# Patient Record
Sex: FEMALE | Race: WHITE | NOT HISPANIC OR LATINO | Employment: OTHER | URBAN - METROPOLITAN AREA
[De-identification: names, ages, dates, MRNs, and addresses within clinical notes are randomized per-mention and may not be internally consistent; named-entity substitution may affect disease eponyms.]

---

## 2017-01-01 ENCOUNTER — APPOINTMENT (OUTPATIENT)
Dept: GENERAL RADIOLOGY | Facility: HOSPITAL | Age: 82
End: 2017-01-01

## 2017-01-01 LAB
ALBUMIN SERPL-MCNC: 3.8 G/DL (ref 3.5–5.2)
ALBUMIN/GLOB SERPL: 1.5 G/DL
ALP SERPL-CCNC: 40 U/L (ref 39–117)
ALT SERPL W P-5'-P-CCNC: 17 U/L (ref 1–33)
ANION GAP SERPL CALCULATED.3IONS-SCNC: 14.2 MMOL/L
AST SERPL-CCNC: 18 U/L (ref 1–32)
BASOPHILS # BLD AUTO: 0.02 10*3/MM3 (ref 0–0.2)
BASOPHILS NFR BLD AUTO: 0.4 % (ref 0–1.5)
BILIRUB SERPL-MCNC: 0.3 MG/DL (ref 0.1–1.2)
BUN BLD-MCNC: 50 MG/DL (ref 8–23)
BUN/CREAT SERPL: 31.4 (ref 7–25)
CALCIUM SPEC-SCNC: 9.8 MG/DL (ref 8.6–10.5)
CHLORIDE SERPL-SCNC: 98 MMOL/L (ref 98–107)
CO2 SERPL-SCNC: 28.8 MMOL/L (ref 22–29)
CREAT BLD-MCNC: 1.59 MG/DL (ref 0.57–1)
DEPRECATED RDW RBC AUTO: 55 FL (ref 37–54)
EOSINOPHIL # BLD AUTO: 0.02 10*3/MM3 (ref 0–0.7)
EOSINOPHIL NFR BLD AUTO: 0.4 % (ref 0.3–6.2)
ERYTHROCYTE [DISTWIDTH] IN BLOOD BY AUTOMATED COUNT: 15.7 % (ref 11.7–13)
GFR SERPL CREATININE-BSD FRML MDRD: 31 ML/MIN/1.73
GLOBULIN UR ELPH-MCNC: 2.6 GM/DL
GLUCOSE BLD-MCNC: 95 MG/DL (ref 65–99)
HCT VFR BLD AUTO: 36.8 % (ref 35.6–45.5)
HGB BLD-MCNC: 11.5 G/DL (ref 11.9–15.5)
IMM GRANULOCYTES # BLD: 0 10*3/MM3 (ref 0–0.03)
IMM GRANULOCYTES NFR BLD: 0 % (ref 0–0.5)
LYMPHOCYTES # BLD AUTO: 0.69 10*3/MM3 (ref 0.9–4.8)
LYMPHOCYTES NFR BLD AUTO: 13.8 % (ref 19.6–45.3)
MAGNESIUM SERPL-MCNC: 2.6 MG/DL (ref 1.6–2.4)
MCH RBC QN AUTO: 30.3 PG (ref 26.9–32)
MCHC RBC AUTO-ENTMCNC: 31.3 G/DL (ref 32.4–36.3)
MCV RBC AUTO: 97.1 FL (ref 80.5–98.2)
MONOCYTES # BLD AUTO: 0.49 10*3/MM3 (ref 0.2–1.2)
MONOCYTES NFR BLD AUTO: 9.8 % (ref 5–12)
NEUTROPHILS # BLD AUTO: 3.79 10*3/MM3 (ref 1.9–8.1)
NEUTROPHILS NFR BLD AUTO: 75.6 % (ref 42.7–76)
PLATELET # BLD AUTO: 228 10*3/MM3 (ref 140–500)
PMV BLD AUTO: 10.4 FL (ref 6–12)
POTASSIUM BLD-SCNC: 4.6 MMOL/L (ref 3.5–5.2)
PROT SERPL-MCNC: 6.4 G/DL (ref 6–8.5)
RBC # BLD AUTO: 3.79 10*6/MM3 (ref 3.9–5.2)
SODIUM BLD-SCNC: 141 MMOL/L (ref 136–145)
TROPONIN T SERPL-MCNC: 0.02 NG/ML (ref 0–0.03)
WBC NRBC COR # BLD: 5.01 10*3/MM3 (ref 4.5–10.7)

## 2017-01-01 PROCEDURE — 84484 ASSAY OF TROPONIN QUANT: CPT | Performed by: EMERGENCY MEDICINE

## 2017-01-01 PROCEDURE — 71010 HC CHEST PA OR AP: CPT

## 2017-01-01 PROCEDURE — 82550 ASSAY OF CK (CPK): CPT | Performed by: HOSPITALIST

## 2017-01-01 PROCEDURE — 99284 EMERGENCY DEPT VISIT MOD MDM: CPT

## 2017-01-01 PROCEDURE — 83735 ASSAY OF MAGNESIUM: CPT | Performed by: EMERGENCY MEDICINE

## 2017-01-01 PROCEDURE — 93010 ELECTROCARDIOGRAM REPORT: CPT | Performed by: INTERNAL MEDICINE

## 2017-01-01 PROCEDURE — 85025 COMPLETE CBC W/AUTO DIFF WBC: CPT | Performed by: EMERGENCY MEDICINE

## 2017-01-01 PROCEDURE — 93005 ELECTROCARDIOGRAM TRACING: CPT

## 2017-01-01 PROCEDURE — 80053 COMPREHEN METABOLIC PANEL: CPT | Performed by: EMERGENCY MEDICINE

## 2017-01-01 RX ORDER — SODIUM CHLORIDE 0.9 % (FLUSH) 0.9 %
10 SYRINGE (ML) INJECTION AS NEEDED
Status: DISCONTINUED | OUTPATIENT
Start: 2017-01-01 | End: 2017-01-09 | Stop reason: HOSPADM

## 2017-01-02 ENCOUNTER — HOSPITAL ENCOUNTER (INPATIENT)
Facility: HOSPITAL | Age: 82
LOS: 7 days | Discharge: NURSING, REHAB OR LONG-TERM CARE | End: 2017-01-09
Attending: EMERGENCY MEDICINE | Admitting: HOSPITALIST

## 2017-01-02 ENCOUNTER — APPOINTMENT (OUTPATIENT)
Dept: CARDIOLOGY | Facility: HOSPITAL | Age: 82
End: 2017-01-02
Attending: HOSPITALIST

## 2017-01-02 ENCOUNTER — APPOINTMENT (OUTPATIENT)
Dept: CT IMAGING | Facility: HOSPITAL | Age: 82
End: 2017-01-02

## 2017-01-02 DIAGNOSIS — R53.1 GENERALIZED WEAKNESS: ICD-10-CM

## 2017-01-02 DIAGNOSIS — N28.9 ACUTE RENAL INSUFFICIENCY: Primary | ICD-10-CM

## 2017-01-02 DIAGNOSIS — E86.0 DEHYDRATION: ICD-10-CM

## 2017-01-02 PROBLEM — E46 PROTEIN CALORIE MALNUTRITION (HCC): Status: ACTIVE | Noted: 2017-01-02

## 2017-01-02 PROBLEM — N18.30 CKD (CHRONIC KIDNEY DISEASE) STAGE 3, GFR 30-59 ML/MIN (HCC): Status: ACTIVE | Noted: 2017-01-02

## 2017-01-02 PROBLEM — F32.A DEPRESSION: Status: ACTIVE | Noted: 2017-01-02

## 2017-01-02 LAB
BH CV LOW VAS LEFT GASTRONEMIUS VESSEL: 1
BH CV LOW VAS LEFT PERONEAL VESSEL: 1
BH CV LOW VAS LEFT POPLITEAL SPONT: 1
BH CV LOW VAS RIGHT GASTRONEMIUS VESSEL: 1
BH CV LOW VAS RIGHT PERONEAL VESSEL: 1
BH CV LOW VAS RIGHT POPLITEAL SPONT: 1
BH CV LOW VAS RIGHT POSTERIOR TIBIAL VESSEL: 1
BH CV LOWER VASCULAR LEFT COMMON FEMORAL AUGMENT: NORMAL
BH CV LOWER VASCULAR LEFT COMMON FEMORAL COMPETENT: NORMAL
BH CV LOWER VASCULAR LEFT COMMON FEMORAL COMPRESS: NORMAL
BH CV LOWER VASCULAR LEFT COMMON FEMORAL PHASIC: NORMAL
BH CV LOWER VASCULAR LEFT COMMON FEMORAL SPONT: NORMAL
BH CV LOWER VASCULAR LEFT DISTAL FEMORAL COMPRESS: NORMAL
BH CV LOWER VASCULAR LEFT GASTRONEMIUS COMPRESS: NORMAL
BH CV LOWER VASCULAR LEFT GASTRONEMIUS THROMBUS: NORMAL
BH CV LOWER VASCULAR LEFT GREATER SAPH AK COMPRESS: NORMAL
BH CV LOWER VASCULAR LEFT GREATER SAPH BK COMPRESS: NORMAL
BH CV LOWER VASCULAR LEFT MID FEMORAL AUGMENT: NORMAL
BH CV LOWER VASCULAR LEFT MID FEMORAL COMPETENT: NORMAL
BH CV LOWER VASCULAR LEFT MID FEMORAL COMPRESS: NORMAL
BH CV LOWER VASCULAR LEFT MID FEMORAL PHASIC: NORMAL
BH CV LOWER VASCULAR LEFT MID FEMORAL SPONT: NORMAL
BH CV LOWER VASCULAR LEFT PERONEAL COMPRESS: NORMAL
BH CV LOWER VASCULAR LEFT PERONEAL THROMBUS: NORMAL
BH CV LOWER VASCULAR LEFT POPLITEAL AUGMENT: NORMAL
BH CV LOWER VASCULAR LEFT POPLITEAL COMPETENT: NORMAL
BH CV LOWER VASCULAR LEFT POPLITEAL COMPRESS: NORMAL
BH CV LOWER VASCULAR LEFT POPLITEAL PHASIC: NORMAL
BH CV LOWER VASCULAR LEFT POPLITEAL SPONT: NORMAL
BH CV LOWER VASCULAR LEFT POPLITEAL THROMBUS: NORMAL
BH CV LOWER VASCULAR LEFT PROXIMAL FEMORAL COMPRESS: NORMAL
BH CV LOWER VASCULAR LEFT SAPHENOFEMORAL JUNCTION AUGMENT: NORMAL
BH CV LOWER VASCULAR LEFT SAPHENOFEMORAL JUNCTION COMPETENT: NORMAL
BH CV LOWER VASCULAR LEFT SAPHENOFEMORAL JUNCTION COMPRESS: NORMAL
BH CV LOWER VASCULAR LEFT SAPHENOFEMORAL JUNCTION PHASIC: NORMAL
BH CV LOWER VASCULAR LEFT SAPHENOFEMORAL JUNCTION SPONT: NORMAL
BH CV LOWER VASCULAR RIGHT COMMON FEMORAL AUGMENT: NORMAL
BH CV LOWER VASCULAR RIGHT COMMON FEMORAL COMPETENT: NORMAL
BH CV LOWER VASCULAR RIGHT COMMON FEMORAL COMPRESS: NORMAL
BH CV LOWER VASCULAR RIGHT COMMON FEMORAL PHASIC: NORMAL
BH CV LOWER VASCULAR RIGHT COMMON FEMORAL SPONT: NORMAL
BH CV LOWER VASCULAR RIGHT DISTAL FEMORAL COMPRESS: NORMAL
BH CV LOWER VASCULAR RIGHT GASTRONEMIUS COMPRESS: NORMAL
BH CV LOWER VASCULAR RIGHT GASTRONEMIUS THROMBUS: NORMAL
BH CV LOWER VASCULAR RIGHT GREATER SAPH AK COMPRESS: NORMAL
BH CV LOWER VASCULAR RIGHT GREATER SAPH BK COMPRESS: NORMAL
BH CV LOWER VASCULAR RIGHT MID FEMORAL AUGMENT: NORMAL
BH CV LOWER VASCULAR RIGHT MID FEMORAL COMPETENT: NORMAL
BH CV LOWER VASCULAR RIGHT MID FEMORAL COMPRESS: NORMAL
BH CV LOWER VASCULAR RIGHT MID FEMORAL PHASIC: NORMAL
BH CV LOWER VASCULAR RIGHT MID FEMORAL SPONT: NORMAL
BH CV LOWER VASCULAR RIGHT PERONEAL COMPRESS: NORMAL
BH CV LOWER VASCULAR RIGHT PERONEAL THROMBUS: NORMAL
BH CV LOWER VASCULAR RIGHT POPLITEAL AUGMENT: NORMAL
BH CV LOWER VASCULAR RIGHT POPLITEAL COMPETENT: NORMAL
BH CV LOWER VASCULAR RIGHT POPLITEAL COMPRESS: NORMAL
BH CV LOWER VASCULAR RIGHT POPLITEAL PHASIC: NORMAL
BH CV LOWER VASCULAR RIGHT POPLITEAL SPONT: NORMAL
BH CV LOWER VASCULAR RIGHT POPLITEAL THROMBUS: NORMAL
BH CV LOWER VASCULAR RIGHT POSTERIOR TIBIAL COMPRESS: NORMAL
BH CV LOWER VASCULAR RIGHT POSTERIOR TIBIAL THROMBUS: NORMAL
BH CV LOWER VASCULAR RIGHT PROXIMAL FEMORAL COMPRESS: NORMAL
BH CV LOWER VASCULAR RIGHT SAPHENOFEMORAL JUNCTION AUGMENT: NORMAL
BH CV LOWER VASCULAR RIGHT SAPHENOFEMORAL JUNCTION COMPETENT: NORMAL
BH CV LOWER VASCULAR RIGHT SAPHENOFEMORAL JUNCTION COMPRESS: NORMAL
BH CV LOWER VASCULAR RIGHT SAPHENOFEMORAL JUNCTION PHASIC: NORMAL
BH CV LOWER VASCULAR RIGHT SAPHENOFEMORAL JUNCTION SPONT: NORMAL
BILIRUB UR QL STRIP: NEGATIVE
CK SERPL-CCNC: 63 U/L (ref 20–180)
CLARITY UR: CLEAR
COLOR UR: YELLOW
GLUCOSE UR STRIP-MCNC: NEGATIVE MG/DL
HGB UR QL STRIP.AUTO: NEGATIVE
HOLD SPECIMEN: NORMAL
HOLD SPECIMEN: NORMAL
KETONES UR QL STRIP: ABNORMAL
LEUKOCYTE ESTERASE UR QL STRIP.AUTO: NEGATIVE
NITRITE UR QL STRIP: NEGATIVE
PH UR STRIP.AUTO: <=5 [PH] (ref 5–8)
PROT UR QL STRIP: NEGATIVE
SP GR UR STRIP: 1.01 (ref 1–1.03)
UROBILINOGEN UR QL STRIP: ABNORMAL
WHOLE BLOOD HOLD SPECIMEN: NORMAL
WHOLE BLOOD HOLD SPECIMEN: NORMAL

## 2017-01-02 PROCEDURE — 81003 URINALYSIS AUTO W/O SCOPE: CPT | Performed by: EMERGENCY MEDICINE

## 2017-01-02 PROCEDURE — 93970 EXTREMITY STUDY: CPT

## 2017-01-02 PROCEDURE — 74176 CT ABD & PELVIS W/O CONTRAST: CPT

## 2017-01-02 PROCEDURE — 25010000002 ENOXAPARIN PER 10 MG: Performed by: HOSPITALIST

## 2017-01-02 RX ORDER — SACCHAROMYCES BOULARDII 250 MG
250 CAPSULE ORAL 2 TIMES DAILY
Status: DISCONTINUED | OUTPATIENT
Start: 2017-01-02 | End: 2017-01-03

## 2017-01-02 RX ORDER — POLYETHYLENE GLYCOL 3350 17 G/17G
17 POWDER, FOR SOLUTION ORAL AS NEEDED
COMMUNITY
End: 2017-04-21

## 2017-01-02 RX ORDER — SODIUM CHLORIDE 9 MG/ML
75 INJECTION, SOLUTION INTRAVENOUS CONTINUOUS
Status: DISCONTINUED | OUTPATIENT
Start: 2017-01-02 | End: 2017-01-03

## 2017-01-02 RX ORDER — ESCITALOPRAM OXALATE 10 MG/1
10 TABLET ORAL DAILY
Status: DISCONTINUED | OUTPATIENT
Start: 2017-01-02 | End: 2017-01-04

## 2017-01-02 RX ORDER — ONDANSETRON 2 MG/ML
4 INJECTION INTRAMUSCULAR; INTRAVENOUS EVERY 6 HOURS PRN
Status: DISCONTINUED | OUTPATIENT
Start: 2017-01-02 | End: 2017-01-09 | Stop reason: HOSPADM

## 2017-01-02 RX ORDER — MIRTAZAPINE 15 MG/1
15 TABLET, FILM COATED ORAL NIGHTLY
Status: DISCONTINUED | OUTPATIENT
Start: 2017-01-02 | End: 2017-01-09 | Stop reason: HOSPADM

## 2017-01-02 RX ORDER — ACETAMINOPHEN 325 MG/1
650 TABLET ORAL EVERY 6 HOURS PRN
Status: DISCONTINUED | OUTPATIENT
Start: 2017-01-02 | End: 2017-01-09 | Stop reason: HOSPADM

## 2017-01-02 RX ORDER — BENAZEPRIL HYDROCHLORIDE 20 MG/1
20 TABLET ORAL
Status: DISCONTINUED | OUTPATIENT
Start: 2017-01-02 | End: 2017-01-03

## 2017-01-02 RX ORDER — SODIUM CHLORIDE 0.9 % (FLUSH) 0.9 %
10 SYRINGE (ML) INJECTION AS NEEDED
Status: DISCONTINUED | OUTPATIENT
Start: 2017-01-02 | End: 2017-01-09 | Stop reason: HOSPADM

## 2017-01-02 RX ADMIN — MIRTAZAPINE 15 MG: 15 TABLET, FILM COATED ORAL at 20:14

## 2017-01-02 RX ADMIN — SODIUM CHLORIDE 100 ML/HR: 9 INJECTION, SOLUTION INTRAVENOUS at 04:39

## 2017-01-02 RX ADMIN — BENAZEPRIL HYDROCHLORIDE 20 MG: 20 TABLET ORAL at 13:10

## 2017-01-02 RX ADMIN — AMPICILLIN AND SULBACTAM 1.5 G: 1; .5 INJECTION, POWDER, FOR SOLUTION INTRAMUSCULAR; INTRAVENOUS at 21:41

## 2017-01-02 RX ADMIN — Medication 250 MG: at 13:09

## 2017-01-02 RX ADMIN — Medication 250 MG: at 17:07

## 2017-01-02 RX ADMIN — ENOXAPARIN SODIUM 50 MG: 60 INJECTION SUBCUTANEOUS at 16:07

## 2017-01-02 RX ADMIN — SODIUM CHLORIDE 75 ML/HR: 9 INJECTION, SOLUTION INTRAVENOUS at 17:07

## 2017-01-02 RX ADMIN — ESCITALOPRAM 10 MG: 10 TABLET, FILM COATED ORAL at 13:10

## 2017-01-02 RX ADMIN — AMPICILLIN AND SULBACTAM 1.5 G: 1; .5 INJECTION, POWDER, FOR SOLUTION INTRAMUSCULAR; INTRAVENOUS at 13:09

## 2017-01-02 RX ADMIN — SODIUM CHLORIDE 500 ML: 9 INJECTION, SOLUTION INTRAVENOUS at 01:55

## 2017-01-02 NOTE — ED PROVIDER NOTES
I supervised care provided by the midlevel provider.    We have discussed this patient's history, physical exam, and treatment plan.   I have reviewed the note and personally saw and examined the patient and agree with the plan of care.    Pt with increased weakness and fatigue over the past few weeks. She denies any CP or SOA, nor does she have any other complaints. Pt was sent from NH today for evaluation of her hypotension.     On exam, pt is anxious, but A&Ox3. Heart is RRR and lungs are CTAB. Abd is soft and nontender. Extremities are unremarkable.     Labs show evidence of renal failure. She will be admitted for further workup evaluation.       Documentation assistance provided by beckie Alicea for Dr. Grajeda.  Information recorded by the scribe was done at my direction and has been verified and validated by me.       Shane Alicea  01/02/17 0120       Levi Grajeda MD  01/02/17 0703

## 2017-01-02 NOTE — PLAN OF CARE
Problem: Patient Care Overview (Adult)  Goal: Plan of Care Review  Outcome: Outcome(s) achieved Date Met:  01/02/17 01/02/17 0427   Coping/Psychosocial Response Interventions   Plan Of Care Reviewed With patient   Patient Care Overview   Progress no change   Outcome Evaluation   Outcome Summary/Follow up Plan admitted for weakness, DHN, Acute renal insufficiency, plan of care initiated, falls precaution, on bed alarm, for further care       Goal: Adult Individualization and Mutuality  Outcome: Ongoing (interventions implemented as appropriate)  Goal: Discharge Needs Assessment  Outcome: Ongoing (interventions implemented as appropriate)    Problem: Renal Failure/Kidney Injury, Acute (Adult)  Goal: Signs and Symptoms of Listed Potential Problems Will be Absent or Manageable (Renal Failure/Kidney Injury, Acute)  Outcome: Ongoing (interventions implemented as appropriate)    Problem: Fall Risk (Adult)  Goal: Identify Related Risk Factors and Signs and Symptoms  Outcome: Outcome(s) achieved Date Met:  01/02/17  Goal: Absence of Falls  Outcome: Ongoing (interventions implemented as appropriate)

## 2017-01-02 NOTE — PROGRESS NOTES
Chart was reviewed.  Attempted to see patient. She had 4-5 guest in the room.  She was eating a little but not much. Access Center will return later.

## 2017-01-02 NOTE — ED PROVIDER NOTES
EMERGENCY DEPARTMENT ENCOUNTER    CHIEF COMPLAINT  Chief Complaint: Weakness  History given by: Patient  History limited by: Age  Room Number: P690/1  PMD: Rafi Gtz MD      HPI:  Pt is a 86 y.o. female who presents via EMS complaining of worsening generalized weakness over the past few weeks with associated decreased appetite. The patient reports that she was living at [facility] and the nurse noted that she was hypotensive so they sent her to the ED for further evaluation. The patient denies chest pain, cough or SOA and she has no other complaints. Patient is a poor historian.     Duration:  Weeks  Onset: Gradual   Timing: Constant  Location: Generalized  Radiation: None  Quality: Weakness  Intensity/Severity: Moderate  Progression: Worsening  Associated Symptoms: Weakness  Aggravating Factors: Exertion  Alleviating Factors: Rest  Previous Episodes: Yes  Treatment before arrival: None    PAST MEDICAL HISTORY  Active Ambulatory Problems     Diagnosis Date Noted   • Hypercholesterolemia 02/03/2016   • Hypertension 02/03/2016   • Valvular heart disease 02/03/2016   • Swelling of limb, left 03/29/2016   • Cellulitis and abscess of leg 04/04/2016   • MVP (mitral valve prolapse) 05/16/2016   • Mitral regurgitation 05/16/2016   • Bilateral carotid bruits 05/16/2016   • Encounter for immunization 09/15/2016   • Routine health maintenance 09/15/2016     Resolved Ambulatory Problems     Diagnosis Date Noted   • No Resolved Ambulatory Problems     Past Medical History   Diagnosis Date   • Hyperlipidemia    • Macular degeneration    • Mitral valve prolapse    • Zoster        PAST SURGICAL HISTORY  Past Surgical History   Procedure Laterality Date   • Skin cancer excision         FAMILY HISTORY  Family History   Problem Relation Age of Onset   • Heart attack Mother    • Hypertension Mother    • Heart attack Father    • Hypertension Father    • Heart attack Brother 58   • Hypertension Brother        SOCIAL  HISTORY  Social History     Social History   • Marital status:      Spouse name: N/A   • Number of children: N/A   • Years of education: N/A     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   • Smoking status: Former Smoker     Quit date: 1977   • Smokeless tobacco: Not on file   • Alcohol use No   • Drug use: No   • Sexual activity: Defer     Other Topics Concern   • Not on file     Social History Narrative       ALLERGIES  Zoloft [sertraline hcl] and Erythromycin    REVIEW OF SYSTEMS  Review of Systems   Constitutional: Positive for appetite change (Dec). Negative for chills and fatigue.   HENT: Negative for congestion, rhinorrhea and sore throat.    Eyes: Negative for pain.   Respiratory: Negative for cough, chest tightness, shortness of breath and wheezing.    Cardiovascular: Negative for chest pain, palpitations and leg swelling.   Gastrointestinal: Negative for abdominal pain, diarrhea, nausea and vomiting.   Genitourinary: Negative for difficulty urinating, dysuria, flank pain and frequency.   Musculoskeletal: Negative for arthralgias, myalgias, neck pain and neck stiffness.   Skin: Negative for rash.   Neurological: Positive for weakness. Negative for dizziness, speech difficulty, light-headedness, numbness and headaches.   Psychiatric/Behavioral: Negative.    All other systems reviewed and are negative.      PHYSICAL EXAM  ED Triage Vitals   Temp Heart Rate Resp BP SpO2   01/01/17 1949 01/01/17 1947 01/01/17 1947 01/01/17 1947 01/01/17 1947   97.5 °F (36.4 °C) 77 18 106/56 100 %      Temp src Heart Rate Source Patient Position BP Location FiO2 (%)   01/01/17 1949 01/01/17 1947 -- -- --   Tympanic Monitor          Physical Exam   Constitutional: She is oriented to person, place, and time and well-developed, well-nourished, and in no distress. No distress.   HENT:   Head: Normocephalic.   Eyes: EOM are normal. Pupils are equal, round, and reactive to light.   Neck: Normal range of  motion.   Cardiovascular: Normal rate and regular rhythm.    Murmur heard.   Systolic (Soft. Kimball at the apex. ) murmur is present with a grade of 3/6   Pulmonary/Chest: Effort normal and breath sounds normal. No respiratory distress.   Abdominal: Soft. There is no tenderness. There is no rebound and no guarding.   Musculoskeletal: Normal range of motion. She exhibits no edema.   Neurological: She is alert and oriented to person, place, and time.   Skin: Skin is warm and dry. No rash noted.   Psychiatric: Mood and affect normal.   Nursing note and vitals reviewed.      LAB RESULTS  Lab Results (last 24 hours)     Procedure Component Value Units Date/Time    CBC & Differential [[id number]] Collected:  01/01/17 2110    Specimen:  Blood Updated:  01/01/17 2123    Narrative:       The following orders were created for panel order CBC & Differential.  Procedure                               Abnormality         Status                     ---------                               -----------         ------                     CBC Auto Differential[[id number]]         Abnormal            Final result                 Please view results for these tests on the individual orders.    Comprehensive Metabolic Panel [[id number]]  (Abnormal) Collected:  01/01/17 2110    Specimen:  Blood Updated:  01/01/17 2150     Glucose 95 mg/dL      BUN 50 (H) mg/dL      Creatinine 1.59 (H) mg/dL      Sodium 141 mmol/L      Potassium 4.6 mmol/L      Chloride 98 mmol/L      CO2 28.8 mmol/L      Calcium 9.8 mg/dL      Total Protein 6.4 g/dL      Albumin 3.80 g/dL      ALT (SGPT) 17 U/L      AST (SGOT) 18 U/L      Alkaline Phosphatase 40 U/L      Total Bilirubin 0.3 mg/dL      eGFR Non African Amer 31 (L) mL/min/1.73      Globulin 2.6 gm/dL      A/G Ratio 1.5 g/dL      BUN/Creatinine Ratio 31.4 (H)      Anion Gap 14.2 mmol/L     Narrative:       The MDRD GFR formula is only valid for adults with stable renal function between ages 18 and 70.    Troponin  [[id number]]  (Normal) Collected:  01/01/17 2110    Specimen:  Blood Updated:  01/01/17 2201     Troponin T 0.016 ng/mL     Narrative:       Troponin T Reference Ranges:  Less than 0.03 ng/mL:    Negative for AMI  0.03 to 0.09 ng/mL:      Indeterminant for AMI  Greater than 0.09 ng/mL: Positive for AMI    Magnesium [[id number]]  (Abnormal) Collected:  01/01/17 2110    Specimen:  Blood Updated:  01/01/17 2147     Magnesium 2.6 (H) mg/dL     CBC Auto Differential [[id number]]  (Abnormal) Collected:  01/01/17 2110    Specimen:  Blood Updated:  01/01/17 2123     WBC 5.01 10*3/mm3      RBC 3.79 (L) 10*6/mm3      Hemoglobin 11.5 (L) g/dL      Hematocrit 36.8 %      MCV 97.1 fL      MCH 30.3 pg      MCHC 31.3 (L) g/dL      RDW 15.7 (H) %      RDW-SD 55.0 (H) fl      MPV 10.4 fL      Platelets 228 10*3/mm3      Neutrophil % 75.6 %      Lymphocyte % 13.8 (L) %      Monocyte % 9.8 %      Eosinophil % 0.4 %      Basophil % 0.4 %      Immature Grans % 0.0 %      Neutrophils, Absolute 3.79 10*3/mm3      Lymphocytes, Absolute 0.69 (L) 10*3/mm3      Monocytes, Absolute 0.49 10*3/mm3      Eosinophils, Absolute 0.02 10*3/mm3      Basophils, Absolute 0.02 10*3/mm3      Immature Grans, Absolute 0.00 10*3/mm3     CK [[id number]]  (Normal) Collected:  01/01/17 2110    Specimen:  Blood Updated:  01/02/17 0333     Creatine Kinase 63 U/L     Urinalysis With / Culture If Indicated [[id number]]  (Abnormal) Collected:  01/02/17 0217    Specimen:  Urine from Urine, Catheter Updated:  01/02/17 0229     Color, UA Yellow      Appearance, UA Clear      pH, UA <=5.0      Specific Gravity, UA 1.015      Glucose, UA Negative      Ketones, UA Trace (A)      Bilirubin, UA Negative      Blood, UA Negative      Protein, UA Negative      Leuk Esterase, UA Negative      Nitrite, UA Negative      Urobilinogen, UA 0.2 E.U./dL     Narrative:       Urine microscopic not indicated.          I ordered the above labs and reviewed the results    RADIOLOGY  XR Chest 1 View    Final Result         0101  Reviewed CXR - The lungs are well-expanded and clear and the heart size is normal. There is no acute disease or change from 12/20/2016.. Independently viewed by me. Interpreted by radiologist.     I ordered the above noted radiological studies. Interpreted by radiologist.  Reviewed by me in PACS.       PROCEDURES  Procedures  See 's note for the EKG reading.     PROGRESS AND CONSULTS  ED Course     1952  Ordered EKG, Labs and CXR for further evaluation. Also ordered IVF for hydration.     0104  Discussed case with  and he agrees with the treatment plan.     0119  Discussed case with Dr. Diallo. Reviewed history, exam, results and treatments.  Discussed concerns and plan of care.  accepts pt to be admitted.      MEDICAL DECISION MAKING  Results were reviewed/discussed with the patient and they were also made aware of online access. Pt also made aware that some labs, such as cultures, will not be resulted during ER visit and follow up with PMD is necessary.     MDM  Number of Diagnoses or Management Options     Amount and/or Complexity of Data Reviewed  Clinical lab tests: ordered and reviewed  Tests in the radiology section of CPT®: ordered and reviewed  Tests in the medicine section of CPT®: ordered and reviewed  Decide to obtain previous medical records or to obtain history from someone other than the patient: yes  Review and summarize past medical records: yes (Patient was seen in ED 12/10/16 for weakness. Five previous visits in December 2016 )    Patient Progress  Patient progress: stable         DIAGNOSIS  Final diagnoses:   Acute renal insufficiency   Dehydration   Generalized weakness       DISPOSITION  ADMISSION    Discussed treatment plan and reason for admission with pt/family and admitting physician.  Pt/family voiced understanding of the plan for admission for further testing/treatment as needed.         Latest Documented Vital Signs:  As of 1:39  AM  BP- 127/73 HR- 80 Temp- 96.3 °F (35.7 °C) O2 sat- 94%    --  Documentation assistance provided by beckie Haile for Nick Grace PA-C.  Information recorded by the scribe was done at my direction and has been verified and validated by me.         Bruce Haile  01/02/17 0140       BLAKE Roque III  01/02/17 0359

## 2017-01-02 NOTE — PROGRESS NOTES
Discharge Planning Assessment  [facility]     Patient Name: Carito Gonzalez  MRN: [id number]  Today's Date: 1/2/2017    Admit Date: 1/2/2017          Discharge Needs Assessment       01/02/17 1437    Living Environment    Lives With alone    Living Arrangements assisted living    Provides Primary Care For no one, unable/limited ability to care for self    Quality Of Family Relationships supportive    Able to Return to Prior Living Arrangements yes    Discharge Needs Assessment    Concerns To Be Addressed adjustment to diagnosis/illness concerns    Readmission Within The Last 30 Days no previous admission in last 30 days    Anticipated Changes Related to Illness inability to care for self    Equipment Currently Used at Home walker, rolling    Equipment Needed After Discharge none    Discharge Facility/Level Of Care Needs assisted living facility v/s nursing facility, skilled    Transportation Available car;family or friend will provide            Discharge Plan       01/02/17 1640    Case Management/Social Work Plan    Plan Return back to [facility] at discharge    Patient/Family In Agreement With Plan other (see comments)    Additional Comments Pt confirmed the PCP is correct, she said she uses [facility] for mail order pharmacy but locally uses [facility] on [address], pt said she is IADL, ambulates with a walker, no longer drives due to macular degeneration has never had HH and hopes to return to [facility] (Monica Appiah notified) at discharge.  Pt said she did not want me to make her a (LACE) follow-up with her PCP due to her son or dtr will have to take her and she will make her appointment when they can take her. I left HIPPA compliant voice mail for pt's dtr Violet ([phone]) with request for return call to review any discharge concerns        Discharge Placement     No information found                Demographic Summary       01/02/17 1436    Referral Information    Admission Type inpatient     Arrived From admitted as an inpatient;other (see comments)    Referral Source admission list    Reason For Consult discharge planning    Contact Information    Permission Granted to Share Information With family/designee;facility             Functional Status       01/02/17 1437    Functional Status Current    Ambulation 2-->assistive person    Transferring 2-->assistive person    Toileting 2-->assistive person    Bathing 2-->assistive person    Dressing 2-->assistive person    Eating 0-->independent    Communication 0-->understands/communicates without difficulty    Swallowing (if score 2 or more for any item, consult Rehab Services) 0-->swallows foods/liquids without difficulty    Change in Functional Status Since Onset of Current Illness/Injury yes    Functional Status Prior    Ambulation 1-->assistive equipment    Transferring 1-->assistive equipment    Toileting 1-->assistive equipment    Bathing 1-->assistive equipment    Dressing 0-->independent    Eating 0-->independent    Communication 0-->understands/communicates without difficulty    Swallowing 0-->swallows foods/liquids without difficulty         Patient Forms       01/02/17 1439    Patient Forms    Provider Choice List Delivered    Delivered to Patient    Method of delivery In person          Claribel Vegas RN

## 2017-01-02 NOTE — PROGRESS NOTES
Bilateral leg venous doppler study preliminary report: positive for dvt and calf vein thrombosis in both legs.  Called report to Dannielle.

## 2017-01-02 NOTE — ED NOTES
Patient and nursing home states that patient has been more weak today and states that she can normally walk well by herself with a walker and today is very weak.      Mana Thomas RN  01/01/17 1953

## 2017-01-02 NOTE — PROGRESS NOTES
Pharmacy consult for Lovenox dosing    Carito Gonzalez is a 86 y.o. female 99 lb 8 oz (45.1 kg).    Pharmacy consulted to dose per Dr. Davila  Indication: Bilateral DVT treatment    Estimated Creatinine Clearance: 18.1 mL/min (by C-G formula based on Cr of 1.59).  Body mass index is 17.97 kg/(m^2).    CREATININE   Date Value Ref Range Status   01/01/2017 1.59 (H) 0.57 - 1.00 mg/dL Final     PLATELETS   Date Value Ref Range Status   01/01/2017 228 140 - 500 10*3/mm3 Final     No results found for: INR  PLAN:  Will start Lovenox 50 mg (~1mg/kg) sq Q 24 hr.  Will monitor and follow daily. Seeing patient is 86 & crcl is well below 30ml/min this is the dose patient will be on throughout therapy.      Thank you for the consult.    Rafi Sage RPH  01/02/17 2:23 PM

## 2017-01-02 NOTE — PLAN OF CARE
Problem: Patient Care Overview (Adult)  Goal: Plan of Care Review    01/02/17 1649   Outcome Evaluation   Outcome Summary/Follow up Plan Vitals stable. Pt medicated with Lovenox per order for DVT treatment. Family at bedside most of day. Decreased appetite. Went for CT abdomen, results pending. Falls precautions maintained, frequent weight shift, heels elevated off bed.        Goal: Adult Individualization and Mutuality  Outcome: Ongoing (interventions implemented as appropriate)  Goal: Discharge Needs Assessment  Outcome: Ongoing (interventions implemented as appropriate)    Problem: Renal Failure/Kidney Injury, Acute (Adult)  Goal: Signs and Symptoms of Listed Potential Problems Will be Absent or Manageable (Renal Failure/Kidney Injury, Acute)  Outcome: Ongoing (interventions implemented as appropriate)    Problem: Fall Risk (Adult)  Goal: Absence of Falls  Outcome: Ongoing (interventions implemented as appropriate)    Problem: Skin Integrity Impairment, Risk/Actual (Adult)  Goal: Identify Related Risk Factors and Signs and Symptoms  Outcome: Ongoing (interventions implemented as appropriate)  Goal: Skin Integrity/Wound Healing  Outcome: Ongoing (interventions implemented as appropriate)

## 2017-01-02 NOTE — IP AVS SNAPSHOT
INTER-FACILITY TRANSFER   AFTER VISIT SUMMARY             Carito Gonzalez            Summary of Your Hospitalization        About Your Hospitalization     You were admitted on:  January 2, 2017 You last received care in the:  [address]      Reason for Hospitalization     Your primary diagnosis was:  Acute Renal Insufficiency    Your diagnoses also included:  Ckd (Chronic Kidney Disease) Stage 3, Gfr 30-59 Ml/Min, Mitral Valve Prolapse, High Blood Pressure, Blood Clot In Vein, Depression, Protein Calorie Malnutrition      Care Providers     Provider Service Role Specialty    Israel Ariza MD Medicine Attending Provider Internal Medicine    Eb Andino III, MD -- Consulting Physician  Psychiatry      Your Allergies  Date Reviewed: 1/2/2017    Allergen Reactions    Zoloft (Sertraline Hcl) Shortness Of Breath         Erythromycin Swelling       Medications    Based on the information you provided to us as well as any changes during this visit, the following is your updated medication list.  This is subject to change based on the care your receive at the receiving facility.  You should receive a new list once you are discharged.      If you have any questions or concerns, contact your primary care physician's office.             Your Medications      START taking these medications     acetaminophen 325 MG tablet   Take 2 tablets by mouth Every 6 (Six) Hours As Needed for mild pain (1-3).   Commonly known as:  TYLENOL           apixaban 2.5 MG tablet tablet   Take 1 tablet by mouth Every 12 (Twelve) Hours.   Last time this was given:  1/9/2017  3:20 PM   Commonly known as:  ELIQUIS           mirtazapine 15 MG tablet   Take 1 tablet by mouth Every Night.   Last time this was given:  1/8/2017  9:13 PM   Commonly known as:  REMERON             CONTINUE taking these medications     benazepril 40 MG tablet   Take 1 tablet by mouth daily.   Last time this was given:  1/9/2017   3:19 PM   Commonly known as:  LOTENSIN           COLACE PO   Take  by mouth.           escitalopram 10 MG tablet   Take 1 tablet by mouth Daily.   Last time this was given:  1/9/2017  3:20 PM   Commonly known as:  LEXAPRO           OCUTABS tablet   Take 1 tablet by mouth daily.           polyethylene glycol packet   Take 17 g by mouth Daily.   Last time this was given:  1/8/2017  9:13 AM   Commonly known as:  MIRALAX             STOP taking these medications     furosemide 40 MG tablet   Commonly known as:  LASIX           potassium chloride 20 MEQ packet   Commonly known as:  KLOR-CON                Where to Get Your Medications      Information about where to get these medications is not yet available     ! Ask your nurse or doctor about these medications     acetaminophen 325 MG tablet    apixaban 2.5 MG tablet tablet    mirtazapine 15 MG tablet                Additional Instructions    Information is subject to change based on the care your receive at the receiving facility.  If you have any questions or concerns, contact your primary care physician's office.           Follow-ups for After Discharge        Follow-up Information     Follow up with [facility] .    Specialty:  Skilled Nursing Facility    Contact information:    [address] [place] [id number] [phone]      Scheduled Appointments     Jan 24, 2017  3:40 PM EST   Office Visit with Rafi Gtz MD   [facility] PRIMARY CARE (--)    [address]  [facility] [id number] [phone]           Arrive 15 minutes prior to appointment.            Mar 13, 2017  8:20 AM EDT   Follow Up with Quin Magana MD   [facility] CARDIOLOGY (--)    [address]  [facility] [address]   [phone]           Arrive 15 minutes prior to appointment.            Mar 15, 2017  9:00 AM EDT   Office Visit with Rafi Gtz MD   [facility]  PRIMARY CARE (--)    [address]  [facility] [address]   [phone]           Arrive 15 minutes prior to appointment.              [employer] Signup Information     [employer] allows you to send messages to your doctor, view your test results, renew your prescriptions, schedule appointments, and more. To sign up, go to [employer] and click on the Sign Up Now link in the New User? box. Enter your [employer] Activation Code exactly as it appears below along with the last four digits of your Social Security Number and your Date of Birth () to complete the sign-up process. If you do not sign up before the expiration date, you must request a new code.    [employer] Activation Code: [email or web address]  Expires: 2017  2:54 PM    If you have questions, you can email [email or web address] or call [phone] to talk to our [employer] staff. Remember, [employer] is NOT to be used for urgent needs. For medical emergencies, dial 911.                     Patient Signature:  ____________________________________________________________    Date:  ____________________________________________________________

## 2017-01-02 NOTE — H&P
HISTORY AND PHYSICAL   [facility]        Patient Identification:  Name: Carito Gonzalez  Age: 86 y.o.  Sex: female  :  3/7/1930  MRN: [id number]                     Primary Care Physician: Rafi Gtz MD    Chief Complaint:  Hypotension     History of Present Illness:   Mrs Gonzalez is a pleasant 86-year-old female sent over from [facility].  Apparently the patient has had a rough turn of events over the last year.  She has been moved into a separate facility from her  and even spent time in the [place] for concerns over uncontrolled depression.  There were plans for her to see Dr. Veloz in consultation from a [facility]-psych standpoint though that has not happened just yet.  She has been started on Lexapro but is not had any major improvement.  The patient still has issues with decreased oral intake and is becoming more and more withdrawn.  She was actually sent over to the hospital for complaints of hypotension though her blood pressures seem well controlled here.  She was found to have some mild acute on chronic renal insufficiency and IV fluids were initiated.  I have discussed the case with the daughter over the phone and spent at least 20 minutes plus inquiring into past medical history.  The patient's not really voicing any complaints at this time though she is very soft-spoken and is not the most interactive at this time though she does not appear toxic either.  She denies any fever chills night sweats dysuria abdominal pain but admits to fatigue and decreased oral intake but denies any suicidal ideation.  She also complains about tenderness in her lower extremities more so on her left side.    Past Medical History:  Past Medical History   Diagnosis Date   • Hyperlipidemia    • Hypertension    • Macular degeneration      Noted 2014   • Mitral regurgitation    • Mitral valve prolapse    • Zoster      Past Surgical History:  Past Surgical History   Procedure Laterality  Date   • Skin cancer excision        Home Meds:  Prescriptions Prior to Admission   Medication Sig Dispense Refill Last Dose   • benazepril (LOTENSIN) 40 MG tablet Take 1 tablet by mouth daily. 90 tablet 1 12/2/2016 at Unknown time   • Docusate Sodium (COLACE PO) Take  by mouth.   12/23/2016 at Unknown time   • escitalopram (LEXAPRO) 10 MG tablet Take 1 tablet by mouth Daily. 30 tablet 0    • furosemide (LASIX) 40 MG tablet Take 0.5 tablets by mouth Daily. 30 tablet 0    • polyethylene glycol (MIRALAX) packet Take 17 g by mouth Daily.      • potassium chloride (KLOR-CON) 20 MEQ packet Take 10 mEq by mouth Daily. 30 packet 0    • Multiple Vitamins-Minerals (OCUTABS) tablet Take 1 tablet by mouth daily.   12/2/2016 at Unknown time       Allergies:  Allergies   Allergen Reactions   • Zoloft [Sertraline Hcl] Shortness Of Breath   • Erythromycin Swelling     Immunizations:  Immunization History   Administered Date(s) Administered   • Pneumococcal Conjugate 13-Valent 09/15/2016     Social History:   Social History     Social History Narrative     Social History     Social History   • Marital status:      Spouse name: N/A   • Number of children: N/A   • Years of education: N/A     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   • Smoking status: Former Smoker     Quit date: 1977   • Smokeless tobacco: Not on file   • Alcohol use No   • Drug use: No   • Sexual activity: Defer     Other Topics Concern   • Not on file     Social History Narrative       Family History:  Family History   Problem Relation Age of Onset   • Heart attack Mother    • Hypertension Mother    • Heart attack Father    • Hypertension Father    • Heart attack Brother 58   • Hypertension Brother         Review of Systems  See history of present illness and past medical history.  Patient denies any acute changes to vision smell taste or sound.  She denies any headache dizziness loss of consciousness or loss of function.  She admits to  "being more fatigued and having less interest in normal activities.  She denies any chest pain palpitations cough shortness of breath or abdominal or flank pain.  She denies any dysuria and admits to constipation.  She denies any new bruising bleeding or complete loss of function.  She missed a discomfort in her left lower extremity and heels.  Remainder of ROS is negative.    Objective:  tMax 24 hrs: Temp (24hrs), Av °F (36.1 °C), Min:96.3 °F (35.7 °C), Max:97.5 °F (36.4 °C)    Vitals Ranges:   Temp:  [96.3 °F (35.7 °C)-97.5 °F (36.4 °C)] 97.3 °F (36.3 °C)  Heart Rate:  [] 76  Resp:  [15-18] 16  BP: (106-163)/(56-84) 144/77      Exam:  Visit Vitals   • /77 (BP Location: Right arm, Patient Position: Lying)   • Pulse 76   • Temp 97.3 °F (36.3 °C) (Oral)   • Resp 16   • Ht 62.4\" (158.5 cm)   • Wt 99 lb 8 oz (45.1 kg)   • SpO2 95%   • BMI 17.97 kg/m2       General Appearance:    Alert, cooperative, no distress, very weak/withdrawn, cachexic, very soft spoken   Head:    Normocephalic, without obvious abnormality, atraumatic       Ears:    Normal external ear canals, both ears   Nose:   Nares normal, septum midline, mucosa normal, no drainage    or sinus tenderness   Throat:   Lips, mucosa, and tongue normal though mucus membranes are dry   Neck:   Supple, no meningismus or JVD       Lungs:     Clear to auscultation bilaterally, respirations unlabored   Chest Wall:    No tenderness or deformity    Heart:    Regular rate and rhythm, S1 and S2 normal, +mitral murmur   Abdomen:     Soft, non-tender, bowel sounds active all four quadrants,     no masses   Extremities:   Mild left leg cellulitis - warm/tender, no cyanosis or edema   Pulses:   2+ and symmetric all extremities           Neurologic:   CNII-XII intact, normal strength, no focal deficits      .    Data Review:  Labs in chart were reviewed.             Imaging Results (all)     Procedure Component Value Units Date/Time    XR Chest 1 View [[id number]] " Collected:  01/01/17 2129     Updated:  01/01/17 9610    Narrative:       ONE VIEW PORTABLE CHEST     HISTORY: Hypotension. Mitral valve prolapse.     The lungs are well-expanded and clear and the heart size is normal.  There is no acute disease or change from 12/20/2016.     This report was finalized on 1/1/2017 11:12 PM by Dr. Tai Pisano MD.               Assessment:  Principal Problem:    Acute renal insufficiency  Active Problems:    Hypertension    Cellulitis and abscess of leg    MVP (mitral valve prolapse)    CKD (chronic kidney disease) stage 3, GFR 30-59 ml/min    Depression    Protein calorie malnutrition      Plan:  A/CKD3 - hold lasix, reduce benazepril. Likely influenced from decreased oral intake compounded by depression   -gentle IVF   -check CT A/P    HTN w/ hypotension    Mild left leg cellulitis - trial Unasyn and Florastor for GI aversion. Check ultrasound to r/out DVT    Depression - resume Lexapro and add Remeron - concern is uncontrolled - denies SI - get Access to evaluate    -TSH 1.6    PCM - check prealbumin, nutrition consult, check anemia labs    Lovenox for DVT prophylaxis    PT/CCP - DC planning as may need to escalate care to assisted care    D/w daughter 20-25min - Code status DNR - I have concerns for overall longterm prognosis and discuss that w/ the daughter    Addendum - preliminary + for DVT - increase Lovenox to therapeutic dosing w/pharm to dose secondary to kidney function    Wallace Davila MD  1/2/2017  9:13 AM

## 2017-01-03 LAB
ALBUMIN SERPL-MCNC: 2.9 G/DL (ref 3.5–5.2)
ANION GAP SERPL CALCULATED.3IONS-SCNC: 11.9 MMOL/L
BUN BLD-MCNC: 26 MG/DL (ref 8–23)
BUN/CREAT SERPL: 31.3 (ref 7–25)
CALCIUM SPEC-SCNC: 8.4 MG/DL (ref 8.6–10.5)
CHLORIDE SERPL-SCNC: 107 MMOL/L (ref 98–107)
CO2 SERPL-SCNC: 24.1 MMOL/L (ref 22–29)
CREAT BLD-MCNC: 0.83 MG/DL (ref 0.57–1)
DEPRECATED RDW RBC AUTO: 55.8 FL (ref 37–54)
ERYTHROCYTE [DISTWIDTH] IN BLOOD BY AUTOMATED COUNT: 15.9 % (ref 11.7–13)
FERRITIN SERPL-MCNC: 71.73 NG/ML (ref 13–150)
GFR SERPL CREATININE-BSD FRML MDRD: 65 ML/MIN/1.73
GLUCOSE BLD-MCNC: 79 MG/DL (ref 65–99)
HCT VFR BLD AUTO: 33.2 % (ref 35.6–45.5)
HGB BLD-MCNC: 10.4 G/DL (ref 11.9–15.5)
IRON 24H UR-MRATE: 73 MCG/DL (ref 37–145)
IRON SATN MFR SERPL: 31 % (ref 20–50)
MAGNESIUM SERPL-MCNC: 2 MG/DL (ref 1.6–2.4)
MCH RBC QN AUTO: 30.5 PG (ref 26.9–32)
MCHC RBC AUTO-ENTMCNC: 31.3 G/DL (ref 32.4–36.3)
MCV RBC AUTO: 97.4 FL (ref 80.5–98.2)
PHOSPHATE SERPL-MCNC: 2.6 MG/DL (ref 2.5–4.5)
PLATELET # BLD AUTO: 190 10*3/MM3 (ref 140–500)
PMV BLD AUTO: 10.5 FL (ref 6–12)
POTASSIUM BLD-SCNC: 3.8 MMOL/L (ref 3.5–5.2)
PREALB SERPL-MCNC: 16.3 MG/DL (ref 20–40)
RBC # BLD AUTO: 3.41 10*6/MM3 (ref 3.9–5.2)
SODIUM BLD-SCNC: 143 MMOL/L (ref 136–145)
TIBC SERPL-MCNC: 237 MCG/DL (ref 298–536)
TRANSFERRIN SERPL-MCNC: 159 MG/DL (ref 200–360)
WBC NRBC COR # BLD: 3.62 10*3/MM3 (ref 4.5–10.7)

## 2017-01-03 PROCEDURE — 84134 ASSAY OF PREALBUMIN: CPT | Performed by: HOSPITALIST

## 2017-01-03 PROCEDURE — 83735 ASSAY OF MAGNESIUM: CPT | Performed by: HOSPITALIST

## 2017-01-03 PROCEDURE — 25010000002 ENOXAPARIN PER 10 MG: Performed by: HOSPITALIST

## 2017-01-03 PROCEDURE — 97110 THERAPEUTIC EXERCISES: CPT

## 2017-01-03 PROCEDURE — 83540 ASSAY OF IRON: CPT | Performed by: HOSPITALIST

## 2017-01-03 PROCEDURE — 85027 COMPLETE CBC AUTOMATED: CPT | Performed by: HOSPITALIST

## 2017-01-03 PROCEDURE — 84466 ASSAY OF TRANSFERRIN: CPT | Performed by: HOSPITALIST

## 2017-01-03 PROCEDURE — 97162 PT EVAL MOD COMPLEX 30 MIN: CPT

## 2017-01-03 PROCEDURE — 80069 RENAL FUNCTION PANEL: CPT | Performed by: HOSPITALIST

## 2017-01-03 PROCEDURE — 82728 ASSAY OF FERRITIN: CPT | Performed by: HOSPITALIST

## 2017-01-03 RX ORDER — BENAZEPRIL HYDROCHLORIDE 20 MG/1
40 TABLET ORAL
Status: DISCONTINUED | OUTPATIENT
Start: 2017-01-04 | End: 2017-01-09 | Stop reason: HOSPADM

## 2017-01-03 RX ADMIN — BENAZEPRIL HYDROCHLORIDE 20 MG: 20 TABLET ORAL at 18:34

## 2017-01-03 RX ADMIN — MIRTAZAPINE 15 MG: 15 TABLET, FILM COATED ORAL at 20:04

## 2017-01-03 RX ADMIN — AMPICILLIN AND SULBACTAM 1.5 G: 1; .5 INJECTION, POWDER, FOR SOLUTION INTRAMUSCULAR; INTRAVENOUS at 10:13

## 2017-01-03 RX ADMIN — ENOXAPARIN SODIUM 50 MG: 60 INJECTION SUBCUTANEOUS at 17:41

## 2017-01-03 RX ADMIN — SODIUM CHLORIDE 75 ML/HR: 9 INJECTION, SOLUTION INTRAVENOUS at 07:19

## 2017-01-03 NOTE — PROGRESS NOTES
Malnutrition Severity Assessment    Patient Name:  Carito Gonzalez  YOB: 1930  MRN: [id number]  Admit Date:  1/2/2017    Patient meets criteria for : Moderate malnutrition    Comments:        Malnutrition Type: Acute Illness/Injury Malnutrition    Physical Signs of Malnutrition         Most Recent Value    Muscle Wasting  Moderate    Fat Loss  Moderate      Weight Status         Most Recent Value    BMI  Mild (<19)    %IBW  Mild (<90%)      Energy Intake Status         Most Recent Value    Energy Intake  Severe (< or equal to 50% / > or equal to 5d)      Labs         Most Recent Value    Serum Albumin  Mild (<3.7)    Serum Prealbumin  Mild (<20)    C-Reactive Protein Elevated  No [NO CRP level available. ]              Electronically signed by:  Sarah Beth Cummings RD  01/03/17 2:33 PM

## 2017-01-03 NOTE — PLAN OF CARE
Problem: Patient Care Overview (Adult)  Goal: Plan of Care Review  Outcome: Ongoing (interventions implemented as appropriate)    01/02/17 2224   Coping/Psychosocial Response Interventions   Plan Of Care Reviewed With patient   Patient Care Overview   Progress no change   Outcome Evaluation   Outcome Summary/Follow up Plan vss, to continue on IV anbiotics, resting comfortablt, labs this am, needs stool specimen for hemoccult       Goal: Adult Individualization and Mutuality  Outcome: Ongoing (interventions implemented as appropriate)  Goal: Discharge Needs Assessment  Outcome: Ongoing (interventions implemented as appropriate)    Problem: Renal Failure/Kidney Injury, Acute (Adult)  Goal: Signs and Symptoms of Listed Potential Problems Will be Absent or Manageable (Renal Failure/Kidney Injury, Acute)  Outcome: Ongoing (interventions implemented as appropriate)    Problem: Fall Risk (Adult)  Goal: Absence of Falls  Outcome: Ongoing (interventions implemented as appropriate)    Problem: Skin Integrity Impairment, Risk/Actual (Adult)  Goal: Identify Related Risk Factors and Signs and Symptoms  Outcome: Outcome(s) achieved Date Met:  01/02/17  Goal: Skin Integrity/Wound Healing  Outcome: Ongoing (interventions implemented as appropriate)

## 2017-01-03 NOTE — PLAN OF CARE
Problem: Nutrition, Imbalanced: Inadequate Oral Intake (Adult)  Goal: Identify Related Risk Factors and Signs and Symptoms  Outcome: Ongoing (interventions implemented as appropriate)

## 2017-01-03 NOTE — PLAN OF CARE
Problem: Patient Care Overview (Adult)  Goal: Plan of Care Review  Outcome: Ongoing (interventions implemented as appropriate)    01/03/17 1304   Coping/Psychosocial Response Interventions   Plan Of Care Reviewed With patient   Outcome Evaluation   Outcome Summary/Follow up Plan pt able to walk 40 ft with rolling walker and min assist, flat affect and needed a lot of encouragement, pt will benefit from PT to address functional mobility, rec snf at d/c         Problem: Inpatient Physical Therapy  Goal: Bed Mobility Goal LTG- PT  Outcome: Ongoing (interventions implemented as appropriate)    01/03/17 1304   Bed Mobility PT LTG   Bed Mobility PT LTG, Date Established 01/03/17   Bed Mobility PT LTG, Time to Achieve 1 wk   Bed Mobility PT LTG, Activity Type all bed mobility   Bed Mobility PT LTG, [place] Level contact guard assist       Goal: Transfer Training Goal 1 LTG- PT  Outcome: Ongoing (interventions implemented as appropriate)    01/03/17 1304   Transfer Training PT LTG   Transfer Training PT LTG, Date Established 01/03/17   Transfer Training PT LTG, Time to Achieve 1 wk   Transfer Training PT LTG, Activity Type all transfers   Transfer Training PT LTG, [place] Level contact guard assist   Transfer Training PT LTG, Assist Device walker, rolling       Goal: Gait Training Goal LTG- PT  Outcome: Ongoing (interventions implemented as appropriate)    01/03/17 1304   Gait Training PT LTG   Gait Training Goal PT LTG, Date Established 01/03/17   Gait Training Goal PT LTG, Time to Achieve 1 wk   Gait Training Goal PT LTG, [place] Level contact guard assist   Gait Training Goal PT LTG, Assist Device walker, rolling   Gait Training Goal PT LTG, Distance to Achieve 75 ft

## 2017-01-03 NOTE — NURSING NOTE
Patient's daughter upset at nurse's station, requesting Dr. Davila call her. I told the daughter that he attempted to call her this afternoon with no answer. I paged Dr. Davila for her and he stated that he already called her today and he wasn't going to call her again. Patient's daughter left soon after arriving and is no longer at the bedside.

## 2017-01-03 NOTE — NURSING NOTE
Attempted to talk with pt, pt points to mouth as if unable to speak. Will return later to again attempt psychiatric evaluation.

## 2017-01-03 NOTE — PROGRESS NOTES
Acute Care - Physical Therapy Initial Evaluation  [facility]     Patient Name: Carito Gonzalez  : 3/7/1930  MRN: [id number]  Today's Date: 1/3/2017   Onset of Illness/Injury or Date of Surgery Date: 17  Date of Referral to PT: 17  Referring Physician: Giovanni      Admit Date: 2017     Visit Dx:    ICD-10-CM ICD-9-CM   1. Acute renal insufficiency N28.9 593.9   2. Dehydration E86.0 276.51   3. Generalized weakness R53.1 780.79     Patient Active Problem List   Diagnosis   • Hypercholesterolemia   • Hypertension   • Valvular heart disease   • Swelling of limb, left   • Cellulitis and abscess of leg   • MVP (mitral valve prolapse)   • Mitral regurgitation   • Bilateral carotid bruits   • Encounter for immunization   • Routine health maintenance   • Acute renal insufficiency   • CKD (chronic kidney disease) stage 3, GFR 30-59 ml/min   • Depression   • Protein calorie malnutrition     Past Medical History   Diagnosis Date   • Hyperlipidemia    • Hypertension    • Macular degeneration      Noted 2014   • Mitral regurgitation    • Mitral valve prolapse    • Zoster      Past Surgical History   Procedure Laterality Date   • Skin cancer excision            PT ASSESSMENT (last 72 hours)      PT Evaluation       17 1253 17 1437    Rehab Evaluation    Document Type evaluation  -PC     Subjective Information complains of;weakness;fatigue  -PC     Patient Effort, Rehab Treatment fair  -PC     Symptoms Noted During/After Treatment fatigue  -PC     General Information    Patient Profile Review yes  -PC     Onset of Illness/Injury or Date of Surgery Date 17  -PC     Referring Physician Giovanni  -PC     General Observations pt is a thin elderly frail appearing female in bed, in no acute distress, flat affect  -PC     Pertinent History Of Current Problem dehydration, acute renal insufficiency  -PC     Prior Level of Function --   difficult to determine, pt unable to provide adequate hx  -PC      Equipment Currently Used at Home walker, rolling  -PC walker, rolling  -AW    Plans/Goals Discussed With patient  -PC     Barriers to Rehab cognitive status;previous functional deficit  -     Living Environment    Lives With --   per chart pt is from assisted living  - alone  -AW    Living Arrangements assisted living  - assisted living  -AW    Transportation Available  car;family or friend will provide  -AW    Living Environment Comment unclear as to whether pt has/needs assistance in her current living situation  -     Clinical Impression    Date of Referral to PT 01/03/17  -PC     Patient/Family Goals Statement did not state  -     Criteria for Skilled Therapeutic Interventions Met yes;treatment indicated  -     Impairments Found (describe specific impairments) gait, locomotion, and balance;arousal, attention, and cognition;muscle performance  -     Rehab Potential fair, will monitor progress closely  -PC     Pain Assessment    Pain Assessment Estrada-Sosa FACES  -PC     Estrada-Sosa FACES Pain Rating 2  -PC     Pain Location Generalized  -     Pain Intervention(s) Repositioned  -     Cognitive Assessment/Intervention    Current Cognitive/Communication Assessment impaired  -PC     Orientation Status unable/difficult to assess  -PC     Follows Commands/Answers Questions able to follow single-step instructions;needs cueing;needs increased time;needs repetition;50% of the time  -     Personal Safety at risk behaviors demonstrated;decreased awareness, need for assist;decreased insight to deficits  -     Personal Safety Interventions fall prevention program maintained;gait belt  -     Short/Long Term Memory requires constant cues  -PC     ROM (Range of Motion)    General ROM Detail WFL  -PC     MMT (Manual Muscle Testing)    General MMT Assessment Detail 3/5, evidence of muscle atrophy/decrease muscle bulk  -PC     Bed Mobility, Assessment/Treatment    Bed Mob, Supine to Sit, [place]  minimum assist (75% patient effort)  -PC     Bed Mob, Sit to Supine, [place] minimum assist (75% patient effort)  -PC     Transfer Assessment/Treatment    Transfers, Sit-Stand [place] moderate assist (50% patient effort)  -PC     Transfers, Stand-Sit [place] minimum assist (75% patient effort)  -PC     Transfers, Sit-Stand-Sit, Assist Device rolling walker  -PC     Gait Assessment/Treatment    Gait, [place] Level minimum assist (75% patient effort)  -PC     Gait, Assistive Device rolling walker  -PC     Gait, Distance (Feet) 40  -PC     Gait, Gait Deviations forward flexed posture;step length decreased  -PC     Gait, Safety Issues step length decreased;sequencing ability decreased  -PC     Gait, Impairments strength decreased;impaired balance;coordination impaired;motor control impaired  -PC     Positioning and Restraints    Pre-Treatment Position in bed  -PC     Post Treatment Position bed  -PC     In Bed supine;call light within reach;encouraged to call for assist;exit alarm on  -PC       01/02/17 0354 01/02/17 0344    General Information    Equipment Currently Used at Home  walker, standard  -SG    Living Environment    Lives With alone  -SG     Living Arrangements assisted living  -SG     Home Accessibility no concerns  -SG     Stair Railings at Home none  -SG     Type of Financial/Environmental Concern none  -SG     Transportation Available ambulance  -SG       User Key  (r) = Recorded By, (t) = Taken By, (c) = Cosigned By    Initials Name Provider Type    PC Maria Luisa Mccarthy, SYDNEY Physical Therapist    EMMA Armstrong, KERA Registered Nurse    KRISTI Vegas RN Case Manager          Physical Therapy Education     Title: PT OT SLP Therapies (Active)     Topic: Physical Therapy (Active)     Point: Mobility training (Active)    Learning Progress Summary    Learner Readiness Method Response Comment Documented by Status   Patient Nonacceptance E,D NR  PC 01/03/17 3714 Active                Point: Home exercise program (Active)    Learning Progress Summary    Learner Readiness Method Response Comment Documented by Status   Patient Nonacceptance E,D NR  PC 01/03/17 1304 Active               Point: Body mechanics (Active)    Learning Progress Summary    Learner Readiness Method Response Comment Documented by Status   Patient Nonacceptance E,D NR  PC 01/03/17 1304 Active               Point: Precautions (Active)    Learning Progress Summary    Learner Readiness Method Response Comment Documented by Status   Patient Nonacceptance E,D NR  PC 01/03/17 1304 Active                      User Key     Initials Effective Dates Name Provider Type Discipline     12/01/15 -  Maria Luisa Mccarthy, PT Physical Therapist PT                PT Recommendation and Plan  Anticipated Discharge Disposition: skilled nursing facility  Planned Therapy Interventions: balance training, bed mobility training, gait training, home exercise program, strengthening, transfer training  PT Frequency: daily  Plan of Care Review  Plan Of Care Reviewed With: patient  Progress: improving  Outcome Summary/Follow up Plan: pt able to walk 40 ft with rolling walker and min assist, flat affect and needed alot of encouragement, pt will benefit from PT to address functional mobility, rec  snf at d/c,          IP PT Goals       01/03/17 1304          Bed Mobility PT LTG    Bed Mobility PT LTG, Date Established 01/03/17  -PC      Bed Mobility PT LTG, Time to Achieve 1 wk  -PC      Bed Mobility PT LTG, Activity Type all bed mobility  -PC      Bed Mobility PT LTG, [place] Level contact guard assist  -PC      Transfer Training PT LTG    Transfer Training PT LTG, Date Established 01/03/17  -PC      Transfer Training PT LTG, Time to Achieve 1 wk  -PC      Transfer Training PT LTG, Activity Type all transfers  -PC      Transfer Training PT LTG, [place] Level contact guard assist  -PC      Transfer Training PT LTG, Assist Device walker, rolling   -PC      Gait Training PT LTG    Gait Training Goal PT LTG, Date Established 01/03/17  -PC      Gait Training Goal PT LTG, Time to Achieve 1 wk  -PC      Gait Training Goal PT LTG, [place] Level contact guard assist  -PC      Gait Training Goal PT LTG, Assist Device walker, rolling  -PC      Gait Training Goal PT LTG, Distance to Achieve 75 ft  -PC        User Key  (r) = Recorded By, (t) = Taken By, (c) = Cosigned By    Initials Name Provider Type    PC Maria Luisa Mccarthy PT Physical Therapist                Outcome Measures       01/03/17 1300          How much help from another person do you currently need...    Turning from your back to your side while in flat bed without using bedrails? 3  -PC      Moving from lying on back to sitting on the side of a flat bed without bedrails? 3  -PC      Moving to and from a bed to a chair (including a wheelchair)? 3  -PC      Standing up from a chair using your arms (e.g., wheelchair, bedside chair)? 3  -PC      Climbing 3-5 steps with a railing? 2  -PC      To walk in hospital room? 3  -PC      AM-PAC 6 Clicks Score 17  -PC      Functional Assessment    Outcome Measure Options AM-PAC 6 Clicks Basic Mobility (PT)  -PC        User Key  (r) = Recorded By, (t) = Taken By, (c) = Cosigned By    Initials Name Provider Type    PC Maria Luisa Mccarthy PT Physical Therapist           Time Calculation:         PT Charges       01/03/17 1307          Time Calculation    Start Time 1130  -PC      Stop Time 1155  -PC      Time Calculation (min) 25 min  -PC      PT Received On 01/03/17  -PC      PT - Next Appointment 01/04/17  -PC      PT Goal Re-Cert Due Date 01/10/17  -PC        User Key  (r) = Recorded By, (t) = Taken By, (c) = Cosigned By    Initials Name Provider Type     Maria Luisa Mccarthy PT Physical Therapist          Therapy Charges for Today     Code Description Service Date Service Provider Modifiers Qty    13024885607 HC PT EVAL MOD COMPLEXITY 2 1/3/2017 Maria Luisa Mccarthy PT GP 1     [id number]  PT THER PROC EA 15 MIN 1/3/2017 Maria Luisa Mccarthy, PT GP 1          PT G-Codes  Outcome Measure Options: AM-PAC 6 Clicks Basic Mobility (PT)      Maria Luisa Mccarthy, PT  1/3/2017

## 2017-01-03 NOTE — CONSULTS
"Adult Nutrition  Assessment/PES    Patient Name:  Carito Gonzalez  YOB: 1930  MRN: [id number]  Admit Date:  1/2/2017    Assessment Date:  1/3/2017     Consult received and nutritional assessment completed. No family here at present so could not get accurate wt loss amounts. Pt does however meet criteria for acute, moderate malnutrition. See MSA form for details.    Suggest change diet to Regular given poor po intakes as pt currently on on a Renal/HHD and energy intakes have been < 50% in about 5 days (20% avg x last 5 meals.). Arranged Ensure Enlive nutritional supplement TID.    RD to follow/monitor. Thanks.         Reason for Assessment       01/03/17 1414    Reason for Assessment    Reason For Assessment/Visit physician consult    Identified At Risk By Screening Criteria MST SCORE 2+;reduced oral intake over the last month;unintentional loss of 10 lbs or more in the past 2 mos    Diagnosis Diagnosis    Nutrition related Increased nutrition needs   \"Protein Calorie Malnutrition\" per MD    Cardiac HTN;Other (comment)   + DVT    Psychosocial Depression    Renal MARGARET;CKD              Nutrition/Diet History       01/03/17 1416    Nutrition/Diet History    Factors Affecting Nutritional Intake Factors    Other Pt from NH. DNR.            Anthropometrics       01/03/17 1416    Anthropometrics    RD Documented Current Weight  44.9 kg (99 lb)    RD Documented Weight on Admission 44.9 kg (99 lb)    Anthropometrics (Special Considerations)    RD Calculated     RD Calculated % IBW 90    RD Calculated BMI (kg/m2) 17.9    Ideal Body Weight (IBW)    % Ideal Body Weight Malnutrition 80-90% - mild deficit    Usual Body Weight (UBW)    Weight Loss 4.536 kg (10 lb)   \"2-13#\" per database. No family present, pt unable to answer, and nothing else documented in chart re: wt loss.     Body Mass Index (BMI)    BMI Grade 17 - 19 low grade I            Labs/Tests/Procedures/Meds       01/03/17 1418    " "Labs/Tests/Procedures/Meds    Diagnostic Test/Procedure Review reviewed   + DVT and calf vein thrombosis in bilat legs.     Labs/Tests Review BUN;Creat;Mg++;Pre Albumin;Alb;Reviewed    Medication Review Reviewed, pertinent;Antibiotic;Anticoag;Other (comment)   Florastor.     Significant Vitals reviewed   Hypotension.             Physical Findings       01/03/17 1419    Physical Findings/Assessment    Additional Documentation Physical Appearance (Group)    Physical Appearance    Overall Physical Appearance cachetic;underweight;listlessness;other (see comments)   [facility] following.     Skin cellulitis;other (see comments)   Cellulitis and abscesss of leg. Erythema noted. B = 18.            Estimated/Assessed Needs       01/03/17 1421    Calculation Measurements    Weight Used For Calculations 44.9 kg (99 lb)    Height Used for Calculations 0.624 m (2' 0.57\")    Estimated/Assessed Energy Needs    Energy Need Method Kcal/kg    kcal/kg 30    30 Kcal/Kg (kcal) 1347.18    Estimated/Assessed Protein Needs    Weight Used for Protein Calculation 44.9 kg (99 lb)    Protein (gm/kg) 1.2    1.2 Gm Protein (gm) 53.89    Estimated/Assessed Fluid Needs    Fluid Need Method RDA method    RDA Method (mL)  1350            Nutrition Prescription Ordered       01/03/17 1421    Nutrition Prescription PO    Common Modifiers Renal;Cardiac            Evaluation of Received Nutrient/Fluid Intake       01/03/17 1421    Evaluation of Received Nutrient/Fluid Intake    Nutrition Delivered Fluid Evaluation    Fluid Intake Evaluation    IV Fluid (mL) 1800   NS @ 75ml/hr.     Total Fluid Intake (mL) 1800    Total Fluid Intake (mL/kg) 40.08 mL/kg    PO Evaluation    Number of Meals 5    % PO Intake 20% avg              Malnutrition Severity Assessment       01/03/17 1422    Malnutrition Severity Assessment    Malnutrition Type Acute Illness/Injury Malnutrition    Physical Signs of Malnutrition (Acute)    Muscle Wasting Moderate    Fat Loss " Moderate    Weight Status (Acute)    BMI Mild (<19)    %IBW Mild (<90%)    Energy Intake Status (Acute)    Energy Intake Severe (< or equal to 50% / > or equal to 5d)    Lab Values (Acute)    Serum Albumin mild    Serum Prealbumin mild    C-Reactive Protein Elevated No   NO CRP level available.           Problem/Interventions:        Problem 1       01/03/17 1424    Nutrition Diagnoses Problem 1    Problem 1 Malnutrition    Etiology (related to) Factors Affecting Nutrition    Appetite Poor at this Time;Poor Whenever Ill    Mental State/Condition Too Drowsy to Eat;Depression;Weakness    Signs/Symptoms (evidenced by) Report of Mnimal PO Intake;Report/Observation    Reported/Observed By RN;MD                    Intervention Goal       01/03/17 1426    Intervention Goal    General Maintain nutrition;Improved nutrition related lab(s);Reduce/improve symptoms    PO Tolerate PO;Increase intake;PO intake (%)    PO Intake % 75 %    Weight Appropriate weight gain            Nutrition Intervention       01/03/17 1426    Nutrition Intervention    RD/Tech Action Follow Tx progress;Care plan reviewd;Other (comment);Encourage intake;Recommend/ordered   No family present, and pt asleep in chair.     Recommended/Ordered Supplement            Nutrition Prescription       01/03/17 1426    Nutrition Prescription PO    PO Prescription Begin/change supplement;Begin/change diet    Begin/Change Diet to Regular    Supplement Ensure Complete    Supplement Frequency 3 times a day    New PO Prescription Ordered? No, recommended    Other Orders    Obtain Weight Weekly    Labs CRP    Labs Ordered? No, recommended            Education/Evaluation       01/03/17 1428    Education    Education Will Instruct as appropriate    Monitor/Evaluation    Monitor Per protocol    Education Follow-up Reinforce PRN        Comments:      Electronically signed by:  Sarah Beth Cummings RD  01/03/17 2:29 PM

## 2017-01-03 NOTE — PROGRESS NOTES
Continued Stay Note  [facility]     Patient Name: Carito Gonzalez  MRN: [id number]  Today's Date: 1/3/2017    Admit Date: 1/2/2017          Discharge Plan       01/03/17 1547    Case Management/Social Work Plan    Plan From [facility], pt wants to return there. PT recommends SNF at discharge    Additional Comments I left a voice mail for pt's dtr Violet yesterday and have not had a return call. I tried again to reach her by phone and no answer, I did leave pt's on [place] HIPPA compliant voice mail with request for a return call to review disscharge planning concerns. I spoke with Monica with [facility] to see if they can accept pt with current mobility due to pt said she wants to return there at discharge, she said she will see how pt is doing at the time of discharge.  If no return call from pt's family I will attempt to reach them again tomorrow.               Discharge Codes     None            Claribel Vegas RN

## 2017-01-03 NOTE — PROGRESS NOTES
Pharmacy consult for Lovenox dosing    Carito Gonzalez is a 86 y.o. female 99 lb 8 oz (45.1 kg).    Pharmacy consulted to dose per Dr Davila  Indication: Bilateral DVT Treatment    Estimated Creatinine Clearance: 34.6 mL/min (by C-G formula based on Cr of 0.83).  Body mass index is 17.97 kg/(m^2).    CREATININE   Date Value Ref Range Status   01/03/2017 0.83 0.57 - 1.00 mg/dL Final   01/01/2017 1.59 (H) 0.57 - 1.00 mg/dL Final     PLATELETS   Date Value Ref Range Status   01/03/2017 190 140 - 500 10*3/mm3 Final   01/01/2017 228 140 - 500 10*3/mm3 Final     No results found for: INR  PLAN:  Serum creatinine has improved to 0.83mg/dl with resulting crcl of ~35ml/min. Therefore will increase the dose of 50mg's frequency to q12h. (at crcl >30ml/min.frequency is q12h)     Thank you for the consult.    Rafi Sage KANIKA  01/03/17 11:34 AM
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
## 2017-01-03 NOTE — PROGRESS NOTES
"    DAILY PROGRESS NOTE  [facility]    Patient Identification:  Name: Carito Gonzalez  Age: 86 y.o.  Sex: female  :  3/7/1930  MRN: [id number]         Primary Care Physician: Rafi Gtz MD    Subjective:  Interval History: d/w rn and she is refusing meds and tx at times. She interacts and seems to have some insight but not sure I can trust some of responses - denies SI    Objective:no fm. D/w rn    Scheduled Meds:    [START ON 2017] benazepril 40 mg Oral Q24H   enoxaparin 50 mg Subcutaneous Q12H   escitalopram 10 mg Oral Daily   mirtazapine 15 mg Oral Nightly     Continuous Infusions:    Pharmacy to Dose enoxaparin (LOVENOX)        Vital signs in last 24 hours:  Temp:  [97.3 °F (36.3 °C)-99 °F (37.2 °C)] 99 °F (37.2 °C)  Heart Rate:  [68-93] 93  Resp:  [16-20] 20  BP: (135-183)/(72-97) 183/97    Intake/Output:    Intake/Output Summary (Last 24 hours) at 17 1542  Last data filed at 17 1300   Gross per 24 hour   Intake   2750 ml   Output   1600 ml   Net   1150 ml       Exam:  Visit Vitals   • BP (!) 183/97 (BP Location: Right arm, Patient Position: Lying)   • Pulse 93   • Temp 99 °F (37.2 °C) (Oral)   • Resp 20   • Ht 62.4\" (158.5 cm)   • Wt 99 lb 8 oz (45.1 kg)   • SpO2 96%   • BMI 17.97 kg/m2       General Appearance:    Alert, no distress, withdrawn though speaks                           Head:    Normocephalic, without obvious abnormality, atraumatic                         Throat:   Lips, tongue, gums normal; oral mucosa pink and moist                         Lungs:    Clear to auscultation bilaterally, respirations unlabored                          Heart:    Regular rate and rhythm, S1 and S2 normal                  Abdomen:     Soft, non-tender, bowel sounds active                 Extremities:   Erythema/warmth resolved, no cyanosis or edema                        Pulses:   Pulses palpable in lower extremities                                 Data Review:  Labs in chart were " reviewed.    Assessment:  Principal Problem:    Acute renal insufficiency  Active Problems:    Hypertension    DVT (deep venous thrombosis)    MVP (mitral valve prolapse)    CKD (chronic kidney disease) stage 3, GFR 30-59 ml/min    Depression    Protein calorie malnutrition      Plan:  ARF resolved w/ CKD3 - hold lasix, resume benazepril at full dose. Likely influenced from decreased oral intake compounded by depression  -SLIVF  -negative CT A/P     HTN w/ hypotension resolving      Doubtful left leg cellulitis - DC Unasyn - changes likely secondary to DVT - on Lovenox for now     Depression - resume Lexapro and add Remeron - concern is uncontrolled - denies SI - get Access to evaluate    -TSH 1.6  -consult psych as could benefit from CMU - she is dodging Access and medical mgnt     PCM - prealbumin 16.3, nutrition consulted, anemia labs ok/stool pending     Lovenox for DVT prophylaxis     PT/CCP      Called daughter and no answer    Wallace Davila MD  1/3/2017  3:42 PM

## 2017-01-03 NOTE — NURSING NOTE
"Have attempted x2 today to see pt. Pt appears very depressed, keeps eyes closed and the only thing she has said is \"I don't understand.\"  I will call family to get input from them.  "

## 2017-01-03 NOTE — PLAN OF CARE
Problem: Patient Care Overview (Adult)  Goal: Plan of Care Review  Outcome: Ongoing (interventions implemented as appropriate)    01/03/17 0335   Coping/Psychosocial Response Interventions   Plan Of Care Reviewed With patient   Patient Care Overview   Progress improving   Outcome Evaluation   Outcome Summary/Follow up Plan VSS, voiding without difficulty, urine clear adequate in amount, no complaints made, slept well, still needs stool for hemoccult       Goal: Adult Individualization and Mutuality  Outcome: Ongoing (interventions implemented as appropriate)  Goal: Discharge Needs Assessment  Outcome: Ongoing (interventions implemented as appropriate)    Problem: Renal Failure/Kidney Injury, Acute (Adult)  Goal: Signs and Symptoms of Listed Potential Problems Will be Absent or Manageable (Renal Failure/Kidney Injury, Acute)  Outcome: Ongoing (interventions implemented as appropriate)    Problem: Fall Risk (Adult)  Goal: Absence of Falls  Outcome: Ongoing (interventions implemented as appropriate)    Problem: Skin Integrity Impairment, Risk/Actual (Adult)  Goal: Skin Integrity/Wound Healing  Outcome: Ongoing (interventions implemented as appropriate)

## 2017-01-03 NOTE — PLAN OF CARE
Problem: Patient Care Overview (Adult)  Goal: Plan of Care Review  Outcome: Ongoing (interventions implemented as appropriate)    01/03/17 0239   Coping/Psychosocial Response Interventions   Plan Of Care Reviewed With patient   Patient Care Overview   Progress no change   Outcome Evaluation   Outcome Summary/Follow up Plan Patient withdrawn, not answering questions and refusing medications. Continue IVF and IV antibiotics. Up to bathroom with assist.       Goal: Adult Individualization and Mutuality  Outcome: Ongoing (interventions implemented as appropriate)  Goal: Discharge Needs Assessment  Outcome: Ongoing (interventions implemented as appropriate)    Problem: Renal Failure/Kidney Injury, Acute (Adult)  Goal: Signs and Symptoms of Listed Potential Problems Will be Absent or Manageable (Renal Failure/Kidney Injury, Acute)  Outcome: Ongoing (interventions implemented as appropriate)    Problem: Fall Risk (Adult)  Goal: Absence of Falls  Outcome: Ongoing (interventions implemented as appropriate)    Problem: Skin Integrity Impairment, Risk/Actual (Adult)  Goal: Skin Integrity/Wound Healing  Outcome: Ongoing (interventions implemented as appropriate)    Problem: Nutrition, Imbalanced: Inadequate Oral Intake (Adult)  Goal: Identify Related Risk Factors and Signs and Symptoms  Outcome: Outcome(s) achieved Date Met:  01/03/17  Goal: Improved Oral Intake  Outcome: Ongoing (interventions implemented as appropriate)  Goal: Prevent Further Weight Loss  Outcome: Ongoing (interventions implemented as appropriate)

## 2017-01-04 PROCEDURE — 25010000002 ENOXAPARIN PER 10 MG: Performed by: HOSPITALIST

## 2017-01-04 PROCEDURE — 97110 THERAPEUTIC EXERCISES: CPT

## 2017-01-04 RX ADMIN — BENAZEPRIL HYDROCHLORIDE 40 MG: 20 TABLET ORAL at 10:21

## 2017-01-04 RX ADMIN — ENOXAPARIN SODIUM 50 MG: 60 INJECTION SUBCUTANEOUS at 10:25

## 2017-01-04 RX ADMIN — MIRTAZAPINE 15 MG: 15 TABLET, FILM COATED ORAL at 20:27

## 2017-01-04 RX ADMIN — ENOXAPARIN SODIUM 50 MG: 60 INJECTION SUBCUTANEOUS at 21:39

## 2017-01-04 RX ADMIN — ESCITALOPRAM 10 MG: 10 TABLET, FILM COATED ORAL at 10:20

## 2017-01-04 NOTE — PLAN OF CARE
Problem: Patient Care Overview (Adult)  Goal: Plan of Care Review    01/04/17 1438   Coping/Psychosocial Response Interventions   Plan Of Care Reviewed With patient   Patient Care Overview   Progress improving   Outcome Evaluation   Outcome Summary/Follow up Plan Pt progressing w bed mobility this pm.

## 2017-01-04 NOTE — PROGRESS NOTES
Acute Care - Physical Therapy Treatment Note  [facility]     Patient Name: Carito Gonzalez  : 3/7/1930  MRN: [id number]  Today's Date: 2017  Onset of Illness/Injury or Date of Surgery Date: 17  Date of Referral to PT: 17  Referring Physician: Giovanni    Admit Date: 2017    Visit Dx:    ICD-10-CM ICD-9-CM   1. Acute renal insufficiency N28.9 593.9   2. Dehydration E86.0 276.51   3. Generalized weakness R53.1 780.79     Patient Active Problem List   Diagnosis   • Hypercholesterolemia   • Hypertension   • Valvular heart disease   • Swelling of limb, left   • DVT (deep venous thrombosis)   • MVP (mitral valve prolapse)   • Mitral regurgitation   • Bilateral carotid bruits   • Encounter for immunization   • Routine health maintenance   • Acute renal insufficiency   • CKD (chronic kidney disease) stage 3, GFR 30-59 ml/min   • Depression   • Protein calorie malnutrition               Adult Rehabilitation Note       17 1435          Rehab Assessment/Intervention    Discipline physical therapist  -MD      Document Type therapy note (daily note)  -MD      Subjective Information agree to therapy;complains of;weakness  -MD      Patient Effort, Rehab Treatment good  -MD      Symptoms Noted During/After Treatment none  -MD      Precautions/Limitations fall precautions  -MD      Recorded by [MD] Ofelia Estrada PT      Pain Assessment    Pain Assessment No/denies pain  -MD      Recorded by [MD] Ofelia Estrada PT      Cognitive Assessment/Intervention    Current Cognitive/Communication Assessment impaired  -MD      Orientation Status oriented to;person  -MD      Follows Commands/Answers Questions 75% of the time;needs cueing;needs repetition  -MD      Personal Safety decreased insight to deficits  -MD      Personal Safety Interventions gait belt;muscle strengthening facilitated;nonskid shoes/slippers when out of bed;supervised activity  -MD      Recorded by [MD] Ofeila Estrada, PT      Bed Mobility,  Assessment/Treatment    Bed Mob, Supine to Sit, [place] verbal cues required;contact guard assist  -MD      Bed Mob, Sit to Supine, [place] verbal cues required;supervision required  -MD      Bed Mobility, Impairments strength decreased  -MD      Recorded by [DORIAN Estrada PT      Transfer Assessment/Treatment    Transfers, Sit-Stand [place] verbal cues required;minimum assist (75% patient effort);2 person assist required  -MD      Transfers, Stand-Sit [place] verbal cues required;minimum assist (75% patient effort)  -MD      Transfers, Sit-Stand-Sit, Assist Device rolling walker  -MD      Transfer, Impairments strength decreased  -MD      Recorded by [MD] Ofelia Estrada PT      Gait Assessment/Treatment    Gait, [place] Level minimum assist (75% patient effort)  -MD      Gait, Assistive Device rolling walker  -MD      Gait, Distance (Feet) 40  -MD      Gait, Gait Deviations bilateral:;shavon decreased;forward flexed posture;step length decreased;decreased heel strike  -MD      Gait, Impairments impaired balance  -MD      Recorded by [DORIAN Estrada PT      Positioning and Restraints    Pre-Treatment Position in bed  -MD      Post Treatment Position bed  -MD      In Bed supine;call light within reach;exit alarm on  -MD      Recorded by [MD] Ofelia Estrada PT        User Key  (r) = Recorded By, (t) = Taken By, (c) = Cosigned By    Initials Name Effective Dates    MD Ofelia Estrada, PT 12/01/15 -                 IP PT Goals       01/03/17 1304          Bed Mobility PT LTG    Bed Mobility PT LTG, Date Established 01/03/17  -PC      Bed Mobility PT LTG, Time to Achieve 1 wk  -PC      Bed Mobility PT LTG, Activity Type all bed mobility  -PC      Bed Mobility PT LTG, [place] Level contact guard assist  -PC      Transfer Training PT LTG    Transfer Training PT LTG, Date Established 01/03/17  -PC      Transfer Training PT LTG, Time to Achieve 1 wk  -PC      Transfer Training PT LTG, Activity Type all  transfers  -PC      Transfer Training PT LTG, [place] Level contact guard assist  -PC      Transfer Training PT LTG, Assist Device walker, rolling  -PC      Gait Training PT LTG    Gait Training Goal PT LTG, Date Established 01/03/17  -PC      Gait Training Goal PT LTG, Time to Achieve 1 wk  -PC      Gait Training Goal PT LTG, [place] Level contact guard assist  -PC      Gait Training Goal PT LTG, Assist Device walker, rolling  -PC      Gait Training Goal PT LTG, Distance to Achieve 75 ft  -PC        User Key  (r) = Recorded By, (t) = Taken By, (c) = Cosigned By    Initials Name Provider Type    PC Maria Luisa Mccarthy, PT Physical Therapist          Physical Therapy Education     Title: PT OT SLP Therapies (Active)     Topic: Physical Therapy (Active)     Point: Mobility training (Active)    Learning Progress Summary    Learner Readiness Method Response Comment Documented by Status   Patient Nonacceptance JACKELIN CORREIA  PC 01/03/17 1304 Active               Point: Home exercise program (Active)    Learning Progress Summary    Learner Readiness Method Response Comment Documented by Status   Patient Nonacceptance JACKELIN CORREIA  PC 01/03/17 1304 Active               Point: Body mechanics (Active)    Learning Progress Summary    Learner Readiness Method Response Comment Documented by Status   Patient Nonacceptance E,D NR  PC 01/03/17 1304 Active               Point: Precautions (Active)    Learning Progress Summary    Learner Readiness Method Response Comment Documented by Status   Patient Acceptance JACKELIN CORREIA MD 01/04/17 1438 Active    Nonacceptance EJACKELIN 01/03/17 1304 Active                      User Key     Initials Effective Dates Name Provider Type Discipline     12/01/15 -  Maria Luisa Mccarthy, PT Physical Therapist PT    MD 12/01/15 -  Ofelia Estrada PT Physical Therapist PT                    PT Recommendation and Plan  Anticipated Discharge Disposition: skilled nursing facility  Planned Therapy Interventions: balance  training, bed mobility training, gait training, home exercise program, strengthening, transfer training  PT Frequency: daily  Plan of Care Review  Plan Of Care Reviewed With: patient  Progress: improving  Outcome Summary/Follow up Plan: Pt progressing w bed mobility this pm.          Outcome Measures       01/04/17 1400 01/03/17 1300       How much help from another person do you currently need...    Turning from your back to your side while in flat bed without using bedrails? 3  -MD 3  -PC     Moving from lying on back to sitting on the side of a flat bed without bedrails? 3  -MD 3  -PC     Moving to and from a bed to a chair (including a wheelchair)? 2  -MD 3  -PC     Standing up from a chair using your arms (e.g., wheelchair, bedside chair)? 2  -MD 3  -PC     Climbing 3-5 steps with a railing? 2  -MD 2  -PC     To walk in hospital room? 3  -MD 3  -PC     AM-PAC 6 Clicks Score 15  -MD 17  -PC     Functional Assessment    Outcome Measure Options AM-PAC 6 Clicks Basic Mobility (PT)  -MD AM-PAC 6 Clicks Basic Mobility (PT)  -PC       User Key  (r) = Recorded By, (t) = Taken By, (c) = Cosigned By    Initials Name Provider Type    PC Maria Luisa Mccarthy, PT Physical Therapist    MD Ofelia Estrada, PT Physical Therapist           Time Calculation:         PT Charges       01/04/17 1435          Time Calculation    Start Time 1408  -MD      Stop Time 1423  -MD      Time Calculation (min) 15 min  -MD      PT Received On 01/04/17  -MD      PT - Next Appointment 01/05/17  -MD        User Key  (r) = Recorded By, (t) = Taken By, (c) = Cosigned By    Initials Name Provider Type    MD Ofelia Estrada, PT Physical Therapist          Therapy Charges for Today     Code Description Service Date Service Provider Modifiers Qty    54020657991 HC PT THER PROC EA 15 MIN 1/4/2017 Ofelia Estrada, PT GP 1    79074782920 HC PT THER SUPP EA 15 MIN 1/4/2017 Ofelia Estrada, PT GP 1          PT G-Codes  Outcome Measure Options: AM-PAC 6 Clicks Basic Mobility  (PT)    Ofelia Estrada, PT  1/4/2017

## 2017-01-04 NOTE — PLAN OF CARE
Problem: Patient Care Overview (Adult)  Goal: Plan of Care Review  Outcome: Ongoing (interventions implemented as appropriate)    01/04/17 0029   Coping/Psychosocial Response Interventions   Plan Of Care Reviewed With patient   Patient Care Overview   Progress no change   Outcome Evaluation   Outcome Summary/Follow up Plan BP elevated. Pt has been uncooperative. Not answering questions, refusing medications, and not allowing staff to take vital signs. Up to bathroom with assist of 2. Pt is very weak. No c/o pain or nausea. Pt is refusing to eat but family came in earlier and were able to get her to eat some dinner. Lovenox administered as ordered for adonis. DVTs. Fall precautions maintained. Accumax on bed. Frequent weight shifts. Psych consult placed .        Goal: Adult Individualization and Mutuality  Outcome: Ongoing (interventions implemented as appropriate)  Goal: Discharge Needs Assessment  Outcome: Ongoing (interventions implemented as appropriate)    Problem: Renal Failure/Kidney Injury, Acute (Adult)  Goal: Signs and Symptoms of Listed Potential Problems Will be Absent or Manageable (Renal Failure/Kidney Injury, Acute)  Outcome: Ongoing (interventions implemented as appropriate)    Problem: Fall Risk (Adult)  Goal: Absence of Falls  Outcome: Ongoing (interventions implemented as appropriate)    Problem: Skin Integrity Impairment, Risk/Actual (Adult)  Goal: Skin Integrity/Wound Healing  Outcome: Ongoing (interventions implemented as appropriate)    Problem: Nutrition, Imbalanced: Inadequate Oral Intake (Adult)  Goal: Improved Oral Intake  Outcome: Ongoing (interventions implemented as appropriate)  Goal: Prevent Further Weight Loss  Outcome: Ongoing (interventions implemented as appropriate)

## 2017-01-04 NOTE — PROGRESS NOTES
Continued Stay Note  [facility]     Patient Name: Carito Gonzalez  MRN: [id number]  Today's Date: 1/4/2017    Admit Date: 1/2/2017          Discharge Plan       01/04/17 1547    Case Management/Social Work Plan    Plan From [facility], pt wants to return there. PT recommends SNF at discharge    Additional Comments I have not had a return call from pt's son and daughter to review discharge plans.  At pts request I met with her this afternoon, she said she thinks she was to be moving out of [facility] and she wondered if [facility] had a place she could stay, I advised [facility] does offer a place when a person is actively dying but not to go stay for months.  I advised I have talked with Monica at [place]  and she wants to see how she is doing at [facility]              Discharge Codes     None            Claribel Vegas RN

## 2017-01-04 NOTE — PROGRESS NOTES
"    DAILY PROGRESS NOTE  [facility]    Patient Identification:  Name: Carito Gonzalez  Age: 86 y.o.  Sex: female  :  3/7/1930  MRN: [id number]         Primary Care Physician: Rafi Gtz MD    Subjective:  Interval History: refusing meds. No cp/palpitation/sob/n/v/bleeding    Objective:no fm    Scheduled Meds:  benazepril 40 mg Oral Q24H   enoxaparin 50 mg Subcutaneous Q12H   escitalopram 10 mg Oral Daily   mirtazapine 15 mg Oral Nightly     Continuous Infusions:  Pharmacy to Dose enoxaparin (LOVENOX)        Vital signs in last 24 hours:  Temp:  [97.6 °F (36.4 °C)-99 °F (37.2 °C)] 97.6 °F (36.4 °C)  Heart Rate:  [75-93] 75  Resp:  [18-20] 18  BP: (159-183)/(78-97) 176/81    Intake/Output:    Intake/Output Summary (Last 24 hours) at 17 0959  Last data filed at 17 1700   Gross per 24 hour   Intake    503 ml   Output    900 ml   Net   -397 ml       Exam:  Visit Vitals   • /81 (BP Location: Right arm, Patient Position: Lying)   • Pulse 75   • Temp 97.6 °F (36.4 °C) (Oral)   • Resp 18   • Ht 62.4\" (158.5 cm)   • Wt 99 lb 8 oz (45.1 kg)   • SpO2 96%   • BMI 17.97 kg/m2       General Appearance:    Alert, cooperative, flat though a little more interactive today                          Head:    Normocephalic, without obvious abnormality, atraumatic                         Throat:   Lips, tongue, gums normal; oral mucosa pink and moist                         Lungs:    Clear to auscultation bilaterally, respirations unlabored                         Heart:    Regular rate and rhythm, S1 and S2 normal                  Abdomen:     Soft, non-tender, bowel sounds active                 Extremities:   No cyanosis or edema or erythema                        Pulses:   Pulses palpable in lower extremities                                 Data Review:  Labs in chart were reviewed.    Assessment:  Principal Problem:    Acute renal insufficiency  Active Problems:    Hypertension    DVT (deep " venous thrombosis)    MVP (mitral valve prolapse)    CKD (chronic kidney disease) stage 3, GFR 30-59 ml/min    Depression    Protein calorie malnutrition      Plan:  ARF resolved w/ CKD3 - hold lasix (needs for valvular disease/diastolic dysfunction), resume benazepril at full dose. Likely influenced from decreased oral intake compounded by depression  -SLIVF  -negative CT A/P      HTN w/ hypotension resolved       No left leg cellulitis - DC Unasyn - changes likely secondary to DVT - on Lovenox for now - declined this am but after further discussing, she is amenable - switch to Eliquis soon      Depression - resume Lexapro and add Remeron - concern is uncontrolled - denies SI - get Access to evaluate    -TSH 1.6  -consult psych as could benefit from [facility] - difficult to manage when she refuses meds      PCM - prealbumin 16.3, nutrition consulted, anemia labs ok/stool pending - repeat cbc in am      Lovenox for DVT prophylaxis      PT/CCP - needs skilled vs assisted living for now      Called daughter and discussed otp - answered all ?s - will call daily    Wallace Davila MD  1/4/2017  9:59 AM

## 2017-01-04 NOTE — PROGRESS NOTES
Continued Stay Note  [facility]     Patient Name: Carito Gonzalez  MRN: [id number]  Today's Date: 1/4/2017    Admit Date: 1/2/2017          Discharge Plan       01/04/17 1731    Case Management/Social Work Plan    Plan Short term rehab (Pending Precert)list of facilities in order of choice:.[facility], [facility], [facility] and [facility]    Patient/Family In Agreement With Plan yes    Additional Comments I spoke again with pt's daughter, [facility] has no beds, referral sent to Nina with [facility] and [facility], referral sent to Shaye with [facility].      01/04/17 1654    Case Management/Social Work Plan    Plan Short term rehab... facility to be selected by dtr today    Additional Comments I spoke with pt's daughter Violet ([phone]), she said she thinks her mother will need rehab when discharged from her, she said she is working with an agency who recommended [facility], after a few back and forth calls with Shaye with [facility] and Violet I advised       01/04/17 1547    Case Management/Social Work Plan    Plan From [facility], pt wants to return there. PT recommends SNF at discharge    Additional Comments I have not had a return call from pt's son and daughter to review discharge plans.  At pts request I met with her this afternoon, she said she thinks she was to be moving out of [facility] and she wondered if [facility] had a place she could stay, I advised \[facility]\"" does offer a place when a person is actively dying but not to go stay for months.  I advised I have talked with Monica at [place]  and she wants to see how she is doing at [facility]              Discharge Codes     None            Claribel Vegas, KERA
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
## 2017-01-04 NOTE — PROGRESS NOTES
"Pt found laying in bed with eyes closed. This clinician had seen pt last admission. She has extremely flat affect with sad, depressed mood. She acknowledged that she is depressed and feels terrible that she did not plan for her future. She was encouraged to think about her overall life and the things she did with the money she had. She continues to regret spending her money and not planning for her future. She says that \" we made bad decisions.\" Pt ultimately believes she needs to have a nest egg to leave to her family upon her death. Pt was asked if she was refusing meds and food in order to move along the process of death. Pt said \"is that what this is?\" Pt then asked if this clinician was from hospice. She was asked if she wanted hospice care. She said she did not know. She says that her daughter keeps asking her what she wants but she doesn't know. Pt was asked if she was going to take the medication that Dr. Andino ordered and she said that it made her different before but she was going to try. Pt has been refusing meds. Pt was told that we (the hospital) need for her to participate in care in order to help her. Pt continued to just close her eyes and shake her head. Her daughter is supposed to be coming in today to discuss. We will follow.    Pt does not want to go to [facility]. She says that it was not helpful to her in the past. The family had refused this in the past also. CCP working on placement. Lexapro added to Remeron. Dr. Andino's note reviewed which suggests we would look at [facility] if pt would agree. She has never been SI or HI. She did talk some today about some ice and a snowball and says that no one seems to understand what she was talking about.   "

## 2017-01-04 NOTE — PLAN OF CARE
Problem: Patient Care Overview (Adult)  Goal: Plan of Care Review  Outcome: Ongoing (interventions implemented as appropriate)    01/04/17 1611   Coping/Psychosocial Response Interventions   Plan Of Care Reviewed With patient   Patient Care Overview   Progress improving   Outcome Evaluation   Outcome Summary/Follow up Plan Patient answering more questions today, disoriented to situation at times. Took PO medications and Lovenox mid-morning after coaxing. Up with assist to bathroom. Resting peacefully.       Goal: Adult Individualization and Mutuality  Outcome: Ongoing (interventions implemented as appropriate)  Goal: Discharge Needs Assessment  Outcome: Ongoing (interventions implemented as appropriate)    Problem: Renal Failure/Kidney Injury, Acute (Adult)  Goal: Signs and Symptoms of Listed Potential Problems Will be Absent or Manageable (Renal Failure/Kidney Injury, Acute)  Outcome: Ongoing (interventions implemented as appropriate)    Problem: Fall Risk (Adult)  Goal: Absence of Falls  Outcome: Ongoing (interventions implemented as appropriate)    Problem: Skin Integrity Impairment, Risk/Actual (Adult)  Goal: Skin Integrity/Wound Healing  Outcome: Ongoing (interventions implemented as appropriate)    Problem: Nutrition, Imbalanced: Inadequate Oral Intake (Adult)  Goal: Improved Oral Intake  Outcome: Ongoing (interventions implemented as appropriate)  Goal: Prevent Further Weight Loss  Outcome: Ongoing (interventions implemented as appropriate)

## 2017-01-05 ENCOUNTER — APPOINTMENT (OUTPATIENT)
Dept: GENERAL RADIOLOGY | Facility: HOSPITAL | Age: 82
End: 2017-01-05
Attending: INTERNAL MEDICINE

## 2017-01-05 LAB
ANION GAP SERPL CALCULATED.3IONS-SCNC: 16.7 MMOL/L
BUN BLD-MCNC: 22 MG/DL (ref 8–23)
BUN/CREAT SERPL: 25.6 (ref 7–25)
CALCIUM SPEC-SCNC: 8.9 MG/DL (ref 8.6–10.5)
CHLORIDE SERPL-SCNC: 101 MMOL/L (ref 98–107)
CO2 SERPL-SCNC: 22.3 MMOL/L (ref 22–29)
CREAT BLD-MCNC: 0.86 MG/DL (ref 0.57–1)
DEPRECATED RDW RBC AUTO: 54 FL (ref 37–54)
ERYTHROCYTE [DISTWIDTH] IN BLOOD BY AUTOMATED COUNT: 15.2 % (ref 11.7–13)
GFR SERPL CREATININE-BSD FRML MDRD: 63 ML/MIN/1.73
GLUCOSE BLD-MCNC: 62 MG/DL (ref 65–99)
HCT VFR BLD AUTO: 35.4 % (ref 35.6–45.5)
HGB BLD-MCNC: 11.2 G/DL (ref 11.9–15.5)
MCH RBC QN AUTO: 30.5 PG (ref 26.9–32)
MCHC RBC AUTO-ENTMCNC: 31.6 G/DL (ref 32.4–36.3)
MCV RBC AUTO: 96.5 FL (ref 80.5–98.2)
PLATELET # BLD AUTO: 191 10*3/MM3 (ref 140–500)
PMV BLD AUTO: 10.4 FL (ref 6–12)
POTASSIUM BLD-SCNC: 3.8 MMOL/L (ref 3.5–5.2)
RBC # BLD AUTO: 3.67 10*6/MM3 (ref 3.9–5.2)
SODIUM BLD-SCNC: 140 MMOL/L (ref 136–145)
WBC NRBC COR # BLD: 3.75 10*3/MM3 (ref 4.5–10.7)

## 2017-01-05 PROCEDURE — 85027 COMPLETE CBC AUTOMATED: CPT | Performed by: HOSPITALIST

## 2017-01-05 PROCEDURE — 97110 THERAPEUTIC EXERCISES: CPT

## 2017-01-05 PROCEDURE — 74000 HC ABDOMEN KUB: CPT

## 2017-01-05 PROCEDURE — 80048 BASIC METABOLIC PNL TOTAL CA: CPT | Performed by: HOSPITALIST

## 2017-01-05 PROCEDURE — 25010000002 ENOXAPARIN PER 10 MG: Performed by: HOSPITALIST

## 2017-01-05 PROCEDURE — 25010000002 ENOXAPARIN PER 10 MG: Performed by: INTERNAL MEDICINE

## 2017-01-05 RX ORDER — ESCITALOPRAM OXALATE 10 MG/1
10 TABLET ORAL DAILY
Status: DISCONTINUED | OUTPATIENT
Start: 2017-01-05 | End: 2017-01-09 | Stop reason: HOSPADM

## 2017-01-05 RX ORDER — POLYETHYLENE GLYCOL 3350 17 G/17G
17 POWDER, FOR SOLUTION ORAL DAILY
Status: DISCONTINUED | OUTPATIENT
Start: 2017-01-05 | End: 2017-01-09 | Stop reason: HOSPADM

## 2017-01-05 RX ADMIN — MIRTAZAPINE 15 MG: 15 TABLET, FILM COATED ORAL at 20:41

## 2017-01-05 RX ADMIN — BENAZEPRIL HYDROCHLORIDE 40 MG: 20 TABLET ORAL at 08:47

## 2017-01-05 RX ADMIN — ESCITALOPRAM 10 MG: 10 TABLET, FILM COATED ORAL at 20:41

## 2017-01-05 RX ADMIN — POLYETHYLENE GLYCOL 3350 17 G: 17 POWDER, FOR SOLUTION ORAL at 09:45

## 2017-01-05 RX ADMIN — ENOXAPARIN SODIUM 50 MG: 60 INJECTION SUBCUTANEOUS at 08:47

## 2017-01-05 RX ADMIN — ENOXAPARIN SODIUM 50 MG: 60 INJECTION SUBCUTANEOUS at 20:42

## 2017-01-05 NOTE — PLAN OF CARE
Problem: Patient Care Overview (Adult)  Goal: Plan of Care Review  Outcome: Ongoing (interventions implemented as appropriate)    01/05/17 1620   Coping/Psychosocial Response Interventions   Plan Of Care Reviewed With patient   Patient Care Overview   Progress improving   Outcome Evaluation   Outcome Summary/Follow up Plan Patient vitals stable.Complains of constipation and had a small bm after mirilax. Taking meds today with no problem. Up to BR with assist.        Goal: Adult Individualization and Mutuality  Outcome: Ongoing (interventions implemented as appropriate)  Goal: Discharge Needs Assessment  Outcome: Ongoing (interventions implemented as appropriate)    Problem: Renal Failure/Kidney Injury, Acute (Adult)  Goal: Signs and Symptoms of Listed Potential Problems Will be Absent or Manageable (Renal Failure/Kidney Injury, Acute)  Outcome: Ongoing (interventions implemented as appropriate)    Problem: Fall Risk (Adult)  Goal: Absence of Falls  Outcome: Ongoing (interventions implemented as appropriate)    Problem: Skin Integrity Impairment, Risk/Actual (Adult)  Goal: Skin Integrity/Wound Healing  Outcome: Ongoing (interventions implemented as appropriate)    Problem: Nutrition, Imbalanced: Inadequate Oral Intake (Adult)  Goal: Improved Oral Intake  Outcome: Ongoing (interventions implemented as appropriate)  Goal: Prevent Further Weight Loss  Outcome: Ongoing (interventions implemented as appropriate)

## 2017-01-05 NOTE — PLAN OF CARE
Problem: Patient Care Overview (Adult)  Goal: Plan of Care Review  Outcome: Ongoing (interventions implemented as appropriate)    01/05/17 0243   Coping/Psychosocial Response Interventions   Plan Of Care Reviewed With patient   Patient Care Overview   Progress improving   Outcome Evaluation   Outcome Summary/Follow up Plan VSS. Pt is more alert and is open to speaking with staff. Pt took PO pills and lovenox this evening. No c/o pain. Family came in to see pt.        Goal: Adult Individualization and Mutuality  Outcome: Ongoing (interventions implemented as appropriate)  Goal: Discharge Needs Assessment  Outcome: Ongoing (interventions implemented as appropriate)    Problem: Renal Failure/Kidney Injury, Acute (Adult)  Goal: Signs and Symptoms of Listed Potential Problems Will be Absent or Manageable (Renal Failure/Kidney Injury, Acute)  Outcome: Ongoing (interventions implemented as appropriate)    Problem: Fall Risk (Adult)  Goal: Absence of Falls  Outcome: Ongoing (interventions implemented as appropriate)    Problem: Skin Integrity Impairment, Risk/Actual (Adult)  Goal: Skin Integrity/Wound Healing  Outcome: Ongoing (interventions implemented as appropriate)    Problem: Nutrition, Imbalanced: Inadequate Oral Intake (Adult)  Goal: Improved Oral Intake  Outcome: Ongoing (interventions implemented as appropriate)  Goal: Prevent Further Weight Loss  Outcome: Ongoing (interventions implemented as appropriate)

## 2017-01-05 NOTE — PROGRESS NOTES
"Continued Stay Note  [facility]     Patient Name: Carito Gonzalez  MRN: [id number]  Today's Date: 1/5/2017    Admit Date: 1/2/2017          Discharge Plan       01/05/17 0813    Case Management/Social Work Plan    Additional Comments I returned a call to Monica Appiah with [facility], she had questions about the tx for blood clots and how far pt is ambulating, I advised Lovenox and ambulating 40 feet with a walker.  Monica said she will call pt's dtr about returning to [place] due to pt's depression and \"she has been through so much\" she thinks it would be better for pt to return there.       01/05/17 0810    Case Management/Social Work Plan    Plan Return to [facility] v/s Short term rehab (Pending Precert)list of facilities in order of choice:.[facility] (no bed), [facility], [facility] (no bed)  and [facility]    Additional Comments Per Shaye / [facility]: no bed at [facility] or [facility]. Per Nina with [facility], bed available at [facility], pt's dtr told me yesterday she would let me know by voice mail before I returned to work today what facility is her preferance ([facility] v/s [facility]) she did not leave me a voice mail and since she mentioned [facility] first I have ask Nina to start precert there)              Discharge Codes     None            Claribel Vegas RN    "

## 2017-01-05 NOTE — PROGRESS NOTES
Continued Stay Note  [facility]     Patient Name: Carito Gonzalez  MRN: [id number]  Today's Date: 1/5/2017    Admit Date: 1/2/2017          Discharge Plan       01/05/17 1625    Case Management/Social Work Plan    Plan [facility] for short term rehab (pending precert) then return to [facility]    Patient/Family In Agreement With Plan yes    Additional Comments I spoke with Della Scotty: [phone] with [employer], she said if request submitted today she will review it tomorrow and ask that is I don't hear from her by noon to call her back. I spoke with pt's daughter, I advised I anticipate approval tomorrow for [facility], she said she will plan to drive her mother to [facility] when discharged due to she is off work next several days.              Discharge Codes     None            Claribel Vegas RN

## 2017-01-05 NOTE — PROGRESS NOTES
Continued Stay Note  [facility]     Patient Name: Carito Gonzalez  MRN: [id number]  Today's Date: 1/5/2017    Admit Date: 1/2/2017          Discharge Plan       01/05/17 1157    Case Management/Social Work Plan    Plan [facility] for short term rehab (pending precert) then return to [facility]    Additional Comments Nina with [facility] said request for precert has been submitted via email (fastest way to request precert), she said pt will need a 30 day PAS, transfer packet started, 30 day on packet and flagged for attending to cosign.      01/05/17 0952    Case Management/Social Work Plan    Additional Comments Last evening pt's wichouther Violet Bernstein was to have left me a voice mail with the SNF of choice, she did not. Today I spoke with her by phone she said the reason she did not leave me a voice mail last evening is because she found out today after talking to someone that her mother may not need rehab.  I advised I talked with Monica Appiah with [facility] and she is agreeable for pt to return there and provide her with therapy there, I advised if she wants to do this she could keep the request for SNF and if she sees that her mother needs rehab she could , if approved by [employer] take her mother from [facility] to [facility].  Pt wanted to talk with her brother before she made any decisions, but thinks she wants her mother to go to [facility], I checked with Nina, she said [facility] does not have a contract with [employer], I advised then pt's dtr wants [facility].  Violet called me back, she is agreeable with [facility], she wants her mother to go from here to [facility], Nina notified.      01/05/17 0949    Case Management/Social Work Plan    Additional Comments 01/05/2017 @ 0937: Reviewed IMM Letter with pt's daughter Violet Bernstein ([phone]) by phone, I provided the phone number to Raudel and a copy of the letter was left in pt's room.  Roselyn MARSH ask pt  to sign letter and she wanted her dtr to take care of it.      01/05/17 0943    Case Management/Social Work Plan    Plan Return to [facility] v/s Short term rehab (Pending Precert)list of facilities in order of choice:.[facility] (no bed), [facility], [facility] (no bed) and [facility]              Discharge Codes     None            Claribel Vegas RN

## 2017-01-05 NOTE — PROGRESS NOTES
Continued Stay Note  [facility]     Patient Name: Carito Gonzalez  MRN: [id number]  Today's Date: 1/5/2017    Admit Date: 1/2/2017          Discharge Plan       01/05/17 0952    Case Management/Social Work Plan    Additional Comments Plan: [place] [facility] [place]ert)  Last evening pt's daguther Violet Bernstein was to have left me a voice mail with the SNF of choice, she did not. Today I spoke with her by phone she said the reason she did not leave me a voice mail last evening is because she found out this morning after talking to someone that her mother may not need rehab.  I advised I talked with Monica Appiah with [facility] and she is agreeable for pt to return there and provide her with therapy there, I advised if she wants to do this she could keep the request for SNF and if she sees that her mother needs rehab she could , if approved by [employer] take her mother from [facility] to [facility].  Pt wanted to talk with her brother before she made any decisions, but thinks she wants her mother to go to [facility], I checked with Nina, she said [facility] does not have a contract with [employer], I advised then pt's dtr wants [facility].  Violet called me back, she is agreeable with [facility], she wants her mother to go from here to [facility], Nina notified.      01/05/17 0949    Case Management/Social Work Plan    Additional Comments 01/05/2017 @ 0937: Reviewed IMM Letter with pt's daughter Violet Bernstein ([phone]) by phone, I provided the phone number to PRO and a copy of the letter was left in pt's room.  Roselyn MARSH ask pt to sign letter and she wanted her dtr to take care of it.      01/05/17 0947    Case Management/Social Work Plan    Plan Short [place] Rehab at [facility] (pending precert)      01/05/17 0813    Case Management/Social Work Plan    Additional Comments I returned a call to Monica Appiah with [facility], she had questions about the  "tx for blood clots and how far pt is ambulating, I advised Lovenox and ambulating 40 feet with a walker.  Monica said she will call pt's dtr about returning to [place] due to pt's depression and \"she has been through so much\" she thinks it would be better for pt to return there.       01/05/17 0810    Case Management/Social Work Plan    Plan Return to [place] [facility] v/s Short term rehab (Pending Precert)list of facilities in order of choice:.[facility] (no bed), [facility] bed)  and [facility]    Additional Comments Per Shaye / Willis: no bed at [facility] or [facility]. Per Nina with [facility], [place] [facility], pt's dtr told me yesterday she would let me know by voice mail before I returned to work today what facility is her preferance ([place] v/s [facility]) she did not leave me a voice mail and since she mentioned [facility] [place] I have ask Nina to start precert there)              Discharge Codes     None            Claribel Vegas, KERA    "

## 2017-01-05 NOTE — PROGRESS NOTES
Chart was reviewed.  Contacted patient juliet/tina Lazo.  Discussed w/ her Dr. Andino's recommendations.  She reports that family is ambivalent a/b patient being admitted to [facility] b/c of poor response to treatment at [facility].  She requested information a/b [facility].  She was provided a comprehensive description of the unit, different halls on [facility], treatments available, visiting hours, and structure for the halls.  She ask additional questions a/b the program.  Dr. Andino feels that patient would benefit from treatment but states that if family is not interested he will NOT force patient over to [facility] on a 72 hour hold.  Violet did not want [facility] to discuss this w/ her mother further until the family could talk b/c of her fear after having been to [facility].  Await Violet's call.  Claribel from [facility] updated.     15:09  VioletjulietYungTINA called back and stated that she and the family do not want her mother admitted to [facility].  She also requested that [facility] not be involved w/ patient any longer. She was informed that should her mother not improve and family felt that patient needed to be admitted to [facility] they could contact the [facility] number.  Dr. Andino was agreeable w/ this plan.  [facility] will sign off given family's decline for further psychiatric interventions at [facility] at this time.  She was also thankful for information and conversation today.

## 2017-01-05 NOTE — PLAN OF CARE
Problem: Patient Care Overview (Adult)  Goal: Plan of Care Review  Outcome: Ongoing (interventions implemented as appropriate)    01/05/17 1406   Coping/Psychosocial Response Interventions   Plan Of Care Reviewed With patient   Patient Care Overview   Progress progress towards functional goals is fair   Outcome Evaluation   Outcome Summary/Follow up Plan pt alert, pleasantly confused, cooperative, limited with walking this afternoon due to rectal pain.

## 2017-01-05 NOTE — PROGRESS NOTES
"    DAILY PROGRESS NOTE  [facility]    Patient Identification:  Name: Carito Gonzalez  Age: 86 y.o.  Sex: female  :  3/7/1930  MRN: [id number]         Primary Care Physician: Rafi Gtz MD    Subjective:  Interval History: not refusing meds today, c/o constipation and small bm today with mirilax, wants more stool. No cp/palpitation/sob/n/v/bleeding, no SI or HI    Objective:no fm    Scheduled Meds:    benazepril 40 mg Oral Q24H   enoxaparin 50 mg Subcutaneous Q12H   mirtazapine 15 mg Oral Nightly   polyethylene glycol 17 g Oral Daily     Continuous Infusions:    Pharmacy to Dose enoxaparin (LOVENOX)        Vital signs in last 24 hours:  Temp:  [97.1 °F (36.2 °C)-98.8 °F (37.1 °C)] 98.8 °F (37.1 °C)  Heart Rate:  [74-84] 84  Resp:  [18] 18  BP: (141-157)/(78-80) 142/79    Intake/Output:    Intake/Output Summary (Last 24 hours) at 17 1707  Last data filed at 17 1300   Gross per 24 hour   Intake    690 ml   Output   1100 ml   Net   -410 ml       Exam:  Visit Vitals   • /79 (BP Location: Right arm, Patient Position: Lying)   • Pulse 84   • Temp 98.8 °F (37.1 °C) (Oral)   • Resp 18   • Ht 62.4\" (158.5 cm)   • Wt 99 lb 8 oz (45.1 kg)   • SpO2 95%   • BMI 17.97 kg/m2       General Appearance:    Alert, cooperative, flat though a little more interactive today                          Head:    Normocephalic, without obvious abnormality, atraumatic                         Throat:   Lips, tongue, gums normal; oral mucosa pink and moist                         Lungs:    Clear to auscultation bilaterally, respirations unlabored                         Heart:    Regular rate and rhythm, S1 and S2 normal                  Abdomen:     Soft, non-tender, bowel sounds active                 Extremities:   No cyanosis or edema or erythema                        Pulses:   Pulses palpable in lower extremities                                 Data Review:  Labs in chart were " reviewed.    Assessment:  Principal Problem:    Acute renal insufficiency  Active Problems:    Hypertension    DVT (deep venous thrombosis)    MVP (mitral valve prolapse)    CKD (chronic kidney disease) stage 3, GFR 30-59 ml/min    Depression    Protein calorie malnutrition      Plan:  ARF resolved w/ CKD3 - holding lasix (needs for valvular disease/diastolic dysfunction), benazepril at full dose. Likely influenced from decreased oral intake compounded by depression  -SLIVF  -negative CT A/P  -labs in am      HTN w/ hypotension resolved: benazepril restarted      Acute RLE DVT and subacute LLE DVT - on Lovenox for now switch to Eliquis tonight,      Depression - resume Lexapro and add Remeron - concern is uncontrolled - denies SI - get [facility] to evaluate    -TSH 1.6  -consulted psych and rec [facility] - but pt family declined, no si or hi currently      PCM - prealbumin 16.3, nutrition consulted, anemia labs ok/stool pending       Lovenox for DVT prophylaxis      PT/CCP - needs skilled , Pre-cert started        Israel Ariza MD  1/5/2017  5:07 PM

## 2017-01-05 NOTE — PROGRESS NOTES
Acute Care - Physical Therapy Treatment Note  [facility]     Patient Name: Carito Gonzalez  : 3/7/1930  MRN: [id number]  Today's Date: 2017  Onset of Illness/Injury or Date of Surgery Date: 17  Date of Referral to PT: 17  Referring Physician: Giovanni    Admit Date: 2017    Visit Dx:    ICD-10-CM ICD-9-CM   1. Acute renal insufficiency N28.9 593.9   2. Dehydration E86.0 276.51   3. Generalized weakness R53.1 780.79     Patient Active Problem List   Diagnosis   • Hypercholesterolemia   • Hypertension   • Valvular heart disease   • Swelling of limb, left   • DVT (deep venous thrombosis)   • MVP (mitral valve prolapse)   • Mitral regurgitation   • Bilateral carotid bruits   • Encounter for immunization   • Routine health maintenance   • Acute renal insufficiency   • CKD (chronic kidney disease) stage 3, GFR 30-59 ml/min   • Depression   • Protein calorie malnutrition               Adult Rehabilitation Note       17 1402 17 1435       Rehab Assessment/Intervention    Discipline physical therapist  -PC physical therapist  -MD     Document Type therapy note (daily note)  -PC therapy note (daily note)  -MD     Subjective Information agree to therapy;complains of;pain  -PC agree to therapy;complains of;weakness  -MD     Patient Effort, Rehab Treatment good  -PC good  -MD     Symptoms Noted During/After Treatment increased pain  -PC none  -MD     Symptoms Noted Comment pain in rectum limited activity  -PC      Precautions/Limitations  fall precautions  -MD     Recorded by [PC] Maria Luisa Mccarthy, PT [MD] Ofelia Estrada, PT     Pain Assessment    Pain Assessment  No/denies pain  -MD     Nicky FACES Pain Rating 8  -PC      Pain Location Rectum  -PC      Pain Intervention(s) Repositioned  -PC      Recorded by [PC] Maria Luisa Mccarthy, PT [MD] Ofelia Estrada, PT     Cognitive Assessment/Intervention    Current Cognitive/Communication Assessment impaired  -PC impaired  -MD     Orientation Status  oriented  to;person  -MD     Follows Commands/Answers Questions able to follow single-step instructions;needs cueing;needs increased time;75% of the time  -PC 75% of the time;needs cueing;needs repetition  -MD     Personal Safety decreased awareness, need for assist  -PC decreased insight to deficits  -MD     Personal Safety Interventions  gait belt;muscle strengthening facilitated;nonskid shoes/slippers when out of bed;supervised activity  -MD     Recorded by [PC] Maria Luisa Mccarthy PT [MD] Ofelia Estrada PT     Bed Mobility, Assessment/Treatment    Bed Mob, Supine to Sit, [place] contact guard assist  -PC verbal cues required;contact guard assist  -MD     Bed Mob, Sit to Supine, [place] minimum assist (75% patient effort)  -PC verbal cues required;supervision required  -MD     Bed Mobility, Impairments  strength decreased  -MD     Recorded by [PC] Maria Luisa Mccarthy, PT [MD] Ofelia Estrada PT     Transfer Assessment/Treatment    Transfers, Sit-Stand [place] minimum assist (75% patient effort)  -PC verbal cues required;minimum assist (75% patient effort);2 person assist required  -MD     Transfers, Stand-Sit [place] minimum assist (75% patient effort)  -PC verbal cues required;minimum assist (75% patient effort)  -MD     Transfers, Sit-Stand-Sit, Assist Device rolling walker  -PC rolling walker  -MD     Transfer, Impairments  strength decreased  -MD     Recorded by [PC] Maria Luisa Mccarthy PT [MD] Ofelia Estrada PT     Gait Assessment/Treatment    Gait, [place] Level  minimum assist (75% patient effort)  -MD     Gait, Assistive Device  rolling walker  -MD     Gait, Distance (Feet)  40  -MD     Gait, Gait Deviations  bilateral:;shavon decreased;forward flexed posture;step length decreased;decreased heel strike  -MD     Gait, Impairments  impaired balance  -MD     Gait, Comment pt stood and attempted to walk 3 times, each time pt experienced severe rectal pain and had to return to sitting  -PC      Recorded by [PC] Maria Luisa MURILLO  Fabio, PT [MD] Ofelia Estrada PT     Positioning and Restraints    Pre-Treatment Position in bed  -PC in bed  -MD     Post Treatment Position bed  -PC bed  -MD     In Bed supine;encouraged to call for assist;call light within reach;exit alarm on  -PC supine;call light within reach;exit alarm on  -MD     Recorded by [PC] Maria Luisa Mccarthy, PT [MD] Ofelia Estrada PT       User Key  (r) = Recorded By, (t) = Taken By, (c) = Cosigned By    Initials Name Effective Dates    PC Maria Luisa Mccarthy, PT 12/01/15 -     MD Ofelia Estrada, PT 12/01/15 -                 IP PT Goals       01/03/17 1304          Bed Mobility PT LTG    Bed Mobility PT LTG, Date Established 01/03/17  -PC      Bed Mobility PT LTG, Time to Achieve 1 wk  -PC      Bed Mobility PT LTG, Activity Type all bed mobility  -PC      Bed Mobility PT LTG, [place] Level contact guard assist  -PC      Transfer Training PT LTG    Transfer Training PT LTG, Date Established 01/03/17  -PC      Transfer Training PT LTG, Time to Achieve 1 wk  -PC      Transfer Training PT LTG, Activity Type all transfers  -PC      Transfer Training PT LTG, [place] Level contact guard assist  -PC      Transfer Training PT LTG, Assist Device walker, rolling  -PC      Gait Training PT LTG    Gait Training Goal PT LTG, Date Established 01/03/17  -PC      Gait Training Goal PT LTG, Time to Achieve 1 wk  -PC      Gait Training Goal PT LTG, [place] Level contact guard assist  -PC      Gait Training Goal PT LTG, Assist Device walker, rolling  -PC      Gait Training Goal PT LTG, Distance to Achieve 75 ft  -PC        User Key  (r) = Recorded By, (t) = Taken By, (c) = Cosigned By    Initials Name Provider Type    PC Maria Luisa Mccarthy PT Physical Therapist          Physical Therapy Education     Title: PT OT SLP Therapies (Active)     Topic: Physical Therapy (Active)     Point: Mobility training (Active)    Learning Progress Summary    Learner Readiness Method Response Comment Documented by Status   Patient  Acceptance E,D NR  PC 01/05/17 1405 Active    Nonacceptance E,D NR  PC 01/03/17 1304 Active               Point: Home exercise program (Active)    Learning Progress Summary    Learner Readiness Method Response Comment Documented by Status   Patient Acceptance E,D NR  PC 01/05/17 1405 Active    Nonacceptance E,D NR  PC 01/03/17 1304 Active               Point: Body mechanics (Active)    Learning Progress Summary    Learner Readiness Method Response Comment Documented by Status   Patient Acceptance E,D NR  PC 01/05/17 1405 Active    Nonacceptance E,D NR  PC 01/03/17 1304 Active               Point: Precautions (Active)    Learning Progress Summary    Learner Readiness Method Response Comment Documented by Status   Patient Acceptance E,D NR  PC 01/05/17 1405 Active    Acceptance E,D NR  MD 01/04/17 1438 Active    Nonacceptance E,D NR  PC 01/03/17 1304 Active                      User Key     Initials Effective Dates Name Provider Type Discipline     12/01/15 -  Maria Luisa Mccarthy, PT Physical Therapist PT    MD 12/01/15 -  Ofelia Estrada, PT Physical Therapist PT                    PT Recommendation and Plan  Anticipated Discharge Disposition: skilled nursing facility  Planned Therapy Interventions: balance training, bed mobility training, gait training, home exercise program, strengthening, transfer training  PT Frequency: daily  Plan of Care Review  Plan Of Care Reviewed With: patient  Progress: progress towards functional goals is fair  Outcome Summary/Follow up Plan: pt alert, pleasantly confused, cooperative, limited with walking this afternoon due to rectal pain.          Outcome Measures       01/05/17 1400 01/04/17 1400 01/03/17 1300    How much help from another person do you currently need...    Turning from your back to your side while in flat bed without using bedrails? 3  -PC 3  -MD 3  -PC    Moving from lying on back to sitting on the side of a flat bed without bedrails? 3  -PC 3  -MD 3  -PC    Moving to and  from a bed to a chair (including a wheelchair)? 2  -PC 2  -MD 3  -PC    Standing up from a chair using your arms (e.g., wheelchair, bedside chair)? 2  -PC 2  -MD 3  -PC    Climbing 3-5 steps with a railing? 2  -PC 2  -MD 2  -PC    To walk in hospital room? 3  -PC 3  -MD 3  -PC    AM-PAC 6 Clicks Score 15  -PC 15  -MD 17  -PC    Functional Assessment    Outcome Measure Options  AM-PAC 6 Clicks Basic Mobility (PT)  -MD AM-PAC 6 Clicks Basic Mobility (PT)  -PC      User Key  (r) = Recorded By, (t) = Taken By, (c) = Cosigned By    Initials Name Provider Type    PC Maria Luisa Mccarthy, PT Physical Therapist    MD Ofelia Estrada, PT Physical Therapist           Time Calculation:         PT Charges       01/05/17 1407          Time Calculation    Start Time 1342  -PC      Stop Time 1359  -PC      Time Calculation (min) 17 min  -PC      PT Received On 01/05/17  -PC      PT - Next Appointment 01/06/17  -PC        User Key  (r) = Recorded By, (t) = Taken By, (c) = Cosigned By    Initials Name Provider Type    PC Maria Luisa Mccarthy, PT Physical Therapist          Therapy Charges for Today     Code Description Service Date Service Provider Modifiers Qty    [id number]  PT THER PROC EA 15 MIN 1/5/2017 Maria Luisa Mccarthy, PT GP 1          PT G-Codes  Outcome Measure Options: AM-PAC 6 Clicks Basic Mobility (PT)    Maria Luisa Mccarthy, PT  1/5/2017

## 2017-01-06 LAB
ANION GAP SERPL CALCULATED.3IONS-SCNC: 13.6 MMOL/L
BUN BLD-MCNC: 17 MG/DL (ref 8–23)
BUN/CREAT SERPL: 22.7 (ref 7–25)
CALCIUM SPEC-SCNC: 8.7 MG/DL (ref 8.6–10.5)
CHLORIDE SERPL-SCNC: 102 MMOL/L (ref 98–107)
CO2 SERPL-SCNC: 24.4 MMOL/L (ref 22–29)
CREAT BLD-MCNC: 0.75 MG/DL (ref 0.57–1)
GFR SERPL CREATININE-BSD FRML MDRD: 73 ML/MIN/1.73
GLUCOSE BLD-MCNC: 76 MG/DL (ref 65–99)
POTASSIUM BLD-SCNC: 3.6 MMOL/L (ref 3.5–5.2)
SODIUM BLD-SCNC: 140 MMOL/L (ref 136–145)

## 2017-01-06 PROCEDURE — 80048 BASIC METABOLIC PNL TOTAL CA: CPT | Performed by: INTERNAL MEDICINE

## 2017-01-06 PROCEDURE — 97110 THERAPEUTIC EXERCISES: CPT | Performed by: PHYSICAL THERAPIST

## 2017-01-06 RX ORDER — ACETAMINOPHEN 325 MG/1
650 TABLET ORAL EVERY 6 HOURS PRN
Refills: 0
Start: 2017-01-06

## 2017-01-06 RX ORDER — MIRTAZAPINE 15 MG/1
15 TABLET, FILM COATED ORAL NIGHTLY
Start: 2017-01-06 | End: 2017-02-03

## 2017-01-06 RX ADMIN — APIXABAN 2.5 MG: 2.5 TABLET, FILM COATED ORAL at 20:46

## 2017-01-06 RX ADMIN — BENAZEPRIL HYDROCHLORIDE 40 MG: 20 TABLET ORAL at 08:41

## 2017-01-06 RX ADMIN — ESCITALOPRAM 10 MG: 10 TABLET, FILM COATED ORAL at 08:40

## 2017-01-06 RX ADMIN — MIRTAZAPINE 15 MG: 15 TABLET, FILM COATED ORAL at 20:46

## 2017-01-06 RX ADMIN — POLYETHYLENE GLYCOL 3350 17 G: 17 POWDER, FOR SOLUTION ORAL at 08:40

## 2017-01-06 RX ADMIN — APIXABAN 2.5 MG: 2.5 TABLET, FILM COATED ORAL at 08:41

## 2017-01-06 NOTE — PLAN OF CARE
Problem: Patient Care Overview (Adult)  Goal: Plan of Care Review  Outcome: Ongoing (interventions implemented as appropriate)    01/06/17 1140   Coping/Psychosocial Response Interventions   Plan Of Care Reviewed With patient   Patient Care Overview   Progress improving   Outcome Evaluation   Outcome Summary/Follow up Plan Max encouragement to participate initially but once out of bed patient did well with all mobility. Limited ambulation distance due to fatigue and poor motivation

## 2017-01-06 NOTE — PROGRESS NOTES
Continued Stay Note  [facility]     Patient Name: Carito Gonzalez  MRN: [id number]  Today's Date: 1/6/2017    Admit Date: 1/2/2017          Discharge Plan       01/06/17 1230    Case Management/Social Work Plan    Plan [facility] for rehab (pending precert) prior to returning to [facility]    Patient/Family In Agreement With Plan yes    Additional Comments After checking with Nina to see if precert has been obtained, she said it has not. I left a voice mail with Della at [employer] [phone]  (she advised yesterday to call her if I had not had a reply by noon.  Pt's dtr Violet called to see if precert obtained for rehab, I advised I am waiting on a reply from [facility], I advised discharge is planned for today pending precert.              Discharge Codes     None            Claribel Vegas RN

## 2017-01-06 NOTE — PLAN OF CARE
Problem: Patient Care Overview (Adult)  Goal: Plan of Care Review  Outcome: Ongoing (interventions implemented as appropriate)    01/06/17 8043   Coping/Psychosocial Response Interventions   Plan Of Care Reviewed With patient   Patient Care Overview   Progress no change   Outcome Evaluation   Outcome Summary/Follow up Plan Patient will only take meds after a long time of coaxing. walked with PT. possible discharge tonight waiting for insurence precert.        Goal: Adult Individualization and Mutuality  Outcome: Ongoing (interventions implemented as appropriate)  Goal: Discharge Needs Assessment  Outcome: Ongoing (interventions implemented as appropriate)    Problem: Renal Failure/Kidney Injury, Acute (Adult)  Goal: Signs and Symptoms of Listed Potential Problems Will be Absent or Manageable (Renal Failure/Kidney Injury, Acute)  Outcome: Ongoing (interventions implemented as appropriate)    Problem: Fall Risk (Adult)  Goal: Absence of Falls  Outcome: Ongoing (interventions implemented as appropriate)    Problem: Skin Integrity Impairment, Risk/Actual (Adult)  Goal: Skin Integrity/Wound Healing  Outcome: Ongoing (interventions implemented as appropriate)    Problem: Nutrition, Imbalanced: Inadequate Oral Intake (Adult)  Goal: Improved Oral Intake  Outcome: Ongoing (interventions implemented as appropriate)  Goal: Prevent Further Weight Loss  Outcome: Ongoing (interventions implemented as appropriate)

## 2017-01-06 NOTE — PLAN OF CARE
Problem: Patient Care Overview (Adult)  Goal: Plan of Care Review  Outcome: Ongoing (interventions implemented as appropriate)    01/06/17 0221   Coping/Psychosocial Response Interventions   Plan Of Care Reviewed With patient   Patient Care Overview   Progress improving   Outcome Evaluation   Outcome Summary/Follow up Plan KUB RESULT RELAYED TO DR. GONZALEZ, PT HAS 2 BOWEL MOVEMENT AFTER ALL THE BOWEL PREP.        Goal: Adult Individualization and Mutuality  Outcome: Ongoing (interventions implemented as appropriate)  Goal: Discharge Needs Assessment  Outcome: Ongoing (interventions implemented as appropriate)    Problem: Renal Failure/Kidney Injury, Acute (Adult)  Goal: Signs and Symptoms of Listed Potential Problems Will be Absent or Manageable (Renal Failure/Kidney Injury, Acute)  Outcome: Ongoing (interventions implemented as appropriate)    Problem: Fall Risk (Adult)  Goal: Absence of Falls  Outcome: Ongoing (interventions implemented as appropriate)    Problem: Skin Integrity Impairment, Risk/Actual (Adult)  Goal: Skin Integrity/Wound Healing  Outcome: Ongoing (interventions implemented as appropriate)    Problem: Nutrition, Imbalanced: Inadequate Oral Intake (Adult)  Goal: Improved Oral Intake  Outcome: Ongoing (interventions implemented as appropriate)  Goal: Prevent Further Weight Loss  Outcome: Ongoing (interventions implemented as appropriate)

## 2017-01-06 NOTE — PROGRESS NOTES
Continued Stay Note  [facility]     Patient Name: Carito Gonzalez  MRN: [id number]  Today's Date: 1/6/2017    Admit Date: 1/2/2017          Discharge Plan       01/06/17 1658    Case Management/Social Work Plan    Plan [facility] [place] for rehab (pending precert) prior to returning to [facility]    Additional Comments Per Nina with [facility] [place], when nurse called [employer] today to see if case has been approved there was an administrative error on [facility]'s side, due to the error it the nurse with [facility] said request would be reviewed today. I have left a 2nd message with Della Fajardo: [phone], nurse assigned to [place] with request for return call, I tried w/o success to leave a message with Angela Gamboa: [phone], another nurse assigned to [place] however there was no voice mail and no answer, I then left a voice mail with request for return for 1.Maikel Gray /  at [employer]: [phone], due to no return call I then left a voice mail for Shaye Mariee ([phone]), a mgr at [employer].  Pt's daughter called again to see  if we have auth for her mother to go to [facility] [place], I advised I was told by the liaison that [facility] [place] was told the review will be done today and the dr is on the unit now and plans to discharge pt today.   I checked with Nina with [facility] [place], she said the nurse is on the phone now with [facility].              Discharge Codes     None        Expected Discharge Date and Time     Expected Discharge Date Expected Discharge Time    Jan 6, 2017             Claribel Vegas RN

## 2017-01-06 NOTE — PROGRESS NOTES
Continued Stay Note  [facility]     Patient Name: Carito Gonzalez  MRN: [id number]  Today's Date: 1/6/2017    Admit Date: 1/2/2017          Discharge Plan       01/06/17 1734    Case Management/Social Work Plan    Plan [facility] for rehab (pending precert) prior to returning to [facility]     Additional Comments Nina with [facility] said nurse was on hold 45 min with [employer], she was told case is being reviewed now and if no answer tonight they will have an answer tomorrow due to a nurse is working tomorrow.  Nina said if auth obtained tonight she will call nurses station. I have updated pt's nurse. I have updated pt's dtr Violet, she ask that if we are notified tonight of auth that we call her cell phone [phone].      01/06/17 1658    Case Management/Social Work Plan    Plan [facility] for rehab (pending precert) prior to returning to [facility]    Additional Comments Per Nina with [facility], when nurse called [employer] today to see if case has been approved there was an administrative error on [employer]'s side, due to the error it the nurse with [employer] said request would be reviewed today. I have left a 2nd message with Della Fajardo: [phone], nurse assigned to [place] with request for return call, I tried w/o success to leave a message with Angela Gamboa: [phone], another nurse assigned to [place] however there was no voice mail and no answer, I then left a voice mail with request for return for 1.Maikel Gray /  at [employer]: [phone], due to no return call I then left a voice mail for Shaye Mariee ([phone]), a mgr at [employer].  Pt's daughter called again to see  if we have auth for her mother to go to [facility], I advised I was told by the liaison that [facility] was told the review will be done today and the dr is on the unit now and plans to discharge pt today.   I checked with Nina with [facility], she said the  nurse is on the phone now with [facility].              Discharge Codes     None        Expected Discharge Date and Time     Expected Discharge Date Expected Discharge Time    Jan 6, 2017             Claribel Vegas RN

## 2017-01-06 NOTE — DISCHARGE SUMMARY
Name: Carito Gonzalez ADMIT: 2017   : 3/7/1930  PCP: Rafi Gtz MD    MRN: [id number] LOS: 4 days   AGE/SEX: 86 y.o. female  ROOM: P690/1     Date of Admission: 2017  Date of Discharge:  2017    PCP: Rafi Gtz MD      DISCHARGE DIAGNOSIS  Active Hospital Problems (** Indicates Principal Problem)    Diagnosis Date Noted   • **Acute renal insufficiency [N28.9] 2017   • CKD (chronic kidney disease) stage 3, GFR 30-59 ml/min [N18.3] 2017   • Depression [F32.9] 2017   • Protein calorie malnutrition [E46] 2017   • MVP (mitral valve prolapse) [I34.1] 2016   • DVT (deep venous thrombosis) [I82.409] 2016   • Hypertension [I10] 2016      Resolved Hospital Problems    Diagnosis Date Noted Date Resolved   No resolved problems to display.       SECONDARY DIAGNOSES  Past Medical History   Diagnosis Date   • Depression    • Hyperlipidemia    • Hypertension    • Macular degeneration      Noted 2014   • Mitral regurgitation    • Mitral valve prolapse    • Zoster        CONSULTS   Consults     Date and Time Order Name Status Description    1/3/2017 1541 Inpatient Consult to Psychiatrist Completed     2017 0101 [facility] (on-call MD unless specified) Completed           PROCEDURES PERFORMED    Lower extremity Dopplers    Interpretation Summary      · Acute right lower extremity deep vein thrombosis noted in the popliteal, posterior tibial, peroneal and gastrocnemius/soleal.  · Sub-acute left lower extremity deep vein thrombosis noted in the popliteal.  · Acute left lower extremity deep vein thrombosis noted in the peroneal and gastrocnemius/soleal.  · All other veins appeared normal bilaterally.         KUB        A single view of the abdomen demonstrates gas dispersed throughout the  colon and small bowel. The small bowel is not distended. Fecal material  is noted dispersed throughout the colon but with no evidence of fecal  impaction. There is  dextroscoliosis of the lumbar spine. Further  evaluation could be performed with a CT examination of the abdomen and  pelvis as indicated.          CT ABDOMEN AND PELVIS WITHOUT CONTRAST      IMPRESSION:  No CT evidence for acute intra-abdominal or pelvic process  is identified.      ONE VIEW PORTABLE CHEST    The lungs are well-expanded and clear and the heart size is normal.  There is no acute disease or change from 12/20/2016.        HOSPITAL COURSE  Patient is a 86 y.o. female presented to [facility] complaining of hypotension.  Please see the admitting history and physical for further details.      The patient is a pleasant 86-year-old female sent from [facility] she has been moved to a separate facility from her  and spent some time at the [facility] for concerns of uncontrolled depression.  There were plans to see Dr. Veloz in the [facility] psych standpoint but has not happened.  She also had issues with decreased oral intake and has become more withdrawn.  She was initially sent to the hospital for hypotension but her blood pressure has remained stable in hospital physician.  She also had mild acute on chronic renal failure and IV fluids were started.  She improved quite nicely with IV fluids and her creatinine is at her baseline of 0.75 at the time of discharge.  Her maximum creatinine was 1.59.  She also had some concerns of a left leg cellulitis and was initially started on Unasyn.  An ultrasound to rule out a DVT was obtained and was positive for bilateral DVT.  Please see above for the full report.  She was transitioned to Eliquis 2.5 mg twice a day given her age of greater than 80 and her weight 45 kg is less than 60 kg.    Really, the most pressing issue was her severe depression.  Psychiatry was consulted and actually recommend patient to be admitted to our crisis management unit.  Discussions were had with the patient's family and was ultimately decided to try to  "manage the patient in other settings other than inpatient psychiatry.  The patient was started on Remeron to help both with her appetite, sleep and for depression.    She does have protein calorie malnutrition and requires prompting and encouragement to eat.  She will also need ensure or other supplement during each meal.  She was also started on Remeron which will help with her appetite and have the added benefit of helping her depression and sleep as well.    Other pertinent labs upon discharge include a sodium of 140, potassium 3.6, creatinine 0.75, hemoglobin 11.2, white blood cell count 3.75 and platelet count of 191    The patient will be discharged to [facility] later this evening.  VITAL SIGNS  Visit Vitals   • /80 (BP Location: Left arm, Patient Position: Lying)   • Pulse 71   • Temp 98.9 °F (37.2 °C) (Oral)   • Resp 16   • Ht 62.4\" (158.5 cm)   • Wt 99 lb 8 oz (45.1 kg)   • SpO2 95%   • BMI 17.97 kg/m2     Objective:  General Appearance:  Comfortable, well-appearing, not in pain and in no acute distress (Soft-spoken and withdrawn, elderly, thin and cachectic).    Vital signs: (most recent): Blood pressure 146/80, pulse 71, temperature 98.9 °F (37.2 °C), temperature source Oral, resp. rate 16, height 62.4\" (158.5 cm), weight 99 lb 8 oz (45.1 kg), SpO2 95 %.  Vital signs are normal.  No fever.    Lungs:  Normal respiratory rate and normal effort.  Breath sounds clear to auscultation.    Heart: Normal rate.  Regular rhythm.  S1 normal and S2 normal.  No murmur.   Abdomen: Abdomen is soft and non-distended.  Bowel sounds are normal.   There is no abdominal tenderness.     Extremities: Normal range of motion.  There is dependent edema.  (Trace)  Neurological: Patient is alert.    Skin:  Warm and dry.  No rash, cyanosis or ulceration.         CONDITION ON DISCHARGE  Stable.      DISCHARGE DISPOSITION   Skilled Nursing Facility (DC - External) [facility]      DISCHARGE MEDICATIONS   Carito Gonzalez"   Home Medication Instructions SAM:[id number]    Printed on:01/06/17 9472   Medication Information                      acetaminophen (TYLENOL) 325 MG tablet  Take 2 tablets by mouth Every 6 (Six) Hours As Needed for mild pain (1-3).             apixaban (ELIQUIS) 2.5 MG tablet tablet  Take 1 tablet by mouth Every 12 (Twelve) Hours.             benazepril (LOTENSIN) 40 MG tablet  Take 1 tablet by mouth daily.             Docusate Sodium (COLACE PO)  Take  by mouth.             escitalopram (LEXAPRO) 10 MG tablet  Take 1 tablet by mouth Daily.             mirtazapine (REMERON) 15 MG tablet  Take 1 tablet by mouth Every Night.             Multiple Vitamins-Minerals (OCUTABS) tablet  Take 1 tablet by mouth daily.             polyethylene glycol (MIRALAX) packet  Take 17 g by mouth Daily.                Future Appointments  Date Time Provider Department Center   1/24/2017 3:40 PM MD DELVIS [facility] None   3/13/2017 8:20 AM MD [facility]VIS [facility] None   3/15/2017 9:00 AM MD [facility] None     Follow-up Information     Follow up with [facility] .    Specialty:  Skilled Nursing Facility    Contact information:    [facility] [facility] [id number] [phone]          TEST  RESULTS PENDING AT DISCHARGE     None    Israel Ariza MD  [facility]  01/06/17  4:56 PM      Time: greater than 30 minutes.      Dragon disclaimer:  Much of this encounter note is an electronic transcription/translation of spoken language to printed text. The electronic translation of spoken language may permit erroneous, or at times, nonsensical words or phrases to be inadvertently transcribed; Although I have reviewed the note for such errors, some may still exist.

## 2017-01-06 NOTE — PROGRESS NOTES
Acute Care - Physical Therapy Treatment Note  [facility]     Patient Name: Carito Gonzalez  : 3/7/1930  MRN: [id number]  Today's Date: 2017  Onset of Illness/Injury or Date of Surgery Date: 17  Date of Referral to PT: 17  Referring Physician: Giovanni    Admit Date: 2017    Visit Dx:    ICD-10-CM ICD-9-CM   1. Acute renal insufficiency N28.9 593.9   2. Dehydration E86.0 276.51   3. Generalized weakness R53.1 780.79     Patient Active Problem List   Diagnosis   • Hypercholesterolemia   • Hypertension   • Valvular heart disease   • Swelling of limb, left   • DVT (deep venous thrombosis)   • MVP (mitral valve prolapse)   • Mitral regurgitation   • Bilateral carotid bruits   • Encounter for immunization   • Routine health maintenance   • Acute renal insufficiency   • CKD (chronic kidney disease) stage 3, GFR 30-59 ml/min   • Depression   • Protein calorie malnutrition               Adult Rehabilitation Note       17 1137 17 1402 17 1435    Rehab Assessment/Intervention    Discipline physical therapist  -KH physical therapist  -PC physical therapist  -MD    Document Type therapy note (daily note)  -KH therapy note (daily note)  -PC therapy note (daily note)  -MD    Subjective Information agree to therapy;complains of;fatigue  -KH agree to therapy;complains of;pain  -PC agree to therapy;complains of;weakness  -MD    Patient Effort, Rehab Treatment adequate  -KH good  -PC good  -MD    Symptoms Noted During/After Treatment fatigue  -KH increased pain  -PC none  -MD    Symptoms Noted Comment  pain in rectum limited activity  -PC     Precautions/Limitations fall precautions  -  fall precautions  -MD    Recorded by [KH] Guerita Head, PT [PC] Maria Luisa Mccarthy, PT [MD] Ofelia Estrada, PT    Pain Assessment    Pain Assessment 0-10  -KH  No/denies pain  -MD Saunders FACES Pain Rating 4  -KH 8  -PC     Pain Location  Rectum  -PC     Pain Intervention(s) Repositioned  -KH Repositioned  -PC      Recorded by [KH] Guerita Head PT [PC] Maria Luisa Mccarthy, PT [MD] Ofelia Estrada PT    Vision Assessment/Intervention    Visual Impairment WFL  -KH      Recorded by [KH] Guerita Head PT      Cognitive Assessment/Intervention    Current Cognitive/Communication Assessment impaired  -KH impaired  -PC impaired  -MD    Orientation Status oriented to;person  -KH  oriented to;person  -MD    Follows Commands/Answers Questions able to follow single-step instructions;needs cueing;needs increased time;needs repetition  -KH able to follow single-step instructions;needs cueing;needs increased time;75% of the time  -PC 75% of the time;needs cueing;needs repetition  -MD    Personal Safety decreased awareness, need for assist;decreased awareness, need for safety;decreased insight to deficits  -KH decreased awareness, need for assist  -PC decreased insight to deficits  -MD    Personal Safety Interventions gait belt;fall prevention program maintained;nonskid shoes/slippers when out of bed  -KH  gait belt;muscle strengthening facilitated;nonskid shoes/slippers when out of bed;supervised activity  -MD    Recorded by [KH] Guerita Head PT [PC] Maria Luisa Mccarthy, PT [MD] Ofelia Estrada, PT    Bed Mobility, Assessment/Treatment    Bed Mob, Supine to Sit, [place] contact guard assist  -KH contact guard assist  -PC verbal cues required;contact guard assist  -MD    Bed Mob, Sit to Supine, [place] minimum assist (75% patient effort)  -KH minimum assist (75% patient effort)  -PC verbal cues required;supervision required  -MD    Bed Mobility, Impairments strength decreased  -KH  strength decreased  -MD    Recorded by [KH] Guerita Head PT [PC] Maria Luisa Mccarthy, PT [MD] Ofelia Estrada, PT    Transfer Assessment/Treatment    Transfers, Sit-Stand [place] minimum assist (75% patient effort)  -KH minimum assist (75% patient effort)  -PC verbal cues required;minimum assist (75% patient effort);2 person assist required  -MD    Transfers, Stand-Sit [place]  minimum assist (75% patient effort)  - minimum assist (75% patient effort)  - verbal cues required;minimum assist (75% patient effort)  -MD    Transfers, Sit-Stand-Sit, Assist Device rolling walker  - rolling walker  -PC rolling walker  -MD    Transfer, Impairments strength decreased  -  strength decreased  -MD    Transfer, Comment difficulty initiating initial phase of sit to stand due to quadriceps weakness  -      Recorded by [] Guerita Head, PT [PC] Maria Luisa Mccarthy, PT [MD] Ofelia Estrada, PT    Gait Assessment/Treatment    Gait, [place] Level minimum assist (75% patient effort)  -  minimum assist (75% patient effort)  -MD    Gait, Assistive Device rolling walker  -  rolling walker  -MD    Gait, Distance (Feet) 50  -  40  -MD    Gait, Gait Deviations shavon decreased;forward flexed posture;step length decreased;stride length decreased  -  bilateral:;shavon decreased;forward flexed posture;step length decreased;decreased heel strike  -MD    Gait, Safety Issues step length decreased;weight-shifting ability decreased  -      Gait, Impairments   impaired balance  -MD    Gait, Comment slight assist needed to turn RW  - pt stood and attempted to walk 3 times, each time pt experienced severe rectal pain and had to return to sitting  -     Recorded by [] Guerita Head, PT [PC] Maria Luisa Mccarthy, PT [MD] Ofelia Estrada PT    Positioning and Restraints    Pre-Treatment Position in bed  - in bed  -PC in bed  -MD    Post Treatment Position bed  - bed  - bed  -MD    In Bed supine;call light within reach;encouraged to call for assist;exit alarm on  - supine;encouraged to call for assist;call light within reach;exit alarm on  - supine;call light within reach;exit alarm on  -MD    Recorded by [] Guerita Head, PT [PC] Maria Luisa Mccarthy, PT [MD] Ofelia Estrada PT      User Key  (r) = Recorded By, (t) = Taken By, (c) = Cosigned By    Initials Name Effective Dates    PC Maria Luisa Mccarthy, PT 12/01/15 -     MD  Ofelia Estrada, PT 12/01/15 -     KEITH Head, PT 06/22/16 -                 IP PT Goals       01/03/17 1304          Bed Mobility PT LTG    Bed Mobility PT LTG, Date Established 01/03/17  -PC      Bed Mobility PT LTG, Time to Achieve 1 wk  -PC      Bed Mobility PT LTG, Activity Type all bed mobility  -PC      Bed Mobility PT LTG, [place] Level contact guard assist  -PC      Transfer Training PT LTG    Transfer Training PT LTG, Date Established 01/03/17  -PC      Transfer Training PT LTG, Time to Achieve 1 wk  -PC      Transfer Training PT LTG, Activity Type all transfers  -PC      Transfer Training PT LTG, [place] Level contact guard assist  -PC      Transfer Training PT LTG, Assist Device walker, rolling  -PC      Gait Training PT LTG    Gait Training Goal PT LTG, Date Established 01/03/17  -PC      Gait Training Goal PT LTG, Time to Achieve 1 wk  -PC      Gait Training Goal PT LTG, [place] Level contact guard assist  -PC      Gait Training Goal PT LTG, Assist Device walker, rolling  -PC      Gait Training Goal PT LTG, Distance to Achieve 75 ft  -PC        User Key  (r) = Recorded By, (t) = Taken By, (c) = Cosigned By    Initials Name Provider Type    PC Maria Luisa Mccarthy PT Physical Therapist          Physical Therapy Education     Title: PT OT SLP Therapies (Active)     Topic: Physical Therapy (Active)     Point: Mobility training (Active)    Learning Progress Summary    Learner Readiness Method Response Comment Documented by Status   Patient Acceptance E NR  KH 01/06/17 1140 Active    Acceptance E,D NR  PC 01/05/17 1405 Active    Nonacceptance E,D NR  PC 01/03/17 1304 Active               Point: Home exercise program (Active)    Learning Progress Summary    Learner Readiness Method Response Comment Documented by Status   Patient Acceptance E,D NR  PC 01/05/17 1405 Active    Nonacceptance E,D NR  PC 01/03/17 1304 Active               Point: Body mechanics (Active)    Learning Progress Summary     Learner Readiness Method Response Comment Documented by Status   Patient Acceptance E NR   01/06/17 1140 Active    Acceptance E,D NR   01/05/17 1405 Active    Nonacceptance E,D NR  PC 01/03/17 1304 Active               Point: Precautions (Active)    Learning Progress Summary    Learner Readiness Method Response Comment Documented by Status   Patient Acceptance E NR   01/06/17 1140 Active    Acceptance E,D NR  PC 01/05/17 1405 Active    Acceptance E,D NR  MD 01/04/17 1438 Active    Nonacceptance E,D NR  PC 01/03/17 1304 Active                      User Key     Initials Effective Dates Name Provider Type Discipline     12/01/15 -  Maria Luisa Mccarthy, PT Physical Therapist PT    MD 12/01/15 -  Ofelia Estrada, PT Physical Therapist PT     06/22/16 -  Guerita Head, PT Physical Therapist PT                    PT Recommendation and Plan  Anticipated Discharge Disposition: skilled nursing facility  Planned Therapy Interventions: balance training, bed mobility training, gait training, home exercise program, strengthening, transfer training  PT Frequency: daily  Plan of Care Review  Plan Of Care Reviewed With: patient  Progress: improving  Outcome Summary/Follow up Plan: Max encouragement to participate initially but once out of bed patient did well with all mobility. Limited ambulation distance due to fatigue and poor motivation          Outcome Measures       01/06/17 1100 01/05/17 1400 01/04/17 1400    How much help from another person do you currently need...    Turning from your back to your side while in flat bed without using bedrails? 3  -KH 3  -PC 3  -MD    Moving from lying on back to sitting on the side of a flat bed without bedrails? 3  -KH 3  -PC 3  -MD    Moving to and from a bed to a chair (including a wheelchair)? 3  -KH 2  -PC 2  -MD    Standing up from a chair using your arms (e.g., wheelchair, bedside chair)? 3  -KH 2  -PC 2  -MD    Climbing 3-5 steps with a railing? 3  -KH 2  -PC 2  -MD    To walk in  hospital room? 3  -KH 3  -PC 3  -MD    AM-PAC 6 Clicks Score 18  -KH 15  -PC 15  -MD    Functional Assessment    Outcome Measure Options AM-PAC 6 Clicks Basic Mobility (PT)  -KEITH  AM-PAC 6 Clicks Basic Mobility (PT)  -MD      01/03/17 1300          How much help from another person do you currently need...    Turning from your back to your side while in flat bed without using bedrails? 3  -PC      Moving from lying on back to sitting on the side of a flat bed without bedrails? 3  -PC      Moving to and from a bed to a chair (including a wheelchair)? 3  -PC      Standing up from a chair using your arms (e.g., wheelchair, bedside chair)? 3  -PC      Climbing 3-5 steps with a railing? 2  -PC      To walk in hospital room? 3  -PC      AM-PAC 6 Clicks Score 17  -PC      Functional Assessment    Outcome Measure Options AM-PAC 6 Clicks Basic Mobility (PT)  -PC        User Key  (r) = Recorded By, (t) = Taken By, (c) = Cosigned By    Initials Name Provider Type    PC Maria Luisa Mccarthy, PT Physical Therapist    MD Ofelia Estrada, PT Physical Therapist    KEITH Head PT Physical Therapist           Time Calculation:         PT Charges       01/06/17 1142          Time Calculation    Start Time 1117  -      Stop Time 1141  -      Time Calculation (min) 24 min  -      PT Received On 01/06/17  -KEITH      PT - Next Appointment 01/07/17  -KEITH        User Key  (r) = Recorded By, (t) = Taken By, (c) = Cosigned By    Initials Name Provider Type     Guerita Head, SYDNEY Physical Therapist          Therapy Charges for Today     Code Description Service Date Service Provider Modifiers Qty    [id number]  PT THER PROC EA 15 MIN 1/6/2017 Guerita Head, PT GP 2          PT G-Codes  Outcome Measure Options: AM-PAC 6 Clicks Basic Mobility (PT)    Guerita Head PT  1/6/2017

## 2017-01-07 PROCEDURE — 97110 THERAPEUTIC EXERCISES: CPT | Performed by: PHYSICAL THERAPIST

## 2017-01-07 RX ADMIN — APIXABAN 2.5 MG: 2.5 TABLET, FILM COATED ORAL at 09:40

## 2017-01-07 RX ADMIN — BENAZEPRIL HYDROCHLORIDE 40 MG: 20 TABLET ORAL at 09:40

## 2017-01-07 RX ADMIN — POLYETHYLENE GLYCOL 3350 17 G: 17 POWDER, FOR SOLUTION ORAL at 13:29

## 2017-01-07 RX ADMIN — ESCITALOPRAM 10 MG: 10 TABLET, FILM COATED ORAL at 09:40

## 2017-01-07 RX ADMIN — MIRTAZAPINE 15 MG: 15 TABLET, FILM COATED ORAL at 21:05

## 2017-01-07 RX ADMIN — APIXABAN 2.5 MG: 2.5 TABLET, FILM COATED ORAL at 21:05

## 2017-01-07 NOTE — PLAN OF CARE
Problem: Patient Care Overview (Adult)  Goal: Plan of Care Review  Outcome: Ongoing (interventions implemented as appropriate)    01/07/17 0711   Coping/Psychosocial Response Interventions   Plan Of Care Reviewed With patient;daughter   Patient Care Overview   Progress improving   Outcome Evaluation   Outcome Summary/Follow up Plan patient mood improved with out of town visitors. Patient ate dinner and lunch well. refused nutritional drinks until dinner time. BP elevated. MD aware.       Goal: Discharge Needs Assessment  Outcome: Ongoing (interventions implemented as appropriate)    Problem: Renal Failure/Kidney Injury, Acute (Adult)  Goal: Signs and Symptoms of Listed Potential Problems Will be Absent or Manageable (Renal Failure/Kidney Injury, Acute)  Outcome: Ongoing (interventions implemented as appropriate)    Problem: Fall Risk (Adult)  Goal: Absence of Falls  Outcome: Ongoing (interventions implemented as appropriate)    Problem: Skin Integrity Impairment, Risk/Actual (Adult)  Goal: Skin Integrity/Wound Healing  Outcome: Ongoing (interventions implemented as appropriate)    Problem: Nutrition, Imbalanced: Inadequate Oral Intake (Adult)  Goal: Improved Oral Intake  Outcome: Ongoing (interventions implemented as appropriate)

## 2017-01-07 NOTE — PLAN OF CARE
Problem: Patient Care Overview (Adult)  Goal: Plan of Care Review  Outcome: Ongoing (interventions implemented as appropriate)    01/07/17 0204   Coping/Psychosocial Response Interventions   Plan Of Care Reviewed With patient   Patient Care Overview   Progress improving   Outcome Evaluation   Outcome Summary/Follow up Plan pt comfortable. discharge later today.       Goal: Adult Individualization and Mutuality  Outcome: Ongoing (interventions implemented as appropriate)  Goal: Discharge Needs Assessment  Outcome: Ongoing (interventions implemented as appropriate)    Problem: Renal Failure/Kidney Injury, Acute (Adult)  Goal: Signs and Symptoms of Listed Potential Problems Will be Absent or Manageable (Renal Failure/Kidney Injury, Acute)  Outcome: Ongoing (interventions implemented as appropriate)    Problem: Fall Risk (Adult)  Goal: Absence of Falls  Outcome: Ongoing (interventions implemented as appropriate)    Problem: Skin Integrity Impairment, Risk/Actual (Adult)  Goal: Skin Integrity/Wound Healing  Outcome: Ongoing (interventions implemented as appropriate)    Problem: Nutrition, Imbalanced: Inadequate Oral Intake (Adult)  Goal: Improved Oral Intake  Outcome: Ongoing (interventions implemented as appropriate)  Goal: Prevent Further Weight Loss  Outcome: Ongoing (interventions implemented as appropriate)

## 2017-01-07 NOTE — DISCHARGE SUMMARY
Name: Carito Gonzalez ADMIT: 2017   : 3/7/1930  PCP: Rafi Gtz MD    MRN: [id number] LOS: 5 days   AGE/SEX: 86 y.o. female  ROOM: 90/1     Date of Admission: 2017  Date of Discharge:  2017    PCP: Rafi Gtz MD      DISCHARGE DIAGNOSIS  Active Hospital Problems (** Indicates Principal Problem)    Diagnosis Date Noted   • **Acute renal insufficiency [N28.9] 2017   • CKD (chronic kidney disease) stage 3, GFR 30-59 ml/min [N18.3] 2017   • Depression [F32.9] 2017   • Protein calorie malnutrition [E46] 2017   • MVP (mitral valve prolapse) [I34.1] 2016   • DVT (deep venous thrombosis) [I82.409] 2016   • Hypertension [I10] 2016      Resolved Hospital Problems    Diagnosis Date Noted Date Resolved   No resolved problems to display.       SECONDARY DIAGNOSES  Past Medical History   Diagnosis Date   • Depression    • Hyperlipidemia    • Hypertension    • Macular degeneration      Noted 2014   • Mitral regurgitation    • Mitral valve prolapse    • Zoster        CONSULTS   Consults     Date and Time Order Name Status Description    1/3/2017 1541 Inpatient Consult to Psychiatrist Completed     2017 0101 [facility] (on-call MD unless specified) Completed           PROCEDURES PERFORMED    Lower extremity Dopplers    Interpretation Summary      · Acute right lower extremity deep vein thrombosis noted in the popliteal, posterior tibial, peroneal and gastrocnemius/soleal.  · Sub-acute left lower extremity deep vein thrombosis noted in the popliteal.  · Acute left lower extremity deep vein thrombosis noted in the peroneal and gastrocnemius/soleal.  · All other veins appeared normal bilaterally.         KUB        A single view of the abdomen demonstrates gas dispersed throughout the  colon and small bowel. The small bowel is not distended. Fecal material  is noted dispersed throughout the colon but with no evidence of fecal  impaction. There is  dextroscoliosis of the lumbar spine. Further  evaluation could be performed with a CT examination of the abdomen and  pelvis as indicated.          CT ABDOMEN AND PELVIS WITHOUT CONTRAST      IMPRESSION:  No CT evidence for acute intra-abdominal or pelvic process  is identified.      ONE VIEW PORTABLE CHEST    The lungs are well-expanded and clear and the heart size is normal.  There is no acute disease or change from 12/20/2016.        HOSPITAL COURSE  Patient is a 86 y.o. female presented to [facility] complaining of hypotension.  Please see the admitting history and physical for further details.      The patient is a pleasant 86-year-old female sent from [facility] she has been moved to a separate facility from her  and spent some time at the [place] for concerns of uncontrolled depression.  There were plans to see Dr. Veloz in the [facility] psych standpoint but has not happened.  She also had issues with decreased oral intake and has become more withdrawn.  She was initially sent to the hospital for hypotension but her blood pressure has remained stable in hospital physician.  She also had mild acute on chronic renal failure and IV fluids were started.  She improved quite nicely with IV fluids and her creatinine is at her baseline of 0.75 at the time of discharge.  Her maximum creatinine was 1.59.  She also had some concerns of a left leg cellulitis and was initially started on Unasyn.  An ultrasound to rule out a DVT was obtained and was positive for bilateral DVT.  Please see above for the full report.  She was transitioned to Eliquis 2.5 mg twice a day given her age of greater than 80 and her weight 45 kg is less than 60 kg.    Really, the most pressing issue was her severe depression.  Psychiatry was consulted and actually recommend patient to be admitted to our crisis management unit.  Discussions were had with the patient's family and was ultimately decided to try to  "manage the patient in other settings other than inpatient psychiatry.  The patient was started on Remeron to help both with her appetite, sleep and for depression.    She does have protein calorie malnutrition and requires prompting and encouragement to eat.  She will also need ensure or other supplement during each meal.  She was also started on Remeron which will help with her appetite and have the added benefit of helping her depression and sleep as well. She will require encouragement for oral intake including food and water.    Other pertinent labs upon discharge include a sodium of 140, potassium 3.6, creatinine 0.75, hemoglobin 11.2, white blood cell count 3.75 and platelet count of 191    The patient will be discharged to [facility] today.  She was supposed to be discharged 1/6/17 but stayed due to pending precertification from her insurance complany. I am told it will be approved today so she will be discharged today.    VITAL SIGNS  Visit Vitals   • /86 (BP Location: Right arm, Patient Position: Lying)   • Pulse 68   • Temp 97.4 °F (36.3 °C) (Oral)   • Resp 16   • Ht 62.4\" (158.5 cm)   • Wt 99 lb 8 oz (45.1 kg)   • SpO2 98%   • BMI 17.97 kg/m2     PE from 1/7/17  Objective:  General Appearance:  Comfortable, well-appearing, not in pain and in no acute distress (sleeping, Soft-spoken and withdrawn, elderly, thin and cachectic).    Vital signs: (most recent): Blood pressure 160/86, pulse 68, temperature 97.4 °F (36.3 °C), temperature source Oral, resp. rate 16, height 62.4\" (158.5 cm), weight 99 lb 8 oz (45.1 kg), SpO2 98 %.  Vital signs are normal.  No fever.    HEENT: (Dry mouth)    Lungs:  Normal respiratory rate and normal effort.  Breath sounds clear to auscultation.    Heart: Normal rate.  Regular rhythm.  S1 normal and S2 normal.  No murmur.   Abdomen: Abdomen is soft and non-distended.  Bowel sounds are normal.   There is no abdominal tenderness.     Extremities: Decreased range of motion.  " There is no dependent edema.    Neurological: Patient is alert.    Skin:  Warm and dry.  No rash, cyanosis or ulceration.         CONDITION ON DISCHARGE  Stable.      DISCHARGE DISPOSITION   Skilled Nursing Facility (DC - External) [facility]      DISCHARGE MEDICATIONS   Carito Gonzalez   Home Medication Instructions SAM:[id number]    Printed on:01/07/17 0809   Medication Information                      acetaminophen (TYLENOL) 325 MG tablet  Take 2 tablets by mouth Every 6 (Six) Hours As Needed for mild pain (1-3).             apixaban (ELIQUIS) 2.5 MG tablet tablet  Take 1 tablet by mouth Every 12 (Twelve) Hours.             benazepril (LOTENSIN) 40 MG tablet  Take 1 tablet by mouth daily.             Docusate Sodium (COLACE PO)  Take  by mouth.             escitalopram (LEXAPRO) 10 MG tablet  Take 1 tablet by mouth Daily.             mirtazapine (REMERON) 15 MG tablet  Take 1 tablet by mouth Every Night.             Multiple Vitamins-Minerals (OCUTABS) tablet  Take 1 tablet by mouth daily.             polyethylene glycol (MIRALAX) packet  Take 17 g by mouth Daily.                  Future Appointments  Date Time Provider Department Center   1/24/2017 3:40 PM Rafi Gtz MD [facility] None   3/13/2017 8:20 AM Quin Magana MD [facility] None   3/15/2017 9:00 AM MD DELVIS Arroyo [facility] None     Follow-up Information     Follow up with [facility] .    Specialty:  Skilled Nursing Facility    Contact information:    [address] [address] [phone]          TEST  RESULTS PENDING AT DISCHARGE     None    Israel Ariza MD  [facility]  01/07/17  8:09 AM      Time: greater than 30 minutes.      Dragon disclaimer:  Much of this encounter note is an electronic transcription/translation of spoken language to printed text. The electronic translation of spoken language may permit erroneous, or at times, nonsensical words or phrases  to be inadvertently transcribed; Although I have reviewed the note for such errors, some may still exist.

## 2017-01-07 NOTE — PROGRESS NOTES
Acute Care - Physical Therapy Treatment Note  [facility]     Patient Name: Carito Gonzalez  : 3/7/1930  MRN: [id number]  Today's Date: 2017  Onset of Illness/Injury or Date of Surgery Date: 17  Date of Referral to PT: 17  Referring Physician: Giovanni    Admit Date: 2017    Visit Dx:    ICD-10-CM ICD-9-CM   1. Acute renal insufficiency N28.9 593.9   2. Dehydration E86.0 276.51   3. Generalized weakness R53.1 780.79     Patient Active Problem List   Diagnosis   • Hypercholesterolemia   • Hypertension   • Valvular heart disease   • Swelling of limb, left   • DVT (deep venous thrombosis)   • MVP (mitral valve prolapse)   • Mitral regurgitation   • Bilateral carotid bruits   • Encounter for immunization   • Routine health maintenance   • Acute renal insufficiency   • CKD (chronic kidney disease) stage 3, GFR 30-59 ml/min   • Depression   • Protein calorie malnutrition               Adult Rehabilitation Note       17 1011 17 1137 17 1402    Rehab Assessment/Intervention    Discipline physical therapist  - physical therapist  - physical therapist  -PC    Document Type therapy note (daily note)  - therapy note (daily note)  - therapy note (daily note)  -PC    Subjective Information agree to therapy;complains of;fatigue  - agree to therapy;complains of;fatigue  - agree to therapy;complains of;pain  -PC    Patient Effort, Rehab Treatment adequate  -KH adequate  -KH good  -PC    Symptoms Noted During/After Treatment fatigue  -KH fatigue  -KH increased pain  -PC    Symptoms Noted Comment   pain in rectum limited activity  -PC    Precautions/Limitations fall precautions  -KH fall precautions  -KH     Recorded by [KH] Guerita Head, PT [KH] Guerita Head, PT [PC] Maria Luisa Mccarthy, PT    Pain Assessment    Pain Assessment No/denies pain  -KH 0-10  -KH     Estrada-Sosa FACES Pain Rating  4  - 8  -PC    Pain Location   Rectum  -PC    Pain Intervention(s)  Repositioned  -KH Repositioned   -PC    Recorded by [KH] Guerita Head, PT [KH] Guerita Head, PT [PC] Maria Luisa Mccarthy, PT    Vision Assessment/Intervention    Visual Impairment WFL  -KH WFL  -KH     Recorded by [KH] Guerita Head PT [KH] Guerita Head PT     Cognitive Assessment/Intervention    Current Cognitive/Communication Assessment impaired  -KH impaired  -KH impaired  -PC    Orientation Status oriented to;person  -KH oriented to;person  -KH     Follows Commands/Answers Questions able to follow single-step instructions;needs cueing;needs increased time;needs repetition  -KH able to follow single-step instructions;needs cueing;needs increased time;needs repetition  -KH able to follow single-step instructions;needs cueing;needs increased time;75% of the time  -    Personal Safety mild impairment;decreased awareness, need for assist;decreased awareness, need for safety;decreased insight to deficits;unaware of consequences of deficits;unaware of cognitive deficits  -KH decreased awareness, need for assist;decreased awareness, need for safety;decreased insight to deficits  -KH decreased awareness, need for assist  -    Personal Safety Interventions fall prevention program maintained;gait belt;nonskid shoes/slippers when out of bed  -KH gait belt;fall prevention program maintained;nonskid shoes/slippers when out of bed  -KH     Short/Long Term Memory requires constant cues  -KH      Recorded by [KH] Guerita Head, PT [KH] Guerita Head, PT [PC] Maria Luisa Mccarthy, PT    Bed Mobility, Assessment/Treatment    Bed Mob, Supine to Sit, [place] contact guard assist  -KH contact guard assist  -KH contact guard assist  -PC    Bed Mob, Sit to Supine, [place] contact guard assist  -KH minimum assist (75% patient effort)  -KH minimum assist (75% patient effort)  -    Bed Mobility, Impairments strength decreased  -KH strength decreased  -KH     Recorded by [KH] Guerita Head, PT [KH] Guerita Head, PT [PC] Maria Luisa Mccarthy, PT    Transfer Assessment/Treatment     "Transfers, Sit-Stand [place] minimum assist (75% patient effort)  -KH minimum assist (75% patient effort)  - minimum assist (75% patient effort)  -    Transfers, Stand-Sit [place] minimum assist (75% patient effort)  -KH minimum assist (75% patient effort)  - minimum assist (75% patient effort)  -PC    Transfers, Sit-Stand-Sit, Assist Device rolling walker  -KH rolling walker  -KH rolling walker  -PC    Transfer, Impairments strength decreased  - strength decreased  -     Transfer, Comment  difficulty initiating initial phase of sit to stand due to quadriceps weakness  -     Recorded by [KH] Guerita Head, PT [KH] Guerita Head, PT [PC] Maria Luisa Mccarthy, PT    Gait Assessment/Treatment    Gait, [place] Level minimum assist (75% patient effort)  - minimum assist (75% patient effort)  -     Gait, Assistive Device rolling walker  -KH rolling walker  -     Gait, Distance (Feet) 50  - 50  -     Gait, Gait Deviations shavon decreased;festinating;forward flexed posture;step length decreased;stride length decreased  - shavon decreased;forward flexed posture;step length decreased;stride length decreased  -     Gait, Safety Issues step length decreased;weight-shifting ability decreased  - step length decreased;weight-shifting ability decreased  -     Gait, Impairments impaired balance  -      Gait, Comment Distance limited by confusion and patient feeling self-conscious of walking in the hallway for fear of being seen \"in this state.\" Provided min A to turn RW  -KH slight assist needed to turn RW  -KH pt stood and attempted to walk 3 times, each time pt experienced severe rectal pain and had to return to sitting  -PC    Recorded by [KH] Guerita Head, PT [KH] Guerita Head, PT [PC] Maria Luisa Mccarthy, PT    Positioning and Restraints    Pre-Treatment Position in bed  -KH in bed  -KH in bed  -PC    Post Treatment Position bed  - bed  - bed  -PC    In Bed supine;call light within " reach;encouraged to call for assist;exit alarm on  -KH supine;call light within reach;encouraged to call for assist;exit alarm on  -KH supine;encouraged to call for assist;call light within reach;exit alarm on  -PC    Recorded by [KH] Guerita Head PT [KH] Guerita Head, PT [PC] Maria Luisa Mccarthy, PT      01/04/17 1435          Rehab Assessment/Intervention    Discipline physical therapist  -MD      Document Type therapy note (daily note)  -MD      Subjective Information agree to therapy;complains of;weakness  -MD      Patient Effort, Rehab Treatment good  -MD      Symptoms Noted During/After Treatment none  -MD      Precautions/Limitations fall precautions  -MD      Recorded by [MD] Ofelia Estrada PT      Pain Assessment    Pain Assessment No/denies pain  -MD      Recorded by [MD] Ofelia Estrada PT      Cognitive Assessment/Intervention    Current Cognitive/Communication Assessment impaired  -MD      Orientation Status oriented to;person  -MD      Follows Commands/Answers Questions 75% of the time;needs cueing;needs repetition  -MD      Personal Safety decreased insight to deficits  -MD      Personal Safety Interventions gait belt;muscle strengthening facilitated;nonskid shoes/slippers when out of bed;supervised activity  -MD      Recorded by [MD] Ofelia Estrada PT      Bed Mobility, Assessment/Treatment    Bed Mob, Supine to Sit, [place] verbal cues required;contact guard assist  -MD      Bed Mob, Sit to Supine, [place] verbal cues required;supervision required  -MD      Bed Mobility, Impairments strength decreased  -MD      Recorded by [MD] Ofelia Estrada PT      Transfer Assessment/Treatment    Transfers, Sit-Stand [place] verbal cues required;minimum assist (75% patient effort);2 person assist required  -MD      Transfers, Stand-Sit [place] verbal cues required;minimum assist (75% patient effort)  -MD      Transfers, Sit-Stand-Sit, Assist Device rolling walker  -MD      Transfer, Impairments strength decreased   -MD      Recorded by [MD] Ofelia Estrada PT      Gait Assessment/Treatment    Gait, [place] Level minimum assist (75% patient effort)  -MD      Gait, Assistive Device rolling walker  -MD      Gait, Distance (Feet) 40  -MD      Gait, Gait Deviations bilateral:;shavon decreased;forward flexed posture;step length decreased;decreased heel strike  -MD      Gait, Impairments impaired balance  -MD      Recorded by [MD] Ofelia Estrada PT      Positioning and Restraints    Pre-Treatment Position in bed  -MD      Post Treatment Position bed  -MD      In Bed supine;call light within reach;exit alarm on  -MD      Recorded by [MD] Ofelia Estrada PT        User Key  (r) = Recorded By, (t) = Taken By, (c) = Cosigned By    Initials Name Effective Dates    PC Maria Luisa Mccarthy, PT 12/01/15 -     MD Ofelia Estrada, PT 12/01/15 -     KEITH Head, PT 06/22/16 -                 IP PT Goals       01/03/17 1304          Bed Mobility PT LTG    Bed Mobility PT LTG, Date Established 01/03/17  -PC      Bed Mobility PT LTG, Time to Achieve 1 wk  -PC      Bed Mobility PT LTG, Activity Type all bed mobility  -PC      Bed Mobility PT LTG, [place] Level contact guard assist  -PC      Transfer Training PT LTG    Transfer Training PT LTG, Date Established 01/03/17  -PC      Transfer Training PT LTG, Time to Achieve 1 wk  -PC      Transfer Training PT LTG, Activity Type all transfers  -PC      Transfer Training PT LTG, [place] Level contact guard assist  -PC      Transfer Training PT LTG, Assist Device walker, rolling  -PC      Gait Training PT LTG    Gait Training Goal PT LTG, Date Established 01/03/17  -PC      Gait Training Goal PT LTG, Time to Achieve 1 wk  -PC      Gait Training Goal PT LTG, [place] Level contact guard assist  -PC      Gait Training Goal PT LTG, Assist Device walker, rolling  -PC      Gait Training Goal PT LTG, Distance to Achieve 75 ft  -PC        User Key  (r) = Recorded By, (t) = Taken By, (c) = Cosigned By    Initials  Name Provider Type    PC Maria Luisa Mccarthy, PT Physical Therapist          Physical Therapy Education     Title: PT OT SLP Therapies (Active)     Topic: Physical Therapy (Active)     Point: Mobility training (Active)    Learning Progress Summary    Learner Readiness Method Response Comment Documented by Status   Patient Acceptance E NR   01/07/17 1014 Active    Acceptance E NR   01/06/17 1140 Active    Acceptance E,D NR  PC 01/05/17 1405 Active    Nonacceptance E,D NR  PC 01/03/17 1304 Active               Point: Home exercise program (Active)    Learning Progress Summary    Learner Readiness Method Response Comment Documented by Status   Patient Acceptance E,D NR  PC 01/05/17 1405 Active    Nonacceptance E,D NR  PC 01/03/17 1304 Active               Point: Body mechanics (Active)    Learning Progress Summary    Learner Readiness Method Response Comment Documented by Status   Patient Acceptance E NR   01/07/17 1014 Active    Acceptance E NR   01/06/17 1140 Active    Acceptance E,D NR   01/05/17 1405 Active    Nonacceptance E,D NR   01/03/17 1304 Active               Point: Precautions (Active)    Learning Progress Summary    Learner Readiness Method Response Comment Documented by Status   Patient Acceptance E NR   01/07/17 1014 Active    Acceptance E NR   01/06/17 1140 Active    Acceptance E,D NR   01/05/17 1405 Active    Acceptance E,D NR  MD 01/04/17 1438 Active    Nonacceptance E,D NR   01/03/17 1304 Active                      User Key     Initials Effective Dates Name Provider Type Discipline     12/01/15 -  Maria Luisa Mccarthy, PT Physical Therapist PT    MD 12/01/15 -  Ofelia Estrada, PT Physical Therapist PT     06/22/16 -  Guerita Head, PT Physical Therapist PT                    PT Recommendation and Plan  Anticipated Discharge Disposition: skilled nursing facility  Planned Therapy Interventions: balance training, bed mobility training, gait training, home exercise program, strengthening,  transfer training  PT Frequency: daily  Plan of Care Review  Plan Of Care Reviewed With: patient  Progress: improving  Outcome Summary/Follow up Plan: Patient appears confused and is unmotivated to participate in therapy. Requires max encouragement          Outcome Measures       01/07/17 1000 01/06/17 1100 01/05/17 1400    How much help from another person do you currently need...    Turning from your back to your side while in flat bed without using bedrails? 3  -KH 3  -KH 3  -PC    Moving from lying on back to sitting on the side of a flat bed without bedrails? 3  -KH 3  -KH 3  -PC    Moving to and from a bed to a chair (including a wheelchair)? 3  -KH 3  -KH 2  -PC    Standing up from a chair using your arms (e.g., wheelchair, bedside chair)? 3  -KH 3  -KH 2  -PC    Climbing 3-5 steps with a railing? 3  -KH 3  -KH 2  -PC    To walk in hospital room? 3  -KH 3  -KH 3  -PC    AM-PAC 6 Clicks Score 18  -KH 18  -KH 15  -PC    Functional Assessment    Outcome Measure Options AM-PAC 6 Clicks Basic Mobility (PT)  -KH AM-PAC 6 Clicks Basic Mobility (PT)  -KH       01/04/17 1400          How much help from another person do you currently need...    Turning from your back to your side while in flat bed without using bedrails? 3  -MD      Moving from lying on back to sitting on the side of a flat bed without bedrails? 3  -MD      Moving to and from a bed to a chair (including a wheelchair)? 2  -MD      Standing up from a chair using your arms (e.g., wheelchair, bedside chair)? 2  -MD      Climbing 3-5 steps with a railing? 2  -MD      To walk in hospital room? 3  -MD      AM-PAC 6 Clicks Score 15  -MD      Functional Assessment    Outcome Measure Options AM-PAC 6 Clicks Basic Mobility (PT)  -MD        User Key  (r) = Recorded By, (t) = Taken By, (c) = Cosigned By    Initials Name Provider Type    EDUARDO Mccarthy, PT Physical Therapist    MD Ofelia Estrada, PT Physical Therapist    KEITH Head, PT Physical Therapist            Time Calculation:         PT Charges       01/07/17 1015          Time Calculation    Start Time 0955  -      Stop Time 1012  -      Time Calculation (min) 17 min  -      PT Received On 01/07/17  -KEITH      PT - Next Appointment 01/08/17  -KEITH        User Key  (r) = Recorded By, (t) = Taken By, (c) = Cosigned By    Initials Name Provider Type     Guerita Head PT Physical Therapist          Therapy Charges for Today     Code Description Service Date Service Provider Modifiers Qty    35892350577 HC PT THER PROC EA 15 MIN 1/6/2017 Guerita Head, PT GP 2    59436981200 HC PT THER PROC EA 15 MIN 1/7/2017 Guerita Head, PT GP 1          PT G-Codes  Outcome Measure Options: AM-PAC 6 Clicks Basic Mobility (PT)    Guerita Head PT  1/7/2017

## 2017-01-07 NOTE — PROGRESS NOTES
"    DAILY PROGRESS NOTE  [facility]    Patient Identification:  Name: Carito Gonzalez  Age: 86 y.o.  Sex: female  :  3/7/1930  MRN: [id number]         Primary Care Physician: Rafi Gtz MD    Subjective:  Interval History: no new issues, little less cooperative today, pt wants to leave hospital. No cp/palpitation/sob/n/v/bleeding, no SI or HI    Objective:no fm    Scheduled Meds:    apixaban 2.5 mg Oral Q12H   benazepril 40 mg Oral Q24H   escitalopram 10 mg Oral Daily   mirtazapine 15 mg Oral Nightly   polyethylene glycol 17 g Oral Daily     Continuous Infusions:       Vital signs in last 24 hours:  Temp:  [97 °F (36.1 °C)-98.9 °F (37.2 °C)] 97 °F (36.1 °C)  Heart Rate:  [66-74] 72  Resp:  [16] 16  BP: (146-171)/(68-80) 150/73    Intake/Output:    Intake/Output Summary (Last 24 hours) at 17 1940  Last data filed at 17 1740   Gross per 24 hour   Intake    360 ml   Output    601 ml   Net   -241 ml       Exam:  Visit Vitals   • /73 (BP Location: Right arm, Patient Position: Lying)   • Pulse 72   • Temp 97 °F (36.1 °C) (Oral)   • Resp 16   • Ht 62.4\" (158.5 cm)   • Wt 99 lb 8 oz (45.1 kg)   • SpO2 97%   • BMI 17.97 kg/m2       General Appearance:    Alert, cooperative, flat though a little more interactive today                          Head:    Normocephalic, without obvious abnormality, atraumatic                         Throat:   Lips, tongue, gums normal; oral mucosa pink and moist                         Lungs:    Clear to auscultation bilaterally, respirations unlabored                         Heart:    Regular rate and rhythm, S1 and S2 normal                  Abdomen:     Soft, non-tender, bowel sounds active                 Extremities:   No cyanosis or edema or erythema                        Pulses:   Pulses palpable in lower extremities                                 Data Review:  Labs in chart were reviewed.    Assessment:  Principal Problem:    Acute renal " insufficiency  Active Problems:    Hypertension    DVT (deep venous thrombosis)    MVP (mitral valve prolapse)    CKD (chronic kidney disease) stage 3, GFR 30-59 ml/min    Depression    Protein calorie malnutrition      Plan:  ARF resolved w/ CKD3 - holding lasix (needs for valvular disease/diastolic dysfunction), benazepril at full dose. Likely influenced from decreased oral intake compounded by depression  -SLIVF  -negative CT A/P  -labs stable      HTN w/ hypotension resolved: benazepril restarted      Acute RLE DVT and subacute LLE DVT - on Eliquis and tolerating      Depression - resume Lexapro and added Remeron - concern is uncontrolled - denies SI -  -TSH 1.6  -consulted psych and rec [facility] - but pt family declined, no si or hi currently      PCM - prealbumin 16.3, nutrition consulted,remeron started with added effect to stimulate appetite      Lovenox for DVT prophylaxis      PT/CCP - needs skilled , Pre-cert started.  D/C tonight if she gets it      Israel Ariza MD  1/6/2017  7:40 PM

## 2017-01-07 NOTE — PLAN OF CARE
Problem: Patient Care Overview (Adult)  Goal: Plan of Care Review  Outcome: Ongoing (interventions implemented as appropriate)    01/07/17 1014   Coping/Psychosocial Response Interventions   Plan Of Care Reviewed With patient   Patient Care Overview   Progress improving   Outcome Evaluation   Outcome Summary/Follow up Plan Patient appears confused and is unmotivated to participate in therapy. Requires max encouragement

## 2017-01-07 NOTE — PROGRESS NOTES
Continued Stay Note  [facility]     Patient Name: Carito Gonzalez  MRN: [id number]  Today's Date: 1/7/2017    Admit Date: 1/2/2017          Discharge Plan       01/07/17 1028    Case Management/Social Work Plan    Additional Comments Received CCP page from staff [facility] [place] requesting CCP check status of [facility] precert. Spoke with Nina/[facility] who states they have not heard from anyone at [facility] today with regard to this pt's pending precert. CCP will f/u on Monday. Updated staff [facility] [place]...........JW              Discharge Codes     None        Expected Discharge Date and Time     Expected Discharge Date Expected Discharge Time    Jan 7, 2017             Nidia Rutherford, RN

## 2017-01-07 NOTE — PROGRESS NOTES
"    DAILY PROGRESS NOTE  [facility]    Patient Identification:  Name: Carito Gonzalez  Age: 86 y.o.  Sex: female  :  3/7/1930  MRN: [id number]         Primary Care Physician: Rafi Gtz MD    Subjective:  Interval History: no new issues,c/o dry mouth. No cp/palpitation/sob/n/v/bleeding, no SI or HI    Objective:no fm    Scheduled Meds:    apixaban 2.5 mg Oral Q12H   benazepril 40 mg Oral Q24H   escitalopram 10 mg Oral Daily   mirtazapine 15 mg Oral Nightly   polyethylene glycol 17 g Oral Daily     Continuous Infusions:       Vital signs in last 24 hours:  Temp:  [97 °F (36.1 °C)-97.5 °F (36.4 °C)] 97.5 °F (36.4 °C)  Heart Rate:  [68-72] 69  Resp:  [16] 16  BP: (150-180)/(73-88) 180/88    Intake/Output:    Intake/Output Summary (Last 24 hours) at 17 1559  Last data filed at 17 1439   Gross per 24 hour   Intake    250 ml   Output    701 ml   Net   -451 ml       Exam:  Visit Vitals   • /88 (BP Location: Right arm, Patient Position: Lying)   • Pulse 69   • Temp 97.5 °F (36.4 °C) (Oral)   • Resp 16   • Ht 62.4\" (158.5 cm)   • Wt 99 lb 8 oz (45.1 kg)   • SpO2 97%   • BMI 17.97 kg/m2       General Appearance:    Alert, cooperative, flat though a little more interactive today                          Head:    Normocephalic, without obvious abnormality, atraumatic                         Throat:   Lips, tongue, gums normal; oral mucosa pink and moist                         Lungs:    Clear to auscultation bilaterally, respirations unlabored                         Heart:    Regular rate and rhythm, S1 and S2 normal                  Abdomen:     Soft, non-tender, bowel sounds active                 Extremities:   No cyanosis or edema or erythema                        Pulses:   Pulses palpable in lower extremities                                 Data Review:  Labs in chart were reviewed.    Assessment:  Principal Problem:    Acute renal insufficiency  Active Problems:    Hypertension    " DVT (deep venous thrombosis)    MVP (mitral valve prolapse)    CKD (chronic kidney disease) stage 3, GFR 30-59 ml/min    Depression    Protein calorie malnutrition      Plan:    Only waiting on pre-cert and then discharge.  After talking with the pt's RN discharge will not happen today or Sunday.  Probably will be Monday.    ARF resolved w/ CKD3 - holding lasix (needs for valvular disease/diastolic dysfunction), benazepril at full dose. Likely influenced from decreased oral intake compounded by depression  -SLIVF  -negative CT A/P  -labs stable      HTN w/ hypotension resolved: benazepril restarted      Acute RLE DVT and subacute LLE DVT - on Eliquis and tolerating      Depression - resume Lexapro and added Remeron - concern is uncontrolled - denies SI -  -TSH 1.6  -consulted psych and rec [facility] - but pt family declined, no si or hi currently      PCM - prealbumin 16.3, nutrition consulted,remeron started with added effect to stimulate appetite      Lovenox for DVT prophylaxis      PT/CCP - needs skilled , Pre-cert started.        Israel Ariza MD  1/7/2017  3:59 PM

## 2017-01-08 PROCEDURE — 97110 THERAPEUTIC EXERCISES: CPT | Performed by: PHYSICAL THERAPIST

## 2017-01-08 RX ADMIN — POLYETHYLENE GLYCOL 3350 17 G: 17 POWDER, FOR SOLUTION ORAL at 09:13

## 2017-01-08 RX ADMIN — APIXABAN 2.5 MG: 2.5 TABLET, FILM COATED ORAL at 21:13

## 2017-01-08 RX ADMIN — BENAZEPRIL HYDROCHLORIDE 40 MG: 20 TABLET ORAL at 10:00

## 2017-01-08 RX ADMIN — ESCITALOPRAM 10 MG: 10 TABLET, FILM COATED ORAL at 10:00

## 2017-01-08 RX ADMIN — APIXABAN 2.5 MG: 2.5 TABLET, FILM COATED ORAL at 10:00

## 2017-01-08 RX ADMIN — MIRTAZAPINE 15 MG: 15 TABLET, FILM COATED ORAL at 21:13

## 2017-01-08 NOTE — PLAN OF CARE
Problem: Patient Care Overview (Adult)  Goal: Plan of Care Review  Outcome: Ongoing (interventions implemented as appropriate)    01/08/17 1013   Coping/Psychosocial Response Interventions   Plan Of Care Reviewed With patient   Patient Care Overview   Progress progress toward functional goals is gradual   Outcome Evaluation   Outcome Summary/Follow up Plan Patient limited by poor motivation but once up is able to ambulate rather easily. Provided max encouragement to participate and ambulation distance limited by patient's lack of motivation

## 2017-01-08 NOTE — PLAN OF CARE
Problem: Patient Care Overview (Adult)  Goal: Plan of Care Review  Outcome: Ongoing (interventions implemented as appropriate)    01/08/17 0212   Coping/Psychosocial Response Interventions   Plan Of Care Reviewed With patient   Patient Care Overview   Progress improving   Outcome Evaluation   Outcome Summary/Follow up Plan waiting for a placement will know on monday, up with assist, no c/o pain,        Goal: Adult Individualization and Mutuality  Outcome: Ongoing (interventions implemented as appropriate)  Goal: Discharge Needs Assessment  Outcome: Ongoing (interventions implemented as appropriate)    Problem: Renal Failure/Kidney Injury, Acute (Adult)  Goal: Signs and Symptoms of Listed Potential Problems Will be Absent or Manageable (Renal Failure/Kidney Injury, Acute)  Outcome: Ongoing (interventions implemented as appropriate)    Problem: Fall Risk (Adult)  Goal: Absence of Falls  Outcome: Ongoing (interventions implemented as appropriate)    Problem: Skin Integrity Impairment, Risk/Actual (Adult)  Goal: Skin Integrity/Wound Healing  Outcome: Ongoing (interventions implemented as appropriate)    Problem: Nutrition, Imbalanced: Inadequate Oral Intake (Adult)  Goal: Improved Oral Intake  Outcome: Ongoing (interventions implemented as appropriate)  Goal: Prevent Further Weight Loss  Outcome: Ongoing (interventions implemented as appropriate)

## 2017-01-08 NOTE — PROGRESS NOTES
"    DAILY PROGRESS NOTE  [facility]    Patient Identification:  Name: Carito Gonzalez  Age: 86 y.o.  Sex: female  :  3/7/1930  MRN: [id number]         Primary Care Physician: Rafi Gtz MD    Subjective:  Interval History: had good day yesterday with family members, today says she's not well and upon further probing says she's sad.  No cp or soa    Objective:no fm    Scheduled Meds:    apixaban 2.5 mg Oral Q12H   benazepril 40 mg Oral Q24H   escitalopram 10 mg Oral Daily   mirtazapine 15 mg Oral Nightly   polyethylene glycol 17 g Oral Daily     Continuous Infusions:       Vital signs in last 24 hours:  Temp:  [97 °F (36.1 °C)-97.5 °F (36.4 °C)] 97.1 °F (36.2 °C)  Heart Rate:  [69-79] 70  Resp:  [16-18] 16  BP: (115-180)/(57-88) 162/84    Intake/Output:    Intake/Output Summary (Last 24 hours) at 17 0859  Last data filed at 17 0500   Gross per 24 hour   Intake    540 ml   Output   1450 ml   Net   -910 ml       Exam:  Visit Vitals   • /84 (BP Location: Right arm, Patient Position: Lying)   • Pulse 70   • Temp 97.1 °F (36.2 °C) (Oral)   • Resp 16   • Ht 62.4\" (158.5 cm)   • Wt 99 lb 8 oz (45.1 kg)   • SpO2 96%   • BMI 17.97 kg/m2       General Appearance:    Alert, cooperative, flat and sad, soft spoken                          Head:    Normocephalic, without obvious abnormality, atraumatic                         Throat:   Lips, tongue, gums normal; oral mucosa pink and moist                         Lungs:    Clear to auscultation bilaterally, respirations unlabored                         Heart:    Regular rate and rhythm, S1 and S2 normal                  Abdomen:     Soft, non-tender, bowel sounds active                 Extremities:   No cyanosis or edema or erythema                        Pulses:   Pulses palpable in lower extremities                                 Data Review:  Labs in chart were reviewed.    Assessment:  Principal Problem:    Acute renal " insufficiency  Active Problems:    Hypertension    DVT (deep venous thrombosis)    MVP (mitral valve prolapse)    CKD (chronic kidney disease) stage 3, GFR 30-59 ml/min    Depression    Protein calorie malnutrition      Plan:    Only waiting on pre-cert and then discharge.  After talking with the pt's RN discharge will not happen today.  Probably will be Monday.    ARF resolved w/ CKD3 - holding lasix (needs for valvular disease/diastolic dysfunction), benazepril at full dose. Likely influenced from decreased oral intake compounded by depression  -SLIVF  -negative CT A/P  -labs stable      HTN w/ hypotension resolved: benazepril restarted      Acute RLE DVT and subacute LLE DVT - on Eliquis and tolerating      Depression - resumed Lexapro and added Remeron - concern is uncontrolled - denies SI -  -TSH 1.6  -consulted psych and rec [facility] - but pt family declined, no si or hi currently  -pt still feels sad but her mood definitely improves when family is present      PCM - prealbumin 16.3, nutrition consulted,remeron started with added effect to stimulate appetite      Eliquis DVT prophylaxis      PT/CCP - needs skilled , Pre-cert started.        Israel Ariza MD  1/8/2017  8:59 AM

## 2017-01-08 NOTE — PROGRESS NOTES
Acute Care - Physical Therapy Treatment Note  [facility]     Patient Name: Carito Gonzalez  : 3/7/1930  MRN: [id number]  Today's Date: 2017  Onset of Illness/Injury or Date of Surgery Date: 17  Date of Referral to PT: 17  Referring Physician: Giovanni    Admit Date: 2017    Visit Dx:    ICD-10-CM ICD-9-CM   1. Acute renal insufficiency N28.9 593.9   2. Dehydration E86.0 276.51   3. Generalized weakness R53.1 780.79     Patient Active Problem List   Diagnosis   • Hypercholesterolemia   • Hypertension   • Valvular heart disease   • Swelling of limb, left   • DVT (deep venous thrombosis)   • MVP (mitral valve prolapse)   • Mitral regurgitation   • Bilateral carotid bruits   • Encounter for immunization   • Routine health maintenance   • Acute renal insufficiency   • CKD (chronic kidney disease) stage 3, GFR 30-59 ml/min   • Depression   • Protein calorie malnutrition               Adult Rehabilitation Note       17 1009 17 1011 17 1137    Rehab Assessment/Intervention    Discipline physical therapist  - physical therapist  - physical therapist  -    Document Type therapy note (daily note)  - therapy note (daily note)  - therapy note (daily note)  -    Subjective Information agree to therapy;complains of;pain  - agree to therapy;complains of;fatigue  - agree to therapy;complains of;fatigue  -    Patient Effort, Rehab Treatment adequate  - adequate  - adequate  -    Symptoms Noted During/After Treatment fatigue  - fatigue  - fatigue  -    Precautions/Limitations  fall precautions  - fall precautions  -    Recorded by [KH] Guerita Head, PT [KH] Guerita Head, PT [KH] Guerita Head, PT    Pain Assessment    Pain Assessment 0-10  - No/denies pain  - 0-10  -KH    Estrada-Sosa FACES Pain Rating   4  -    Pain Score 2  -      Pain Type Chronic pain  -      Pain Location Leg  -      Pain Orientation Right;Left  -      Pain Intervention(s)  Repositioned  -KH  Repositioned  -KH    Recorded by [KH] Guerita Head PT [KH] Guerita Head, PT [KH] Guerita Head PT    Vision Assessment/Intervention    Visual Impairment WFL  -KH WFL  -KH WFL  -KH    Recorded by [KH] Guerita Head PT [KH] Guerita Head PT [KH] Guerita Head, PT    Cognitive Assessment/Intervention    Current Cognitive/Communication Assessment impaired  -KH impaired  -KH impaired  -KH    Orientation Status oriented to;person  -KH oriented to;person  -KH oriented to;person  -KH    Follows Commands/Answers Questions able to follow single-step instructions;needs cueing;needs increased time;needs repetition  -KH able to follow single-step instructions;needs cueing;needs increased time;needs repetition  -KH able to follow single-step instructions;needs cueing;needs increased time;needs repetition  -KH    Personal Safety mild impairment  -KH mild impairment;decreased awareness, need for assist;decreased awareness, need for safety;decreased insight to deficits;unaware of consequences of deficits;unaware of cognitive deficits  -KH decreased awareness, need for assist;decreased awareness, need for safety;decreased insight to deficits  -KH    Personal Safety Interventions fall prevention program maintained;gait belt;nonskid shoes/slippers when out of bed  -KH fall prevention program maintained;gait belt;nonskid shoes/slippers when out of bed  -KH gait belt;fall prevention program maintained;nonskid shoes/slippers when out of bed  -KH    Short/Long Term Memory requires constant cues  -KH requires constant cues  -KH     Recorded by [KH] Guerita Head, PT [KH] Guerita Head, PT [KH] Gueirta Head, PT    Bed Mobility, Assessment/Treatment    Bed Mob, Supine to Sit, [place] moderate assist (50% patient effort)  - contact guard assist  -KH contact guard assist  -KH    Bed Mob, Sit to Supine, [place] minimum assist (75% patient effort)  -KH contact guard assist  -KH minimum assist (75% patient effort)  -    Bed  Mobility, Impairments strength decreased  -KH strength decreased  -KH strength decreased  -KH    Bed Mobility, Comment max encouragement to get out of bed and participate. Patient unwilling to assist due to lack of motivation but has sufficient strength in B UE and LE  -KH      Recorded by [KH] Guerita Head, PT [KH] Guerita Head, PT [KH] Guerita Head PT    Transfer Assessment/Treatment    Transfers, Sit-Stand [place] minimum assist (75% patient effort)  -KH minimum assist (75% patient effort)  -KH minimum assist (75% patient effort)  -KH    Transfers, Stand-Sit [place] minimum assist (75% patient effort)  -KH minimum assist (75% patient effort)  -KH minimum assist (75% patient effort)  -KH    Transfers, Sit-Stand-Sit, Assist Device rolling walker  -KH rolling walker  -KH rolling walker  -KH    Transfer, Impairments strength decreased  -KH strength decreased  -KH strength decreased  -KH    Transfer, Comment   difficulty initiating initial phase of sit to stand due to quadriceps weakness  -KH    Recorded by [KH] Guerita Head, PT [KH] Guerita Head PT [KH] Guerita Head, PT    Gait Assessment/Treatment    Gait, [place] Level minimum assist (75% patient effort)  -KH minimum assist (75% patient effort)  -KH minimum assist (75% patient effort)  -KH    Gait, Assistive Device rolling walker  -KH rolling walker  -KH rolling walker  -KH    Gait, Distance (Feet) 40  -KH 50  -KH 50  -KH    Gait, Gait Deviations shavon decreased;step length decreased;stride length decreased;forward flexed posture  -KH shavon decreased;festinating;forward flexed posture;step length decreased;stride length decreased  -KH shavon decreased;forward flexed posture;step length decreased;stride length decreased  -KH    Gait, Safety Issues weight-shifting ability decreased;step length decreased  -KH step length decreased;weight-shifting ability decreased  -KH step length decreased;weight-shifting ability decreased  -KH    Gait, Impairments  "impaired balance;strength decreased  - impaired balance  -KH     Gait, Comment Limited by lack of motivation. much slower shavon noted today  -KH Distance limited by confusion and patient feeling self-conscious of walking in the hallway for fear of being seen \"in this state.\" Provided min A to turn RW  -KH slight assist needed to turn RW  -KH    Recorded by [KH] Guerita Head, PT [KH] Guerita Head, PT [KH] Guerita Head PT    Positioning and Restraints    Pre-Treatment Position in bed  -KH in bed  -KH in bed  -KH    Post Treatment Position bed  -KH bed  -KH bed  -KH    In Bed supine;call light within reach;encouraged to call for assist  -KH supine;call light within reach;encouraged to call for assist;exit alarm on  -KH supine;call light within reach;encouraged to call for assist;exit alarm on  -KH    Recorded by [KH] Guerita Head, PT [KH] Guerita Head, PT [KH] Guerita Head, PT      01/05/17 1402          Rehab Assessment/Intervention    Discipline physical therapist  -PC      Document Type therapy note (daily note)  -PC      Subjective Information agree to therapy;complains of;pain  -PC      Patient Effort, Rehab Treatment good  -PC      Symptoms Noted During/After Treatment increased pain  -PC      Symptoms Noted Comment pain in rectum limited activity  -PC      Recorded by [PC] Maria Luisa Mccarthy, PT      Pain Assessment    Estrada-Sosa FACES Pain Rating 8  -PC      Pain Location Rectum  -PC      Pain Intervention(s) Repositioned  -PC      Recorded by [PC] Maria Luisa Mccarthy, PT      Cognitive Assessment/Intervention    Current Cognitive/Communication Assessment impaired  -PC      Follows Commands/Answers Questions able to follow single-step instructions;needs cueing;needs increased time;75% of the time  -PC      Personal Safety decreased awareness, need for assist  -PC      Recorded by [PC] Maria Luisa Mccarthy, PT      Bed Mobility, Assessment/Treatment    Bed Mob, Supine to Sit, [place] contact guard assist  -PC      Bed Mob, " Sit to Supine, [place] minimum assist (75% patient effort)  -PC      Recorded by [PC] Maria Luisa Mccarthy PT      Transfer Assessment/Treatment    Transfers, Sit-Stand [place] minimum assist (75% patient effort)  -PC      Transfers, Stand-Sit [place] minimum assist (75% patient effort)  -PC      Transfers, Sit-Stand-Sit, Assist Device rolling walker  -PC      Recorded by [PC] Maria Luisa Mccarthy, PT      Gait Assessment/Treatment    Gait, Comment pt stood and attempted to walk 3 times, each time pt experienced severe rectal pain and had to return to sitting  -PC      Recorded by [PC] Maria Luisa Mccarthy PT      Positioning and Restraints    Pre-Treatment Position in bed  -PC      Post Treatment Position bed  -PC      In Bed supine;encouraged to call for assist;call light within reach;exit alarm on  -PC      Recorded by [PC] Maria Luisa Mccarthy PT        User Key  (r) = Recorded By, (t) = Taken By, (c) = Cosigned By    Initials Name Effective Dates    PC Maria Luisa Mccarthy, PT 12/01/15 -      Guerita Head, PT 06/22/16 -                 IP PT Goals       01/03/17 1304          Bed Mobility PT LTG    Bed Mobility PT LTG, Date Established 01/03/17  -PC      Bed Mobility PT LTG, Time to Achieve 1 wk  -PC      Bed Mobility PT LTG, Activity Type all bed mobility  -PC      Bed Mobility PT LTG, [place] Level contact guard assist  -PC      Transfer Training PT LTG    Transfer Training PT LTG, Date Established 01/03/17  -PC      Transfer Training PT LTG, Time to Achieve 1 wk  -PC      Transfer Training PT LTG, Activity Type all transfers  -PC      Transfer Training PT LTG, [place] Level contact guard assist  -PC      Transfer Training PT LTG, Assist Device walker, rolling  -PC      Gait Training PT LTG    Gait Training Goal PT LTG, Date Established 01/03/17  -PC      Gait Training Goal PT LTG, Time to Achieve 1 wk  -PC      Gait Training Goal PT LTG, [place] Level contact guard assist  -PC      Gait Training Goal PT  LTG, Assist Device walker, rolling  -PC      Gait Training Goal PT LTG, Distance to Achieve 75 ft  -PC        User Key  (r) = Recorded By, (t) = Taken By, (c) = Cosigned By    Initials Name Provider Type    PC Maria Luisa Mccarthy PT Physical Therapist          Physical Therapy Education     Title: PT OT SLP Therapies (Active)     Topic: Physical Therapy (Active)     Point: Mobility training (Active)    Learning Progress Summary    Learner Readiness Method Response Comment Documented by Status   Patient Acceptance E NR   01/08/17 1015 Active    Acceptance E NR   01/07/17 1014 Active    Acceptance E NR   01/06/17 1140 Active    Acceptance E,D NR  PC 01/05/17 1405 Active    Nonacceptance E,D NR  PC 01/03/17 1304 Active               Point: Home exercise program (Active)    Learning Progress Summary    Learner Readiness Method Response Comment Documented by Status   Patient Acceptance E,D NR  PC 01/05/17 1405 Active    Nonacceptance E,D NR  PC 01/03/17 1304 Active               Point: Body mechanics (Active)    Learning Progress Summary    Learner Readiness Method Response Comment Documented by Status   Patient Acceptance E NR   01/08/17 1015 Active    Acceptance E NR   01/07/17 1014 Active    Acceptance E NR   01/06/17 1140 Active    Acceptance E,D NR  PC 01/05/17 1405 Active    Nonacceptance E,D NR  PC 01/03/17 1304 Active               Point: Precautions (Active)    Learning Progress Summary    Learner Readiness Method Response Comment Documented by Status   Patient Acceptance E NR   01/08/17 1015 Active    Acceptance E NR   01/07/17 1014 Active    Acceptance E NR   01/06/17 1140 Active    Acceptance E,D NR  EDUARDO 01/05/17 1405 Active    Acceptance E,D NR  MD 01/04/17 1438 Active    Nonacceptance E,D NR  PC 01/03/17 1304 Active                      User Key     Initials Effective Dates Name Provider Type Discipline    PC 12/01/15 -  Maria Luisa Mccarthy, PT Physical Therapist PT    MD 12/01/15 -  Ofelia Estrada PT  Physical Therapist PT     06/22/16 -  Guerita Head PT Physical Therapist PT                    PT Recommendation and Plan  Anticipated Discharge Disposition: skilled nursing facility  Planned Therapy Interventions: balance training, bed mobility training, gait training, home exercise program, strengthening, transfer training  PT Frequency: daily  Plan of Care Review  Plan Of Care Reviewed With: patient  Progress: progress toward functional goals is gradual  Outcome Summary/Follow up Plan: Patient limited by poor motivation but once up is able to ambulate rather easily. Provided max encouragement to participate and ambulation distance limited by patient's lack of motivation          Outcome Measures       01/08/17 1000 01/07/17 1000 01/06/17 1100    How much help from another person do you currently need...    Turning from your back to your side while in flat bed without using bedrails? 3  -KH 3  -KH 3  -KH    Moving from lying on back to sitting on the side of a flat bed without bedrails? 3  -KH 3  -KH 3  -KH    Moving to and from a bed to a chair (including a wheelchair)? 3  -KH 3  -KH 3  -KH    Standing up from a chair using your arms (e.g., wheelchair, bedside chair)? 3  -KH 3  -KH 3  -KH    Climbing 3-5 steps with a railing? 3  -KH 3  -KH 3  -KH    To walk in hospital room? 3  -KH 3  -KH 3  -KH    AM-PAC 6 Clicks Score 18  -KH 18  -KH 18  -KH    Functional Assessment    Outcome Measure Options AM-PAC 6 Clicks Basic Mobility (PT)  -KH AM-PAC 6 Clicks Basic Mobility (PT)  -KH AM-PAC 6 Clicks Basic Mobility (PT)  -KH      01/05/17 1400          How much help from another person do you currently need...    Turning from your back to your side while in flat bed without using bedrails? 3  -PC      Moving from lying on back to sitting on the side of a flat bed without bedrails? 3  -PC      Moving to and from a bed to a chair (including a wheelchair)? 2  -PC      Standing up from a chair using your arms (e.g.,  wheelchair, bedside chair)? 2  -PC      Climbing 3-5 steps with a railing? 2  -PC      To walk in hospital room? 3  -PC      AM-PAC 6 Clicks Score 15  -PC        User Key  (r) = Recorded By, (t) = Taken By, (c) = Cosigned By    Initials Name Provider Type    PC Maria Luisa Mccarthy, PT Physical Therapist    KEITH Head PT Physical Therapist           Time Calculation:         PT Charges       01/08/17 1009          Time Calculation    Start Time 1005  -      Stop Time 1018  -KH      Time Calculation (min) 13 min  -KH      PT Received On 01/08/17  -KEITH      PT - Next Appointment 01/09/17  -KEITH        User Key  (r) = Recorded By, (t) = Taken By, (c) = Cosigned By    Initials Name Provider Type    KEITH Head PT Physical Therapist          Therapy Charges for Today     Code Description Service Date Service Provider Modifiers Qty    97775449878 HC PT THER PROC EA 15 MIN 1/7/2017 Guerita Head, PT GP 1    99347368840 HC PT THER PROC EA 15 MIN 1/8/2017 Guerita Head, PT GP 1    33441858418 HC PT THER SUPP EA 15 MIN 1/8/2017 Guerita Head, PT GP 1          PT G-Codes  Outcome Measure Options: AM-PAC 6 Clicks Basic Mobility (PT)    Guerita Head PT  1/8/2017

## 2017-01-08 NOTE — PLAN OF CARE
Problem: Patient Care Overview (Adult)  Goal: Plan of Care Review  Outcome: Ongoing (interventions implemented as appropriate)    01/08/17 1539   Coping/Psychosocial Response Interventions   Plan Of Care Reviewed With patient   Patient Care Overview   Progress no change   Outcome Evaluation   Outcome Summary/Follow up Plan patient sad today. Did not want to take medication. VSS. Probable discharge in AM to [facility]       Goal: Adult Individualization and Mutuality  Outcome: Ongoing (interventions implemented as appropriate)  Goal: Discharge Needs Assessment  Outcome: Ongoing (interventions implemented as appropriate)    Problem: Renal Failure/Kidney Injury, Acute (Adult)  Goal: Signs and Symptoms of Listed Potential Problems Will be Absent or Manageable (Renal Failure/Kidney Injury, Acute)  Outcome: Ongoing (interventions implemented as appropriate)    Problem: Fall Risk (Adult)  Goal: Absence of Falls  Outcome: Ongoing (interventions implemented as appropriate)    Problem: Skin Integrity Impairment, Risk/Actual (Adult)  Goal: Skin Integrity/Wound Healing  Outcome: Ongoing (interventions implemented as appropriate)

## 2017-01-09 VITALS
DIASTOLIC BLOOD PRESSURE: 83 MMHG | TEMPERATURE: 97.7 F | RESPIRATION RATE: 16 BRPM | HEART RATE: 69 BPM | SYSTOLIC BLOOD PRESSURE: 146 MMHG | WEIGHT: 99.5 LBS | HEIGHT: 62 IN | BODY MASS INDEX: 18.31 KG/M2 | OXYGEN SATURATION: 95 %

## 2017-01-09 RX ADMIN — BENAZEPRIL HYDROCHLORIDE 40 MG: 20 TABLET ORAL at 15:19

## 2017-01-09 RX ADMIN — APIXABAN 2.5 MG: 2.5 TABLET, FILM COATED ORAL at 15:20

## 2017-01-09 RX ADMIN — ESCITALOPRAM 10 MG: 10 TABLET, FILM COATED ORAL at 15:20

## 2017-01-09 NOTE — PLAN OF CARE
Problem: Inpatient Physical Therapy  Goal: Bed Mobility Goal LTG- PT  Outcome: Unable to achieve outcome(s) by discharge Date Met:  01/09/17 01/09/17 1454   Bed Mobility PT LTG   Bed Mobility PT LTG, Date Goal Reviewed 01/09/17   Bed Mobility PT LTG, Outcome goal not met   Bed Mobility PT LTG, Reason Goal Not Met discharged from facility       Goal: Transfer Training Goal 1 LTG- PT  Outcome: Unable to achieve outcome(s) by discharge Date Met:  01/09/17 01/09/17 1454   Transfer Training PT LTG   Transfer Training PT LTG, Date Goal Reviewed 01/09/17   Transfer Training PT LTG, Outcome goal not met   Transfer Training PT LTG, Reason Goal Not Met discharged from facility       Goal: Gait Training Goal LTG- PT  Outcome: Unable to achieve outcome(s) by discharge Date Met:  01/09/17 01/09/17 1454   Gait Training PT LTG   Gait Training Goal PT LTG, Date Goal Reviewed 01/09/17   Gait Training Goal PT LTG, Outcome goal not met   Gait Training Goal PT LTG, Reason Goal Not Met discharged from facility

## 2017-01-09 NOTE — PROGRESS NOTES
"Continued Stay Note  [facility]     Patient Name: Carito Gonzalez  MRN: [id number]  Today's Date: 1/9/2017    Admit Date: 1/2/2017          Discharge Plan       01/09/17 1325    Case Management/Social Work Plan    Plan [facility] for rehab prior to returning to [facility]    Additional Comments I returned call to Kathleen with [facility], she ask that I call her due to she understands pt is still at the hospital. I left her a voice mail advising pt is still here waiting on a precert and expected to discharge today to [facility] for short term rehab.  Violet, pt's daughter called me back to see what I meant by \"short term rehab\", I advised it means that she will not be going for long term care, I advised the length of time she can stay depends on her policy and if she participates in therapy and shows progress.  I reviewed the IMM Letter with Violet via phone, she said she will sign letter when she picks her mother up about 2PM.       01/09/17 1304    Case Management/Social Work Plan    Plan [facility] for rehab prior to returning to [facility]    Additional Comments Voice mail from Nina with [facility] that precert has been obtained, I have already faxed clinical to [facility].  I left a HIPPA compliant voice mail for pt's daughter that we now have precert for her mother to go to [facility].  I returned a voice mail to Kathleen Appiah with [facility], she said               Discharge Codes     None        Expected Discharge Date and Time     Expected Discharge Date Expected Discharge Time    Jan 9, 2017             Claribel Vegas RN    "

## 2017-01-09 NOTE — PROGRESS NOTES
Continued Stay Note  [facility]     Patient Name: Carito Gonzalez  MRN: [id number]  Today's Date: 1/9/2017    Admit Date: 1/2/2017          Discharge Plan       01/09/17 0936    Case Management/Social Work Plan    Plan [facility] for rehab (pending precert) prior to returning to [facility]     Additional Comments Patient's daughter Violet called about 8AM to see if there was any news, I advised precert is still pending, she wanted to know about charges, I advised I deal in medical necessity and she would have to talk with  about the bill. I have faxed to Curt with [place] and [facility] a copy of today's discharge order and dc summary, Nina notified me she will let me know when precert has been obtained.               Discharge Codes     None        Expected Discharge Date and Time     Expected Discharge Date Expected Discharge Time    Jan 9, 2017             Claribel Vegas RN

## 2017-01-09 NOTE — PROGRESS NOTES
"Continued Stay Note  [facility]     Patient Name: Carito Gonzalez  MRN: [id number]  Today's Date: 1/9/2017    Admit Date: 1/2/2017          Discharge Plan       01/09/17 1516    Case Management/Social Work Plan    Plan [facility] for rehab prior to returning to [facility]    Additional Comments IMM Letter reviewed with pt and her daughter Violet, at pt's request Violet signed the letter and a copy was given to her.     Final Note    Final Note 03/ Discharging to [facility] for skilled care      01/09/17 1325    Case Management/Social Work Plan    Plan [facility] for rehab prior to returning to [facility]    Additional Comments I returned call to Kathleen with [facility], she ask that I call her due to she understands pt is still at the hospital. I left her a voice mail advising pt is still here waiting on a precert and expected to discharge today to [facility] for short term rehab.  Violet, pt's daughter called me back to see what I meant by \"short term rehab\", I advised it means that she will not be going for long term care, I advised the length of time she can stay depends on her policy and if she participates in therapy and shows progress.  I reviewed the IMM Letter with Violet via phone, she said she will sign letter when she picks her mother up about 2PM.       01/09/17 1304    Case Management/Social Work Plan    Plan [facility] for rehab prior to returning to [facility]    Additional Comments Voice mail from Nina with [facility] that precert has been obtained, I have already faxed clinical to [facility].  I left a HIPPA compliant voice mail for pt's daughter that we now have precert for her mother to go to [facility].  I returned a voice mail to Kathleen Appiah with [facility], she said               Discharge Codes     None        Expected Discharge Date and Time     Expected Discharge Date Expected Discharge Time    Jan 9, 2017             Claribel Salinas " [place], RN

## 2017-01-09 NOTE — DISCHARGE SUMMARY
Name: Carito Gonzalez ADMIT: 2017   : 3/7/1930  PCP: Rafi Gzt MD    MRN: [id number] LOS: 7 days   AGE/SEX: 86 y.o. female  ROOM: P690/1     Date of Admission: 2017  Date of Discharge:  2017    PCP: Rafi Gtz MD      DISCHARGE DIAGNOSIS  Active Hospital Problems (** Indicates Principal Problem)    Diagnosis Date Noted   • **Acute renal insufficiency [N28.9] 2017   • CKD (chronic kidney disease) stage 3, GFR 30-59 ml/min [N18.3] 2017   • Depression [F32.9] 2017   • Protein calorie malnutrition [E46] 2017   • MVP (mitral valve prolapse) [I34.1] 2016   • DVT (deep venous thrombosis) [I82.409] 2016   • Hypertension [I10] 2016      Resolved Hospital Problems    Diagnosis Date Noted Date Resolved   No resolved problems to display.       SECONDARY DIAGNOSES  Past Medical History   Diagnosis Date   • Depression    • Hyperlipidemia    • Hypertension    • Macular degeneration      Noted 2014   • Mitral regurgitation    • Mitral valve prolapse    • Zoster        CONSULTS   Consults     Date and Time Order Name Status Description    1/3/2017 1541 Inpatient Consult to Psychiatrist Completed     2017 0101 [facility] (on-call MD unless specified) Completed           PROCEDURES PERFORMED    Lower extremity Dopplers    Interpretation Summary      · Acute right lower extremity deep vein thrombosis noted in the popliteal, posterior tibial, peroneal and gastrocnemius/soleal.  · Sub-acute left lower extremity deep vein thrombosis noted in the popliteal.  · Acute left lower extremity deep vein thrombosis noted in the peroneal and gastrocnemius/soleal.  · All other veins appeared normal bilaterally.         KUB        A single view of the abdomen demonstrates gas dispersed throughout the  colon and small bowel. The small bowel is not distended. Fecal material  is noted dispersed throughout the colon but with no evidence of fecal  impaction. There is  dextroscoliosis of the lumbar spine. Further  evaluation could be performed with a CT examination of the abdomen and  pelvis as indicated.          CT ABDOMEN AND PELVIS WITHOUT CONTRAST      IMPRESSION:  No CT evidence for acute intra-abdominal or pelvic process  is identified.      ONE VIEW PORTABLE CHEST    The lungs are well-expanded and clear and the heart size is normal.  There is no acute disease or change from 12/20/2016.        HOSPITAL COURSE  Patient is a 86 y.o. female presented to [facility] complaining of hypotension.  Please see the admitting history and physical for further details.      The patient is a pleasant 86-year-old female sent from [facility] she has been moved to a separate facility from her  and spent some time at the [facility] for concerns of uncontrolled depression.  There were plans to see Dr. Veloz in the [facility] psych standpoint but has not happened.  She also had issues with decreased oral intake and has become more withdrawn.  She was initially sent to the hospital for hypotension but her blood pressure has remained stable in hospital physician.  She also had mild acute on chronic renal failure and IV fluids were started.  She improved quite nicely with IV fluids and her creatinine is at her baseline of 0.75 at the time of discharge.  Her maximum creatinine was 1.59.  She also had some concerns of a left leg cellulitis and was initially started on Unasyn.  An ultrasound to rule out a DVT was obtained and was positive for bilateral DVT.  Please see above for the full report.  She was transitioned to Eliquis 2.5 mg twice a day given her age of greater than 80 and her weight 45 kg is less than 60 kg.    Really, the most pressing issue was her severe depression.  Psychiatry was consulted and actually recommended patient to be admitted to our crisis management unit for depression treatment.  Discussions were had with the patient's family and was  "ultimately decided (family's decision)to try to manage the patient in other settings other than inpatient psychiatry.  The patient was started on Remeron to help both with her appetite, sleep and for depression.    She does have protein calorie malnutrition and requires prompting and encouragement to eat.  She will also need ensure or other supplement during each meal.  She was also started on Remeron which will help with her appetite and have the added benefit of helping her depression and sleep as well. She will require encouragement for oral intake including food and water.    Other pertinent labs upon discharge include a sodium of 140, potassium 3.6, creatinine 0.75, hemoglobin 11.2, white blood cell count 3.75 and platelet count of 191    The patient will be discharged to [facility] today.  She was supposed to be discharged 1/6/17 but stayed due to pending precertification from her insurance complany. I am told it will be approved today so she will be discharged today.    VITAL SIGNS  Visit Vitals   • /83 (BP Location: Right arm, Patient Position: Lying)   • Pulse 69   • Temp 97.7 °F (36.5 °C) (Oral)   • Resp 16   • Ht 62.4\" (158.5 cm)   • Wt 99 lb 8 oz (45.1 kg)   • SpO2 95%   • BMI 17.97 kg/m2     PE from 1/7/17  Objective:  General Appearance:  Comfortable, well-appearing, not in pain and in no acute distress (shakes her head, doesn't answer my questions, Soft-spoken and withdrawn, elderly, thin and cachectic).    Vital signs: (most recent): Blood pressure 146/83, pulse 69, temperature 97.7 °F (36.5 °C), temperature source Oral, resp. rate 16, height 62.4\" (158.5 cm), weight 99 lb 8 oz (45.1 kg), SpO2 95 %.  Vital signs are normal.  No fever.    HEENT: (Dry mouth)    Lungs:  Normal respiratory rate and normal effort.  Breath sounds clear to auscultation.    Heart: Normal rate.  Regular rhythm.  S1 normal and S2 normal.  No murmur.   Abdomen: Abdomen is soft and non-distended.  Bowel sounds are " normal.   There is no abdominal tenderness.     Extremities: Decreased range of motion.  There is no dependent edema.    Neurological: Patient is alert.    Skin:  Warm and dry.  No rash, cyanosis or ulceration.         CONDITION ON DISCHARGE  Stable.      DISCHARGE DISPOSITION   Skilled Nursing Facility (DC - External) [facility]      DISCHARGE MEDICATIONS   Carito Gonzalez   Home Medication Instructions SAM:[id number]    Printed on:01/09/17 6200   Medication Information                      acetaminophen (TYLENOL) 325 MG tablet  Take 2 tablets by mouth Every 6 (Six) Hours As Needed for mild pain (1-3).             apixaban (ELIQUIS) 2.5 MG tablet tablet  Take 1 tablet by mouth Every 12 (Twelve) Hours.             benazepril (LOTENSIN) 40 MG tablet  Take 1 tablet by mouth daily.             Docusate Sodium (COLACE PO)  Take  by mouth.             escitalopram (LEXAPRO) 10 MG tablet  Take 1 tablet by mouth Daily.             mirtazapine (REMERON) 15 MG tablet  Take 1 tablet by mouth Every Night.             Multiple Vitamins-Minerals (OCUTABS) tablet  Take 1 tablet by mouth daily.             polyethylene glycol (MIRALAX) packet  Take 17 g by mouth Daily.                  Future Appointments  Date Time Provider Department Center   1/24/2017 3:40 PM MD DELVIS Arroyo PC [facility] None   3/13/2017 8:20 AM MD DELVIS Damon CD [facility] None   3/15/2017 9:00 AM MD DELVIS Arroyo [facility] None     Follow-up Information     Follow up with [facility] .    Specialty:  Skilled Nursing Facility    Contact information:    [address] [address] [phone]          TEST  RESULTS PENDING AT DISCHARGE     None    Israel Ariza MD  [facility]  01/09/17  8:55 AM      Time: greater than 30 minutes.      Dragon disclaimer:  Much of this encounter note is an electronic transcription/translation of spoken language to printed text. The electronic  translation of spoken language may permit erroneous, or at times, nonsensical words or phrases to be inadvertently transcribed; Although I have reviewed the note for such errors, some may still exist.

## 2017-01-09 NOTE — THERAPY DISCHARGE NOTE
Acute Care - Physical Therapy Discharge Summary  [facility]       Patient Name: Carito Gonzalez  : 3/7/1930  MRN: [id number]    Today's Date: 2017  Onset of Illness/Injury or Date of Surgery Date: 17    Date of Referral to PT: 17  Referring Physician: Giovanni      Admit Date: 2017      PT Recommendation and Plan    Visit Dx:    ICD-10-CM ICD-9-CM   1. Acute renal insufficiency N28.9 593.9   2. Dehydration E86.0 276.51   3. Generalized weakness R53.1 780.79             Outcome Measures       17 1000 17 1000       How much help from another person do you currently need...    Turning from your back to your side while in flat bed without using bedrails? 3  -KH 3  -KH     Moving from lying on back to sitting on the side of a flat bed without bedrails? 3  -KH 3  -KH     Moving to and from a bed to a chair (including a wheelchair)? 3  -KH 3  -KH     Standing up from a chair using your arms (e.g., wheelchair, bedside chair)? 3  -KH 3  -KH     Climbing 3-5 steps with a railing? 3  -KH 3  -KH     To walk in hospital room? 3  -KH 3  -KH     AM-PAC 6 Clicks Score 18  -KH 18  -KH     Functional Assessment    Outcome Measure Options AM-PAC 6 Clicks Basic Mobility (PT)  -KH AM-PAC 6 Clicks Basic Mobility (PT)  -KH       User Key  (r) = Recorded By, (t) = Taken By, (c) = Cosigned By    Initials Name Provider Type    KEITH Head, PT Physical Therapist                      IP PT Goals       17 1454 17 1304       Bed Mobility PT LTG    Bed Mobility PT LTG, Date Established  17  -PC     Bed Mobility PT LTG, Time to Achieve  1 wk  -PC     Bed Mobility PT LTG, Activity Type  all bed mobility  -PC     Bed Mobility PT LTG, Foreman Level  contact guard assist  -PC     Bed Mobility PT LTG, Date Goal Reviewed 17  -MD      Bed Mobility PT LTG, Outcome goal not met  -MD      Bed Mobility PT LTG, Reason Goal Not Met discharged from facility  -MD      Transfer Training PT LTG     Transfer Training PT LTG, Date Established  01/03/17  -PC     Transfer Training PT LTG, Time to Achieve  1 wk  -PC     Transfer Training PT LTG, Activity Type  all transfers  -PC     Transfer Training PT LTG, Bethel Level  contact guard assist  -PC     Transfer Training PT LTG, Assist Device  walker, rolling  -PC     Transfer Training PT  LTG, Date Goal Reviewed 01/09/17  -MD      Transfer Training PT LTG, Outcome goal not met  -MD      Transfer Training PT LTG, Reason Goal Not Met discharged from facility  -MD      Gait Training PT LTG    Gait Training Goal PT LTG, Date Established  01/03/17  -PC     Gait Training Goal PT LTG, Time to Achieve  1 wk  -PC     Gait Training Goal PT LTG, Bethel Level  contact guard assist  -PC     Gait Training Goal PT LTG, Assist Device  walker, rolling  -PC     Gait Training Goal PT LTG, Distance to Achieve  75 ft  -PC     Gait Training Goal PT LTG, Date Goal Reviewed 01/09/17  -MD      Gait Training Goal PT LTG, Outcome goal not met  -MD      Gait Training Goal PT LTG, Reason Goal Not Met discharged from facility  -MD        User Key  (r) = Recorded By, (t) = Taken By, (c) = Cosigned By    Initials Name Provider Type    PC Maria Luisa Mccarthy, PT Physical Therapist    MD Ofelia Estrada, PT Physical Therapist              PT Discharge Summary  Anticipated Discharge Disposition: skilled nursing facility  Reason for Discharge: Discharge from facility  Outcomes Achieved: Refer to plan of care for updates on goals achieved  Discharge Destination: SNF      Ofelia Estrada, PT   1/9/2017

## 2017-01-09 NOTE — PLAN OF CARE
Problem: Patient Care Overview (Adult)  Goal: Plan of Care Review  Outcome: Ongoing (interventions implemented as appropriate)    01/09/17 0352   Coping/Psychosocial Response Interventions   Plan Of Care Reviewed With patient   Patient Care Overview   Progress no change   Outcome Evaluation   Outcome Summary/Follow up Plan VSS. Pt is quiet with flat affect. VSS. Pt did not want to take nightime medications. Currently resting in bed. Plan is for D/C to [place] in AM.        Goal: Adult Individualization and Mutuality  Outcome: Ongoing (interventions implemented as appropriate)  Goal: Discharge Needs Assessment  Outcome: Ongoing (interventions implemented as appropriate)    Problem: Renal Failure/Kidney Injury, Acute (Adult)  Goal: Signs and Symptoms of Listed Potential Problems Will be Absent or Manageable (Renal Failure/Kidney Injury, Acute)  Outcome: Ongoing (interventions implemented as appropriate)    Problem: Fall Risk (Adult)  Goal: Absence of Falls  Outcome: Ongoing (interventions implemented as appropriate)    Problem: Skin Integrity Impairment, Risk/Actual (Adult)  Goal: Skin Integrity/Wound Healing  Outcome: Ongoing (interventions implemented as appropriate)    Problem: Nutrition, Imbalanced: Inadequate Oral Intake (Adult)  Goal: Improved Oral Intake  Outcome: Ongoing (interventions implemented as appropriate)  Goal: Prevent Further Weight Loss  Outcome: Ongoing (interventions implemented as appropriate)

## 2017-01-10 NOTE — PROGRESS NOTES
Continued Stay Note  [facility]     Patient Name: Carito Gonzalez  MRN: [id number]  Today's Date: 1/10/2017    Admit Date: 1/2/2017          Discharge Plan       01/10/17 1552    Case Management/Social Work Plan    Additional Comments Nina gallego [facility] confirmed pt was admitted to skilled level of care yesterday.              Discharge Codes     None        Expected Discharge Date and Time     Expected Discharge Date Expected Discharge Time    Jan 9, 2017             Claribel Vegas RN

## 2017-01-24 ENCOUNTER — TELEPHONE (OUTPATIENT)
Dept: INTERNAL MEDICINE | Age: 82
End: 2017-01-24

## 2017-01-25 ENCOUNTER — TELEPHONE (OUTPATIENT)
Dept: INTERNAL MEDICINE | Age: 82
End: 2017-01-25

## 2017-01-25 NOTE — TELEPHONE ENCOUNTER
Okay to do chest x-ray and CBC.  Call reports to Dr. Gtz when he is back in the office on 01/26/2017.

## 2017-01-25 NOTE — TELEPHONE ENCOUNTER
LPN requesting order for the following:    Pt presents at admission to facility with no productive cough, temp at 101.3-tylenol was given, rattling heard in upper lobes bilat.     LPN requesting CXR or any other work up.   Please advise. MM

## 2017-01-25 NOTE — TELEPHONE ENCOUNTER
Kathy advised me that pt is now in assisted living.  The pt will have own apartment which necessitate the PT use. MARLEN

## 2017-01-27 ENCOUNTER — TELEPHONE (OUTPATIENT)
Dept: INTERNAL MEDICINE | Age: 82
End: 2017-01-27

## 2017-01-27 NOTE — TELEPHONE ENCOUNTER
I have no problem with decreasing the Remeron to 7.5 mg per day instead of 15, she will need to discuss the other medication with her cardiologist.

## 2017-01-27 NOTE — TELEPHONE ENCOUNTER
If she has fever, shortness of breath, or hypoxia, they may want to send her to the hospital for admission.

## 2017-01-31 ENCOUNTER — TELEPHONE (OUTPATIENT)
Dept: INTERNAL MEDICINE | Age: 82
End: 2017-01-31

## 2017-02-03 ENCOUNTER — OFFICE VISIT (OUTPATIENT)
Dept: INTERNAL MEDICINE | Age: 82
End: 2017-02-03

## 2017-02-03 VITALS
BODY MASS INDEX: 20.67 KG/M2 | OXYGEN SATURATION: 92 % | HEIGHT: 62 IN | DIASTOLIC BLOOD PRESSURE: 72 MMHG | WEIGHT: 112.3 LBS | TEMPERATURE: 97.3 F | HEART RATE: 87 BPM | SYSTOLIC BLOOD PRESSURE: 138 MMHG

## 2017-02-03 DIAGNOSIS — I10 ESSENTIAL HYPERTENSION: Primary | ICD-10-CM

## 2017-02-03 DIAGNOSIS — F32.A DEPRESSION, UNSPECIFIED DEPRESSION TYPE: ICD-10-CM

## 2017-02-03 DIAGNOSIS — I82.431 ACUTE DEEP VEIN THROMBOSIS (DVT) OF POPLITEAL VEIN OF RIGHT LOWER EXTREMITY (HCC): ICD-10-CM

## 2017-02-03 PROCEDURE — 99214 OFFICE O/P EST MOD 30 MIN: CPT | Performed by: INTERNAL MEDICINE

## 2017-02-03 RX ORDER — FUROSEMIDE 20 MG/1
TABLET ORAL
COMMUNITY
Start: 2016-12-29 | End: 2017-02-03

## 2017-02-03 RX ORDER — POTASSIUM CHLORIDE 750 MG/1
CAPSULE, EXTENDED RELEASE ORAL
COMMUNITY
Start: 2016-12-17 | End: 2017-02-03 | Stop reason: ALTCHOICE

## 2017-02-03 RX ORDER — LEVOFLOXACIN 750 MG/1
TABLET ORAL
COMMUNITY
Start: 2017-01-26 | End: 2017-03-20

## 2017-02-03 NOTE — PROGRESS NOTES
"Carito NEVAREZ [place] / 86 y.o. / female  02/03/2017    VITALS    Visit Vitals   • /72   • Pulse 87   • Temp 97.3 °F (36.3 °C)   • Ht 62\" (157.5 cm)   • Wt 112 lb 4.8 oz (50.9 kg)   • SpO2 92%   • BMI 20.54 kg/m2     BP Readings from Last 3 Encounters:   02/03/17 138/72   01/09/17 146/83   12/24/16 (!) 199/94     Wt Readings from Last 3 Encounters:   02/03/17 112 lb 4.8 oz (50.9 kg)   01/02/17 99 lb 8 oz (45.1 kg)   12/24/16 103 lb (46.7 kg)      Body mass index is 20.54 kg/(m^2).    CC:  Main reason(s) for today's visit: Hypertension (2 month BP check; pt and pt's son has questions regarding nose bleeds, leg swelling, and medication)      HPI:     Presents today for follow-up of multiple medical issues.    Hypertension: She is compliant with medication, dietary salt restriction.  She is active with physical therapy in the assisted living facility where she currently resides.    Patient had DVT noted January 2 right side popliteal vessels, she is currently being treated with Eliquis for 6 weeks.  While on this medication since 23 January she has had 3 nosebleeds most recently 48 hours ago.  I'll nosebleeds within the same side.  (Right).  Currently she is ambulatory on a daily basis.      Patient's been placed on Remeron along with Lexapro because of depression, lack of eating, and insomnia.  Since she has been seen by me, she is gained 12 pounds, sleeping, and her appetite is improved.  She does appear to me to be back to her baseline personality.  He also notes some side effects with the use of the Remeron in that it is impairing her vision more upon awakening in addition to her baseline macular degeneration.  I do not see visual impairment listed as a side effect of this particular medication.    ______________________________________________________    ASSESSMENT & PLAN:    1. Essential hypertension    2. Acute deep vein thrombosis (DVT) of popliteal vein of right lower extremity    3. Depression, unspecified " depression type      No orders of the defined types were placed in this encounter.      Summary/Discussion:     · #1 hypertension stable on current medical regimen.  Plan: Continue same, blood pressure check 2 months, since her most recent BMP was normal on January 6, I will not check lab today.    #2 acute DVT secondary to situational immobilization.  Recommend six-week treatment with Eliquis, I would suggest that we discontinue the Eliquis 2 weeks from today 17 February.  She is on an anticoagulation, and has had some problems with nosebleeds, I suggest that she keep her fingers out of her nose and that should eliminate the problem.  She will contact me if this problem recurs.    #3 patient is having dysphoria with use of Remeron, her weight is improved, I believe her mood is back to baseline, I would suggest we discontinue Remeron as of today.  Continue to weigh on a regular basis and contact me if her weight declines or her appetite diminishes.  She continues to improve, I will see her back in 2 months and at that time we'll consider discontinuing the Lexapro as well.    #4 complete course of antibiotics which should be done in approximate 4 days.    Return in about 2 months (around 4/3/2017) for Recheck.    Future Appointments  Date Time Provider Department Center   3/13/2017 8:20 AM MD SUSIE DamonK CD [facility] None   3/15/2017 9:00 AM Rafi Gtz MD [facility] None       ______________________________________________________    REVIEW OF SYSTEMS    Review of Systems   Respiratory: Positive for shortness of breath. Negative for chest tightness.         Noted over the past 2 mornings while attempting to walk to breakfast.  Of note, she had no difficulty walking down to the dining room for dinner.    Cardiovascular: Negative for chest pain and palpitations.   Neurological: Negative for dizziness, syncope, speech difficulty, weakness, light-headedness and headaches.         PHYSICAL EXAMINATION    Physical  Exam   Constitutional: She is oriented to person, place, and time. She appears well-developed and well-nourished. No distress.   Cardiovascular: Normal rate, regular rhythm and intact distal pulses.  Exam reveals no gallop and no friction rub.    Murmur heard.  2/6 systolic ejection murmur noted at the base   Pulmonary/Chest: Effort normal and breath sounds normal. She has no wheezes. She has no rales.   Musculoskeletal: She exhibits edema.   Trace edema distally both lower extremities.  Patient is advised to elevate legs when seated.   Neurological: She is alert and oriented to person, place, and time.   Skin: Skin is warm and dry. No rash noted.   Psychiatric: She has a normal mood and affect. Her behavior is normal. Judgment and thought content normal.   Nursing note and vitals reviewed.    REVIEWED DATA:    Labs:   Lab Results   Component Value Date     01/06/2017    K 3.6 01/06/2017    AST 18 01/01/2017    ALT 17 01/01/2017    BUN 17 01/06/2017    CREATININE 0.75 01/06/2017    CREATININE 0.86 01/05/2017    CREATININE 0.83 01/03/2017    EGFRIFNONA 73 01/06/2017    EGFRIFAFRI 73 06/15/2016       Lab Results   Component Value Date    GLU 84 06/15/2016    GLU 87 02/03/2016    GLU 87 01/27/2015       Lab Results   Component Value Date     (H) 02/03/2016     (H) 01/27/2015    HDL 76 (H) 02/03/2016    TRIG 74 02/03/2016       Lab Results   Component Value Date    TSH 1.640 12/02/2016          Lab Results   Component Value Date    WBC 3.75 (L) 01/05/2017    HGB 11.2 (L) 01/05/2017     01/05/2017        Imaging:        Medical Tests:        Summary of old records / correspondence / consultant report:        Request outside records:          ALLERGIES:    Zoloft [sertraline hcl] and Erythromycin    MEDICATIONS:    Current Outpatient Prescriptions   Medication Sig Dispense Refill   • acetaminophen (TYLENOL) 325 MG tablet Take 2 tablets by mouth Every 6 (Six) Hours As Needed for mild pain (1-3).   0   • apixaban (ELIQUIS) 2.5 MG tablet tablet Take 1 tablet by mouth Every 12 (Twelve) Hours. 60 tablet    • benazepril (LOTENSIN) 40 MG tablet Take 1 tablet by mouth daily. 90 tablet 1   • Docusate Sodium (COLACE PO) Take  by mouth.     • escitalopram (LEXAPRO) 10 MG tablet Take 1 tablet by mouth Daily. 30 tablet 0   • levoFLOXacin (LEVAQUIN) 750 MG tablet      • Multiple Vitamins-Minerals (OCUTABS) tablet Take 1 tablet by mouth daily.     • polyethylene glycol (MIRALAX) packet Take 17 g by mouth As Needed.       No current facility-administered medications for this visit.        [facility]    The following portions of the patient's history were reviewed and updated as appropriate: Allergies / Current Medications / Past Medical History / Surgical History / Social History / Family History    PROBLEM LIST:    Patient Active Problem List   Diagnosis   • Hypercholesterolemia   • Hypertension   • Valvular heart disease   • Swelling of limb, left   • DVT (deep venous thrombosis)   • MVP (mitral valve prolapse)   • Mitral regurgitation   • Bilateral carotid bruits   • Encounter for immunization   • Routine health maintenance   • Acute renal insufficiency   • CKD (chronic kidney disease) stage 3, GFR 30-59 ml/min   • Depression   • Protein calorie malnutrition       PAST MEDICAL HX:    Past Medical History   Diagnosis Date   • Depression    • Hyperlipidemia    • Hypertension    • Macular degeneration      Noted February 2014   • Mitral regurgitation    • Mitral valve prolapse    • Zoster        PAST SURGICAL HX:    Past Surgical History   Procedure Laterality Date   • Skin cancer excision         SOCIAL HX:    Social History     Social History   • Marital status:      Spouse name: N/A   • Number of children: N/A   • Years of education: N/A     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   • Smoking status: Former Smoker     Quit date: 1977   • Smokeless tobacco: None   • Alcohol use No   • Drug use: No   •  Sexual activity: Defer     Other Topics Concern   • None     Social History Narrative       FAMILY HX:    Family History   Problem Relation Age of Onset   • Heart attack Mother    • Hypertension Mother    • Heart attack Father    • Hypertension Father    • Heart attack Brother 58   • Hypertension Brother

## 2017-02-06 ENCOUNTER — OFFICE VISIT (OUTPATIENT)
Dept: INTERNAL MEDICINE | Age: 82
End: 2017-02-06

## 2017-02-06 VITALS
HEIGHT: 62 IN | DIASTOLIC BLOOD PRESSURE: 54 MMHG | HEART RATE: 74 BPM | BODY MASS INDEX: 21.16 KG/M2 | TEMPERATURE: 97.8 F | SYSTOLIC BLOOD PRESSURE: 116 MMHG | WEIGHT: 115 LBS | OXYGEN SATURATION: 98 %

## 2017-02-06 DIAGNOSIS — R04.0 EPISTAXIS, RECURRENT: Primary | ICD-10-CM

## 2017-02-06 PROCEDURE — 99214 OFFICE O/P EST MOD 30 MIN: CPT | Performed by: NURSE PRACTITIONER

## 2017-02-06 NOTE — PROGRESS NOTES
Carito NEVAREZ [place] / 86 y.o. / female  02/06/2017      CC:  Main reason(s) for today's visit: Nose Bleed (started saturday and stopped / concerned about her eliquis medication)      HPI:   Sylvia states that she began Eliquis on 01/06/2017 for a BLE DVT. States that she began to have nosebleeds as early as 01/26/2017 and has had three more episodes since that time. States that the nosebleeds have been so severe that she woke up from her sleep and it saturated her clothes and sheets. She reports another time it had bled so severe it soaked her shirt. Most recent nose bleed was on 02/04/2017. She states that she had bleeding which she applied pressure to and it lasted an hour before it stopped. She reports that she had not taken any Eliquis since Saturday morning. Denies any SOA, chest pain, or chest pressure. She does report some swelling in BLE and states they are tender but not hot or painful. She also states that she has compression stockings but has not been wearing them during the time time she was at rehabilitation. She states that when she elevates her feet, the swelling resolves. She states that she now lives at an assisted living facility and has been evaluated by the cardiologist. States that her cardiologist discontinued her diuretic but no other changes done.      The following portions of the patient's history were reviewed and updated as appropriate: past medical history and past surgical history.    ASSESSMENT & PLAN:    Problem List Items Addressed This Visit     None      Visit Diagnoses     Epistaxis, recurrent    -  Primary    Relevant Orders    Ambulatory Referral to ENT (Otolaryngology)        Orders Placed This Encounter   Procedures   • Ambulatory Referral to ENT (Otolaryngology)       · Summary/Discussion:     Epistaxis: Patient presents to the office with complaints of recurring and severe nosebleeds.  Patient states that she was last evaluated by Dr. Rafi Gtz on 01/03/2017. Since that time,  she has had three severe nosebleeds lasting approximately an hour in duration despite applying pressure to the site. Due to these complaints a referral will be made to ENT for further evaluation.  Advised to monitor for worsening of symptoms and contact the office if symptoms do not improve. She will need to begin taking Eliquist again after evaluation from ENT is completed.  Patient verbalized understanding of instructions.      Follow-up: Return if symptoms worsen or fail to improve.     Future Appointments  Date Time Provider Department Center   3/13/2017 8:20 AM Quin Magana MD [facility] None   4/21/2017 12:40 PM MD [facility] None     ____________________________________________________________________    REVIEW OF SYSTEMS    Review of Systems   Constitutional: Negative for activity change, appetite change, chills, diaphoresis, fatigue and fever.   HENT: Positive for nosebleeds (Severe nosebleeds x three that have lasted over 1 hour and has soaked her sheets and clothing. ). Negative for congestion, dental problem, ear discharge, facial swelling, hearing loss, mouth sores, sore throat, trouble swallowing and voice change.    Eyes: Negative.    Respiratory: Negative for apnea, cough, choking, chest tightness, shortness of breath, wheezing and stridor.    Cardiovascular: Positive for leg swelling (States the swelling resolves with elevation). Negative for chest pain and palpitations.   Gastrointestinal: Negative for abdominal distention, abdominal pain, anal bleeding, blood in stool, constipation, diarrhea, nausea, rectal pain and vomiting.   Endocrine: Negative for cold intolerance and heat intolerance.   Genitourinary: Negative for decreased urine volume, difficulty urinating, dysuria, enuresis, flank pain, frequency, genital sores, hematuria and urgency.   Musculoskeletal: Negative for arthralgias, back pain, gait problem, joint swelling, myalgias, neck pain and neck stiffness.   Skin:  "Negative for color change, pallor, rash and wound.   Allergic/Immunologic: Negative for environmental allergies, food allergies and immunocompromised state.   Neurological: Negative for dizziness, tremors, seizures, syncope, facial asymmetry, speech difficulty, weakness, light-headedness, numbness and headaches.   Hematological: Negative for adenopathy. Does not bruise/bleed easily.   Psychiatric/Behavioral: Negative for agitation, confusion, decreased concentration, self-injury, sleep disturbance and suicidal ideas. The patient is not nervous/anxious and is not hyperactive.    All other systems reviewed and are negative.    Other: As noted per HPI      VITALS    Visit Vitals   • /54   • Pulse 74   • Temp 97.8 °F (36.6 °C)   • Ht 62\" (157.5 cm)   • Wt 115 lb (52.2 kg)   • SpO2 98%   • BMI 21.03 kg/m2     BP Readings from Last 3 Encounters:   02/06/17 116/54   02/03/17 138/72   01/09/17 146/83     Wt Readings from Last 3 Encounters:   02/06/17 115 lb (52.2 kg)   02/03/17 112 lb 4.8 oz (50.9 kg)   01/02/17 99 lb 8 oz (45.1 kg)      Body mass index is 21.03 kg/(m^2).    PHYSICAL EXAMINATION    Physical Exam   Constitutional: She is oriented to person, place, and time. She appears well-developed and well-nourished.   HENT:   Head: Normocephalic.   Eyes: Conjunctivae are normal. Pupils are equal, round, and reactive to light.   Neck: Normal range of motion.   Cardiovascular: Normal rate and regular rhythm.    Pulmonary/Chest: Effort normal and breath sounds normal.   Musculoskeletal: Normal range of motion.   Neurological: She is alert and oriented to person, place, and time.   Skin: Skin is warm and dry.   1 + pitting edema in BLE. No heat, redness or pain reported. Patient is not wearing compression stockings at this time but has calf length socks that have cut into her skin. No open areas or weeping noted.    Psychiatric: She has a normal mood and affect. Her behavior is normal.   Vitals " reviewed.        REVIEWED DATA:    Labs:   Lab Results   Component Value Date     01/06/2017    K 3.6 01/06/2017    AST 18 01/01/2017    ALT 17 01/01/2017    BUN 17 01/06/2017    CREATININE 0.75 01/06/2017    CREATININE 0.86 01/05/2017    CREATININE 0.83 01/03/2017    EGFRIFNONA 73 01/06/2017    EGFRIFAFRI 73 06/15/2016       Lab Results   Component Value Date    GLU 84 06/15/2016    GLU 87 02/03/2016    GLU 87 01/27/2015       Lab Results   Component Value Date     (H) 02/03/2016     (H) 01/27/2015    HDL 76 (H) 02/03/2016    TRIG 74 02/03/2016       Lab Results   Component Value Date    TSH 1.640 12/02/2016          Lab Results   Component Value Date    WBC 3.75 (L) 01/05/2017    HGB 11.2 (L) 01/05/2017     01/05/2017        Imaging:        Medical Tests:        Summary of old records / correspondence / consultant report:        Request outside records:          ALLERGIES:    Zoloft [sertraline hcl] and Erythromycin    MEDICATIONS:  Current Outpatient Prescriptions   Medication Sig Dispense Refill   • acetaminophen (TYLENOL) 325 MG tablet Take 2 tablets by mouth Every 6 (Six) Hours As Needed for mild pain (1-3).  0   • apixaban (ELIQUIS) 2.5 MG tablet tablet Take 1 tablet by mouth Every 12 (Twelve) Hours. 60 tablet    • benazepril (LOTENSIN) 40 MG tablet Take 1 tablet by mouth daily. 90 tablet 1   • Docusate Sodium (COLACE PO) Take  by mouth.     • escitalopram (LEXAPRO) 10 MG tablet Take 1 tablet by mouth Daily. 30 tablet 0   • Multiple Vitamins-Minerals (OCUTABS) tablet Take 1 tablet by mouth daily.     • polyethylene glycol (MIRALAX) packet Take 17 g by mouth As Needed.     • levoFLOXacin (LEVAQUIN) 750 MG tablet        No current facility-administered medications for this visit.        [facility]    The following portions of the patient's history were reviewed and updated as appropriate: Allergies / Current Medications / Past Medical History / Surgical History / Social History / Family  History    PROBLEM LIST:    Patient Active Problem List   Diagnosis   • Hypercholesterolemia   • Hypertension   • Valvular heart disease   • Swelling of limb, left   • DVT (deep venous thrombosis)   • MVP (mitral valve prolapse)   • Mitral regurgitation   • Bilateral carotid bruits   • Encounter for immunization   • Routine health maintenance   • Acute renal insufficiency   • CKD (chronic kidney disease) stage 3, GFR 30-59 ml/min   • Depression   • Protein calorie malnutrition       PAST MEDICAL HX:    Past Medical History   Diagnosis Date   • Depression    • Hyperlipidemia    • Hypertension    • Macular degeneration      Noted February 2014   • Mitral regurgitation    • Mitral valve prolapse    • Zoster        PAST SURGICAL HX:    Past Surgical History   Procedure Laterality Date   • Skin cancer excision         SOCIAL HX:    Social History     Social History   • Marital status:      Spouse name: N/A   • Number of children: N/A   • Years of education: N/A     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   • Smoking status: Former Smoker     Quit date: 1977   • Smokeless tobacco: None   • Alcohol use No   • Drug use: No   • Sexual activity: Defer     Other Topics Concern   • None     Social History Narrative       FAMILY HX:    Family History   Problem Relation Age of Onset   • Heart attack Mother    • Hypertension Mother    • Heart attack Father    • Hypertension Father    • Heart attack Brother 58   • Hypertension Brother

## 2017-02-15 ENCOUNTER — TELEPHONE (OUTPATIENT)
Dept: INTERNAL MEDICINE | Age: 82
End: 2017-02-15

## 2017-02-20 ENCOUNTER — TELEPHONE (OUTPATIENT)
Dept: INTERNAL MEDICINE | Age: 82
End: 2017-02-20

## 2017-02-21 ENCOUNTER — TELEPHONE (OUTPATIENT)
Dept: INTERNAL MEDICINE | Age: 82
End: 2017-02-21

## 2017-02-21 NOTE — TELEPHONE ENCOUNTER
They can discontinue the MiraLAX if no longer needed, I will recheck the recommendations for distal provoked DVT, the current recommendation is now 3 months rather than 6 weeks.  This would mean that she would continue the Eliquis through the first week of April.

## 2017-03-20 ENCOUNTER — OFFICE VISIT (OUTPATIENT)
Dept: CARDIOLOGY | Facility: CLINIC | Age: 82
End: 2017-03-20

## 2017-03-20 VITALS
SYSTOLIC BLOOD PRESSURE: 110 MMHG | HEART RATE: 71 BPM | DIASTOLIC BLOOD PRESSURE: 74 MMHG | BODY MASS INDEX: 21.44 KG/M2 | HEIGHT: 63 IN | WEIGHT: 121 LBS

## 2017-03-20 DIAGNOSIS — I34.0 NON-RHEUMATIC MITRAL REGURGITATION: Primary | ICD-10-CM

## 2017-03-20 DIAGNOSIS — I82.431 ACUTE DEEP VEIN THROMBOSIS (DVT) OF POPLITEAL VEIN OF RIGHT LOWER EXTREMITY (HCC): ICD-10-CM

## 2017-03-20 DIAGNOSIS — N28.9 ACUTE RENAL INSUFFICIENCY: ICD-10-CM

## 2017-03-20 DIAGNOSIS — I34.1 MVP (MITRAL VALVE PROLAPSE): ICD-10-CM

## 2017-03-20 DIAGNOSIS — I10 ESSENTIAL HYPERTENSION: ICD-10-CM

## 2017-03-20 PROCEDURE — 99214 OFFICE O/P EST MOD 30 MIN: CPT | Performed by: INTERNAL MEDICINE

## 2017-03-20 PROCEDURE — 93000 ELECTROCARDIOGRAM COMPLETE: CPT | Performed by: INTERNAL MEDICINE

## 2017-03-20 NOTE — PROGRESS NOTES
Date of Office Visit: 2017  Encounter Provider: Quin Magana MD  Place of Service: [facility] CARDIOLOGY  Patient Name: Carito Gonzalez  :3/7/1930    Chief complaint  Follow-up of deep venous thrombosis, anticoagulant therapy, mitral valve prolapse with severe mitral valve regurgitation    History of Present Illness  Patient is a delightful 87-year-old female with hypertension and mitral valve prolapse with mitral regurgitation.  In 2016 she was noted to have normal left ventricular size and systolic function with an ejection fraction 55%, grade 2 diastolic dysfunction with severe mitral valve regurgitation with severe prolapse of the posterior mitral valve leaflet.  There was also mild tricuspid regurgitation.  RV systolic pressure 45 mmHg. in 2017 she was admitted with an acute right looks Route deep venous thrombosis as well as acute renal insufficiency.  She was placed on low-dose Eliquis therapy.  She now presents a follow-up    In the interim she has had no bleeding she denies any chest pain palpitations syncope near syncope.  There is some question about discontinuing Eliquis at this time Dr. Gtz prefer to do this as it has been at least 2 1/2months since her diagnosis and treatment.  This is been stopped intermittently due to nosebleeds.  She denies any falls.    Past Medical History   Diagnosis Date   • Abscess of leg    • Acute deep vein thrombosis (DVT) of left lower extremity    • Acute deep vein thrombosis (DVT) of right lower extremity    • Acute renal insufficiency    • Cellulitis    • CKD (chronic kidney disease) stage 3, GFR 30-59 ml/min    • Depression    • Hypercholesterolemia    • Hyperlipidemia    • Hypertension    • Hypotension    • Macular degeneration      Noted 2014   • Mitral regurgitation    • Mitral valve prolapse    • MVP (mitral valve prolapse)    • SOB (shortness of breath)    • Zoster      Past Surgical History    Procedure Laterality Date   • Skin cancer excision       Outpatient Medications Prior to Visit   Medication Sig Dispense Refill   • acetaminophen (TYLENOL) 325 MG tablet Take 2 tablets by mouth Every 6 (Six) Hours As Needed for mild pain (1-3).  0   • benazepril (LOTENSIN) 40 MG tablet Take 1 tablet by mouth daily. 90 tablet 1   • Docusate Sodium (COLACE PO) Take 100 mg by mouth Daily.     • escitalopram (LEXAPRO) 10 MG tablet Take 1 tablet by mouth Daily. 30 tablet 0   • Multiple Vitamins-Minerals (OCUTABS) tablet Take 1 tablet by mouth daily.     • polyethylene glycol (MIRALAX) packet Take 17 g by mouth As Needed.     • apixaban (ELIQUIS) 2.5 MG tablet tablet Take 1 tablet by mouth Every 12 (Twelve) Hours. 60 tablet    • levoFLOXacin (LEVAQUIN) 750 MG tablet        No facility-administered medications prior to visit.      Allergies as of 03/20/2017 - Levi as Reviewed 03/20/2017   Allergen Reaction Noted   • Zoloft [sertraline hcl] Shortness Of Breath 12/21/2016   • Erythromycin Swelling 02/02/2016     Social History     Social History   • Marital status:      Spouse name: N/A   • Number of children: N/A   • Years of education: N/A     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   • Smoking status: Former Smoker     Quit date: 1977   • Smokeless tobacco: Not on file   • Alcohol use No      Comment: Daily caffeine use   • Drug use: No   • Sexual activity: Defer     Other Topics Concern   • Not on file     Social History Narrative     Family History   Problem Relation Age of Onset   • Heart attack Mother    • Hypertension Mother    • Heart attack Father    • Hypertension Father    • Heart attack Brother 58   • Hypertension Brother      Review of Systems   Constitution: Positive for malaise/fatigue. Negative for fever, weight gain and weight loss.   HENT: Negative for ear pain, hearing loss, nosebleeds and sore throat.    Eyes: Positive for visual disturbance. Negative for double vision, pain,  "vision loss in left eye and vision loss in right eye.   Cardiovascular: Positive for leg swelling.        See history of present illness.   Respiratory: Positive for shortness of breath. Negative for cough, sleep disturbances due to breathing, snoring and wheezing.    Endocrine: Negative for cold intolerance, heat intolerance and polyuria.   Skin: Negative for itching, poor wound healing and rash.   Musculoskeletal: Positive for joint swelling and myalgias. Negative for joint pain.   Gastrointestinal: Negative for abdominal pain, diarrhea, hematochezia, nausea and vomiting.   Genitourinary: Negative for hematuria and hesitancy.   Neurological: Negative for numbness, paresthesias and seizures.   Psychiatric/Behavioral: Positive for depression. The patient is nervous/anxious.      Objective:     Vitals:    03/20/17 1019   BP: 110/74   Pulse: 71   Weight: 121 lb (54.9 kg)   Height: 63\" (160 cm)     Body mass index is 21.43 kg/(m^2).    Physical Exam   Constitutional: She is oriented to person, place, and time. She appears well-developed and well-nourished.   HENT:   Head: Normocephalic.   Nose: Nose normal.   Mouth/Throat: Oropharynx is clear and moist.   Eyes: Conjunctivae and EOM are normal. Pupils are equal, round, and reactive to light. Right eye exhibits no discharge. No scleral icterus.   Neck: Normal range of motion. Neck supple. No JVD present. No thyromegaly present.   Cardiovascular: Normal rate, regular rhythm, normal heart sounds and intact distal pulses.  Exam reveals no gallop and no friction rub.    No murmur heard.  Pulses:       Carotid pulses are 2+ on the right side, and 2+ on the left side.       Radial pulses are 2+ on the right side, and 2+ on the left side.        Femoral pulses are 2+ on the right side, and 2+ on the left side.       Popliteal pulses are 2+ on the right side, and 2+ on the left side.        Dorsalis pedis pulses are 2+ on the right side, and 2+ on the left side.        Posterior " tibial pulses are 2+ on the right side, and 2+ on the left side.   Pulmonary/Chest: Effort normal and breath sounds normal. No respiratory distress. She has no wheezes. She has no rales.   Abdominal: Soft. Bowel sounds are normal. She exhibits no distension. There is no hepatosplenomegaly. There is no tenderness. There is no rebound.   Musculoskeletal: Normal range of motion. She exhibits no edema or tenderness.   Neurological: She is alert and oriented to person, place, and time.   Skin: Skin is warm and dry. No rash noted. No erythema.   Psychiatric: She has a normal mood and affect. Her behavior is normal. Judgment and thought content normal.   Vitals reviewed.    Lab Review:     ECG 12 Lead  Date/Time: 3/20/2017 12:06 AM  Performed by: JOSIAH RIVERA  Authorized by: JOSIAH RIVERA   Comparison: compared with previous ECG   Similar to previous ECG  Rhythm: sinus rhythm  Conduction comments: Prior anteroseptal infarction  QTc = 426 msec            Assessment:       Diagnosis Plan   1. Non-rheumatic mitral regurgitation     2. Acute deep vein thrombosis (DVT) of popliteal vein of right lower extremity     3. Essential hypertension     4. MVP (mitral valve prolapse)     5. Acute renal insufficiency       Plan:         1.  Deep venous thrombosis on Eliquis therapy.  It is reasonable this point to discontinue all questions*on a coated baby aspirin a day.  2.  Mild lower extremity edema.  Recommended leg elevation as well as support stockings.  3.  Mitral valve prolapse with severe mitral regurgitation, continue medical therapy  4.  Hypertension, under good control  5.  Acute renal insufficiency seems to have resolved     Carito Gonzalez   Home Medication Instructions SAM:    Printed on:03/21/17 1661   Medication Information                      acetaminophen (TYLENOL) 325 MG tablet  Take 2 tablets by mouth Every 6 (Six) Hours As Needed for mild pain (1-3).             aspirin 81 MG tablet  Take 1 tablet by mouth Daily.              benazepril (LOTENSIN) 40 MG tablet  Take 1 tablet by mouth daily.             Docusate Sodium (COLACE PO)  Take 100 mg by mouth Daily.             escitalopram (LEXAPRO) 10 MG tablet  Take 1 tablet by mouth Daily.             Multiple Vitamins-Minerals (OCUTABS) tablet  Take 1 tablet by mouth daily.             polyethylene glycol (MIRALAX) packet  Take 17 g by mouth As Needed.                 New Medications Ordered This Visit   Medications   • aspirin 81 MG tablet     Sig: Take 1 tablet by mouth Daily.     Dispense:  30 tablet     Refill:  1       EMR Dragon/Transcription disclaimer:   Much of this encounter note is an electronic transcription/translation of spoken language to printed text. The electronic translation of spoken language may permit erroneous, or at times, nonsensical words or phrases to be inadvertently transcribed; Although I have reviewed the note for such errors, some may still exist.

## 2017-04-21 ENCOUNTER — OFFICE VISIT (OUTPATIENT)
Dept: INTERNAL MEDICINE | Age: 82
End: 2017-04-21

## 2017-04-21 VITALS
DIASTOLIC BLOOD PRESSURE: 60 MMHG | TEMPERATURE: 98.1 F | HEIGHT: 63 IN | SYSTOLIC BLOOD PRESSURE: 170 MMHG | HEART RATE: 69 BPM | WEIGHT: 122 LBS | OXYGEN SATURATION: 96 % | BODY MASS INDEX: 21.62 KG/M2

## 2017-04-21 DIAGNOSIS — F32.A DEPRESSION, UNSPECIFIED DEPRESSION TYPE: ICD-10-CM

## 2017-04-21 DIAGNOSIS — I10 ESSENTIAL HYPERTENSION: Primary | ICD-10-CM

## 2017-04-21 PROCEDURE — 99213 OFFICE O/P EST LOW 20 MIN: CPT | Performed by: INTERNAL MEDICINE

## 2017-04-21 RX ORDER — HYDROCHLOROTHIAZIDE 12.5 MG/1
12.5 CAPSULE, GELATIN COATED ORAL DAILY
Qty: 30 CAPSULE | Refills: 1 | Status: SHIPPED | OUTPATIENT
Start: 2017-04-21 | End: 2017-06-08

## 2017-04-21 NOTE — PROGRESS NOTES
"Carito NEVAREZ Turkish / 87 y.o. / female  04/21/2017    VITALS    /60  Pulse 69  Temp 98.1 °F (36.7 °C)  Ht 63\" (160 cm)  Wt 122 lb (55.3 kg)  SpO2 96%  BMI 21.61 kg/m2  BP Readings from Last 3 Encounters:   04/21/17 150/60   03/20/17 110/74   02/06/17 116/54     Wt Readings from Last 3 Encounters:   04/21/17 122 lb (55.3 kg)   03/20/17 121 lb (54.9 kg)   02/06/17 115 lb (52.2 kg)      Body mass index is 21.61 kg/(m^2).    CC:  Main reason(s) for today's visit: Hypertension and Bleeding/Bruising      HPI:     Presents today for follow-up.  Last seen by me approximate 2 months ago at that time, we discontinue Remeron and today we are going to reassess her for elimination of Lexapro as well.  Patient is eating well, is actually gaining weight, she is sleeping well, and doing overall very well.  According to her daughter she, as I do, feel that patient is back at her mental baseline.    Laboratory review January 6, 2017 creatinine 0.75, potassium 3.6.    ______________________________________________________    ASSESSMENT & PLAN:    1. Essential hypertension    2. Depression, unspecified depression type      No orders of the defined types were placed in this encounter.      Summary/Discussion:     · #1 stage II systolic hypertension, needs improved control, we'll add hydrochlorothiazide 12 mg daily, blood pressure check 6 weeks, recheck BMP at that time as well.  We'll not add potassium today as patient is already on Lotensin which she was mitigated be hypokalemic affect.  ·   · #2 depression improved clinically, recommend patient taper Lexapro to 10 mg every other day ×2 weeks, then discontinue medication.  Patient and her daughter were advised of the symptoms to watch for recurrent depression.  If they should occur, we can easily restart the medication at that time.      No Follow-up on file.    Future Appointments  Date Time Provider Department Center   6/26/2017 10:20 AM Quin Magana MD [facility] None "       ______________________________________________________    REVIEW OF SYSTEMS    Review of Systems   Respiratory: Negative for chest tightness and shortness of breath.    Cardiovascular: Negative for chest pain and palpitations.   Neurological: Negative for dizziness, syncope, speech difficulty, weakness, light-headedness and headaches.         PHYSICAL EXAMINATION    Physical Exam   Constitutional: She is oriented to person, place, and time. She appears well-developed and well-nourished. No distress.   Cardiovascular: Normal rate, regular rhythm, normal heart sounds and intact distal pulses.  Exam reveals no gallop and no friction rub.    No murmur heard.  Pulmonary/Chest: Effort normal and breath sounds normal. She has no wheezes. She has no rales.   Musculoskeletal: She exhibits edema.   Slight distal lower extremity edema.   Neurological: She is alert and oriented to person, place, and time.   Skin: Skin is warm and dry. No rash noted.   Left upper extremity shows evidence of U minor ecchymoses secondary to aspirin therapy (use because of carotid artery stenosis).    Right upper extremity shows small Trevin Ks ×2, right temple Trevin K ×2.  These are not premalignant, recommend reassurance.   Psychiatric: She has a normal mood and affect. Her behavior is normal. Judgment and thought content normal.   Nursing note and vitals reviewed.      REVIEWED DATA:    Labs:   Lab Results   Component Value Date     01/06/2017    K 3.6 01/06/2017    AST 18 01/01/2017    ALT 17 01/01/2017    BUN 17 01/06/2017    CREATININE 0.75 01/06/2017    CREATININE 0.86 01/05/2017    CREATININE 0.83 01/03/2017    EGFRIFNONA 73 01/06/2017    EGFRIFAFRI 73 06/15/2016       Lab Results   Component Value Date    GLU 84 06/15/2016    GLU 87 02/03/2016    GLU 87 01/27/2015       Lab Results   Component Value Date     (H) 02/03/2016     (H) 01/27/2015    HDL 76 (H) 02/03/2016    TRIG 74 02/03/2016       Lab Results   Component  Value Date    TSH 1.640 12/02/2016          Lab Results   Component Value Date    WBC 3.75 (L) 01/05/2017    HGB 11.2 (L) 01/05/2017    HGB 10.4 (L) 01/03/2017    HGB 11.5 (L) 01/01/2017     01/05/2017        Imaging:        Medical Tests:        Summary of old records / correspondence / consultant report:        Request outside records:          ALLERGIES:    Zoloft [sertraline hcl] and Erythromycin    MEDICATIONS:       Current Outpatient Prescriptions   Medication Sig Dispense Refill   • acetaminophen (TYLENOL) 325 MG tablet Take 2 tablets by mouth Every 6 (Six) Hours As Needed for mild pain (1-3).  0   • aspirin 81 MG tablet Take 1 tablet by mouth Daily. 30 tablet 1   • benazepril (LOTENSIN) 40 MG tablet Take 1 tablet by mouth daily. 90 tablet 1   • Docusate Sodium (COLACE PO) Take 100 mg by mouth Daily.     • escitalopram (LEXAPRO) 10 MG tablet Take 1 tablet by mouth Daily. 30 tablet 0   • Multiple Vitamins-Minerals (OCUTABS) tablet Take 1 tablet by mouth daily.       No current facility-administered medications for this visit.        [facility]    The following portions of the patient's history were reviewed and updated as appropriate: Allergies / Current Medications / Past Medical History / Surgical History / Social History / Family History    PROBLEM LIST:    Patient Active Problem List   Diagnosis   • Hypercholesterolemia   • Hypertension   • Valvular heart disease   • Swelling of limb, left   • DVT (deep venous thrombosis)   • MVP (mitral valve prolapse)   • Mitral regurgitation   • Bilateral carotid bruits   • Encounter for immunization   • Routine health maintenance   • Acute renal insufficiency   • CKD (chronic kidney disease) stage 3, GFR 30-59 ml/min   • Depression   • Protein calorie malnutrition       PAST MEDICAL HX:    Past Medical History:   Diagnosis Date   • Abscess of leg    • Acute deep vein thrombosis (DVT) of left lower extremity    • Acute deep vein thrombosis (DVT) of right lower  extremity    • Acute renal insufficiency    • Cellulitis    • CKD (chronic kidney disease) stage 3, GFR 30-59 ml/min    • Depression    • Hypercholesterolemia    • Hyperlipidemia    • Hypertension    • Hypotension    • Macular degeneration     Noted February 2014   • Mitral regurgitation    • Mitral valve prolapse    • MVP (mitral valve prolapse)    • SOB (shortness of breath)    • Zoster        PAST SURGICAL HX:    Past Surgical History:   Procedure Laterality Date   • SKIN CANCER EXCISION         SOCIAL HX:    Social History     Social History   • Marital status:      Spouse name: N/A   • Number of children: 3   • Years of education: N/A     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   • Smoking status: Former Smoker     Quit date: 1977   • Smokeless tobacco: None   • Alcohol use No      Comment: Daily caffeine use   • Drug use: No   • Sexual activity: Defer     Other Topics Concern   • None     Social History Narrative       FAMILY HX:    Family History   Problem Relation Age of Onset   • Heart attack Mother    • Hypertension Mother    • Heart attack Father    • Hypertension Father    • Heart attack Brother 58   • Hypertension Brother          **Nataly Disclaimer:   Much of this encounter note is an electronic transcription/translation of spoken language to printed text. The electronic translation of spoken language may permit erroneous, or at times, nonsensical words or phrases to be inadvertently transcribed. Although I have reviewed the note for such errors, some may still exist.

## 2017-06-08 ENCOUNTER — OFFICE VISIT (OUTPATIENT)
Dept: INTERNAL MEDICINE | Age: 82
End: 2017-06-08

## 2017-06-08 VITALS
WEIGHT: 121.6 LBS | SYSTOLIC BLOOD PRESSURE: 160 MMHG | OXYGEN SATURATION: 100 % | DIASTOLIC BLOOD PRESSURE: 70 MMHG | HEIGHT: 63 IN | HEART RATE: 68 BPM | TEMPERATURE: 97.9 F | BODY MASS INDEX: 21.55 KG/M2

## 2017-06-08 DIAGNOSIS — I10 ESSENTIAL HYPERTENSION: Primary | ICD-10-CM

## 2017-06-08 PROCEDURE — 99213 OFFICE O/P EST LOW 20 MIN: CPT | Performed by: INTERNAL MEDICINE

## 2017-06-08 RX ORDER — HYDROCHLOROTHIAZIDE 12.5 MG/1
25 CAPSULE, GELATIN COATED ORAL DAILY
Qty: 30 CAPSULE | Refills: 1 | COMMUNITY
Start: 2017-06-08 | End: 2017-06-26 | Stop reason: SDUPTHER

## 2017-06-09 LAB
ALBUMIN SERPL-MCNC: 4.5 G/DL (ref 3.5–5.2)
ALBUMIN/GLOB SERPL: 1.6 G/DL
ALP SERPL-CCNC: 54 U/L (ref 39–117)
ALT SERPL-CCNC: 8 U/L (ref 1–33)
AST SERPL-CCNC: 15 U/L (ref 1–32)
BASOPHILS # BLD AUTO: 0.03 10*3/MM3 (ref 0–0.2)
BASOPHILS NFR BLD AUTO: 0.5 % (ref 0–1.5)
BILIRUB SERPL-MCNC: 0.4 MG/DL (ref 0.1–1.2)
BUN SERPL-MCNC: 32 MG/DL (ref 8–23)
BUN/CREAT SERPL: 38.6 (ref 7–25)
CALCIUM SERPL-MCNC: 9.9 MG/DL (ref 8.6–10.5)
CHLORIDE SERPL-SCNC: 95 MMOL/L (ref 98–107)
CO2 SERPL-SCNC: 27.9 MMOL/L (ref 22–29)
CREAT SERPL-MCNC: 0.83 MG/DL (ref 0.57–1)
EOSINOPHIL # BLD AUTO: 0.11 10*3/MM3 (ref 0–0.7)
EOSINOPHIL NFR BLD AUTO: 1.9 % (ref 0.3–6.2)
ERYTHROCYTE [DISTWIDTH] IN BLOOD BY AUTOMATED COUNT: 15.5 % (ref 11.7–13)
GLOBULIN SER CALC-MCNC: 2.9 GM/DL
GLUCOSE SERPL-MCNC: 86 MG/DL (ref 65–99)
HCT VFR BLD AUTO: 38.3 % (ref 35.6–45.5)
HGB BLD-MCNC: 11.8 G/DL (ref 11.9–15.5)
IMM GRANULOCYTES # BLD: 0 10*3/MM3 (ref 0–0.03)
IMM GRANULOCYTES NFR BLD: 0 % (ref 0–0.5)
LYMPHOCYTES # BLD AUTO: 1.79 10*3/MM3 (ref 0.9–4.8)
LYMPHOCYTES NFR BLD AUTO: 31.7 % (ref 19.6–45.3)
MCH RBC QN AUTO: 28.7 PG (ref 26.9–32)
MCHC RBC AUTO-ENTMCNC: 30.8 G/DL (ref 32.4–36.3)
MCV RBC AUTO: 93.2 FL (ref 80.5–98.2)
MONOCYTES # BLD AUTO: 0.33 10*3/MM3 (ref 0.2–1.2)
MONOCYTES NFR BLD AUTO: 5.8 % (ref 5–12)
NEUTROPHILS # BLD AUTO: 3.39 10*3/MM3 (ref 1.9–8.1)
NEUTROPHILS NFR BLD AUTO: 60.1 % (ref 42.7–76)
PLATELET # BLD AUTO: 289 10*3/MM3 (ref 140–500)
POTASSIUM SERPL-SCNC: 4.2 MMOL/L (ref 3.5–5.2)
PROT SERPL-MCNC: 7.4 G/DL (ref 6–8.5)
RBC # BLD AUTO: 4.11 10*6/MM3 (ref 3.9–5.2)
SODIUM SERPL-SCNC: 136 MMOL/L (ref 136–145)
WBC # BLD AUTO: 5.65 10*3/MM3 (ref 4.5–10.7)

## 2017-06-26 ENCOUNTER — OFFICE VISIT (OUTPATIENT)
Dept: CARDIOLOGY | Facility: CLINIC | Age: 82
End: 2017-06-26

## 2017-06-26 VITALS
HEIGHT: 63 IN | SYSTOLIC BLOOD PRESSURE: 138 MMHG | WEIGHT: 125 LBS | HEART RATE: 67 BPM | DIASTOLIC BLOOD PRESSURE: 74 MMHG | BODY MASS INDEX: 22.15 KG/M2

## 2017-06-26 DIAGNOSIS — I34.0 NON-RHEUMATIC MITRAL REGURGITATION: ICD-10-CM

## 2017-06-26 DIAGNOSIS — I10 ESSENTIAL HYPERTENSION: ICD-10-CM

## 2017-06-26 DIAGNOSIS — N18.30 CKD (CHRONIC KIDNEY DISEASE) STAGE 3, GFR 30-59 ML/MIN (HCC): ICD-10-CM

## 2017-06-26 DIAGNOSIS — I34.1 MVP (MITRAL VALVE PROLAPSE): Primary | ICD-10-CM

## 2017-06-26 PROBLEM — N28.9 ACUTE RENAL INSUFFICIENCY: Status: RESOLVED | Noted: 2017-01-02 | Resolved: 2017-06-26

## 2017-06-26 PROCEDURE — 93000 ELECTROCARDIOGRAM COMPLETE: CPT | Performed by: INTERNAL MEDICINE

## 2017-06-26 PROCEDURE — 99213 OFFICE O/P EST LOW 20 MIN: CPT | Performed by: INTERNAL MEDICINE

## 2017-06-26 RX ORDER — HYDROCHLOROTHIAZIDE 25 MG/1
25 TABLET ORAL DAILY
COMMUNITY
End: 2017-10-03 | Stop reason: ALTCHOICE

## 2017-06-26 NOTE — PROGRESS NOTES
Date of Office Visit: 2017  Encounter Provider: Quin Magana MD  Place of Service: [facility] CARDIOLOGY  Patient Name: Carito Gonzalez  :3/7/1930    Chief complaint  Follow-up of deep venous thrombosis, anticoagulant therapy, mitral valve prolapse with severe mitral valve regurgitation    History of Present Illness  Patient is a delightful 87-year-old female with hypertension and mitral valve prolapse with mitral regurgitation.  In 2016 she was noted to have normal left ventricular size and systolic function with an ejection fraction 55%, grade 2 diastolic dysfunction with severe mitral valve regurgitation with severe prolapse of the posterior mitral valve leaflet.  There was also mild tricuspid regurgitation.  RV systolic pressure 45 mmHg. in 2017 she was admitted with an acute right acute deep venous thrombosis as well as acute renal insufficiency.  She was transiently placed on Eliquis and completed therapy for this.    Since last visit she states that when she saw Dr. Gtz on , she was hypertensive.  He increase hydrochlorothiazide to 25 mg a day.  Since then her blood pressure has improved and so has some edema in her legs.  She otherwise feels well she denies any chest pain, shortness of breath, palpitations, syncope, near syncope.  She does enjoy keep her feet up on the wedge pillow.  She is exercising 3 times a week doing strengthening activities as well as a rowing machine.  She is attempting to stay on a low-salt diet and certainly adds no salt but does not have much control over the food at her living facility.    Past Medical History:   Diagnosis Date   • Abscess of leg    • Acute deep vein thrombosis (DVT) of left lower extremity    • Acute deep vein thrombosis (DVT) of right lower extremity    • Acute renal insufficiency    • Cellulitis    • CKD (chronic kidney disease) stage 3, GFR 30-59 ml/min    • Depression    • Herpes zoster    •  Hypercholesterolemia    • Hyperlipidemia    • Hypertension    • Hypotension    • Macular degeneration     Noted February 2014   • Mitral regurgitation    • Mitral valve prolapse    • MVP (mitral valve prolapse)    • SOB (shortness of breath)    • Zoster      Past Surgical History:   Procedure Laterality Date   • SKIN CANCER EXCISION       Outpatient Medications Prior to Visit   Medication Sig Dispense Refill   • acetaminophen (TYLENOL) 325 MG tablet Take 2 tablets by mouth Every 6 (Six) Hours As Needed for mild pain (1-3).  0   • aspirin 81 MG tablet Take 1 tablet by mouth Daily. 30 tablet 1   • benazepril (LOTENSIN) 40 MG tablet Take 1 tablet by mouth daily. 90 tablet 1   • Docusate Sodium (COLACE PO) Take 100 mg by mouth Daily.     • Multiple Vitamins-Minerals (OCUTABS) tablet Take 1 tablet by mouth daily.     • hydrochlorothiazide (MICROZIDE) 12.5 MG capsule Take 2 capsules by mouth Daily. 30 capsule 1     No facility-administered medications prior to visit.      Allergies as of 06/26/2017 - Levi as Reviewed 06/26/2017   Allergen Reaction Noted   • Zoloft [sertraline hcl] Shortness Of Breath 12/21/2016   • Erythromycin Swelling 02/02/2016     Social History     Social History   • Marital status:      Spouse name: N/A   • Number of children: 3   • Years of education: N/A     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   • Smoking status: Former Smoker     Quit date: 1977   • Smokeless tobacco: Never Used   • Alcohol use No      Comment: Daily caffeine use   • Drug use: No   • Sexual activity: Defer     Other Topics Concern   • Not on file     Social History Narrative     Family History   Problem Relation Age of Onset   • Heart attack Mother    • Hypertension Mother    • Heart attack Father    • Hypertension Father    • Heart attack Brother 58   • Hypertension Brother      Review of Systems   Constitution: Negative for fever, malaise/fatigue, weight gain and weight loss.   HENT: Positive for  "hearing loss. Negative for ear pain, nosebleeds and sore throat.    Eyes: Positive for visual disturbance. Negative for double vision, pain, vision loss in left eye and vision loss in right eye.        Chronic vision loss   Cardiovascular: Positive for leg swelling.        See history of present illness.   Respiratory: Positive for shortness of breath. Negative for cough, sleep disturbances due to breathing, snoring and wheezing.    Endocrine: Negative for cold intolerance, heat intolerance and polyuria.   Skin: Negative for itching, poor wound healing and rash.   Musculoskeletal: Negative for joint pain, joint swelling and myalgias.   Gastrointestinal: Negative for abdominal pain, diarrhea, hematochezia, nausea and vomiting.   Genitourinary: Negative for hematuria and hesitancy.   Neurological: Negative for numbness, paresthesias and seizures.   Psychiatric/Behavioral: Negative for depression. The patient is not nervous/anxious.      Objective:     Vitals:    06/26/17 1023   BP: 138/74   Pulse: 67   Weight: 125 lb (56.7 kg)   Height: 63\" (160 cm)     Body mass index is 22.14 kg/(m^2).    Physical Exam   Constitutional: She is oriented to person, place, and time. She appears well-developed and well-nourished.   HENT:   Head: Normocephalic.   Nose: Nose normal.   Mouth/Throat: Oropharynx is clear and moist.   Eyes: Conjunctivae and EOM are normal. Pupils are equal, round, and reactive to light. Right eye exhibits no discharge. No scleral icterus.   Neck: Normal range of motion. Neck supple. No JVD present. No thyromegaly present.   Cardiovascular: Normal rate, regular rhythm and intact distal pulses.  Exam reveals no gallop and no friction rub.    Murmur heard.  High-pitched blowing holosystolic murmur is present with a grade of 3/6  at the apex Trace edema to mid tibia bilaterally with chronic venous stasis changes.  Superficial sensitivity noted  Pulses:       Carotid pulses are 2+ on the right side, and 2+ on the " left side.       Radial pulses are 2+ on the right side, and 2+ on the left side.        Femoral pulses are 2+ on the right side, and 2+ on the left side.       Popliteal pulses are 2+ on the right side, and 2+ on the left side.        Dorsalis pedis pulses are 2+ on the right side, and 2+ on the left side.        Posterior tibial pulses are 2+ on the right side, and 2+ on the left side.   Pulmonary/Chest: Effort normal and breath sounds normal. No respiratory distress. She has no wheezes. She has no rales.   Abdominal: Soft. Bowel sounds are normal. She exhibits no distension. There is no hepatosplenomegaly. There is no tenderness. There is no rebound.   Musculoskeletal: Normal range of motion. She exhibits no edema or tenderness.   Neurological: She is alert and oriented to person, place, and time.   Skin: Skin is warm and dry. No rash noted. No erythema.   Psychiatric: She has a normal mood and affect. Her behavior is normal. Judgment and thought content normal.   Vitals reviewed.    Lab Review:     ECG 12 Lead  Date/Time: 6/26/2017 11:25 AM  Performed by: JOSIAH RIVERA  Authorized by: JOSIAH RIVERA   Comparison: compared with previous ECG   Similar to previous ECG  Rhythm: sinus rhythm  Conduction comments: QTc = 423 msec  Clinical impression: normal ECG          Assessment:       Diagnosis Plan   1. MVP (mitral valve prolapse)     2. Non-rheumatic mitral regurgitation     3. Essential hypertension     4. CKD (chronic kidney disease) stage 3, GFR 30-59 ml/min       Plan:       1.  Deep venous thrombosis, or saphenous vein popliteal tenderness at this time though has edema and chronic venous stasis changes to her legs.  She is completed THIS therapy and now is on aspirin.  I've asked her to watch for worsening symptoms.  2.  Mild lower extremity edema.  Overall improved significantly  3.  Mitral valve prolapse with severe mitral regurgitation, continue medical therapy clinically unchanged  4.  Hypertension, improved  increased dose of hydrochlorothiazide  5.  Acute renal insufficiency seems to have resolved, however with recent increasing diuretics will check a BMP in one week to her facility       Carito Gonzalez S   Home Medication Instructions SAM:    Printed on:06/26/17 4902   Medication Information                      acetaminophen (TYLENOL) 325 MG tablet  Take 2 tablets by mouth Every 6 (Six) Hours As Needed for mild pain (1-3).             aspirin 81 MG tablet  Take 1 tablet by mouth Daily.             benazepril (LOTENSIN) 40 MG tablet  Take 1 tablet by mouth daily.             Docusate Sodium (COLACE PO)  Take 100 mg by mouth Daily.             hydrochlorothiazide (HYDRODIURIL) 25 MG tablet  Take 25 mg by mouth Daily.             Multiple Vitamins-Minerals (OCUTABS) tablet  Take 1 tablet by mouth daily.                 No orders of the defined types were placed in this encounter.      Dictated utilizing Dragon dictation

## 2017-07-06 ENCOUNTER — TELEPHONE (OUTPATIENT)
Dept: CARDIOLOGY | Facility: CLINIC | Age: 82
End: 2017-07-06

## 2017-07-06 NOTE — TELEPHONE ENCOUNTER
7/6/17  Cristal with [facility], ph [phone], called re: bmp drawn on pt.  I gave her the fax [phone] to send to Dr. Chino kirby../[email or web address]

## 2017-07-17 NOTE — TELEPHONE ENCOUNTER
Labs reviewed with cr 1.1, gfr 47. No change in meds. See pcp re followup of renal insufficency. ag

## 2017-10-03 ENCOUNTER — OFFICE VISIT (OUTPATIENT)
Dept: CARDIOLOGY | Facility: CLINIC | Age: 82
End: 2017-10-03

## 2017-10-03 VITALS
HEART RATE: 67 BPM | DIASTOLIC BLOOD PRESSURE: 72 MMHG | WEIGHT: 122 LBS | HEIGHT: 63 IN | SYSTOLIC BLOOD PRESSURE: 132 MMHG | BODY MASS INDEX: 21.62 KG/M2

## 2017-10-03 DIAGNOSIS — I34.1 MVP (MITRAL VALVE PROLAPSE): Primary | ICD-10-CM

## 2017-10-03 DIAGNOSIS — I10 ESSENTIAL HYPERTENSION: ICD-10-CM

## 2017-10-03 DIAGNOSIS — I34.0 NON-RHEUMATIC MITRAL REGURGITATION: ICD-10-CM

## 2017-10-03 PROCEDURE — 99214 OFFICE O/P EST MOD 30 MIN: CPT | Performed by: INTERNAL MEDICINE

## 2017-10-03 PROCEDURE — 93000 ELECTROCARDIOGRAM COMPLETE: CPT | Performed by: INTERNAL MEDICINE

## 2017-10-03 RX ORDER — HYDROCODONE BITARTRATE AND ACETAMINOPHEN 7.5; 325 MG/1; MG/1
1 TABLET ORAL
COMMUNITY
Start: 2017-09-20 | End: 2017-11-17

## 2017-10-03 RX ORDER — BISACODYL 10 MG
10 SUPPOSITORY, RECTAL RECTAL DAILY
COMMUNITY
End: 2017-11-17

## 2017-10-03 RX ORDER — PANTOPRAZOLE SODIUM 40 MG/1
40 TABLET, DELAYED RELEASE ORAL DAILY
COMMUNITY
End: 2017-11-17

## 2017-10-03 NOTE — PROGRESS NOTES
Date of Office Visit: 10/03/2017  Encounter Provider: Quin Magana MD  Place of Service: [facility] CARDIOLOGY  Patient Name: Carito Gonzalez  :3/7/1930    Chief complaint  Follow-up of deep venous thrombosis, anticoagulant therapy, mitral valve prolapse with severe mitral valve regurgitation    History of Present Illness  Patient is a delightful 87-year-old female with hypertension and mitral valve prolapse with mitral regurgitation.  In 2016 she was noted to have normal left ventricular size and systolic function with an ejection fraction 55%, grade 2 diastolic dysfunction with severe mitral valve regurgitation with severe prolapse of the posterior mitral valve leaflet.  There was also mild tricuspid regurgitation.  RV systolic pressure 45 mmHg. in 2017 she was admitted with an acute right acute deep venous thrombosis as well as acute renal insufficiency.  She was transiently placed on Eliquis and completed therapy for this.    Since last visit in 2017 she fell and fractured her left hip.  She underwent partial arthroplasty.  She developed hypotension and Lotensin and hydrochlorothiazide were held post procedure.  She has had mild renal insufficiency that seemed to improve her to dismissal.  She states that the rehabilitation facility her blood pressure has remained low with systolics in the 103 range.  Despite this she is slowly improving from her surgery.  She denies any chest pain, shortness of breath, palpitations, syncope near syncope.  She has mild dependent edema.  She's been placed onto full-strength aspirin a day by ortho whom she is to see later today.    Past Medical History:   Diagnosis Date   • Abscess of leg    • Acute deep vein thrombosis (DVT) of left lower extremity    • Acute deep vein thrombosis (DVT) of right lower extremity    • Acute renal insufficiency    • Cellulitis    • CKD (chronic kidney disease) stage 3, GFR 30-59 ml/min    •  Depression    • Herpes zoster    • Hypercholesterolemia    • Hyperlipidemia    • Hypertension    • Hypotension    • Macular degeneration     Noted February 2014   • Mitral regurgitation    • Mitral valve prolapse    • MVP (mitral valve prolapse)    • SOB (shortness of breath)    • Zoster      Past Surgical History:   Procedure Laterality Date   • HIP FRACTURE SURGERY Right 09/18/2017   • SKIN CANCER EXCISION       Outpatient Medications Prior to Visit   Medication Sig Dispense Refill   • acetaminophen (TYLENOL) 325 MG tablet Take 2 tablets by mouth Every 6 (Six) Hours As Needed for mild pain (1-3).  0   • aspirin 81 MG tablet Take 1 tablet by mouth Daily. 30 tablet 1   • Docusate Sodium (COLACE PO) Take 100 mg by mouth Daily.     • Multiple Vitamins-Minerals (OCUTABS) tablet Take 1 tablet by mouth daily.     • benazepril (LOTENSIN) 40 MG tablet Take 1 tablet by mouth daily. 90 tablet 1   • hydrochlorothiazide (HYDRODIURIL) 25 MG tablet Take 25 mg by mouth Daily.       No facility-administered medications prior to visit.      Allergies as of 10/03/2017 - Levi as Reviewed 10/03/2017   Allergen Reaction Noted   • Zoloft [sertraline hcl] Shortness Of Breath 12/21/2016   • Erythromycin Swelling 02/02/2016     Social History     Social History   • Marital status:      Spouse name: N/A   • Number of children: 3   • Years of education: N/A     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   • Smoking status: Former Smoker     Quit date: 1977   • Smokeless tobacco: Never Used   • Alcohol use No      Comment: Daily caffeine use   • Drug use: No   • Sexual activity: Defer     Other Topics Concern   • Not on file     Social History Narrative     Family History   Problem Relation Age of Onset   • Heart attack Mother    • Hypertension Mother    • Heart attack Father    • Hypertension Father    • Heart attack Brother 58   • Hypertension Brother      Review of Systems   Constitution: Negative for fever,  "malaise/fatigue, weight gain and weight loss.   HENT: Negative for ear pain, hearing loss, nosebleeds and sore throat.    Eyes: Negative for double vision, pain, vision loss in left eye and vision loss in right eye.   Cardiovascular:        See history of present illness.   Respiratory: Positive for shortness of breath. Negative for cough, sleep disturbances due to breathing, snoring and wheezing.    Endocrine: Negative for cold intolerance, heat intolerance and polyuria.   Skin: Negative for itching, poor wound healing and rash.   Musculoskeletal: Negative for joint pain, joint swelling and myalgias.   Gastrointestinal: Negative for abdominal pain, diarrhea, hematochezia, nausea and vomiting.   Genitourinary: Negative for hematuria and hesitancy.   Neurological: Negative for numbness, paresthesias and seizures.   Psychiatric/Behavioral: Negative for depression. The patient is not nervous/anxious.      Objective:     Vitals:    10/03/17 1043   BP: 132/72   Pulse: 67   Weight: 122 lb (55.3 kg)   Height: 63\" (160 cm)     Body mass index is 21.61 kg/(m^2).    Physical Exam   Constitutional: She is oriented to person, place, and time. She appears well-developed and well-nourished.   HENT:   Head: Normocephalic.   Nose: Nose normal.   Mouth/Throat: Oropharynx is clear and moist.   Eyes: Conjunctivae and EOM are normal. Pupils are equal, round, and reactive to light. Right eye exhibits no discharge. No scleral icterus.   Neck: Normal range of motion. Neck supple. No JVD present. No thyromegaly present.   Cardiovascular: Normal rate, regular rhythm and intact distal pulses.  Exam reveals no gallop and no friction rub.    Murmur heard.  High-pitched blowing holosystolic murmur is present with a grade of 3/6  at the apex Trace edema  Pulses:       Carotid pulses are 2+ on the right side, and 2+ on the left side.       Radial pulses are 2+ on the right side, and 2+ on the left side.        Femoral pulses are 2+ on the right " side, and 2+ on the left side.       Popliteal pulses are 2+ on the right side, and 2+ on the left side.        Dorsalis pedis pulses are 2+ on the right side, and 2+ on the left side.        Posterior tibial pulses are 2+ on the right side, and 2+ on the left side.   Pulmonary/Chest: Effort normal and breath sounds normal. No respiratory distress. She has no wheezes. She has no rales.   Abdominal: Soft. Bowel sounds are normal. She exhibits no distension. There is no hepatosplenomegaly. There is no tenderness. There is no rebound.   Musculoskeletal: Normal range of motion. She exhibits no edema or tenderness.   Neurological: She is alert and oriented to person, place, and time.   Skin: Skin is warm and dry. No rash noted. No erythema.   Psychiatric: She has a normal mood and affect. Her behavior is normal. Judgment and thought content normal.   Vitals reviewed.    Lab Review:     ECG 12 Lead  Date/Time: 10/3/2017 11:12 AM  Performed by: JOSIAH RIVERA  Authorized by: JOSIAH RIVERA   Comparison: compared with previous ECG   Rhythm: sinus rhythm  Conduction comments: Nonspecific ST T wave changes  QTc = 427msec  Clinical impression: abnormal ECG          Assessment:       Diagnosis Plan   1. MVP (mitral valve prolapse)  ECG 12 Lead   2. Non-rheumatic mitral regurgitation  ECG 12 Lead   3. Essential hypertension  ECG 12 Lead     Plan:       1.  Mitral valve prolapse with severe mitral regurgitation, now off lotensin. She will BP and when SBP increases to > 130-140/70's, resume lotensin at a low dose and possibly add a diuretic  2.  Hypertension, improved increased dose of hydrochlorothiazide  3.  Acute renal insufficiency, improved  4.  S/p hip fracture, to see ortho today and will discuss decreasing asprin  5.  Anemia, to see Dr Gtz in early November     Carito Gonzalez   Home Medication Instructions SAM:    Printed on:10/03/17 1128   Medication Information                      acetaminophen (TYLENOL) 325 MG tablet  Take  2 tablets by mouth Every 6 (Six) Hours As Needed for mild pain (1-3).             aspirin 81 MG tablet  Take 1 tablet by mouth Daily.             bisacodyl (DULCOLAX) 10 MG suppository  Insert 10 mg into the rectum Daily.             Docusate Sodium (COLACE PO)  Take 100 mg by mouth Daily.             HYDROcodone-acetaminophen (NORCO) 7.5-325 MG per tablet  Take 1 tablet by mouth.             Multiple Vitamins-Minerals (OCUTABS) tablet  Take 1 tablet by mouth daily.             pantoprazole (PROTONIX) 40 MG EC tablet  Take 40 mg by mouth Daily.             SENNA PO  Take  by mouth.               Dictated utilizing Dragon dictation

## 2017-10-10 ENCOUNTER — TELEPHONE (OUTPATIENT)
Dept: INTERNAL MEDICINE | Age: 82
End: 2017-10-10

## 2017-10-10 NOTE — TELEPHONE ENCOUNTER
Cristal from care facility stated patient wants to discontinue her Protonix 40MG. Is this ok for the patient to do?

## 2017-10-10 NOTE — TELEPHONE ENCOUNTER
Called and informed Leanna Tejada to DC Protonix if pt is not having any symptoms. She said that is doing well but is refusing medication stating that she no longer needs to take.

## 2017-10-26 ENCOUNTER — TELEPHONE (OUTPATIENT)
Dept: INTERNAL MEDICINE | Age: 82
End: 2017-10-26

## 2017-10-26 NOTE — TELEPHONE ENCOUNTER
Patient's daughter works retail and brings the patient to her appointments. Her schedule is not flexible at all. She wants to move the appointment to 11/17/17 or 11/22/17. Dr. Gtz does not have anything available either dates. She wanted to see if she could get in on one of these dates and if not how far out could she book this for her mother?

## 2017-10-26 NOTE — TELEPHONE ENCOUNTER
Offered appointment with Nubia Damon and daughter stated due to her mothers age and continuity she would feel better with her mother seeing Dr. Gtz.

## 2017-11-17 ENCOUNTER — OFFICE VISIT (OUTPATIENT)
Dept: INTERNAL MEDICINE | Age: 82
End: 2017-11-17

## 2017-11-17 VITALS
OXYGEN SATURATION: 98 % | HEIGHT: 63 IN | TEMPERATURE: 97.2 F | WEIGHT: 120 LBS | BODY MASS INDEX: 21.26 KG/M2 | DIASTOLIC BLOOD PRESSURE: 70 MMHG | HEART RATE: 73 BPM | SYSTOLIC BLOOD PRESSURE: 120 MMHG

## 2017-11-17 DIAGNOSIS — I10 ESSENTIAL HYPERTENSION: Primary | ICD-10-CM

## 2017-11-17 PROBLEM — R79.89 AZOTEMIA: Status: ACTIVE | Noted: 2017-09-18

## 2017-11-17 PROCEDURE — 99213 OFFICE O/P EST LOW 20 MIN: CPT | Performed by: INTERNAL MEDICINE

## 2017-11-17 NOTE — PROGRESS NOTES
"      Carito NEVAREZ French / 87 y.o. / female  11/17/2017  VITALS    /70  Pulse 73  Temp 97.2 °F (36.2 °C)  Ht 63\" (160 cm)  Wt 120 lb (54.4 kg)  SpO2 98%  BMI 21.26 kg/m2    BP Readings from Last 3 Encounters:   11/17/17 120/70   10/03/17 132/72   06/26/17 138/74     Wt Readings from Last 3 Encounters:   11/17/17 120 lb (54.4 kg)   10/03/17 122 lb (55.3 kg)   06/26/17 125 lb (56.7 kg)      Body mass index is 21.26 kg/(m^2).    CC:  Main reason(s) for today's visit: Hypertension (5 month f/u, BP check)      HPI:   Hypertension: Patient has history of hypertension but is not currently taking any medication she is on a low salt diet.    She currently resides at [facility], in an apartment with her spouse, which is a personal care facility.  Meals are provided, medications are dispensed by the staff there.  There are limited transportation opportunities.  She is ambulatory independently with a cane as needed.  Hip has totally resolved, orthopedic follow-up scheduled for one year.  She exercises in the gym 3 times weekly and is about to resume walking on the off days.    [facility], June of this year renal function normal creatinine 0.83, liver functions normal, hemoglobin 11.8, platelet count 289,000 white count 5.65    Patient Care Team:  Rafi Gtz MD as PCP - General  Rafi Gtz MD as PCP - Family Medicine  ____________________________________________________________________    ASSESSMENT & PLAN:    Problem List Items Addressed This Visit        Unprioritized    Hypertension - Primary        No orders of the defined types were placed in this encounter.      Summary/Discussion:  · Number-one history of hypertension, at this point, no treatment is needed.  Recommend see me in 6-12 months.    Return in about 6 months (around 5/17/2018) for Recheck.    Future Appointments  Date Time Provider Department Center   4/3/2018 11:00 AM Quin Magana MD [id number] None "       ______________________________________________________    REVIEW OF SYSTEMS    Review of Systems   Respiratory: Negative for chest tightness and shortness of breath.    Cardiovascular: Negative for chest pain and palpitations.   Neurological: Negative for dizziness, syncope, speech difficulty, weakness, light-headedness and headaches.         PHYSICAL EXAMINATION    Physical Exam   Constitutional: She is oriented to person, place, and time. She appears well-developed and well-nourished. No distress.   Cardiovascular: Normal rate, regular rhythm and intact distal pulses.  Exam reveals no gallop and no friction rub.    Murmur heard.  2/6 systolic ejection murmur left sternal border, consistent with prior known diagnosis of mitral valve prolapse and mitral regurgitation.   Pulmonary/Chest: Effort normal and breath sounds normal. She has no wheezes. She has no rales.   Musculoskeletal: She exhibits no edema.   Neurological: She is alert and oriented to person, place, and time.   Skin: Skin is warm and dry. No rash noted.   Psychiatric: She has a normal mood and affect. Her behavior is normal. Judgment and thought content normal.   Nursing note and vitals reviewed.        REVIEWED DATA:    Labs:     Lab Results   Component Value Date     06/08/2017    K 4.2 06/08/2017    AST 15 06/08/2017    ALT 8 06/08/2017    BUN 32 (H) 06/08/2017    CREATININE 0.83 06/08/2017    CREATININE 0.75 01/06/2017    CREATININE 0.86 01/05/2017    EGFRIFNONA 65 06/08/2017    EGFRIFAFRI 79 06/08/2017       Lab Results   Component Value Date    GLU 86 06/08/2017    GLU 84 06/15/2016    GLU 87 02/03/2016       Lab Results   Component Value Date     (H) 02/03/2016     (H) 01/27/2015    HDL 76 (H) 02/03/2016    TRIG 74 02/03/2016       Lab Results   Component Value Date    TSH 1.640 12/02/2016       Lab Results   Component Value Date    WBC 5.65 06/08/2017    HGB 11.8 (L) 06/08/2017    HGB 11.2 (L) 01/05/2017    HGB 10.4  (L) 01/03/2017     06/08/2017       Imaging:         Medical Tests:         Summary of old records / correspondence / consultant report:         Request outside records:         ALLERGIES  Allergies   Allergen Reactions   • Zoloft [Sertraline Hcl] Shortness Of Breath   • Erythromycin Swelling        MEDICATIONS  Current Outpatient Prescriptions on File Prior to Visit   Medication Sig Dispense Refill   • acetaminophen (TYLENOL) 325 MG tablet Take 2 tablets by mouth Every 6 (Six) Hours As Needed for mild pain (1-3).  0   • aspirin 81 MG tablet Take 1 tablet by mouth Daily. 30 tablet 1   • Docusate Sodium (COLACE PO) Take 100 mg by mouth Daily.     • Multiple Vitamins-Minerals (OCUTABS) tablet Take 1 tablet by mouth daily.     • [DISCONTINUED] bisacodyl (DULCOLAX) 10 MG suppository Insert 10 mg into the rectum Daily.     • [DISCONTINUED] HYDROcodone-acetaminophen (NORCO) 7.5-325 MG per tablet Take 1 tablet by mouth.     • [DISCONTINUED] pantoprazole (PROTONIX) 40 MG EC tablet Take 40 mg by mouth Daily.     • [DISCONTINUED] SENNA PO Take  by mouth.       No current facility-administered medications on file prior to visit.        PFSH:     The following portions of the patient's history were reviewed and updated as appropriate: Allergies / Current Medications / Past Medical History / Surgical History / Social History / Family History    PROBLEM LIST   Patient Active Problem List   Diagnosis   • Hypercholesterolemia   • Hypertension   • Swelling of limb, left   • MVP (mitral valve prolapse)   • Mitral regurgitation   • Bilateral carotid bruits   • CKD (chronic kidney disease) stage 3, GFR 30-59 ml/min   • Depression   • Protein calorie malnutrition   • Azotemia       PAST MEDICAL HISTORY  Past Medical History:   Diagnosis Date   • Abscess of leg    • Acute deep vein thrombosis (DVT) of left lower extremity    • Acute deep vein thrombosis (DVT) of right lower extremity    • Acute renal insufficiency    • Cellulitis     • CKD (chronic kidney disease) stage 3, GFR 30-59 ml/min    • Depression    • Herpes zoster    • Hypercholesterolemia    • Hyperlipidemia    • Hypertension    • Hypotension    • Macular degeneration     Noted February 2014   • Mitral regurgitation    • Mitral valve prolapse    • MVP (mitral valve prolapse)    • SOB (shortness of breath)    • Zoster        SURGICAL HISTORY  Past Surgical History:   Procedure Laterality Date   • HIP FRACTURE SURGERY Right 09/18/2017   • SKIN CANCER EXCISION         SOCIAL HISTORY  Social History     Social History   • Marital status:      Spouse name: N/A   • Number of children: 3   • Years of education: N/A     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   • Smoking status: Former Smoker     Quit date: 1977   • Smokeless tobacco: Never Used   • Alcohol use No      Comment: Daily caffeine use   • Drug use: No   • Sexual activity: Defer     Other Topics Concern   • None     Social History Narrative   • None       FAMILY HISTORY  Family History   Problem Relation Age of Onset   • Heart attack Mother    • Hypertension Mother    • Heart attack Father    • Hypertension Father    • Heart attack Brother 58   • Hypertension Brother          **Nataly Disclaimer:   Much of this encounter note is an electronic transcription/translation of spoken language to printed text. The electronic translation of spoken language may permit erroneous, or at times, nonsensical words or phrases to be inadvertently transcribed. Although I have reviewed the note for such errors, some may still exist.

## 2018-01-02 ENCOUNTER — OFFICE VISIT (OUTPATIENT)
Dept: INTERNAL MEDICINE | Age: 83
End: 2018-01-02

## 2018-01-02 VITALS
SYSTOLIC BLOOD PRESSURE: 160 MMHG | HEIGHT: 63 IN | BODY MASS INDEX: 21.26 KG/M2 | DIASTOLIC BLOOD PRESSURE: 70 MMHG | OXYGEN SATURATION: 98 % | TEMPERATURE: 97.7 F | WEIGHT: 120 LBS | HEART RATE: 73 BPM

## 2018-01-02 DIAGNOSIS — Z00.00 ROUTINE HEALTH MAINTENANCE: Primary | ICD-10-CM

## 2018-01-02 PROCEDURE — 99212 OFFICE O/P EST SF 10 MIN: CPT | Performed by: INTERNAL MEDICINE

## 2018-01-04 NOTE — PROGRESS NOTES
Subjective   Carito Gonzalez is a 87 y.o. female.     History of Present Illness patient presents today because of her request to fill out forms for [facility] for both her and her .  See copies of forms attached to the chart.    The following portions of the patient's history were reviewed and updated as appropriate: allergies, current medications, past medical history, past social history, past surgical history and problem list.    Review of Systems n/c    Objective   Physical Exam   Constitutional: She is oriented to person, place, and time. She appears well-developed and well-nourished.   Neurological: She is alert and oriented to person, place, and time.   Skin: Skin is warm and dry.   Psychiatric: She has a normal mood and affect. Her behavior is normal. Judgment and thought content normal.       Assessment/Plan   Diagnoses and all orders for this visit:    Routine health maintenance      Forms completed as requested.  I was instructed to file this  under code [id number].  According to the computer, this code does not exist.

## 2018-05-14 ENCOUNTER — OFFICE VISIT (OUTPATIENT)
Dept: CARDIOLOGY | Facility: CLINIC | Age: 83
End: 2018-05-14

## 2018-05-14 VITALS
HEIGHT: 63 IN | HEART RATE: 65 BPM | DIASTOLIC BLOOD PRESSURE: 84 MMHG | WEIGHT: 122 LBS | BODY MASS INDEX: 21.62 KG/M2 | SYSTOLIC BLOOD PRESSURE: 170 MMHG

## 2018-05-14 DIAGNOSIS — I34.0 NON-RHEUMATIC MITRAL REGURGITATION: Primary | ICD-10-CM

## 2018-05-14 DIAGNOSIS — I34.1 MVP (MITRAL VALVE PROLAPSE): ICD-10-CM

## 2018-05-14 DIAGNOSIS — N18.30 CKD (CHRONIC KIDNEY DISEASE) STAGE 3, GFR 30-59 ML/MIN (HCC): ICD-10-CM

## 2018-05-14 DIAGNOSIS — I10 ESSENTIAL HYPERTENSION: ICD-10-CM

## 2018-05-14 PROCEDURE — 99213 OFFICE O/P EST LOW 20 MIN: CPT | Performed by: INTERNAL MEDICINE

## 2018-05-14 PROCEDURE — 93000 ELECTROCARDIOGRAM COMPLETE: CPT | Performed by: INTERNAL MEDICINE

## 2018-05-14 NOTE — PROGRESS NOTES
Date of Office Visit: 2018  Encounter Provider: Quin Magana MD  Place of Service: [facility] CARDIOLOGY  Patient Name: Carito Gonzalez  :3/7/1930    Chief complaint  Follow-up of deep venous thrombosis, anticoagulant therapy, mitral valve prolapse with severe mitral valve regurgitation    History of Present Illness  Patient is a delightful 88-year-old female with hypertension and mitral valve prolapse with mitral regurgitation.  In 2016 she was noted to have normal left ventricular size and systolic function with an ejection fraction 55%, grade 2 diastolic dysfunction with severe mitral valve regurgitation with severe prolapse of the posterior mitral valve leaflet.  There was also mild tricuspid regurgitation.  RV systolic pressure 45 mmHg. in 2017 she was admitted with an acute right acute deep venous thrombosis as well as acute renal insufficiency.  She was transiently placed on Eliquis and completed therapy for this.    Since the last visit, she feels well.  Blood pressure has been much lower, typically in the 130-150s systolic.  She is working out at the gym three times a week.  At that time, blood pressure is often in the 130s when she is working out.  She denies any chest pain, palpitations, syncope or near syncope.  She has mild dependent edema.    Past Medical History:   Diagnosis Date   • Abscess of leg    • Acute deep vein thrombosis (DVT) of left lower extremity    • Acute deep vein thrombosis (DVT) of right lower extremity    • Acute renal insufficiency    • Cellulitis    • CKD (chronic kidney disease) stage 3, GFR 30-59 ml/min    • Depression    • Herpes zoster    • Hypercholesterolemia    • Hyperlipidemia    • Hypertension    • Hypotension    • Macular degeneration     Noted 2014   • Mitral regurgitation    • Mitral valve prolapse    • MVP (mitral valve prolapse)    • SOB (shortness of breath)    • Zoster      Past Surgical History:    Procedure Laterality Date   • HIP FRACTURE SURGERY Right 09/18/2017   • SKIN CANCER EXCISION       Outpatient Medications Prior to Visit   Medication Sig Dispense Refill   • acetaminophen (TYLENOL) 325 MG tablet Take 2 tablets by mouth Every 6 (Six) Hours As Needed for mild pain (1-3).  0   • aspirin 81 MG tablet Take 1 tablet by mouth Daily. 30 tablet 1   • Docusate Sodium (COLACE PO) Take 100 mg by mouth Daily.     • Multiple Vitamins-Minerals (OCUTABS) tablet Take 1 tablet by mouth daily.       No facility-administered medications prior to visit.        Allergies as of 05/14/2018 - Reviewed 05/14/2018   Allergen Reaction Noted   • Zoloft [sertraline hcl] Shortness Of Breath 12/21/2016   • Erythromycin Swelling 02/02/2016     Social History     Social History   • Marital status:      Spouse name: N/A   • Number of children: 3   • Years of education: N/A     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   • Smoking status: Former Smoker     Quit date: 1977   • Smokeless tobacco: Never Used   • Alcohol use No      Comment: Daily caffeine use   • Drug use: No   • Sexual activity: Defer     Other Topics Concern   • Not on file     Social History Narrative   • No narrative on file     Family History   Problem Relation Age of Onset   • Heart attack Mother    • Hypertension Mother    • Heart attack Father    • Hypertension Father    • Heart attack Brother 58   • Hypertension Brother      Review of Systems   Constitution: Negative for fever, malaise/fatigue, weight gain and weight loss.   HENT: Positive for nosebleeds. Negative for ear pain, hearing loss and sore throat.    Eyes: Positive for visual disturbance. Negative for double vision, pain, vision loss in left eye and vision loss in right eye.   Cardiovascular: Positive for leg swelling.        See history of present illness.   Respiratory: Negative for cough, shortness of breath, sleep disturbances due to breathing, snoring and wheezing.   "  Endocrine: Negative for cold intolerance, heat intolerance and polyuria.   Skin: Negative for itching, poor wound healing and rash.   Musculoskeletal: Negative for joint pain, joint swelling and myalgias.   Gastrointestinal: Negative for abdominal pain, diarrhea, hematochezia, nausea and vomiting.   Genitourinary: Negative for hematuria and hesitancy.   Neurological: Negative for numbness, paresthesias and seizures.   Psychiatric/Behavioral: Negative for depression. The patient is not nervous/anxious.         Objective:     Vitals:    05/14/18 1144   BP: 170/84   BP Location: Left arm   Pulse: 65   Weight: 55.3 kg (122 lb)   Height: 160 cm (63\")     Body mass index is 21.61 kg/m².    Physical Exam   Constitutional: She is oriented to person, place, and time. She appears well-developed and well-nourished.   HENT:   Head: Normocephalic.   Nose: Nose normal.   Mouth/Throat: Oropharynx is clear and moist.   Eyes: Conjunctivae and EOM are normal. Pupils are equal, round, and reactive to light. Right eye exhibits no discharge. No scleral icterus.   Neck: Normal range of motion. Neck supple. No JVD present. No thyromegaly present.   Cardiovascular: Normal rate, regular rhythm and intact distal pulses.  Exam reveals no gallop and no friction rub.    Murmur heard.   Systolic murmur is present with a grade of 2/6   Pulses:       Carotid pulses are 2+ on the right side, and 2+ on the left side.       Radial pulses are 2+ on the right side, and 2+ on the left side.        Femoral pulses are 2+ on the right side, and 2+ on the left side.       Popliteal pulses are 2+ on the right side, and 2+ on the left side.        Dorsalis pedis pulses are 2+ on the right side, and 2+ on the left side.        Posterior tibial pulses are 2+ on the right side, and 2+ on the left side.   Pulmonary/Chest: Effort normal and breath sounds normal. No respiratory distress. She has no wheezes. She has no rales.   Abdominal: Soft. Bowel sounds are " normal. She exhibits no distension. There is no hepatosplenomegaly. There is no tenderness. There is no rebound.   Musculoskeletal: Normal range of motion. She exhibits no edema or tenderness.   Neurological: She is alert and oriented to person, place, and time.   Skin: Skin is warm and dry. No rash noted. No erythema.   Psychiatric: She has a normal mood and affect. Her behavior is normal. Judgment and thought content normal.   Vitals reviewed.    Lab Review:     ECG 12 Lead  Date/Time: 5/14/2018 11:45 AM  Performed by: JOSIAH RIVERA  Authorized by: JOSIAH RIVERA   Comparison: compared with previous ECG   Similar to previous ECG  Rhythm: sinus rhythm  Clinical impression: normal ECG          Assessment:       Diagnosis Plan   1. Non-rheumatic mitral regurgitation  ECG 12 Lead   2. MVP (mitral valve prolapse)  ECG 12 Lead   3. Essential hypertension     4. CKD (chronic kidney disease) stage 3, GFR 30-59 ml/min       Plan:       1.  Hypertension.  She will monitor her blood pressure more closely at home and her family will call if it remains elevated.  2.  Mitral valve prolapse with severe mitral regurgitation, clinically stable.  May need to resume ACE inhibitor therapy if blood pressure remains elevated.  3.  History of renal insufficiency, improved.  4.  History of anemia.     Carito Gonzalez   Home Medication Instructions SAM:    Printed on:05/18/18 3297   Medication Information                      acetaminophen (TYLENOL) 325 MG tablet  Take 2 tablets by mouth Every 6 (Six) Hours As Needed for mild pain (1-3).             aspirin 81 MG tablet  Take 1 tablet by mouth Daily.             Docusate Sodium (COLACE PO)  Take 100 mg by mouth Daily.             Multiple Vitamins-Minerals (OCUTABS) tablet  Take 1 tablet by mouth daily.                 No orders of the defined types were placed in this encounter.      Dictated utilizing Dragon dictation

## 2018-05-24 ENCOUNTER — TELEPHONE (OUTPATIENT)
Dept: CARDIOLOGY | Facility: CLINIC | Age: 83
End: 2018-05-24

## 2018-05-24 DIAGNOSIS — I10 ESSENTIAL HYPERTENSION: Primary | ICD-10-CM

## 2018-06-05 ENCOUNTER — OFFICE VISIT (OUTPATIENT)
Dept: INTERNAL MEDICINE | Age: 83
End: 2018-06-05

## 2018-06-05 VITALS
OXYGEN SATURATION: 98 % | TEMPERATURE: 97.4 F | HEART RATE: 70 BPM | HEIGHT: 63 IN | DIASTOLIC BLOOD PRESSURE: 80 MMHG | SYSTOLIC BLOOD PRESSURE: 150 MMHG | BODY MASS INDEX: 21.26 KG/M2 | WEIGHT: 120 LBS

## 2018-06-05 DIAGNOSIS — D50.9 IRON DEFICIENCY ANEMIA, UNSPECIFIED IRON DEFICIENCY ANEMIA TYPE: ICD-10-CM

## 2018-06-05 DIAGNOSIS — I10 ESSENTIAL HYPERTENSION: Primary | ICD-10-CM

## 2018-06-05 PROCEDURE — 99213 OFFICE O/P EST LOW 20 MIN: CPT | Performed by: INTERNAL MEDICINE

## 2018-06-05 RX ORDER — LISINOPRIL 5 MG/1
7.5 TABLET ORAL DAILY
COMMUNITY
End: 2018-07-16 | Stop reason: DRUGHIGH

## 2018-06-05 NOTE — PROGRESS NOTES
"[facility]        Carito NEVAREZ [place] / 88 y.o. / female  06/05/2018      ASSESSMENT & PLAN:    Problem List Items Addressed This Visit        Unprioritized    Hypertension - Primary    Relevant Medications    lisinopril (PRINIVIL,ZESTRIL) 5 MG tablet      Other Visit Diagnoses     Iron deficiency anemia, unspecified iron deficiency anemia type            No orders of the defined types were placed in this encounter.      Summary/Discussion:    Number-one hypertension stage II elevation systolic pressure needs improved control, increase lisinopril to 7.5 mg per day, check blood pressure 6 weeks.    #2 hemoglobin 9.4 on February 2, 2017.  Baseline hemoglobin June 2000 1711.8.  Recommend repeat CBC, if RDW is still elevated which would imply iron deficiency, we'll check iron panel at that time.      Return in about 4 weeks (around 7/3/2018) for Blood pressure check.    ____________________________________________________________________    VITALS    Visit Vitals  /80   Pulse 70   Temp 97.4 °F (36.3 °C)   Ht 160 cm (62.99\")   Wt 54.4 kg (120 lb)   SpO2 98%   BMI 21.26 kg/m²       BP Readings from Last 3 Encounters:   06/05/18 150/80   05/14/18 170/84   01/02/18 160/70     Wt Readings from Last 3 Encounters:   06/05/18 54.4 kg (120 lb)   05/14/18 55.3 kg (122 lb)   01/02/18 54.4 kg (120 lb)      Body mass index is 21.26 kg/m².    CC:  Main reason(s) for today's visit: Hypertension      HPI:     Patient presents this afternoon for follow-up of hypertension.  She is compliant with medication which was recently added by cardiology after noting at home blood pressure was elevated.  She is tolerating medication without side effects, she is also been monitoring salt intake.  Blood pressure is monitored for her monthly and is typically in the fairly normal range.  He does walk 1 mile 4 times weekly, goes to the gym times weekly for strength training as well as Pilates.    Laboratory June 8, 2017, CMP creatinine " 0.83, potassium 4.2, liver functions normal.  CBC hemoglobin 11.8, platelet count 29,000, white count 5.65.  May 31, 2018 BMP glucose 123, potassium 4.5, creatinine 0.9.    Patient is currently 2.5 hours postprandial.    Patient Care Team:  Rafi Gtz MD as PCP - General  Rafi Gtz MD as PCP - Family Medicine  ____________________________________________________________________    REVIEW OF SYSTEMS    Review of Systems   Respiratory: Negative for chest tightness and shortness of breath.    Cardiovascular: Negative for chest pain and palpitations.   Neurological: Negative for dizziness, syncope, speech difficulty, weakness, light-headedness and headaches.           PHYSICAL EXAMINATION    Physical Exam   Constitutional: She is oriented to person, place, and time. She appears well-developed and well-nourished. No distress.   Cardiovascular: Normal rate, regular rhythm, normal heart sounds and intact distal pulses.  Exam reveals no gallop and no friction rub.    No murmur heard.  Pulmonary/Chest: Effort normal and breath sounds normal. She has no wheezes. She has no rales.   Musculoskeletal: She exhibits no edema.   Neurological: She is alert and oriented to person, place, and time.   Skin: Skin is warm and dry. No rash noted.   Psychiatric: She has a normal mood and affect. Her behavior is normal. Judgment and thought content normal.   Nursing note and vitals reviewed.      REVIEWED DATA:    Labs:     Lab Results   Component Value Date     06/08/2017    K 4.2 06/08/2017    AST 15 06/08/2017    ALT 8 06/08/2017    BUN 32 (H) 06/08/2017    CREATININE 0.83 06/08/2017    CREATININE 0.75 01/06/2017    CREATININE 0.86 01/05/2017    EGFRIFNONA 65 06/08/2017    EGFRIFAFRI 79 06/08/2017       Lab Results   Component Value Date    GLUCOSE 76 01/06/2017    GLUCOSE 62 (L) 01/05/2017    GLUCOSE 79 01/03/2017       Lab Results   Component Value Date     (H) 02/03/2016     (H) 01/27/2015    HDL 76 (H)  02/03/2016    TRIG 74 02/03/2016       Lab Results   Component Value Date    TSH 1.640 12/02/2016       Lab Results   Component Value Date    WBC 5.65 06/08/2017    HGB 11.8 (L) 06/08/2017    HGB 11.2 (L) 01/05/2017    HGB 10.4 (L) 01/03/2017     06/08/2017       Imaging:         Medical Tests:         Summary of old records / correspondence / consultant report:         Request outside records:         ALLERGIES  Allergies   Allergen Reactions   • Zoloft [Sertraline Hcl] Shortness Of Breath   • Erythromycin Swelling        MEDICATIONS  Current Outpatient Prescriptions   Medication Sig Dispense Refill   • lisinopril (PRINIVIL,ZESTRIL) 5 MG tablet Take 7.5 mg by mouth Daily.     • acetaminophen (TYLENOL) 325 MG tablet Take 2 tablets by mouth Every 6 (Six) Hours As Needed for mild pain (1-3).  0   • aspirin 81 MG tablet Take 1 tablet by mouth Daily. 30 tablet 1   • Multiple Vitamins-Minerals (OCUTABS) tablet Take 1 tablet by mouth daily.       No current facility-administered medications for this visit.        PFSH:     The following portions of the patient's history were reviewed and updated as appropriate: Allergies / Current Medications / Past Medical History / Surgical History / Social History / Family History    PROBLEM LIST   Patient Active Problem List   Diagnosis   • Hypercholesterolemia   • Hypertension   • Swelling of limb, left   • MVP (mitral valve prolapse)   • Mitral regurgitation   • Bilateral carotid bruits   • CKD (chronic kidney disease) stage 3, GFR 30-59 ml/min   • Depression   • Protein calorie malnutrition   • Azotemia       PAST MEDICAL HISTORY  Past Medical History:   Diagnosis Date   • Abscess of leg    • Acute deep vein thrombosis (DVT) of left lower extremity    • Acute deep vein thrombosis (DVT) of right lower extremity    • Acute renal insufficiency    • Cellulitis    • CKD (chronic kidney disease) stage 3, GFR 30-59 ml/min    • Depression    • Herpes zoster    • Hypercholesterolemia     • Hyperlipidemia    • Hypertension    • Hypotension    • Macular degeneration     Noted February 2014   • Mitral regurgitation    • Mitral valve prolapse    • MVP (mitral valve prolapse)    • SOB (shortness of breath)    • Zoster        SURGICAL HISTORY  Past Surgical History:   Procedure Laterality Date   • HIP FRACTURE SURGERY Right 09/18/2017   • SKIN CANCER EXCISION         SOCIAL HISTORY  Social History     Social History   • Marital status:    • Number of children: 3     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   • Smoking status: Former Smoker     Quit date: 1977   • Smokeless tobacco: Never Used   • Alcohol use No      Comment: Daily caffeine use   • Drug use: No   • Sexual activity: Defer     Other Topics Concern   • Not on file       FAMILY HISTORY  Family History   Problem Relation Age of Onset   • Heart attack Mother    • Hypertension Mother    • Heart attack Father    • Hypertension Father    • Heart attack Brother 58   • Hypertension Brother          **Nataly Disclaimer:   Much of this encounter note is an electronic transcription/translation of spoken language to printed text. The electronic translation of spoken language may permit erroneous, or at times, nonsensical words or phrases to be inadvertently transcribed. Although I have reviewed the note for such errors, some may still exist.

## 2018-06-06 LAB
BASOPHILS # BLD AUTO: 0.05 10*3/MM3 (ref 0–0.2)
BASOPHILS NFR BLD AUTO: 1 % (ref 0–1.5)
EOSINOPHIL # BLD AUTO: 0.13 10*3/MM3 (ref 0–0.7)
EOSINOPHIL NFR BLD AUTO: 2.5 % (ref 0.3–6.2)
ERYTHROCYTE [DISTWIDTH] IN BLOOD BY AUTOMATED COUNT: 14.4 % (ref 11.7–13)
HCT VFR BLD AUTO: 38.9 % (ref 35.6–45.5)
HGB BLD-MCNC: 12 G/DL (ref 11.9–15.5)
IMM GRANULOCYTES # BLD: 0.01 10*3/MM3 (ref 0–0.03)
IMM GRANULOCYTES NFR BLD: 0.2 % (ref 0–0.5)
LYMPHOCYTES # BLD AUTO: 1.38 10*3/MM3 (ref 0.9–4.8)
LYMPHOCYTES NFR BLD AUTO: 26.3 % (ref 19.6–45.3)
MCH RBC QN AUTO: 29.6 PG (ref 26.9–32)
MCHC RBC AUTO-ENTMCNC: 30.8 G/DL (ref 32.4–36.3)
MCV RBC AUTO: 95.8 FL (ref 80.5–98.2)
MONOCYTES # BLD AUTO: 0.38 10*3/MM3 (ref 0.2–1.2)
MONOCYTES NFR BLD AUTO: 7.3 % (ref 5–12)
NEUTROPHILS # BLD AUTO: 3.3 10*3/MM3 (ref 1.9–8.1)
NEUTROPHILS NFR BLD AUTO: 62.9 % (ref 42.7–76)
PLATELET # BLD AUTO: 287 10*3/MM3 (ref 140–500)
RBC # BLD AUTO: 4.06 10*6/MM3 (ref 3.9–5.2)
WBC # BLD AUTO: 5.24 10*3/MM3 (ref 4.5–10.7)

## 2018-07-02 NOTE — TELEPHONE ENCOUNTER
7/2/18  I called pt. With these instructions.  She lives at [facility], ph [phone].  So, I called that number and spoke with Giovanni.  Gave him the verbal, but he states the patient's pcp had just increased to 15 mg a day and asked if the dose should still go up to 20mg?    He asked for a faxed order as well.  Fx to [phone] Attn: Cristal (nurse for today)./kyra

## 2018-07-03 NOTE — TELEPHONE ENCOUNTER
I tried to call Cristal at listed number and fax signal that I received.  Please let her know to increase lisinopril to 20 mg a day and to check a BMP in 2 weeks.  Please verify that no one else is changing her blood pressure meds.. ag

## 2018-07-03 NOTE — TELEPHONE ENCOUNTER
New bp readings were faxed from [place]    06/28 1:00 pm 144/63  06/29 2:35 pm 143/56  07/01 10:52 am 148/82  07/02 1:30 pm 168/72  07/03 11:27 185/74    These were faxed from Cristal [phone] is number  Maria Alejandra

## 2018-07-05 NOTE — TELEPHONE ENCOUNTER
No, thank you for the clarification.  Leave lisinopril as is until they call back with additional blood pressure readings next week. ag

## 2018-07-16 ENCOUNTER — OFFICE VISIT (OUTPATIENT)
Dept: INTERNAL MEDICINE | Age: 83
End: 2018-07-16

## 2018-07-16 VITALS
HEIGHT: 63 IN | BODY MASS INDEX: 20.98 KG/M2 | SYSTOLIC BLOOD PRESSURE: 115 MMHG | WEIGHT: 118.4 LBS | OXYGEN SATURATION: 98 % | TEMPERATURE: 98 F | HEART RATE: 82 BPM | DIASTOLIC BLOOD PRESSURE: 75 MMHG

## 2018-07-16 DIAGNOSIS — I10 ESSENTIAL HYPERTENSION: Primary | ICD-10-CM

## 2018-07-16 DIAGNOSIS — D64.9 ANEMIA, UNSPECIFIED TYPE: ICD-10-CM

## 2018-07-16 DIAGNOSIS — Z23 ENCOUNTER FOR IMMUNIZATION: ICD-10-CM

## 2018-07-16 PROCEDURE — 90732 PPSV23 VACC 2 YRS+ SUBQ/IM: CPT | Performed by: INTERNAL MEDICINE

## 2018-07-16 PROCEDURE — 99213 OFFICE O/P EST LOW 20 MIN: CPT | Performed by: INTERNAL MEDICINE

## 2018-07-16 PROCEDURE — G0009 ADMIN PNEUMOCOCCAL VACCINE: HCPCS | Performed by: INTERNAL MEDICINE

## 2018-07-16 RX ORDER — LISINOPRIL 20 MG/1
1 TABLET ORAL DAILY
COMMUNITY
Start: 2018-07-02 | End: 2018-08-10 | Stop reason: SDUPTHER

## 2018-07-16 NOTE — PROGRESS NOTES
"[facility] [place] [facility]  MD Carito GUTIERREZ Irish / 88 y.o. / female  07/16/2018      ASSESSMENT & PLAN:    Problem List Items Addressed This Visit        Unprioritized    Hypertension - Primary    Relevant Medications    lisinopril (PRINIVIL,ZESTRIL) 20 MG tablet    Other Relevant Orders    Lipid Panel      Other Visit Diagnoses     Anemia, unspecified type        Encounter for immunization            Orders Placed This Encounter   Procedures   • Pneumococcal Polysaccharide Vaccine 23-Valent Greater Than or Equal To 3yo Subcutaneous / IM   • Lipid Panel       Summary/Discussion:    Number-one hypertension stable on current medication.  Plan: Same meds, blood pressure check 6 months.    #2 anemia resolved, no treatment needed.    #3 immunization update, Pneumovax today.    Return in about 6 months (around 1/16/2019) for Blood pressure check.    ____________________________________________________________________    VITALS    Visit Vitals  /75   Pulse 82   Temp 98 °F (36.7 °C)   Ht 160 cm (62.99\")   Wt 53.7 kg (118 lb 6.4 oz)   SpO2 98%   BMI 20.98 kg/m²       BP Readings from Last 3 Encounters:   07/16/18 115/75   06/05/18 150/80   05/14/18 170/84     Wt Readings from Last 3 Encounters:   07/16/18 53.7 kg (118 lb 6.4 oz)   06/05/18 54.4 kg (120 lb)   05/14/18 55.3 kg (122 lb)      Body mass index is 20.98 kg/m².    CC:  Main reason(s) for today's visit: Hypertension      HPI:     She presents this afternoon for follow-up of her last office visit on June 6 of this year.  That time her blood pressure was a bit elevated, lisinopril dosage was increased to 20 mg per day by us as well as cardiology.  There are no medication side effects noted.  She is watching her dietary salt, and exercising on a regular basis.  Pressure at home is monitored twice daily when last checked, the value was 132/78.    CBC was also done at last visit to document resolution of anemia.  Hemoglobin is now normal at 12.    Interval " history, patient has received first dose of hepatitis A as well as the new shingles vaccine.  We will complete her pneumonia series today with Pneumovax.    Patient Care Team:  Rafi Gtz MD as PCP - General  Rafi Gtz MD as PCP - Family Medicine  ____________________________________________________________________    REVIEW OF SYSTEMS    Review of Systems   Respiratory: Negative for chest tightness and shortness of breath.    Cardiovascular: Negative for chest pain and palpitations.   Neurological: Negative for dizziness, syncope, speech difficulty, weakness, light-headedness and headaches.         PHYSICAL EXAMINATION    Physical Exam   Constitutional: She is oriented to person, place, and time. She appears well-developed and well-nourished. No distress.   Cardiovascular: Normal rate, regular rhythm, normal heart sounds and intact distal pulses.  Exam reveals no gallop and no friction rub.    No murmur heard.  Pulmonary/Chest: Effort normal and breath sounds normal. She has no wheezes. She has no rales.   Musculoskeletal: She exhibits no edema.   Neurological: She is alert and oriented to person, place, and time.   Skin: Skin is warm and dry. No rash noted.   Psychiatric: She has a normal mood and affect. Her behavior is normal. Judgment and thought content normal.   Nursing note and vitals reviewed.        REVIEWED DATA:    Labs:     Lab Results   Component Value Date     06/08/2017    K 4.2 06/08/2017    AST 15 06/08/2017    ALT 8 06/08/2017    BUN 32 (H) 06/08/2017    CREATININE 0.83 06/08/2017    CREATININE 0.75 01/06/2017    CREATININE 0.86 01/05/2017    EGFRIFNONA 65 06/08/2017    EGFRIFAFRI 79 06/08/2017       Lab Results   Component Value Date    GLUCOSE 76 01/06/2017    GLUCOSE 62 (L) 01/05/2017    GLUCOSE 79 01/03/2017       Lab Results   Component Value Date     (H) 02/03/2016     (H) 01/27/2015    HDL 76 (H) 02/03/2016    TRIG 74 02/03/2016       Lab Results   Component  Value Date    TSH 1.640 12/02/2016       Lab Results   Component Value Date    WBC 5.65 06/08/2017    HGB 12.0 06/05/2018    HGB 11.8 (L) 06/08/2017    HGB 11.2 (L) 01/05/2017     06/05/2018       No results found for: PSA      Imaging:         Medical Tests:         Summary of old records / correspondence / consultant report:         Request outside records:         ALLERGIES  Allergies   Allergen Reactions   • Zoloft [Sertraline Hcl] Shortness Of Breath   • Erythromycin Swelling        MEDICATIONS  Current Outpatient Prescriptions   Medication Sig Dispense Refill   • acetaminophen (TYLENOL) 325 MG tablet Take 2 tablets by mouth Every 6 (Six) Hours As Needed for mild pain (1-3).  0   • aspirin 81 MG tablet Take 1 tablet by mouth Daily. 30 tablet 1   • lisinopril (PRINIVIL,ZESTRIL) 20 MG tablet 1 tablet Daily.     • Multiple Vitamins-Minerals (OCUTABS) tablet Take 1 tablet by mouth daily.       No current facility-administered medications for this visit.        PFSH:     The following portions of the patient's history were reviewed and updated as appropriate: Allergies / Current Medications / Past Medical History / Surgical History / Social History / Family History    PROBLEM LIST   Patient Active Problem List   Diagnosis   • Hypercholesterolemia   • Hypertension   • Swelling of limb, left   • MVP (mitral valve prolapse)   • Mitral regurgitation   • Bilateral carotid bruits   • CKD (chronic kidney disease) stage 3, GFR 30-59 ml/min   • Depression   • Protein calorie malnutrition (CMS/HCC)   • Azotemia       PAST MEDICAL HISTORY  Past Medical History:   Diagnosis Date   • Abscess of leg    • Acute deep vein thrombosis (DVT) of left lower extremity (CMS/HCC)    • Acute deep vein thrombosis (DVT) of right lower extremity (CMS/HCC)    • Acute renal insufficiency    • Cellulitis    • CKD (chronic kidney disease) stage 3, GFR 30-59 ml/min    • Depression    • Herpes zoster    • Hypercholesterolemia    •  Hyperlipidemia    • Hypertension    • Hypotension    • Macular degeneration     Noted February 2014   • Mitral regurgitation    • Mitral valve prolapse    • MVP (mitral valve prolapse)    • SOB (shortness of breath)    • Zoster        SURGICAL HISTORY  Past Surgical History:   Procedure Laterality Date   • HIP FRACTURE SURGERY Right 09/18/2017   • SKIN CANCER EXCISION         SOCIAL HISTORY  Social History     Social History   • Marital status:    • Number of children: 3     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   • Smoking status: Former Smoker     Quit date: 1977   • Smokeless tobacco: Never Used   • Alcohol use No      Comment: Daily caffeine use   • Drug use: No   • Sexual activity: Defer     Other Topics Concern   • Not on file       FAMILY HISTORY  Family History   Problem Relation Age of Onset   • Heart attack Mother    • Hypertension Mother    • Heart attack Father    • Hypertension Father    • Heart attack Brother 58   • Hypertension Brother        Health Maintenance Due   Topic   • ZOSTER VACCINE (2 of 2)   • LIPID PANEL        Overdue health maintenance interventions  reviewed with patient.    Dictated utilizing Dragon voice recognition software

## 2018-07-20 ENCOUNTER — TELEPHONE (OUTPATIENT)
Dept: CARDIOLOGY | Facility: CLINIC | Age: 83
End: 2018-07-20

## 2018-08-07 ENCOUNTER — TELEPHONE (OUTPATIENT)
Dept: CARDIOLOGY | Facility: CLINIC | Age: 83
End: 2018-08-07

## 2018-08-09 DIAGNOSIS — I10 ESSENTIAL HYPERTENSION: Primary | ICD-10-CM

## 2018-08-10 RX ORDER — LISINOPRIL 20 MG/1
20 TABLET ORAL EVERY MORNING
Qty: 1 TABLET | Refills: 1
Start: 2018-08-10 | End: 2018-11-12 | Stop reason: SDUPTHER

## 2018-09-06 NOTE — TELEPHONE ENCOUNTER
Info to Palmer.    Updated BP:  9/4 120/68  9/3 140/70  9/2 160/72  9/1 140/68  8/31 152/80  8/30 157/67  8/29 162/72  8/28 182/71

## 2018-09-11 ENCOUNTER — TELEPHONE (OUTPATIENT)
Dept: CARDIOLOGY | Facility: CLINIC | Age: 83
End: 2018-09-11

## 2018-09-11 DIAGNOSIS — I10 ESSENTIAL HYPERTENSION: Primary | ICD-10-CM

## 2018-10-03 NOTE — TELEPHONE ENCOUNTER
Pt is on a low salt diet and currently in a nursing home.  Spoke with RN and she stated that the pt walk a half mile twice a day.

## 2018-10-04 NOTE — TELEPHONE ENCOUNTER
This is lower than it has been, continue to watch over the next several weeks and call if it remains consistently greater than 140/80mmHg. negin

## 2018-11-12 ENCOUNTER — OFFICE VISIT (OUTPATIENT)
Dept: CARDIOLOGY | Facility: CLINIC | Age: 83
End: 2018-11-12

## 2018-11-12 VITALS
WEIGHT: 117 LBS | HEART RATE: 65 BPM | HEIGHT: 63 IN | SYSTOLIC BLOOD PRESSURE: 136 MMHG | DIASTOLIC BLOOD PRESSURE: 80 MMHG | BODY MASS INDEX: 20.73 KG/M2

## 2018-11-12 DIAGNOSIS — I34.0 NON-RHEUMATIC MITRAL REGURGITATION: Primary | ICD-10-CM

## 2018-11-12 DIAGNOSIS — N18.30 CKD (CHRONIC KIDNEY DISEASE) STAGE 3, GFR 30-59 ML/MIN (HCC): ICD-10-CM

## 2018-11-12 DIAGNOSIS — I10 ESSENTIAL HYPERTENSION: ICD-10-CM

## 2018-11-12 DIAGNOSIS — I34.1 MVP (MITRAL VALVE PROLAPSE): ICD-10-CM

## 2018-11-12 PROCEDURE — 99213 OFFICE O/P EST LOW 20 MIN: CPT | Performed by: INTERNAL MEDICINE

## 2018-11-12 PROCEDURE — 93000 ELECTROCARDIOGRAM COMPLETE: CPT | Performed by: INTERNAL MEDICINE

## 2018-11-12 RX ORDER — LISINOPRIL 20 MG/1
20 TABLET ORAL 2 TIMES DAILY
Qty: 1 TABLET | Refills: 1
Start: 2018-11-12 | End: 2020-03-05

## 2018-11-12 NOTE — PROGRESS NOTES
Date of Office Visit: 2018  Encounter Provider: Quin Magana MD  Place of Service: [facility] CARDIOLOGY  Patient Name: Carito Gonzalez  :3/7/1930    Chief complaint  Follow-up of deep venous thrombosis, anticoagulant therapy, mitral valve prolapse with severe mitral valve regurgitation    History of Present Illness  Patient is a delightful 88-year-old female with hypertension and mitral valve prolapse with mitral regurgitation.  In 2016 she was noted to have normal left ventricular size and systolic function with an ejection fraction 55%, grade 2 diastolic dysfunction with severe mitral valve regurgitation with severe prolapse of the posterior mitral valve leaflet.  There was also mild tricuspid regurgitation.  RV systolic pressure 45 mmHg. in 2017 she was admitted with an acute right acute deep venous thrombosis as well as acute renal insufficiency.  She was transiently placed on Eliquis and completed therapy for this.    Since last visit she's had no chest pain, shortness of breath, palpitations, syncope near syncope.  She is working out at the gym 3 times a week and walking 1 mile and other days.  Overall she looks well.  She is trying to start a low-salt diet though this is difficult and her living situation.    Past Medical History:   Diagnosis Date   • Abscess of leg    • Acute deep vein thrombosis (DVT) of left lower extremity (CMS/HCC)    • Acute deep vein thrombosis (DVT) of right lower extremity (CMS/HCC)    • Acute renal insufficiency    • Cellulitis    • CKD (chronic kidney disease) stage 3, GFR 30-59 ml/min (CMS/HCC)    • Depression    • Herpes zoster    • Hypercholesterolemia    • Hyperlipidemia    • Hypertension    • Hypotension    • Macular degeneration     Noted 2014   • Mitral regurgitation    • Mitral valve prolapse    • MVP (mitral valve prolapse)    • SOB (shortness of breath)    • Zoster      Past Surgical History:   Procedure  Laterality Date   • HIP FRACTURE SURGERY Right 2017   • SKIN CANCER EXCISION       Outpatient Medications Prior to Visit   Medication Sig Dispense Refill   • acetaminophen (TYLENOL) 325 MG tablet Take 2 tablets by mouth Every 6 (Six) Hours As Needed for mild pain (1-3).  0   • aspirin 81 MG tablet Take 1 tablet by mouth Daily. 30 tablet 1   • Multiple Vitamins-Minerals (OCUTABS) tablet Take 1 tablet by mouth daily.     • lisinopril (PRINIVIL,ZESTRIL) 20 MG tablet Take 1 tablet by mouth Every Morning. 10mg in the PM 1 tablet 1     No facility-administered medications prior to visit.        Allergies as of 2018 - Reviewed 2018   Allergen Reaction Noted   • Zoloft [sertraline hcl] Shortness Of Breath 2016   • Erythromycin Swelling 2016     Social History     Socioeconomic History   • Marital status:      Spouse name: Not on file   • Number of children: 3   • Years of education: Not on file   • Highest education level: Not on file   Social Needs   • Financial resource strain: Not on file   • Food insecurity - worry: Not on file   • Food insecurity - inability: Not on file   • Transportation needs - medical: Not on file   • Transportation needs - non-medical: Not on file   Occupational History   • Occupation: RN   Tobacco Use   • Smoking status: Former Smoker     Last attempt to quit: 1977     Years since quittin.9   • Smokeless tobacco: Never Used   Substance and Sexual Activity   • Alcohol use: No     Comment: Daily caffeine use   • Drug use: No   • Sexual activity: Defer   Other Topics Concern   • Not on file   Social History Narrative   • Not on file     Family History   Problem Relation Age of Onset   • Heart attack Mother    • Hypertension Mother    • Heart attack Father    • Hypertension Father    • Heart attack Brother 58   • Hypertension Brother      Review of Systems   Constitution: Negative for fever, malaise/fatigue, weight gain and weight loss.   HENT: Negative for  "ear pain, hearing loss, nosebleeds and sore throat.    Eyes: Negative for double vision, pain, vision loss in left eye and vision loss in right eye.   Cardiovascular:        See history of present illness.   Respiratory: Negative for cough, shortness of breath, sleep disturbances due to breathing, snoring and wheezing.    Endocrine: Negative for cold intolerance, heat intolerance and polyuria.   Skin: Negative for itching, poor wound healing and rash.   Musculoskeletal: Negative for joint pain, joint swelling and myalgias.   Gastrointestinal: Negative for abdominal pain, diarrhea, hematochezia, nausea and vomiting.   Genitourinary: Negative for hematuria and hesitancy.   Neurological: Negative for numbness, paresthesias and seizures.   Psychiatric/Behavioral: Negative for depression. The patient is not nervous/anxious.         Objective:     Vitals:    11/12/18 1113   BP: 136/80   BP Location: Right arm   Patient Position: Sitting   Cuff Size: Adult   Pulse: 65   Weight: 53.1 kg (117 lb)   Height: 160 cm (62.99\")     Body mass index is 20.73 kg/m².    Physical Exam   Constitutional: She is oriented to person, place, and time. She appears well-developed and well-nourished.   HENT:   Head: Normocephalic.   Nose: Nose normal.   Mouth/Throat: Oropharynx is clear and moist.   Eyes: Conjunctivae and EOM are normal. Pupils are equal, round, and reactive to light. Right eye exhibits no discharge. No scleral icterus.   Neck: Normal range of motion. Neck supple. No JVD present. No thyromegaly present.   Cardiovascular: Normal rate, regular rhythm and intact distal pulses. Exam reveals no gallop and no friction rub.   Murmur heard.  High-pitched blowing holosystolic murmur is present with a grade of 2/6 at the apex.  Pulses:       Carotid pulses are 2+ on the right side, and 2+ on the left side.       Radial pulses are 2+ on the right side, and 2+ on the left side.        Femoral pulses are 2+ on the right side, and 2+ on the " left side.       Popliteal pulses are 2+ on the right side, and 2+ on the left side.        Dorsalis pedis pulses are 2+ on the right side, and 2+ on the left side.        Posterior tibial pulses are 2+ on the right side, and 2+ on the left side.   Pulmonary/Chest: Effort normal and breath sounds normal. No respiratory distress. She has no wheezes. She has no rales.   Abdominal: Soft. Bowel sounds are normal. She exhibits no distension. There is no hepatosplenomegaly. There is no tenderness. There is no rebound.   Musculoskeletal: Normal range of motion. She exhibits no edema or tenderness.   Neurological: She is alert and oriented to person, place, and time.   Skin: Skin is warm and dry. No rash noted. No erythema.   Psychiatric: She has a normal mood and affect. Her behavior is normal. Judgment and thought content normal.   Vitals reviewed.    Lab Review:     ECG 12 Lead  Date/Time: 11/12/2018 11:36 AM  Performed by: Quin Magana MD  Authorized by: Quin Magana MD   Comparison: compared with previous ECG   Rhythm: sinus rhythm  Conduction: 1st degree  Conduction comments: Prior anteroseptal infarction  Clinical impression: abnormal ECG          Assessment:       Diagnosis Plan   1. Non-rheumatic mitral regurgitation  ECG 12 Lead   2. MVP (mitral valve prolapse)     3. Essential hypertension     4. CKD (chronic kidney disease) stage 3, GFR 30-59 ml/min (CMS/[facility])       Plan:       1.  Hypertension.  Under fair control.  They will monitor this slightly more closely at the assisted living center.  2.  Mitral valve prolapse with severe mitral regurgitation, clinically stable.  Continue to treat medically and with absolutely no heart failure symptoms.  3.  History of renal insufficiency, improved.  4.  History of anemia.           Your medication list           Accurate as of 11/12/18 11:59 PM. If you have any questions, ask your nurse or doctor.               CHANGE how you take these medications      Instructions Last  Dose Given Next Dose Due   lisinopril 20 MG tablet  Commonly known as:  PRINIVIL,ZESTRIL  What changed:    · when to take this  · additional instructions      Take 1 tablet by mouth 2 (Two) Times a Day.          CONTINUE taking these medications      Instructions Last Dose Given Next Dose Due   acetaminophen 325 MG tablet  Commonly known as:  TYLENOL      Take 2 tablets by mouth Every 6 (Six) Hours As Needed for mild pain (1-3).       aspirin 81 MG tablet      Take 1 tablet by mouth Daily.       OCUTABS tablet      Take 1 tablet by mouth daily.             Where to Get Your Medications      Information about where to get these medications is not yet available    Ask your nurse or doctor about these medications  · lisinopril 20 MG tablet       Dictated utilizing Dragon dictation

## 2018-11-29 ENCOUNTER — TELEPHONE (OUTPATIENT)
Dept: CARDIOLOGY | Facility: CLINIC | Age: 83
End: 2018-11-29

## 2018-11-30 RX ORDER — METOPROLOL SUCCINATE 25 MG/1
25 TABLET, EXTENDED RELEASE ORAL DAILY
Qty: 90 TABLET | Refills: 1 | Status: SHIPPED | OUTPATIENT
Start: 2018-11-30 | End: 2019-04-20 | Stop reason: SDUPTHER

## 2018-11-30 NOTE — TELEPHONE ENCOUNTER
Please have them add Toprol-XL 25 mg a day to her regimen as blood pressure still moderately elevated. ag

## 2018-11-30 NOTE — TELEPHONE ENCOUNTER
Spoke with Kelsey, RN @ [facility] at [phone].  Advised to start watching her HR and if this falls below 60 to just let us know.  Rx sent to [facility] ([facility])  (done)

## 2019-01-14 ENCOUNTER — OFFICE VISIT (OUTPATIENT)
Dept: INTERNAL MEDICINE | Age: 84
End: 2019-01-14

## 2019-01-14 VITALS
HEIGHT: 63 IN | BODY MASS INDEX: 21.09 KG/M2 | SYSTOLIC BLOOD PRESSURE: 140 MMHG | OXYGEN SATURATION: 98 % | HEART RATE: 68 BPM | DIASTOLIC BLOOD PRESSURE: 65 MMHG | TEMPERATURE: 97.8 F | WEIGHT: 119 LBS

## 2019-01-14 DIAGNOSIS — E78.00 HYPERCHOLESTEROLEMIA: ICD-10-CM

## 2019-01-14 DIAGNOSIS — I10 ESSENTIAL HYPERTENSION: Primary | ICD-10-CM

## 2019-01-14 PROCEDURE — 99213 OFFICE O/P EST LOW 20 MIN: CPT | Performed by: INTERNAL MEDICINE

## 2019-01-14 NOTE — PROGRESS NOTES
"[facility] INTERNAL MEDICINE  MD Carito GUTIERREZ Indonesian / 88 y.o. / female  01/14/2019      ASSESSMENT & PLAN:    Problem List Items Addressed This Visit        Unprioritized    Hypercholesterolemia    Hypertension - Primary    Relevant Medications    lisinopril (PRINIVIL,ZESTRIL) 20 MG tablet    metoprolol succinate XL (TOPROL-XL) 25 MG 24 hr tablet        No orders of the defined types were placed in this encounter.      Summary/Discussion: Number-one hypertension fair control on current medication, continue same, blood pressure check 4 months.  Patient will see Dr. Mata after I  leave the area.  We'll schedule fasting CMP, follow-up results online.      Return in about 4 months (around 5/14/2019), or DR Mata, for Blood pressure check.    ____________________________________________________________________    VITALS    Visit Vitals  /65   Pulse 68   Temp 97.8 °F (36.6 °C)   Ht 160 cm (62.99\")   Wt 54 kg (119 lb)   SpO2 98%   BMI 21.09 kg/m²       BP Readings from Last 3 Encounters:   01/14/19 140/65   11/12/18 136/80   07/16/18 115/75     Wt Readings from Last 3 Encounters:   01/14/19 54 kg (119 lb)   11/12/18 53.1 kg (117 lb)   07/16/18 53.7 kg (118 lb 6.4 oz)      Body mass index is 21.09 kg/m².    CC:  Main reason(s) for today's visit: Hypertension      HPI:     Patient presents this afternoon for follow-up of hypertension.  When last seen by me, blood pressure was well-controlled at 115/75.  She is compliant with medication, regular exercise, and outpatient blood pressure monitoring.  There are no medication side effects noted.    Laboratory review: Last CMP was done during 2017.  That'll be updated at her convenience when fasting.    Hyperlipidemia by history, patient has  Laboratory today.  I agree, beginning treatment with statin class of drugs this particular age group has shown no clinical benefit.  She has no history of stroke or myocardial infarction which would suggest need for " treatment.    Patient Care Team:  Rafi Gtz MD as PCP - General  Rafi Gtz MD as PCP - Family Medicine  ____________________________________________________________________    REVIEW OF SYSTEMS    Review of Systems   Respiratory: Negative for chest tightness and shortness of breath.    Cardiovascular: Negative for chest pain and palpitations.   Neurological: Negative for dizziness, syncope, speech difficulty, weakness, light-headedness and headaches.         PHYSICAL EXAMINATION    Physical Exam   Constitutional: She is oriented to person, place, and time. She appears well-developed and well-nourished. No distress.   Cardiovascular: Normal rate, regular rhythm and intact distal pulses. Exam reveals no gallop and no friction rub.   Murmur heard.  3/6 holosystolic murmur consistent with mitral regurgitation   Pulmonary/Chest: Effort normal and breath sounds normal. She has no wheezes. She has no rales.   Musculoskeletal: She exhibits no edema.   Neurological: She is alert and oriented to person, place, and time.   Skin: Skin is warm and dry. No rash noted.   Psychiatric: She has a normal mood and affect. Her behavior is normal. Judgment and thought content normal.   Nursing note and vitals reviewed.        REVIEWED DATA:    Labs:     Lab Results   Component Value Date     06/08/2017    K 4.2 06/08/2017    AST 15 06/08/2017    ALT 8 06/08/2017    BUN 32 (H) 06/08/2017    CREATININE 0.83 06/08/2017    CREATININE 0.75 01/06/2017    CREATININE 0.86 01/05/2017    EGFRIFNONA 65 06/08/2017    EGFRIFAFRI 79 06/08/2017       Lab Results   Component Value Date    GLUCOSE 76 01/06/2017    GLUCOSE 62 (L) 01/05/2017    GLUCOSE 79 01/03/2017       Lab Results   Component Value Date     (H) 02/03/2016     (H) 01/27/2015    HDL 76 (H) 02/03/2016    TRIG 74 02/03/2016       Lab Results   Component Value Date    TSH 1.640 12/02/2016       Lab Results   Component Value Date    WBC 5.24 06/05/2018    HGB  12.0 06/05/2018    HGB 11.8 (L) 06/08/2017    HGB 11.2 (L) 01/05/2017     06/05/2018       No results found for: PSA      Imaging:         Medical Tests:         Summary of old records / correspondence / consultant report:         Request outside records:         ALLERGIES  Allergies   Allergen Reactions   • Zoloft [Sertraline Hcl] Shortness Of Breath   • Erythromycin Swelling        MEDICATIONS  Current Outpatient Medications   Medication Sig Dispense Refill   • acetaminophen (TYLENOL) 325 MG tablet Take 2 tablets by mouth Every 6 (Six) Hours As Needed for mild pain (1-3).  0   • aspirin 81 MG tablet Take 1 tablet by mouth Daily. 30 tablet 1   • lisinopril (PRINIVIL,ZESTRIL) 20 MG tablet Take 1 tablet by mouth 2 (Two) Times a Day. 1 tablet 1   • metoprolol succinate XL (TOPROL-XL) 25 MG 24 hr tablet Take 1 tablet by mouth Daily. 90 tablet 1   • Multiple Vitamins-Minerals (OCUTABS) tablet Take 1 tablet by mouth daily.       No current facility-administered medications for this visit.        PFSH:     The following portions of the patient's history were reviewed and updated as appropriate: Allergies / Current Medications / Past Medical History / Surgical History / Social History / Family History    PROBLEM LIST   Patient Active Problem List   Diagnosis   • Hypercholesterolemia   • Hypertension   • Swelling of limb, left   • MVP (mitral valve prolapse)   • Mitral regurgitation   • Bilateral carotid bruits   • CKD (chronic kidney disease) stage 3, GFR 30-59 ml/min (CMS/HCC)   • Depression   • Protein calorie malnutrition (CMS/HCC)   • Azotemia       PAST MEDICAL HISTORY  Past Medical History:   Diagnosis Date   • Abscess of leg    • Acute deep vein thrombosis (DVT) of left lower extremity (CMS/HCC)    • Acute deep vein thrombosis (DVT) of right lower extremity (CMS/HCC)    • Acute renal insufficiency    • Cellulitis    • CKD (chronic kidney disease) stage 3, GFR 30-59 ml/min (CMS/HCC)    • Depression    • Herpes  zoster    • Hypercholesterolemia    • Hyperlipidemia    • Hypertension    • Hypotension    • Macular degeneration     Noted 2014   • Mitral regurgitation    • Mitral valve prolapse    • MVP (mitral valve prolapse)    • SOB (shortness of breath)    • Zoster        SURGICAL HISTORY  Past Surgical History:   Procedure Laterality Date   • HIP FRACTURE SURGERY Right 2017   • SKIN CANCER EXCISION         SOCIAL HISTORY  Social History     Socioeconomic History   • Marital status:      Spouse name: Not on file   • Number of children: 3   • Years of education: Not on file   • Highest education level: Not on file   Occupational History   • Occupation: RN   Tobacco Use   • Smoking status: Former Smoker     Last attempt to quit:      Years since quittin.0   • Smokeless tobacco: Never Used   Substance and Sexual Activity   • Alcohol use: No     Comment: Daily caffeine use   • Drug use: No   • Sexual activity: Defer       FAMILY HISTORY  Family History   Problem Relation Age of Onset   • Heart attack Mother    • Hypertension Mother    • Heart attack Father    • Hypertension Father    • Heart attack Brother 58   • Hypertension Brother        Health Maintenance Due   Topic   • HEPATITIS A VACCINE ADULT (1 of 2)   • LIPID PANEL        Overdue health maintenance interventions  reviewed with patient.    Dictated utilizing Dragon voice recognition software

## 2019-04-22 RX ORDER — METOPROLOL SUCCINATE 25 MG/1
25 TABLET, EXTENDED RELEASE ORAL DAILY
Qty: 90 TABLET | Refills: 0 | Status: SHIPPED | OUTPATIENT
Start: 2019-04-22 | End: 2020-10-22

## 2019-07-22 ENCOUNTER — OFFICE VISIT (OUTPATIENT)
Dept: CARDIOLOGY | Facility: CLINIC | Age: 84
End: 2019-07-22

## 2019-07-22 ENCOUNTER — OFFICE VISIT (OUTPATIENT)
Dept: INTERNAL MEDICINE | Age: 84
End: 2019-07-22

## 2019-07-22 VITALS
DIASTOLIC BLOOD PRESSURE: 70 MMHG | HEART RATE: 56 BPM | BODY MASS INDEX: 20.73 KG/M2 | SYSTOLIC BLOOD PRESSURE: 152 MMHG | WEIGHT: 117 LBS | HEIGHT: 63 IN

## 2019-07-22 VITALS
OXYGEN SATURATION: 98 % | TEMPERATURE: 97.6 F | SYSTOLIC BLOOD PRESSURE: 158 MMHG | RESPIRATION RATE: 13 BRPM | DIASTOLIC BLOOD PRESSURE: 60 MMHG | BODY MASS INDEX: 20.73 KG/M2 | HEIGHT: 63 IN | WEIGHT: 117 LBS | HEART RATE: 67 BPM

## 2019-07-22 DIAGNOSIS — I34.0 NON-RHEUMATIC MITRAL REGURGITATION: Primary | ICD-10-CM

## 2019-07-22 DIAGNOSIS — I10 ESSENTIAL HYPERTENSION: ICD-10-CM

## 2019-07-22 DIAGNOSIS — I34.1 MVP (MITRAL VALVE PROLAPSE): ICD-10-CM

## 2019-07-22 DIAGNOSIS — Z76.89 ESTABLISHING CARE WITH NEW DOCTOR, ENCOUNTER FOR: ICD-10-CM

## 2019-07-22 DIAGNOSIS — I10 ESSENTIAL HYPERTENSION: Primary | ICD-10-CM

## 2019-07-22 DIAGNOSIS — R06.02 SOB (SHORTNESS OF BREATH): ICD-10-CM

## 2019-07-22 PROCEDURE — 99213 OFFICE O/P EST LOW 20 MIN: CPT | Performed by: INTERNAL MEDICINE

## 2019-07-22 PROCEDURE — 93000 ELECTROCARDIOGRAM COMPLETE: CPT | Performed by: INTERNAL MEDICINE

## 2019-07-22 PROCEDURE — 99214 OFFICE O/P EST MOD 30 MIN: CPT | Performed by: INTERNAL MEDICINE

## 2019-07-22 NOTE — PROGRESS NOTES
"  Carito Gonzalez is a 89 y.o. female who presents with   Chief Complaint   Patient presents with   • Hypertension     Lisinopril 20 mg; metoprolol succinate XL 25 mg   • Establishing care with new physician     Former patient of Dr. Gtz   .    89-year-old female.  6-month checkup.  No complaints.  No problems.  Former patient of Dr. Gtz.  She does let it be known upfront that she is not interested in any lab tests or increased medications or any new medications or any surgical procedures.  In her words \"I am 89 years old and I want to run out of life before I run out of money\".      Hypertension   This is a chronic problem. The current episode started more than 1 year ago. The problem is controlled. Current antihypertension treatment includes ACE inhibitors and beta blockers. The current treatment provides no improvement. There are no compliance problems.         The following portions of the patient's history were reviewed and updated as appropriate: allergies, current medications, past medical history and problem list.    Review of Systems   Constitutional: Negative.    Respiratory: Negative.    Cardiovascular: Negative.    Neurological: Negative.    Psychiatric/Behavioral: Negative.        Objective   Physical Exam   Constitutional: She is oriented to person, place, and time. She appears well-developed and well-nourished.   HENT:   Head: Normocephalic and atraumatic.   Eyes: EOM are normal. Pupils are equal, round, and reactive to light.   Neck:   Bilateral carotid bruits heard on auscultation.   Cardiovascular: Normal rate.   Murmur heard.  2/6 systolic murmur lower left sternal border.  Known history of mitral valve prolapse/mitral regurgitation   Pulmonary/Chest: Effort normal.   Neurological: She is alert and oriented to person, place, and time.   Psychiatric: She has a normal mood and affect. Her behavior is normal. Judgment and thought content normal.   Nursing note and vitals " reviewed.      Assessment/Plan   Carito was seen today for hypertension and establishing care with new physician.    Diagnoses and all orders for this visit:    Essential hypertension    Establishing care with new doctor, encounter for      Plan: Blood pressure is stable at her current level of 158/60 which is about what her cardiologist (Dr. Magana) gets when she checks her.  The patient is not interested in any medication dosage change or any new medication for blood pressure.  She declines to have a wellness visit.  She declines to have any labs updated.  She has refused in the past to have a bone density test and generally is happy with her current medications and basically just likes to get a checkup every 6 months and not much of anything else.  She is aware of the risks of elevated blood pressure in terms of stroke and heart attacks but is willing to assume those risks.    She will continue all current medicines as prescribed.  We will see her in 6 months for a checkup.  She will continue cardiology care with Dr. Magana.  (Mitral valve prolapse/mitral regurgitation/bilateral carotid bruits).

## 2019-07-22 NOTE — PROGRESS NOTES
Date of Office Visit: 2019  Encounter Provider: Quin Magana MD  Place of Service: [facility]  Patient Name: Carito Gonzalez  :3/7/1930    Chief complaint  Follow-up of deep venous thrombosis, anticoagulant therapy, mitral valve prolapse with severe mitral valve regurgitation    History of Present Illness  Patient is a delightful 89-year-old female with hypertension and mitral valve prolapse with mitral regurgitation.  In 2016 she was noted to have normal left ventricular size and systolic function with an ejection fraction 55%, grade 2 diastolic dysfunction with severe mitral valve regurgitation with severe prolapse of the posterior mitral valve leaflet.  There was also mild tricuspid regurgitation.  RV systolic pressure 45 mmHg. in 2017 she was admitted with an acute right acute deep venous thrombosis as well as acute renal insufficiency.  She was transiently placed on Eliquis and completed therapy for this.    Since the last visit she is working out at the gym 3 times a week riding the bicycle for 25 minutes 3 times a week.  She is walking a mile 4 times a week.  Blood pressure is often in the 120s and occasionally in the 150s.  She has dyspnea on exertion that is unchanged.  She denies any chest pain palpitations syncope near syncope.    Past Medical History:   Diagnosis Date   • Abscess of leg    • Acute deep vein thrombosis (DVT) of left lower extremity (CMS/HCC)    • Acute deep vein thrombosis (DVT) of right lower extremity (CMS/HCC)    • Acute renal insufficiency    • Cellulitis    • CKD (chronic kidney disease) stage 3, GFR 30-59 ml/min (CMS/HCC)    • Depression    • Herpes zoster    • Hypercholesterolemia    • Hyperlipidemia    • Hypertension    • Hypotension    • Macular degeneration     Noted 2014   • Mitral regurgitation    • Mitral valve prolapse    • SOB (shortness of breath)    • Zoster      Past Surgical History:   Procedure  Laterality Date   • HIP FRACTURE SURGERY Right 2017   • SKIN CANCER EXCISION       Outpatient Medications Prior to Visit   Medication Sig Dispense Refill   • acetaminophen (TYLENOL) 325 MG tablet Take 2 tablets by mouth Every 6 (Six) Hours As Needed for mild pain (1-3).  0   • aspirin 81 MG tablet Take 1 tablet by mouth Daily. 30 tablet 1   • lisinopril (PRINIVIL,ZESTRIL) 20 MG tablet Take 1 tablet by mouth 2 (Two) Times a Day. 1 tablet 1   • metoprolol succinate XL (TOPROL-XL) 25 MG 24 hr tablet Take 1 tablet by mouth Daily. 90 tablet 0   • Multiple Vitamins-Minerals (OCUTABS) tablet Take 1 tablet by mouth daily.       No facility-administered medications prior to visit.        Allergies as of 2019 - Reviewed 2019   Allergen Reaction Noted   • Zoloft [sertraline hcl] Shortness Of Breath 2016   • Erythromycin Swelling 2016     Social History     Socioeconomic History   • Marital status:      Spouse name: Not on file   • Number of children: 3   • Years of education: Not on file   • Highest education level: Not on file   Occupational History   • Occupation: RN   Tobacco Use   • Smoking status: Former Smoker     Last attempt to quit: 1977     Years since quittin.6   • Smokeless tobacco: Never Used   Substance and Sexual Activity   • Alcohol use: No     Comment: Daily caffeine use   • Drug use: No   • Sexual activity: Defer   Lifestyle   • Physical activity:     Days per week: 7 days     Minutes per session: 40 min   • Stress: Not on file     Family History   Problem Relation Age of Onset   • Heart attack Mother    • Hypertension Mother    • Heart attack Father    • Hypertension Father    • Heart attack Brother 58   • Hypertension Brother      Review of Systems   Constitution: Negative for fever, malaise/fatigue, weight gain and weight loss.   HENT: Negative for ear pain, hearing loss, nosebleeds and sore throat.    Eyes: Negative for double vision, pain, vision loss in left eye  "and vision loss in right eye.   Cardiovascular: Positive for dyspnea on exertion.        See history of present illness.   Respiratory: Positive for shortness of breath. Negative for cough, sleep disturbances due to breathing, snoring and wheezing.    Endocrine: Negative for cold intolerance, heat intolerance and polyuria.   Skin: Negative for itching, poor wound healing and rash.   Musculoskeletal: Negative for joint pain, joint swelling and myalgias.   Gastrointestinal: Negative for abdominal pain, diarrhea, hematochezia, nausea and vomiting.   Genitourinary: Negative for hematuria and hesitancy.   Neurological: Negative for numbness, paresthesias and seizures.   Psychiatric/Behavioral: Negative for depression. The patient is not nervous/anxious.         Objective:     Vitals:    07/22/19 1039   BP: 152/70   Pulse: 56   Weight: 53.1 kg (117 lb)   Height: 160 cm (63\")     Body mass index is 20.73 kg/m².    Physical Exam   Constitutional: She is oriented to person, place, and time. She appears well-developed and well-nourished.   HENT:   Head: Normocephalic.   Nose: Nose normal.   Mouth/Throat: Oropharynx is clear and moist.   Eyes: Conjunctivae and EOM are normal. Pupils are equal, round, and reactive to light. Right eye exhibits no discharge. No scleral icterus.   Neck: Normal range of motion. Neck supple. No JVD present. No thyromegaly present.   Cardiovascular: Normal rate, regular rhythm and intact distal pulses. Exam reveals no gallop and no friction rub.   Murmur heard.  High-pitched blowing holosystolic murmur is present with a grade of 2/6 at the apex.  Pulses:       Carotid pulses are 2+ on the right side, and 2+ on the left side.       Radial pulses are 2+ on the right side, and 2+ on the left side.        Femoral pulses are 2+ on the right side, and 2+ on the left side.       Popliteal pulses are 2+ on the right side, and 2+ on the left side.        Dorsalis pedis pulses are 2+ on the right side, and 2+ " on the left side.        Posterior tibial pulses are 2+ on the right side, and 2+ on the left side.   Pulmonary/Chest: Effort normal and breath sounds normal. No respiratory distress. She has no wheezes. She has no rales.   Abdominal: Soft. Bowel sounds are normal. She exhibits no distension. There is no hepatosplenomegaly. There is no tenderness. There is no rebound.   Musculoskeletal: Normal range of motion. She exhibits no edema or tenderness.   Neurological: She is alert and oriented to person, place, and time.   Skin: Skin is warm and dry. No rash noted. No erythema.   Psychiatric: She has a normal mood and affect. Her behavior is normal. Judgment and thought content normal.   Vitals reviewed.    Lab Review:     ECG 12 Lead  Date/Time: 7/22/2019 10:48 AM  Performed by: Quin Magana MD  Authorized by: Quin Magana MD   Comparison: compared with previous ECG   Similar to previous ECG  Rhythm: sinus rhythm    Clinical impression: normal ECG          Assessment:       Diagnosis Plan   1. Non-rheumatic mitral regurgitation  ECG 12 Lead   2. SOB (shortness of breath)  ECG 12 Lead   3. MVP (mitral valve prolapse)     4. Essential hypertension       Plan:       1.  Hypertension.  Most of her readings have been in the 120s with rare spikes in the 150s.  She will continue the current regimen and follow-up in 6 months.  2.  Mitral valve prolapse with severe mitral regurgitation, clinically the murmur has improved and she is doing quite well without any symptoms.  Discussed possible follow-up imaging in terms of her desired for further intervention such as surgical or transcatheter repair of the mitral valve.  She is not interested in either at this point.  If she develops symptoms she may reconsider this.  3.  History of renal insufficiency, resolved  4.  History of anemia.  5.  Hyperlipidemia.  She has no interest in treating this and does not want to have it checked in the future.       Your medication list            Accurate as of 7/22/19 11:59 PM. If you have any questions, ask your nurse or doctor.               CONTINUE taking these medications      Instructions Last Dose Given Next Dose Due   acetaminophen 325 MG tablet  Commonly known as:  TYLENOL      Take 2 tablets by mouth Every 6 (Six) Hours As Needed for mild pain (1-3).       aspirin 81 MG tablet      Take 1 tablet by mouth Daily.       lisinopril 20 MG tablet  Commonly known as:  PRINIVIL,ZESTRIL      Take 1 tablet by mouth 2 (Two) Times a Day.       metoprolol succinate XL 25 MG 24 hr tablet  Commonly known as:  TOPROL-XL      Take 1 tablet by mouth Daily.       OCUTABS tablet      Take 1 tablet by mouth daily.              Dictated utilizing Dragon dictation

## 2019-07-29 PROBLEM — R06.02 SOB (SHORTNESS OF BREATH): Status: ACTIVE | Noted: 2019-07-29

## 2020-01-22 ENCOUNTER — OFFICE VISIT (OUTPATIENT)
Dept: INTERNAL MEDICINE | Age: 85
End: 2020-01-22

## 2020-01-22 VITALS
WEIGHT: 116 LBS | HEART RATE: 62 BPM | SYSTOLIC BLOOD PRESSURE: 144 MMHG | HEIGHT: 63 IN | DIASTOLIC BLOOD PRESSURE: 80 MMHG | OXYGEN SATURATION: 95 % | TEMPERATURE: 98.4 F | BODY MASS INDEX: 20.55 KG/M2 | RESPIRATION RATE: 13 BRPM

## 2020-01-22 DIAGNOSIS — I10 ESSENTIAL HYPERTENSION: Primary | ICD-10-CM

## 2020-01-22 DIAGNOSIS — E78.00 HYPERCHOLESTEROLEMIA: ICD-10-CM

## 2020-01-22 LAB
ALBUMIN SERPL-MCNC: 4.4 G/DL (ref 3.5–5.2)
ALBUMIN/GLOB SERPL: 1.9 G/DL
ALP SERPL-CCNC: 51 U/L (ref 39–117)
ALT SERPL-CCNC: 8 U/L (ref 1–33)
AST SERPL-CCNC: 17 U/L (ref 1–32)
BILIRUB SERPL-MCNC: 0.4 MG/DL (ref 0.2–1.2)
BUN SERPL-MCNC: 18 MG/DL (ref 8–23)
BUN/CREAT SERPL: 18.4 (ref 7–25)
CALCIUM SERPL-MCNC: 10 MG/DL (ref 8.6–10.5)
CHLORIDE SERPL-SCNC: 98 MMOL/L (ref 98–107)
CO2 SERPL-SCNC: 28.9 MMOL/L (ref 22–29)
CREAT SERPL-MCNC: 0.98 MG/DL (ref 0.57–1)
GLOBULIN SER CALC-MCNC: 2.3 GM/DL
GLUCOSE SERPL-MCNC: 91 MG/DL (ref 65–99)
POTASSIUM SERPL-SCNC: 4.6 MMOL/L (ref 3.5–5.2)
PROT SERPL-MCNC: 6.7 G/DL (ref 6–8.5)
SODIUM SERPL-SCNC: 138 MMOL/L (ref 136–145)
T4 FREE SERPL-MCNC: 1.11 NG/DL (ref 0.93–1.7)
TSH SERPL DL<=0.005 MIU/L-ACNC: 2.22 UIU/ML (ref 0.27–4.2)

## 2020-01-22 PROCEDURE — 99214 OFFICE O/P EST MOD 30 MIN: CPT | Performed by: INTERNAL MEDICINE

## 2020-01-22 NOTE — PROGRESS NOTES
"  Carito Gonzalez is a 89 y.o. female who presents with   Chief Complaint   Patient presents with   • Hypertension     Lisinopril 20 mg twice daily; metoprolol succinate XL 25 mg daily   • Hyperlipidemia     Patient refuses testing stating that she does not want to take any medicine for cholesterol even if it were needed.   .    89-year-old female.  Resident of [facility].  6-month checkup/lab update visit.  Refuses to be tested for lipids and refuses treatment for hyperlipidemia even if necessary    Hypertension   This is a chronic problem. The problem is unchanged. The problem is controlled. Current antihypertension treatment includes ACE inhibitors and beta blockers. The current treatment provides moderate improvement. There are no compliance problems.    Hyperlipidemia   This is a chronic problem. The current episode started more than 1 year ago. Condition status: Unknown.  Declines testing. Treatments tried: None.  Patient refuses to be treated even if necessary.        /80   Pulse 62   Temp 98.4 °F (36.9 °C)   Resp 13   Ht 160 cm (62.99\")   Wt 52.6 kg (116 lb)   LMP  (LMP Unknown)   SpO2 95%   Breastfeeding No   BMI 20.55 kg/m²     The following portions of the patient's history were reviewed and updated as appropriate: allergies, current medications, past medical history and problem list.    Review of Systems   Constitutional: Negative.    HENT: Negative.    Eyes: Negative.    Respiratory: Negative.    Cardiovascular: Negative.    Genitourinary: Negative.    Musculoskeletal: Negative.    Skin: Negative.    Neurological: Negative.    Psychiatric/Behavioral: Negative.        Objective   Physical Exam   Constitutional: She is oriented to person, place, and time. She appears well-developed and well-nourished. No distress.   HENT:   Head: Normocephalic and atraumatic.   Eyes: Pupils are equal, round, and reactive to light. Conjunctivae and EOM are normal.   Neck: Normal range of motion. Neck " supple. No thyromegaly present.   Neck exam negative.  Carotid auscultation normal-no bruits heard.   Cardiovascular: Normal rate, regular rhythm, normal heart sounds and intact distal pulses. Exam reveals no gallop and no friction rub.   No murmur heard.  Pulmonary/Chest: Effort normal and breath sounds normal. No respiratory distress. She has no wheezes. She has no rales. She exhibits no tenderness.   Neurological: She is alert and oriented to person, place, and time.   Psychiatric: She has a normal mood and affect. Her behavior is normal. Judgment and thought content normal.   Nursing note and vitals reviewed.      Assessment/Plan   Carito was seen today for hypertension and hyperlipidemia.    Diagnoses and all orders for this visit:    Essential hypertension  -     Comprehensive Metabolic Panel  -     TSH+Free T4    Hypercholesterolemia  -     Comprehensive Metabolic Panel      Plan: Labs as above.Today's exam unremarkable for any new problems or events.  Continue all current treatment as prescribed.  A follow-up checkup/lab update visit is advised for 6 months assuming today's labs are all acceptable.    Medicare wellness visit advised-declined

## 2020-01-23 NOTE — PROGRESS NOTES
All recently tested labs are within normal / acceptable ranges. Continue all current meds as they are. A followup visit to be seen and have labs updated in six months is advised.

## 2020-03-03 ENCOUNTER — OFFICE VISIT (OUTPATIENT)
Dept: CARDIOLOGY | Facility: CLINIC | Age: 85
End: 2020-03-03

## 2020-03-03 VITALS
WEIGHT: 116 LBS | BODY MASS INDEX: 19.33 KG/M2 | SYSTOLIC BLOOD PRESSURE: 160 MMHG | HEIGHT: 65 IN | DIASTOLIC BLOOD PRESSURE: 82 MMHG | HEART RATE: 64 BPM

## 2020-03-03 DIAGNOSIS — I34.0 NONRHEUMATIC MITRAL VALVE REGURGITATION: Primary | ICD-10-CM

## 2020-03-03 DIAGNOSIS — N18.30 CKD (CHRONIC KIDNEY DISEASE) STAGE 3, GFR 30-59 ML/MIN (HCC): ICD-10-CM

## 2020-03-03 DIAGNOSIS — I34.1 MVP (MITRAL VALVE PROLAPSE): ICD-10-CM

## 2020-03-03 DIAGNOSIS — I10 ESSENTIAL HYPERTENSION: ICD-10-CM

## 2020-03-03 PROCEDURE — 99214 OFFICE O/P EST MOD 30 MIN: CPT | Performed by: INTERNAL MEDICINE

## 2020-03-03 PROCEDURE — 93000 ELECTROCARDIOGRAM COMPLETE: CPT | Performed by: INTERNAL MEDICINE

## 2020-03-03 NOTE — PROGRESS NOTES
Date of Office Visit: 2020  Encounter Provider: Quin Magana MD  Place of Service: [facility]  Patient Name: Carito Gonzalez  :3/7/1930    Chief complaint  Mitral valve prolapse with severe regurgitation, pulmonary hypertension    History of Present Illness  Patient is a delightful 89-year-old female with hypertension and mitral valve prolapse with mitral regurgitation.  In 2016 she was noted to have normal left ventricular size and systolic function with an ejection fraction 55%, grade 2 diastolic dysfunction with severe mitral valve regurgitation with severe prolapse of the posterior mitral valve leaflet.  There was also mild tricuspid regurgitation.  RV systolic pressure 45 mmHg. in 2017 she was admitted with an acute right acute deep venous thrombosis as well as acute renal insufficiency.  She was transiently placed on Eliquis and completed therapy for this.    Since the last visit she was riding her bicycle 3 miles and working to the gym 3 times a week she denies any chest pain, shortness of breath, palpitations, syncope near syncope.    Past Medical History:   Diagnosis Date   • Abscess of leg    • Acute deep vein thrombosis (DVT) of left lower extremity (CMS/HCC)    • Acute deep vein thrombosis (DVT) of right lower extremity (CMS/HCC)    • Acute renal insufficiency    • Cellulitis    • CKD (chronic kidney disease) stage 3, GFR 30-59 ml/min (CMS/HCC)    • Depression    • Herpes zoster    • Hypercholesterolemia    • Hyperlipidemia    • Hypertension    • Hypotension    • Macular degeneration     Noted 2014   • Mitral regurgitation    • Mitral valve prolapse    • SOB (shortness of breath)    • Zoster      Past Surgical History:   Procedure Laterality Date   • HIP FRACTURE SURGERY Right 2017   • SKIN CANCER EXCISION       Outpatient Medications Prior to Visit   Medication Sig Dispense Refill   • acetaminophen (TYLENOL) 325 MG tablet  Take 2 tablets by mouth Every 6 (Six) Hours As Needed for mild pain (1-3).  0   • aspirin 81 MG tablet Take 1 tablet by mouth Daily. 30 tablet 1   • metoprolol succinate XL (TOPROL-XL) 25 MG 24 hr tablet Take 1 tablet by mouth Daily. 90 tablet 0   • Multiple Vitamins-Minerals (OCUTABS) tablet Take 1 tablet by mouth daily.     • lisinopril (PRINIVIL,ZESTRIL) 20 MG tablet Take 1 tablet by mouth 2 (Two) Times a Day. 1 tablet 1     No facility-administered medications prior to visit.        Allergies as of 2020 - Reviewed 2020   Allergen Reaction Noted   • Zoloft [sertraline hcl] Shortness Of Breath 2016   • Erythromycin Swelling 2016     Social History     Socioeconomic History   • Marital status:      Spouse name: Not on file   • Number of children: 3   • Years of education: Not on file   • Highest education level: Not on file   Occupational History   • Occupation: RN   Tobacco Use   • Smoking status: Former Smoker     Last attempt to quit: 1977     Years since quittin.2   • Smokeless tobacco: Never Used   Substance and Sexual Activity   • Alcohol use: No     Comment: Daily caffeine use   • Drug use: No   • Sexual activity: Defer   Lifestyle   • Physical activity:     Days per week: 7 days     Minutes per session: 40 min   • Stress: Not on file     Family History   Problem Relation Age of Onset   • Heart attack Mother    • Hypertension Mother    • Heart attack Father    • Hypertension Father    • Heart attack Brother 58   • Hypertension Brother      Review of Systems   Constitution: Negative for fever, malaise/fatigue, weight gain and weight loss.   HENT: Negative for ear pain, hearing loss, nosebleeds and sore throat.    Eyes: Negative for double vision, pain, vision loss in left eye and vision loss in right eye.   Cardiovascular:        See history of present illness.   Respiratory: Negative for cough, shortness of breath, sleep disturbances due to breathing, snoring and wheezing.  "   Endocrine: Negative for cold intolerance, heat intolerance and polyuria.   Skin: Negative for itching, poor wound healing and rash.   Musculoskeletal: Negative for joint pain, joint swelling and myalgias.   Gastrointestinal: Negative for abdominal pain, diarrhea, hematochezia, nausea and vomiting.   Genitourinary: Negative for hematuria and hesitancy.   Neurological: Negative for numbness, paresthesias and seizures.   Psychiatric/Behavioral: Negative for depression. The patient is not nervous/anxious.         Objective:     Vitals:    03/03/20 0953   BP: 160/82   Pulse: 64   Weight: 52.6 kg (116 lb)   Height: 165.1 cm (65\")     Body mass index is 19.3 kg/m².    Physical Exam   Constitutional: She is oriented to person, place, and time. She appears well-developed and well-nourished.   HENT:   Head: Normocephalic.   Nose: Nose normal.   Mouth/Throat: Oropharynx is clear and moist.   Eyes: Pupils are equal, round, and reactive to light. Conjunctivae and EOM are normal. Right eye exhibits no discharge. No scleral icterus.   Neck: Normal range of motion. Neck supple. No JVD present. No thyromegaly present.   Cardiovascular: Normal rate, regular rhythm, normal heart sounds and intact distal pulses. Exam reveals no gallop and no friction rub.   No murmur heard.  Pulses:       Carotid pulses are 2+ on the right side, and 2+ on the left side.       Radial pulses are 2+ on the right side, and 2+ on the left side.        Femoral pulses are 2+ on the right side, and 2+ on the left side.       Popliteal pulses are 2+ on the right side, and 2+ on the left side.        Dorsalis pedis pulses are 2+ on the right side, and 2+ on the left side.        Posterior tibial pulses are 2+ on the right side, and 2+ on the left side.   Pulmonary/Chest: Effort normal and breath sounds normal. No respiratory distress. She has no wheezes. She has no rales.   Abdominal: Soft. Bowel sounds are normal. She exhibits no distension. There is no " hepatosplenomegaly. There is no tenderness. There is no rebound.   Musculoskeletal: Normal range of motion. She exhibits no edema or tenderness.   Neurological: She is alert and oriented to person, place, and time.   Skin: Skin is warm and dry. No rash noted. No erythema.   Psychiatric: She has a normal mood and affect. Her behavior is normal. Judgment and thought content normal.   Vitals reviewed.    Lab Review:     ECG 12 Lead  Date/Time: 3/3/2020 9:53 AM  Performed by: Quin Magana MD  Authorized by: Quin Magana MD   Comparison: compared with previous ECG   Similar to previous ECG  Rhythm: sinus rhythm  Other findings comments: Consider prior anteroseptal wall infarction    Clinical impression: abnormal EKG          Assessment:       Diagnosis Plan   1. Nonrheumatic mitral valve regurgitation  ECG 12 Lead   2. Essential hypertension     3. MVP (mitral valve prolapse)     4. CKD (chronic kidney disease) stage 3, GFR 30-59 ml/min (CMS/[facility])       Plan:       1.  Hypertension.  Remains elevated office but states that after exercise when checked in her facility systolics in the 120s and at times in the 100 range.  She is not able to check her blood pressure on her own with a regular device as she is legally blind.  We will try to get patient and family to utilize [employer] blood pressure device.  2.  Mitral valve prolapse with severe mitral regurgitation, clinically the murmur has improved and she is doing quite well without any symptoms.  Discussed possible follow-up imaging in terms of her desired for further intervention such as surgical or transcatheter repair of the mitral valve.  She is not interested in either at this point.  If she develops symptoms she may reconsider this point she feels well.  I did suggest an additional blood pressure measurements as above  3.  History of renal insufficiency, resolved  4.  History of anemia.  5.  Hyperlipidemia.  She has no interest in treating this and does not want to  have it checked in the future.       Your medication list           Accurate as of March 3, 2020 11:59 PM. If you have any questions, ask your nurse or doctor.               CONTINUE taking these medications      Instructions Last Dose Given Next Dose Due   acetaminophen 325 MG tablet  Commonly known as:  TYLENOL      Take 2 tablets by mouth Every 6 (Six) Hours As Needed for mild pain (1-3).       aspirin 81 MG tablet      Take 1 tablet by mouth Daily.       lisinopril 20 MG tablet  Commonly known as:  PRINIVIL,ZESTRIL      Take 1 tablet by mouth 2 (Two) Times a Day.       metoprolol succinate XL 25 MG 24 hr tablet  Commonly known as:  TOPROL-XL      Take 1 tablet by mouth Daily.       OCUTABS tablet      Take 1 tablet by mouth daily.              Patient is no longer taking -.  I corrected the med list to reflect this.  I did not stop these medications.    Dictated utilizing Dragon dictation

## 2020-03-05 ENCOUNTER — TELEPHONE (OUTPATIENT)
Dept: INTERNAL MEDICINE | Age: 85
End: 2020-03-05

## 2020-03-05 ENCOUNTER — HOSPITAL ENCOUNTER (EMERGENCY)
Facility: HOSPITAL | Age: 85
Discharge: HOME | End: 2020-03-05
Attending: EMERGENCY MEDICINE | Admitting: EMERGENCY MEDICINE

## 2020-03-05 ENCOUNTER — TELEPHONE (OUTPATIENT)
Dept: CARDIOLOGY | Facility: CLINIC | Age: 85
End: 2020-03-05

## 2020-03-05 VITALS
HEART RATE: 76 BPM | DIASTOLIC BLOOD PRESSURE: 96 MMHG | OXYGEN SATURATION: 97 % | HEIGHT: 63 IN | TEMPERATURE: 98.1 F | SYSTOLIC BLOOD PRESSURE: 163 MMHG | WEIGHT: 116 LBS | BODY MASS INDEX: 20.55 KG/M2 | RESPIRATION RATE: 16 BRPM

## 2020-03-05 DIAGNOSIS — T46.4X5A ANGIOTENSIN CONVERTING ENZYME INHIBITOR-AGGRAVATED ANGIOEDEMA, INITIAL ENCOUNTER: Primary | ICD-10-CM

## 2020-03-05 DIAGNOSIS — I10 BENIGN ESSENTIAL HYPERTENSION: ICD-10-CM

## 2020-03-05 DIAGNOSIS — T78.3XXA ANGIOTENSIN CONVERTING ENZYME INHIBITOR-AGGRAVATED ANGIOEDEMA, INITIAL ENCOUNTER: Primary | ICD-10-CM

## 2020-03-05 LAB
ANION GAP SERPL CALCULATED.3IONS-SCNC: 11.6 MMOL/L (ref 5–15)
BASOPHILS # BLD AUTO: 0.05 10*3/MM3 (ref 0–0.2)
BASOPHILS NFR BLD AUTO: 0.8 % (ref 0–1.5)
BUN BLD-MCNC: 13 MG/DL (ref 8–23)
BUN/CREAT SERPL: 16.5 (ref 7–25)
CALCIUM SPEC-SCNC: 9.3 MG/DL (ref 8.6–10.5)
CHLORIDE SERPL-SCNC: 101 MMOL/L (ref 98–107)
CO2 SERPL-SCNC: 28.4 MMOL/L (ref 22–29)
CREAT BLD-MCNC: 0.79 MG/DL (ref 0.57–1)
DEPRECATED RDW RBC AUTO: 42.9 FL (ref 37–54)
EOSINOPHIL # BLD AUTO: 0.08 10*3/MM3 (ref 0–0.4)
EOSINOPHIL NFR BLD AUTO: 1.2 % (ref 0.3–6.2)
ERYTHROCYTE [DISTWIDTH] IN BLOOD BY AUTOMATED COUNT: 13 % (ref 12.3–15.4)
GFR SERPL CREATININE-BSD FRML MDRD: 69 ML/MIN/1.73
GLUCOSE BLD-MCNC: 88 MG/DL (ref 65–99)
HCT VFR BLD AUTO: 35 % (ref 34–46.6)
HGB BLD-MCNC: 11.6 G/DL (ref 12–15.9)
IMM GRANULOCYTES # BLD AUTO: 0.03 10*3/MM3 (ref 0–0.05)
IMM GRANULOCYTES NFR BLD AUTO: 0.5 % (ref 0–0.5)
LYMPHOCYTES # BLD AUTO: 0.91 10*3/MM3 (ref 0.7–3.1)
LYMPHOCYTES NFR BLD AUTO: 13.8 % (ref 19.6–45.3)
MAGNESIUM SERPL-MCNC: 2.1 MG/DL (ref 1.6–2.4)
MCH RBC QN AUTO: 30 PG (ref 26.6–33)
MCHC RBC AUTO-ENTMCNC: 33.1 G/DL (ref 31.5–35.7)
MCV RBC AUTO: 90.4 FL (ref 79–97)
MONOCYTES # BLD AUTO: 0.42 10*3/MM3 (ref 0.1–0.9)
MONOCYTES NFR BLD AUTO: 6.4 % (ref 5–12)
NEUTROPHILS # BLD AUTO: 5.1 10*3/MM3 (ref 1.7–7)
NEUTROPHILS NFR BLD AUTO: 77.3 % (ref 42.7–76)
NRBC BLD AUTO-RTO: 0 /100 WBC (ref 0–0.2)
PLATELET # BLD AUTO: 336 10*3/MM3 (ref 140–450)
PMV BLD AUTO: 9.6 FL (ref 6–12)
POTASSIUM BLD-SCNC: 4.1 MMOL/L (ref 3.5–5.2)
RBC # BLD AUTO: 3.87 10*6/MM3 (ref 3.77–5.28)
SODIUM BLD-SCNC: 141 MMOL/L (ref 136–145)
WBC NRBC COR # BLD: 6.59 10*3/MM3 (ref 3.4–10.8)

## 2020-03-05 PROCEDURE — 96374 THER/PROPH/DIAG INJ IV PUSH: CPT

## 2020-03-05 PROCEDURE — 83735 ASSAY OF MAGNESIUM: CPT | Performed by: EMERGENCY MEDICINE

## 2020-03-05 PROCEDURE — 80048 BASIC METABOLIC PNL TOTAL CA: CPT | Performed by: EMERGENCY MEDICINE

## 2020-03-05 PROCEDURE — 99284 EMERGENCY DEPT VISIT MOD MDM: CPT

## 2020-03-05 PROCEDURE — 96375 TX/PRO/DX INJ NEW DRUG ADDON: CPT

## 2020-03-05 PROCEDURE — 25010000002 DIPHENHYDRAMINE PER 50 MG: Performed by: EMERGENCY MEDICINE

## 2020-03-05 PROCEDURE — 25010000002 METHYLPREDNISOLONE PER 125 MG: Performed by: EMERGENCY MEDICINE

## 2020-03-05 PROCEDURE — 85025 COMPLETE CBC W/AUTO DIFF WBC: CPT | Performed by: EMERGENCY MEDICINE

## 2020-03-05 RX ORDER — PREDNISONE 20 MG/1
20 TABLET ORAL 2 TIMES DAILY
Qty: 10 TABLET | Refills: 0 | Status: SHIPPED | OUTPATIENT
Start: 2020-03-05 | End: 2020-03-10

## 2020-03-05 RX ORDER — FAMOTIDINE 10 MG/ML
20 INJECTION, SOLUTION INTRAVENOUS ONCE
Status: COMPLETED | OUTPATIENT
Start: 2020-03-05 | End: 2020-03-05

## 2020-03-05 RX ORDER — METHYLPREDNISOLONE SODIUM SUCCINATE 125 MG/2ML
125 INJECTION, POWDER, LYOPHILIZED, FOR SOLUTION INTRAMUSCULAR; INTRAVENOUS ONCE
Status: COMPLETED | OUTPATIENT
Start: 2020-03-05 | End: 2020-03-05

## 2020-03-05 RX ORDER — CIMETIDINE 400 MG/1
400 TABLET, FILM COATED ORAL 2 TIMES DAILY
Qty: 10 TABLET | Refills: 0 | Status: SHIPPED | OUTPATIENT
Start: 2020-03-05 | End: 2020-03-10

## 2020-03-05 RX ORDER — CETIRIZINE HYDROCHLORIDE 10 MG/1
5 TABLET ORAL DAILY
Qty: 3 TABLET | Refills: 0 | Status: SHIPPED | OUTPATIENT
Start: 2020-03-05 | End: 2020-03-11

## 2020-03-05 RX ORDER — SODIUM CHLORIDE 0.9 % (FLUSH) 0.9 %
10 SYRINGE (ML) INJECTION AS NEEDED
Status: DISCONTINUED | OUTPATIENT
Start: 2020-03-05 | End: 2020-03-05 | Stop reason: HOSPADM

## 2020-03-05 RX ORDER — CLONIDINE HYDROCHLORIDE 0.1 MG/1
0.1 TABLET ORAL ONCE
Status: COMPLETED | OUTPATIENT
Start: 2020-03-05 | End: 2020-03-05

## 2020-03-05 RX ORDER — DIPHENHYDRAMINE HYDROCHLORIDE 50 MG/ML
25 INJECTION INTRAMUSCULAR; INTRAVENOUS ONCE
Status: COMPLETED | OUTPATIENT
Start: 2020-03-05 | End: 2020-03-05

## 2020-03-05 RX ORDER — AMLODIPINE BESYLATE 5 MG/1
5 TABLET ORAL DAILY
Qty: 30 TABLET | Refills: 0 | Status: SHIPPED | OUTPATIENT
Start: 2020-03-05 | End: 2020-03-10 | Stop reason: SDUPTHER

## 2020-03-05 RX ADMIN — METHYLPREDNISOLONE SODIUM SUCCINATE 125 MG: 125 INJECTION, POWDER, FOR SOLUTION INTRAMUSCULAR; INTRAVENOUS at 09:30

## 2020-03-05 RX ADMIN — FAMOTIDINE 20 MG: 10 INJECTION INTRAVENOUS at 09:28

## 2020-03-05 RX ADMIN — SODIUM CHLORIDE, PRESERVATIVE FREE 10 ML: 5 INJECTION INTRAVENOUS at 09:30

## 2020-03-05 RX ADMIN — CLONIDINE HYDROCHLORIDE 0.1 MG: 0.1 TABLET ORAL at 09:32

## 2020-03-05 RX ADMIN — DIPHENHYDRAMINE HYDROCHLORIDE 25 MG: 50 INJECTION, SOLUTION INTRAMUSCULAR; INTRAVENOUS at 09:26

## 2020-03-05 NOTE — ED TRIAGE NOTES
Patient presents to er via ems from [place].  Patient noticed facial swelling around 0100.  Patient has no dyspnea or itching.

## 2020-03-05 NOTE — ED PROVIDER NOTES
EMERGENCY DEPARTMENT ENCOUNTER    Room Number:  38/38  Date of encounter:  3/5/2020  PCP: Sam Mata MD  Historian: patient      HPI:  Chief Complaint: left upper lip swelling  A complete HPI/ROS/PMH/PSH/SH/FH are unobtainable due to: nothing  Context: Carito Gonzalez is a 89 y.o. female who presents to the ED c/o constant mild left upper lip swelling that she noticed around 0100 this morning. The patient reports the upper lip swelling has been unchanged since she noticed it this morning. The patient denies previous episodes. The patient denies associated shortness of breath, chest pain, or itching. The patient is currently on 20 mg of Lisinopril BID. There are no other complaints at this time.    The patient has asymptomatic hypertension currently. Her blood pressure is 220/95 at this time.       Previous Episodes: the patient denies previous episodes.  Current Symptoms: left upper lip swelling    MEDICAL HISTORY REVIEWED  The patient was seen by Cardiology on 03/03/2020.    Patient is a delightful 89-year-old female with hypertension and mitral valve prolapse with mitral regurgitation.  In September 2016 she was noted to have normal left ventricular size and systolic function with an ejection fraction 55%, grade 2 diastolic dysfunction with severe mitral valve regurgitation with severe prolapse of the posterior mitral valve leaflet.  There was also mild tricuspid regurgitation.  RV systolic pressure 45 mmHg. in January 2017 she was admitted with an acute right acute deep venous thrombosis as well as acute renal insufficiency.  She was transiently placed on Eliquis and completed therapy for this.    1.  Hypertension.  Remains elevated office but states that after exercise when checked in her facility systolics in the 120s and at times in the 100 range.  She is not able to check her blood pressure on her own with a regular device as she is legally blind.  We will try to get patient and family to utilize aa  phonics blood pressure device.  2.  Mitral valve prolapse with severe mitral regurgitation, clinically the murmur has improved and she is doing quite well without any symptoms.  Discussed possible follow-up imaging in terms of her desired for further intervention such as surgical or transcatheter repair of the mitral valve.  She is not interested in either at this point.  If she develops symptoms she may reconsider this point she feels well.  I did suggest an additional blood pressure measurements as above  3.  History of renal insufficiency, resolved  4.  History of anemia.  5.  Hyperlipidemia.  She has no interest in treating this and does not want to have it checked in the future.    PAST MEDICAL HISTORY  Active Ambulatory Problems     Diagnosis Date Noted   • Hypercholesterolemia 02/03/2016   • Hypertension 02/03/2016   • Swelling of limb, left 03/29/2016   • MVP (mitral valve prolapse) 05/16/2016   • Mitral regurgitation 05/16/2016   • Bilateral carotid bruits 05/16/2016   • CKD (chronic kidney disease) stage 3, GFR 30-59 ml/min (CMS/[facility]) 01/02/2017   • Depression 01/02/2017   • Protein calorie malnutrition (CMS/[facility]) 01/02/2017   • Azotemia 09/18/2017   • SOB (shortness of breath) 07/29/2019     Resolved Ambulatory Problems     Diagnosis Date Noted   • DVT (deep venous thrombosis) (CMS/[facility]) 04/04/2016   • Acute renal insufficiency 01/02/2017     Past Medical History:   Diagnosis Date   • Abscess of leg    • Acute deep vein thrombosis (DVT) of left lower extremity (CMS/[facility])    • Acute deep vein thrombosis (DVT) of right lower extremity (CMS/[facility])    • Cellulitis    • Herpes zoster    • Hyperlipidemia    • Hypotension    • Macular degeneration    • Mitral valve prolapse    • Zoster          PAST SURGICAL HISTORY  Past Surgical History:   Procedure Laterality Date   • HIP FRACTURE SURGERY Right 09/18/2017   • SKIN CANCER EXCISION           FAMILY HISTORY  Family History   Problem Relation Age of Onset   • Heart  attack Mother    • Hypertension Mother    • Heart attack Father    • Hypertension Father    • Heart attack Brother 58   • Hypertension Brother          SOCIAL HISTORY  Social History     Socioeconomic History   • Marital status:      Spouse name: Not on file   • Number of children: 3   • Years of education: Not on file   • Highest education level: Not on file   Occupational History   • Occupation: RN   Tobacco Use   • Smoking status: Former Smoker     Last attempt to quit:      Years since quittin.2   • Smokeless tobacco: Never Used   Substance and Sexual Activity   • Alcohol use: No     Comment: Daily caffeine use   • Drug use: No   • Sexual activity: Defer   Lifestyle   • Physical activity:     Days per week: 7 days     Minutes per session: 40 min   • Stress: Not on file         ALLERGIES  Zoloft [sertraline hcl] and Erythromycin        REVIEW OF SYSTEMS  Review of Systems     All systems reviewed and negative except for those discussed in HPI.       PHYSICAL EXAM    I have reviewed the triage vital signs and nursing notes.    ED Triage Vitals   Temp Heart Rate Resp BP SpO2   20 0816 20 0816 20 0816 20 0816 20 0816   98.1 °F (36.7 °C) 69 16 (!) 202/94 96 %      Temp src Heart Rate Source Patient Position BP Location FiO2 (%)   20 0816 20 0816 20 0901 20 0901 --   Tympanic Monitor Lying Right arm        GENERAL: pleasant elderly female, no acute distress   HENT: nares patent, mild swelling to left upper lip, no swelling to tongue, oropharynx or submandibular area  Head/neck/ face are symmetric without gross deformity or signs of trauma  EYES: no scleral icterus, no conjunctival pallor.  NECK: Supple, no meningismus, no swelling to anterior neck  CV: regular rhythm, regular rate with intact distal pulses, 2/6 systolic murmur  RESPIRATORY: normal effort and no respiratory distress, no stridor  ABDOMEN: soft and non-tender  MUSCULOSKELETAL: no  deformity  NEURO: alert and appropriate, moves all extremities, follows commands  SKIN: warm, dry    Vital signs and nursing notes reviewed.        LAB RESULTS  Recent Results (from the past 24 hour(s))   Basic Metabolic Panel    Collection Time: 03/05/20  9:24 AM   Result Value Ref Range    Glucose 88 65 - 99 mg/dL    BUN 13 8 - 23 mg/dL    Creatinine 0.79 0.57 - 1.00 mg/dL    Sodium 141 136 - 145 mmol/L    Potassium 4.1 3.5 - 5.2 mmol/L    Chloride 101 98 - 107 mmol/L    CO2 28.4 22.0 - 29.0 mmol/L    Calcium 9.3 8.6 - 10.5 mg/dL    eGFR Non African Amer 69 >60 mL/min/1.73    BUN/Creatinine Ratio 16.5 7.0 - 25.0    Anion Gap 11.6 5.0 - 15.0 mmol/L   Magnesium    Collection Time: 03/05/20  9:24 AM   Result Value Ref Range    Magnesium 2.1 1.6 - 2.4 mg/dL   CBC Auto Differential    Collection Time: 03/05/20  9:24 AM   Result Value Ref Range    WBC 6.59 3.40 - 10.80 10*3/mm3    RBC 3.87 3.77 - 5.28 10*6/mm3    Hemoglobin 11.6 (L) 12.0 - 15.9 g/dL    Hematocrit 35.0 34.0 - 46.6 %    MCV 90.4 79.0 - 97.0 fL    MCH 30.0 26.6 - 33.0 pg    MCHC 33.1 31.5 - 35.7 g/dL    RDW 13.0 12.3 - 15.4 %    RDW-SD 42.9 37.0 - 54.0 fl    MPV 9.6 6.0 - 12.0 fL    Platelets 336 140 - 450 10*3/mm3    Neutrophil % 77.3 (H) 42.7 - 76.0 %    Lymphocyte % 13.8 (L) 19.6 - 45.3 %    Monocyte % 6.4 5.0 - 12.0 %    Eosinophil % 1.2 0.3 - 6.2 %    Basophil % 0.8 0.0 - 1.5 %    Immature Grans % 0.5 0.0 - 0.5 %    Neutrophils, Absolute 5.10 1.70 - 7.00 10*3/mm3    Lymphocytes, Absolute 0.91 0.70 - 3.10 10*3/mm3    Monocytes, Absolute 0.42 0.10 - 0.90 10*3/mm3    Eosinophils, Absolute 0.08 0.00 - 0.40 10*3/mm3    Basophils, Absolute 0.05 0.00 - 0.20 10*3/mm3    Immature Grans, Absolute 0.03 0.00 - 0.05 10*3/mm3    nRBC 0.0 0.0 - 0.2 /100 WBC       Ordered the above labs and independently reviewed the results.      PROCEDURES    Procedures      MEDICATIONS GIVEN IN ER    Medications   sodium chloride 0.9 % flush 10 mL (10 mL Intravenous Given 3/5/20  0930)   cloNIDine (CATAPRES) tablet 0.1 mg (0.1 mg Oral Given 3/5/20 0932)   diphenhydrAMINE (BENADRYL) injection 25 mg (25 mg Intravenous Given 3/5/20 0926)   methylPREDNISolone sodium succinate (SOLU-Medrol) injection 125 mg (125 mg Intravenous Given 3/5/20 0930)   famotidine (PEPCID) injection 20 mg (20 mg Intravenous Given 3/5/20 0928)         PROGRESS, DATA ANALYSIS, CONSULTS, AND MEDICAL DECISION MAKING    All labs have been independently reviewed by me.  All radiology studies have been reviewed by me and discussed with radiologist dictating the report.   EKG's independently viewed and interpreted by me.  Discussion below represents my analysis of pertinent findings related to patient's condition, differential diagnosis, treatment plan and final disposition.        0915 Ordered Clonidine to treat hypertension. Ordered Benadryl, Solu-Medrol, and Pepcid to treat patient's symptoms. Ordered blood work for further evaluation.    1030 Placed call to Cardiology for consult.    1031 Reassessed the patient. They are resting comfortably and in NAD. BP- (!) 195/96 HR- 67 Temp- 98.1 °F (36.7 °C) (Tympanic) O2 sat- 98%. They have had symptomatic improvement. On repeat exam, her left upper lip swelling has improved. It is currently mild in severity. I discussed today's findings with the patient, explaining the pertinent positives and negatives from today's visit, and the plan of care. Notified the patient of the plan to consult Cardiology to discuss her blood pressure medications. Pt understands and agrees with the plan, all questions answered.    1136 Received a call from Dr. Quin Magana (Cardiology) and discussed pt's case. Dr. Magana agreed with plan to discharge the patient home. She recommended that the patient stop Lisinopril. She recommended that I start the patient on 5 mg of Amlodipine.     1208 Reassessed the patient. They are resting comfortably and in NAD. BP- 159/71 HR- 66 Temp- 98.1 °F (36.7 °C) (Tympanic) O2 sat-  96%. They have had symptomatic improvement during their ED stay. On repeat exam, her lip swelling has continued to improve. I discussed today's findings with the patient, explaining the pertinent positives and negatives from today's visit, and the plan of care. Discussed the plan for discharge with a prescription for Amlodipine 5 mg (to treat hypertension), Zyrtec, Prednisone, and Tagamet and instructions to f/u with her PMD and cardiologist for further evaluation and management. Advised the patient to discontinue her Lisinopril. Strict RTER warnings given. Pt understands and agrees with the plan, all questions answered. Patient was discharged home in a stable condition.        AS OF 12:28 PM VITALS:    BP - 159/71  HR - 66  TEMP - 98.1 °F (36.7 °C) (Tympanic)  02 SATS - 96%        DIAGNOSIS  Final diagnoses:   Angiotensin converting enzyme inhibitor-aggravated angioedema, initial encounter   Benign essential hypertension           DISPOSITION  DISCHARGE    Patient discharged in stable condition.    Reviewed implications of results, diagnosis, meds, responsibility to follow up, warning signs and symptoms of possible worsening, potential complications and reasons to return to ER, including bad headache, altered mental status,chest pain,shortness of breath,visual changes, speech changes, any weakness to extremities, or any concerns.    Patient/Family voiced understanding of above instructions.    Discussed plan for discharge, as there is no emergent indication for admission.  The patient's blood pressure has been elevated in the emergency department but the patient has no signs of any end organ damage.  It is not mandatory to lower the blood pressure to normal levels on an emergent basis.  Patient referred to primary care provider for BP management due to today's BP. Pt/family is agreeable and understands need for follow up and repeat testing.  Pt is aware that discharge does not mean that nothing is wrong but it  indicates no emergency is present that requires admission and they must continue care with follow-up as given below or physician of their choice.     FOLLOW-UP  Quin Magana MD  [address]  [place] [address]  [phone]      Call today when you get home or tomorrow morning to arrange follow-up in this next week.  Return if swelling worsens, any tongue swelling, shortness of breath, difficulty breathing, or any concerns.         Medication List      New Prescriptions    amLODIPine 5 MG tablet  Commonly known as:  NORVASC  Take 1 tablet by mouth Daily for 30 days.     cetirizine 10 MG tablet  Commonly known as:  zyrTEC  Take 0.5 tablets by mouth Daily for 5 days.     cimetidine 400 MG tablet  Commonly known as:  TAGAMET  Take 1 tablet by mouth 2 (Two) Times a Day for 5 days.     predniSONE 20 MG tablet  Commonly known as:  DELTASONE  Take 1 tablet by mouth 2 (Two) Times a Day for 5 days.        Stop    lisinopril 20 MG tablet  Commonly known as:  PRINIVIL,ZESTRIL                  Documentation assistance provided by beckie Moreno for Dr Hardy.  Information recorded by the scribe was done at my direction and has been verified and validated by me.     Rosario Moreno  03/05/20 [id number]       Levi Hardy MD  03/05/20 [id number]

## 2020-03-05 NOTE — DISCHARGE INSTRUCTIONS
As we discussed, stop lisinopril.  As we discussed, make certain that you get up from sitting position and remains steady and do not attempt to ambulate or walk for 2 to 3 minutes.  If you feel lightheaded or weak sit back down.

## 2020-03-05 NOTE — TELEPHONE ENCOUNTER
[facility] CALLED PT IS EXPERIENCING FACIAL SWELLING UPPER LIP ELEVATED AND CHEEK AND NECK.  PT DOES NOT WANT TO GO TO ER BUT STAFF FEELS SHE SHOULD. THEY ARE SENDING HER TO THE [facility] ER AND WANTED TO LET US KNOW .ANY QUESTIONS CALL [phone]

## 2020-03-10 RX ORDER — AMLODIPINE BESYLATE 5 MG/1
5 TABLET ORAL EVERY MORNING
Qty: 45 TABLET | Refills: 5 | Status: SHIPPED | OUTPATIENT
Start: 2020-03-10 | End: 2021-03-16 | Stop reason: SDUPTHER

## 2020-03-10 NOTE — TELEPHONE ENCOUNTER
Called and spoke with Diana at [facility] they will continue to monitor BP and adjust meds.  Rx sent to [facility].  I set a reminder to followup in 1 week.

## 2020-03-10 NOTE — TELEPHONE ENCOUNTER
Spoke with pt.  She is doing better and RN with [facility] will be calling later with BP update.  Pt said they had an emergency meeting yesterday and the nursing home has decided to keep outside visits to emergencies only.  Pt was scheduled a followup from the ER with ERROL Pablo on Friday.  Pt would like to cancel.

## 2020-03-17 ENCOUNTER — TELEPHONE (OUTPATIENT)
Dept: CARDIOLOGY | Facility: CLINIC | Age: 85
End: 2020-03-17

## 2020-10-22 RX ORDER — METOPROLOL SUCCINATE 25 MG/1
TABLET, EXTENDED RELEASE ORAL
Qty: 30 TABLET | Refills: 2 | Status: SHIPPED | OUTPATIENT
Start: 2020-10-22 | End: 2021-02-25 | Stop reason: SDUPTHER

## 2021-01-05 NOTE — TELEPHONE ENCOUNTER
Received call from the ER patient was seen for facial edema felt to be due to angioedema.  She was hypertensive in the emergency room they gave her clonidine, steroids and Benadryl.  Recommend she start Norvasc 5 mg a day as lisinopril will be discontinued.  She is to check her pressures with her phonic blood pressure device or the facility options.    Please call her on Monday to find out how is her blood pressure and facial edema.ag   Hemigard Postcare Instructions: The HEMIGARD strips are to remain completely dry for at least 5-7 days.

## 2021-02-25 RX ORDER — METOPROLOL SUCCINATE 25 MG/1
25 TABLET, EXTENDED RELEASE ORAL DAILY
Qty: 30 TABLET | Refills: 0 | Status: SHIPPED | OUTPATIENT
Start: 2021-02-25 | End: 2021-03-16 | Stop reason: SDUPTHER

## 2021-02-25 NOTE — TELEPHONE ENCOUNTER
[facility] CALLED.  PT NEEDS A REFILL ON HER METOPROLOL TO BE CALLED IN UNTIL SHE GETS TO HER NEW PCP IN MARCH. PLEASE CALL INTO [place] [phone] AND THE FAX IS [phone]  ANY QUESTIONS CALL [phone]

## 2021-03-03 DIAGNOSIS — Z23 IMMUNIZATION DUE: ICD-10-CM

## 2021-03-16 ENCOUNTER — OFFICE VISIT (OUTPATIENT)
Dept: CARDIOLOGY | Facility: CLINIC | Age: 86
End: 2021-03-16

## 2021-03-16 VITALS
WEIGHT: 113 LBS | HEIGHT: 65 IN | DIASTOLIC BLOOD PRESSURE: 80 MMHG | HEART RATE: 62 BPM | SYSTOLIC BLOOD PRESSURE: 120 MMHG | BODY MASS INDEX: 18.83 KG/M2

## 2021-03-16 DIAGNOSIS — I10 ESSENTIAL HYPERTENSION: ICD-10-CM

## 2021-03-16 DIAGNOSIS — I34.1 MVP (MITRAL VALVE PROLAPSE): Primary | ICD-10-CM

## 2021-03-16 DIAGNOSIS — I34.0 NON-RHEUMATIC MITRAL REGURGITATION: ICD-10-CM

## 2021-03-16 PROCEDURE — 99213 OFFICE O/P EST LOW 20 MIN: CPT | Performed by: INTERNAL MEDICINE

## 2021-03-16 PROCEDURE — 93000 ELECTROCARDIOGRAM COMPLETE: CPT | Performed by: INTERNAL MEDICINE

## 2021-03-16 RX ORDER — METOPROLOL SUCCINATE 25 MG/1
25 TABLET, EXTENDED RELEASE ORAL DAILY
Qty: 90 TABLET | Refills: 1 | Status: SHIPPED | OUTPATIENT
Start: 2021-03-16

## 2021-03-16 RX ORDER — AMLODIPINE BESYLATE 5 MG/1
5 TABLET ORAL EVERY MORNING
Qty: 90 TABLET | Refills: 1 | Status: SHIPPED | OUTPATIENT
Start: 2021-03-16 | End: 2021-05-27

## 2021-03-16 NOTE — PROGRESS NOTES
Date of Office Visit: 2021  Encounter Provider: Quin Magana MD  Place of Service: [facility] CARDIOLOGY  Patient Name: Carito Gonzalez  :3/7/1930    Chief complaint  Mitral valve prolapse with severe regurgitation    History of Present Illness  Patient is a delightful 89-year-old female with hypertension and mitral valve prolapse with mitral regurgitation.  In 2016 she was noted to have normal left ventricular size and systolic function with an ejection fraction 55%, grade 2 diastolic dysfunction with severe mitral valve regurgitation with severe prolapse of the posterior mitral valve leaflet.  There was also mild tricuspid regurgitation.  RV systolic pressure 45 mmHg. in 2017 she was admitted with an acute right acute deep venous thrombosis as well as acute renal insufficiency.  She was transiently placed on Eliquis and completed therapy for this.    Since last visit she is exercising 5 times a week doing stretching and walking a mile a day.  She denies any chest pain, shortness of breath, palpitations, syncope near syncope.  Overall feels well.  No blood work on file since 2020 but has an upcoming appointment with Dr. Rodríguez    Past Medical History:   Diagnosis Date   • Abscess of leg    • Acute deep vein thrombosis (DVT) of left lower extremity (CMS/HCC)    • Acute deep vein thrombosis (DVT) of right lower extremity (CMS/HCC)    • Acute renal insufficiency    • Angiotensin converting enzyme inhibitor-aggravated angioedema    • Cellulitis    • CKD (chronic kidney disease) stage 3, GFR 30-59 ml/min (CMS/HCC)    • Depression    • Herpes zoster    • Hypercholesterolemia    • Hyperlipidemia    • Hypertension    • Hypotension    • Macular degeneration     Noted 2014   • Mitral regurgitation    • Mitral valve prolapse    • SOB (shortness of breath)    • Zoster      Past Surgical History:   Procedure Laterality Date   • HIP FRACTURE SURGERY Right  2017   • SKIN CANCER EXCISION       Outpatient Medications Prior to Visit   Medication Sig Dispense Refill   • acetaminophen (TYLENOL) 325 MG tablet Take 2 tablets by mouth Every 6 (Six) Hours As Needed for mild pain (1-3).  0   • aspirin 81 MG tablet Take 1 tablet by mouth Daily. 30 tablet 1   • Multiple Vitamins-Minerals (OCUTABS) tablet Take 1 tablet by mouth daily.     • amLODIPine (NORVASC) 5 MG tablet Take 1 tablet by mouth Every Morning. 0.5 tablet by mouth every evening. 45 tablet 5   • metoprolol succinate XL (TOPROL-XL) 25 MG 24 hr tablet Take 1 tablet by mouth Daily. 30 tablet 0     No facility-administered medications prior to visit.       Allergies as of 2021 - Reviewed 2021   Allergen Reaction Noted   • Lisinopril Angioedema 2020   • Zoloft [sertraline hcl] Shortness Of Breath 2016   • Erythromycin Swelling 2016     Social History     Socioeconomic History   • Marital status:      Spouse name: Not on file   • Number of children: 3   • Years of education: Not on file   • Highest education level: Not on file   Tobacco Use   • Smoking status: Former Smoker     Quit date:      Years since quittin.2   • Smokeless tobacco: Never Used   Substance and Sexual Activity   • Alcohol use: No     Comment: Daily caffeine use   • Drug use: No   • Sexual activity: Defer     Family History   Problem Relation Age of Onset   • Heart attack Mother    • Hypertension Mother    • Heart attack Father    • Hypertension Father    • Heart attack Brother 58   • Hypertension Brother      Review of Systems   Constitutional: Negative for chills, fever, weight gain and weight loss.   Cardiovascular: Negative for leg swelling.   Respiratory: Negative for cough, snoring and wheezing.    Hematologic/Lymphatic: Negative for bleeding problem. Does not bruise/bleed easily.   Skin: Negative for color change.   Musculoskeletal: Negative for falls, joint pain and myalgias.   Gastrointestinal:  "Negative for melena.   Genitourinary: Negative for hematuria.   Neurological: Negative for excessive daytime sleepiness.   Psychiatric/Behavioral: Negative for depression. The patient is not nervous/anxious.         Objective:     Vitals:    03/16/21 1228   BP: 120/80   Pulse: 62   Weight: 51.3 kg (113 lb)   Height: 165.1 cm (65\")     Body mass index is 18.8 kg/m².    Vitals reviewed.   Constitutional:       Appearance: Well-developed.      Comments: Thin elderly female   Eyes:      General: No scleral icterus.        Right eye: No discharge.      Conjunctiva/sclera: Conjunctivae normal.      Pupils: Pupils are equal, round, and reactive to light.   HENT:      Head: Normocephalic.      Nose: Nose normal.   Neck:      Thyroid: No thyromegaly.      Vascular: No JVD.   Pulmonary:      Effort: Pulmonary effort is normal. No respiratory distress.      Breath sounds: Normal breath sounds. No wheezing. No rales.   Cardiovascular:      Normal rate. Regular rhythm. Normal S1. Normal S2.      Murmurs: There is a grade 3/6 high frequency blowing holosystolic murmur at the apex.      No gallop.   Pulses:     Intact distal pulses.   Edema:     Peripheral edema absent.   Abdominal:      General: Bowel sounds are normal. There is no distension.      Palpations: Abdomen is soft.      Tenderness: There is no abdominal tenderness. There is no rebound.   Musculoskeletal: Normal range of motion.         General: No tenderness.      Cervical back: Normal range of motion and neck supple. Skin:     General: Skin is warm and dry.      Findings: No erythema or rash.   Neurological:      Mental Status: Alert and oriented to person, place, and time.   Psychiatric:         Behavior: Behavior normal.         Thought Content: Thought content normal.         Judgment: Judgment normal.       Lab Review:     ECG 12 Lead    Date/Time: 3/16/2021 12:29 PM  Performed by: Quin Magana MD  Authorized by: Quin Magana MD   Comparison: compared with " previous ECG   Comparison to previous ECG: RS interval is increased slightly.  Rhythm: sinus rhythm  Conduction: non-specific intraventricular conduction delay  Other findings: poor R wave progression  Other findings comments: Cannot rule out prior anteroseptal infarct    Clinical impression: abnormal EKG          Assessment:       Diagnosis Plan   1. MVP (mitral valve prolapse)  ECG 12 Lead   2. Non-rheumatic mitral regurgitation  ECG 12 Lead   3. Essential hypertension       Plan:       1.  Hypertension.  Controlled on her current regimen.  Have cautioned her to track pressures closely with the upcoming summer months and call if systolic pressures are less than 110 mmHg  2.  Mitral valve prolapse with severe mitral regurgitation, clinically doing quite well despite murmur consistent with severe mitral valve regurgitation.  Reviewed symptoms of disease progression with her she will call if these occur in which case may need to titrate meds.  She still remains hesitant to pursue further investigation and quite reasonable age 89.  We will follow up in 6 months.  3.  History of renal insufficiency, resolved and with labs pending in the near future  4.  History of anemia.  5.  Hyperlipidemia.  She has no interest in treating this and does not want to have it checked in the future.      I spent a total 25minutes spent including reviewing records with 15 minutes of face to face time with this patient.       Your medication list          Accurate as of March 16, 2021 11:59 PM. If you have any questions, ask your nurse or doctor.            CONTINUE taking these medications      Instructions Last Dose Given Next Dose Due   acetaminophen 325 MG tablet  Commonly known as: TYLENOL      Take 2 tablets by mouth Every 6 (Six) Hours As Needed for mild pain (1-3).       amLODIPine 5 MG tablet  Commonly known as: NORVASC      Take 1 tablet by mouth Every Morning. 0.5 tablet by mouth every evening.       aspirin 81 MG tablet       Take 1 tablet by mouth Daily.       metoprolol succinate XL 25 MG 24 hr tablet  Commonly known as: TOPROL-XL      Take 1 tablet by mouth Daily.       Ocutabs tablet tablet  Generic drug: multivitamin with minerals      Take 1 tablet by mouth daily.             Where to Get Your Medications      These medications were sent to [facility] - [place], [place] - [address] - [phone] [facility] [phone] FX  [address] [place] [address]    Phone: [phone]   · amLODIPine 5 MG tablet  · metoprolol succinate XL 25 MG 24 hr tablet         Patient is no longer taking -.  I corrected the med list to reflect this.  I did not stop these medications.    Dictated utilizing Dragon dictation

## 2021-03-25 ENCOUNTER — OFFICE VISIT (OUTPATIENT)
Dept: FAMILY MEDICINE CLINIC | Facility: CLINIC | Age: 86
End: 2021-03-25

## 2021-03-25 VITALS
DIASTOLIC BLOOD PRESSURE: 80 MMHG | HEIGHT: 65 IN | RESPIRATION RATE: 18 BRPM | OXYGEN SATURATION: 96 % | BODY MASS INDEX: 19.06 KG/M2 | SYSTOLIC BLOOD PRESSURE: 152 MMHG | WEIGHT: 114.4 LBS | TEMPERATURE: 97.3 F | HEART RATE: 68 BPM

## 2021-03-25 DIAGNOSIS — I10 ESSENTIAL HYPERTENSION: Primary | ICD-10-CM

## 2021-03-25 PROCEDURE — 99214 OFFICE O/P EST MOD 30 MIN: CPT | Performed by: INTERNAL MEDICINE

## 2021-04-20 NOTE — PROGRESS NOTES
Martha Gonzalez is a 89 y.o. female. Patient is here today for   Chief Complaint   Patient presents with   • Med Management     fu medication review          Vitals:    21 1451   BP: 152/80   Pulse: 68   Resp: 18   Temp: 97.3 °F (36.3 °C)   SpO2: 96%     Body mass index is 19.04 kg/m².      Past Medical History:   Diagnosis Date   • Abscess of leg    • Acute deep vein thrombosis (DVT) of left lower extremity (CMS/HCC)    • Acute deep vein thrombosis (DVT) of right lower extremity (CMS/HCC)    • Acute renal insufficiency    • Angiotensin converting enzyme inhibitor-aggravated angioedema    • Cellulitis    • CKD (chronic kidney disease) stage 3, GFR 30-59 ml/min (CMS/HCC)    • Depression    • Herpes zoster    • Hypercholesterolemia    • Hyperlipidemia    • Hypertension    • Hypotension    • Macular degeneration     Noted 2014   • Mitral regurgitation    • Mitral valve prolapse    • SOB (shortness of breath)    • Zoster       Allergies   Allergen Reactions   • Lisinopril Angioedema   • Zoloft [Sertraline Hcl] Shortness Of Breath   • Erythromycin Swelling      Social History     Socioeconomic History   • Marital status:      Spouse name: Not on file   • Number of children: 3   • Years of education: Not on file   • Highest education level: Not on file   Tobacco Use   • Smoking status: Former Smoker     Quit date:      Years since quittin.3   • Smokeless tobacco: Never Used   Substance and Sexual Activity   • Alcohol use: No     Comment: Daily caffeine use   • Drug use: No   • Sexual activity: Defer        Current Outpatient Medications:   •  acetaminophen (TYLENOL) 325 MG tablet, Take 2 tablets by mouth Every 6 (Six) Hours As Needed for mild pain (1-3)., Disp: , Rfl: 0  •  amLODIPine (NORVASC) 5 MG tablet, Take 1 tablet by mouth Every Morning. 0.5 tablet by mouth every evening., Disp: 90 tablet, Rfl: 1  •  aspirin 81 MG tablet, Take 1 tablet by mouth Daily., Disp: 30 tablet, Rfl:  1  •  metoprolol succinate XL (TOPROL-XL) 25 MG 24 hr tablet, Take 1 tablet by mouth Daily., Disp: 90 tablet, Rfl: 1  •  Multiple Vitamins-Minerals (OCUTABS) tablet, Take 1 tablet by mouth daily., Disp: , Rfl:      Objective     It is a pleasure to see this patient.she is to follow-up with Dr. Sam Mata.  He is recently retired so she is meeting me today for the first time.  She takes amlodipine 5 mg once daily metoprolol succinate XL 25 mg once daily to help control her pretension.    She takes a daily aspirin and acetaminophen as needed.    She tells me that she feels well.         Review of Systems   Constitutional: Negative.    HENT: Negative.    Respiratory: Negative.    Cardiovascular: Negative.    Musculoskeletal: Negative.    Psychiatric/Behavioral: Negative.        Physical Exam  Vitals and nursing note reviewed.   Constitutional:       Appearance: Normal appearance.      Comments: Pleasant, neatly groomed, mentally sharp, appears physically fit.   Neck:      Vascular: No carotid bruit.   Cardiovascular:      Rate and Rhythm: Regular rhythm.      Heart sounds: Murmur heard.   No gallop.       Comments: She had a 3/6 holosystolic murmur.  Pulmonary:      Effort: No respiratory distress.      Breath sounds: Normal breath sounds. No wheezing or rales.   Neurological:      Mental Status: She is alert and oriented to person, place, and time.   Psychiatric:         Mood and Affect: Mood normal.         Behavior: Behavior normal.         Thought Content: Thought content normal.           Problems Addressed this Visit        Cardiac and Vasculature    Hypertension - Primary      Diagnoses       Codes Comments    Essential hypertension    -  Primary ICD-10-CM: I10  ICD-9-CM: 401.9             PLAN  It is a pleasure to have met this delightful 89-year-old woman.  All seems well for the time being.  Her hypertension is a bit elevated.  She saw Dr. Unique Magana a week ago.  Her blood pressure looks good at that  time.    I Angela have her back to see me in 5 to 6 months.  Fasting labs prior to that visit should include lipid profile, comprehensive metabolic panel, CBC, urinalysis, vitamin D level, iron profile..  No follow-ups on file.

## 2021-05-27 RX ORDER — AMLODIPINE BESYLATE 5 MG/1
TABLET ORAL
Qty: 90 TABLET | Refills: 1 | Status: SHIPPED | OUTPATIENT
Start: 2021-05-27

## 2021-11-02 ENCOUNTER — HOSPITAL ENCOUNTER (OUTPATIENT)
Dept: CARDIOLOGY | Facility: HOSPITAL | Age: 86
Discharge: HOME | End: 2021-11-02
Admitting: INTERNAL MEDICINE

## 2021-11-02 ENCOUNTER — OFFICE VISIT (OUTPATIENT)
Dept: CARDIOLOGY | Facility: CLINIC | Age: 86
End: 2021-11-02

## 2021-11-02 VITALS
SYSTOLIC BLOOD PRESSURE: 120 MMHG | HEIGHT: 65 IN | BODY MASS INDEX: 18.83 KG/M2 | HEART RATE: 59 BPM | DIASTOLIC BLOOD PRESSURE: 70 MMHG | WEIGHT: 113 LBS

## 2021-11-02 DIAGNOSIS — E78.00 HYPERCHOLESTEROLEMIA: ICD-10-CM

## 2021-11-02 DIAGNOSIS — I34.0 NON-RHEUMATIC MITRAL REGURGITATION: ICD-10-CM

## 2021-11-02 DIAGNOSIS — R06.09 DOE (DYSPNEA ON EXERTION): ICD-10-CM

## 2021-11-02 DIAGNOSIS — N18.30 STAGE 3 CHRONIC KIDNEY DISEASE, UNSPECIFIED WHETHER STAGE 3A OR 3B CKD (HCC): ICD-10-CM

## 2021-11-02 DIAGNOSIS — I34.1 MVP (MITRAL VALVE PROLAPSE): Primary | ICD-10-CM

## 2021-11-02 DIAGNOSIS — I10 PRIMARY HYPERTENSION: ICD-10-CM

## 2021-11-02 LAB
ALBUMIN SERPL-MCNC: 4.4 G/DL (ref 3.5–5.2)
ALBUMIN/GLOB SERPL: 1.4 G/DL
ALP SERPL-CCNC: 60 U/L (ref 39–117)
ALT SERPL W P-5'-P-CCNC: 10 U/L (ref 1–33)
ANION GAP SERPL CALCULATED.3IONS-SCNC: 10.6 MMOL/L (ref 5–15)
AST SERPL-CCNC: 16 U/L (ref 1–32)
BASOPHILS # BLD AUTO: 0.03 10*3/MM3 (ref 0–0.2)
BASOPHILS NFR BLD AUTO: 0.5 % (ref 0–1.5)
BILIRUB SERPL-MCNC: 0.4 MG/DL (ref 0–1.2)
BUN SERPL-MCNC: 15 MG/DL (ref 8–23)
BUN/CREAT SERPL: 19.2 (ref 7–25)
CALCIUM SPEC-SCNC: 9.7 MG/DL (ref 8.2–9.6)
CHLORIDE SERPL-SCNC: 101 MMOL/L (ref 98–107)
CO2 SERPL-SCNC: 27.4 MMOL/L (ref 22–29)
CREAT SERPL-MCNC: 0.78 MG/DL (ref 0.57–1)
DEPRECATED RDW RBC AUTO: 43.6 FL (ref 37–54)
EOSINOPHIL # BLD AUTO: 0.04 10*3/MM3 (ref 0–0.4)
EOSINOPHIL NFR BLD AUTO: 0.6 % (ref 0.3–6.2)
ERYTHROCYTE [DISTWIDTH] IN BLOOD BY AUTOMATED COUNT: 13.2 % (ref 12.3–15.4)
GFR SERPL CREATININE-BSD FRML MDRD: 69 ML/MIN/1.73
GLOBULIN UR ELPH-MCNC: 3.2 GM/DL
GLUCOSE SERPL-MCNC: 79 MG/DL (ref 65–99)
HCT VFR BLD AUTO: 38.1 % (ref 34–46.6)
HGB BLD-MCNC: 12.6 G/DL (ref 12–15.9)
IMM GRANULOCYTES # BLD AUTO: 0.01 10*3/MM3 (ref 0–0.05)
IMM GRANULOCYTES NFR BLD AUTO: 0.2 % (ref 0–0.5)
LYMPHOCYTES # BLD AUTO: 1.25 10*3/MM3 (ref 0.7–3.1)
LYMPHOCYTES NFR BLD AUTO: 20.1 % (ref 19.6–45.3)
MCH RBC QN AUTO: 29.9 PG (ref 26.6–33)
MCHC RBC AUTO-ENTMCNC: 33.1 G/DL (ref 31.5–35.7)
MCV RBC AUTO: 90.3 FL (ref 79–97)
MONOCYTES # BLD AUTO: 0.34 10*3/MM3 (ref 0.1–0.9)
MONOCYTES NFR BLD AUTO: 5.5 % (ref 5–12)
NEUTROPHILS NFR BLD AUTO: 4.55 10*3/MM3 (ref 1.7–7)
NEUTROPHILS NFR BLD AUTO: 73.1 % (ref 42.7–76)
NRBC BLD AUTO-RTO: 0 /100 WBC (ref 0–0.2)
NT-PROBNP SERPL-MCNC: 158.4 PG/ML (ref 0–1800)
PLATELET # BLD AUTO: 365 10*3/MM3 (ref 140–450)
PMV BLD AUTO: 10 FL (ref 6–12)
POTASSIUM SERPL-SCNC: 4 MMOL/L (ref 3.5–5.2)
PROT SERPL-MCNC: 7.6 G/DL (ref 6–8.5)
RBC # BLD AUTO: 4.22 10*6/MM3 (ref 3.77–5.28)
SODIUM SERPL-SCNC: 139 MMOL/L (ref 136–145)
TSH SERPL DL<=0.05 MIU/L-ACNC: 2.23 UIU/ML (ref 0.27–4.2)
WBC # BLD AUTO: 6.22 10*3/MM3 (ref 3.4–10.8)

## 2021-11-02 PROCEDURE — 36415 COLL VENOUS BLD VENIPUNCTURE: CPT

## 2021-11-02 PROCEDURE — 99214 OFFICE O/P EST MOD 30 MIN: CPT | Performed by: INTERNAL MEDICINE

## 2021-11-02 PROCEDURE — 93000 ELECTROCARDIOGRAM COMPLETE: CPT | Performed by: INTERNAL MEDICINE

## 2021-11-02 PROCEDURE — 84443 ASSAY THYROID STIM HORMONE: CPT | Performed by: INTERNAL MEDICINE

## 2021-11-02 PROCEDURE — 80053 COMPREHEN METABOLIC PANEL: CPT | Performed by: INTERNAL MEDICINE

## 2021-11-02 PROCEDURE — 83880 ASSAY OF NATRIURETIC PEPTIDE: CPT | Performed by: INTERNAL MEDICINE

## 2021-11-02 PROCEDURE — 85025 COMPLETE CBC W/AUTO DIFF WBC: CPT | Performed by: INTERNAL MEDICINE

## 2021-11-02 NOTE — PROGRESS NOTES
Date of Office Visit: 2021  Encounter Provider: Quin Magana MD  Place of Service: [facility] CARDIOLOGY  Patient Name: Carito Gonzalez  :3/7/1930    Chief complaint  Mitral valve prolapse mitral vegetation, pulmonary hypertension    History of Present Illness  Patient is a delightful 89-year-old female with hypertension and mitral valve prolapse with mitral regurgitation.  In 2016 she was noted to have normal left ventricular size and systolic function with an ejection fraction 55%, grade 2 diastolic dysfunction with severe mitral valve regurgitation with severe prolapse of the posterior mitral valve leaflet.  There was also mild tricuspid regurgitation.  RV systolic pressure 45 mmHg. in 2017 she was admitted with an acute right acute deep venous thrombosis as well as acute renal insufficiency.  She was transiently placed on Eliquis and completed therapy for this.    Since last visit she has noticed slight new dyspnea on exertion over the past month has also had edema.  She denies any chest pain dizziness palpitations.  She was walking a mile daily up until developing dyspnea over the past month.  I have no recent labs on file.  She denies any dietary discretions with salt fever chills she had a Covid shot after the symptom onset.    Past Medical History:   Diagnosis Date   • Abscess of leg    • Acute deep vein thrombosis (DVT) of left lower extremity (HCC)    • Acute deep vein thrombosis (DVT) of right lower extremity (HCC)    • Acute renal insufficiency    • Angiotensin converting enzyme inhibitor-aggravated angioedema    • Cellulitis    • CKD (chronic kidney disease) stage 3, GFR 30-59 ml/min (HCC)    • Depression    • Herpes zoster    • Hypercholesterolemia    • Hyperlipidemia    • Hypertension    • Hypotension    • Macular degeneration     Noted 2014   • Mitral regurgitation    • Mitral valve prolapse    • SOB (shortness of breath)    • Zoster       Past Surgical History:   Procedure Laterality Date   • HIP FRACTURE SURGERY Right 2017   • SKIN CANCER EXCISION       Outpatient Medications Prior to Visit   Medication Sig Dispense Refill   • acetaminophen (TYLENOL) 325 MG tablet Take 2 tablets by mouth Every 6 (Six) Hours As Needed for mild pain (1-3).  0   • amLODIPine (NORVASC) 5 MG tablet Take one tablet po q am and half tablet po q pm 90 tablet 1   • aspirin 81 MG tablet Take 1 tablet by mouth Daily. 30 tablet 1   • metoprolol succinate XL (TOPROL-XL) 25 MG 24 hr tablet Take 1 tablet by mouth Daily. 90 tablet 1   • Multiple Vitamins-Minerals (OCUTABS) tablet Take 1 tablet by mouth daily.       No facility-administered medications prior to visit.       Allergies as of 2021 - Reviewed 2021   Allergen Reaction Noted   • Lisinopril Angioedema 2020   • Zoloft [sertraline hcl] Shortness Of Breath 2016   • Erythromycin Swelling 2016     Social History     Socioeconomic History   • Marital status:    • Number of children: 3   Tobacco Use   • Smoking status: Former Smoker     Quit date:      Years since quittin.8   • Smokeless tobacco: Never Used   Substance and Sexual Activity   • Alcohol use: No     Comment: Daily caffeine use   • Drug use: No   • Sexual activity: Defer     Family History   Problem Relation Age of Onset   • Heart attack Mother    • Hypertension Mother    • Heart attack Father    • Hypertension Father    • Heart attack Brother 58   • Hypertension Brother      Review of Systems   Constitutional: Negative for chills, fever, weight gain and weight loss.   Cardiovascular: Positive for dyspnea on exertion and leg swelling.   Respiratory: Negative for cough, snoring and wheezing.    Hematologic/Lymphatic: Negative for bleeding problem. Does not bruise/bleed easily.   Skin: Negative for color change.   Musculoskeletal: Negative for falls, joint pain and myalgias.   Gastrointestinal: Negative for melena.  "  Genitourinary: Negative for hematuria.   Neurological: Negative for excessive daytime sleepiness.   Psychiatric/Behavioral: Negative for depression. The patient is not nervous/anxious.         Objective:     Vitals:    11/02/21 1011   BP: 120/70   Pulse: 59   Weight: 51.3 kg (113 lb)   Height: 165.1 cm (65\")     Body mass index is 18.8 kg/m².    Vitals reviewed.   Constitutional:       Appearance: Well-developed.   Eyes:      General: No scleral icterus.        Right eye: No discharge.      Conjunctiva/sclera: Conjunctivae normal.      Pupils: Pupils are equal, round, and reactive to light.   HENT:      Head: Normocephalic.      Nose: Nose normal.   Neck:      Thyroid: No thyromegaly.      Vascular: No JVD.   Pulmonary:      Effort: Pulmonary effort is normal. No respiratory distress.      Breath sounds: Normal breath sounds. No wheezing. No rales.   Cardiovascular:      Normal rate. Regular rhythm. Normal S1. Normal S2.      Murmurs: There is a grade 3/6 high frequency blowing holosystolic murmur at the apex.      No gallop.   Pulses:     Intact distal pulses.   Edema:     Peripheral edema present.     Pretibial: 1+ edema of the left pretibial area.     Ankle: bilateral 1+ edema of the ankle.     Feet: 1+ edema of the right foot.  Abdominal:      General: Bowel sounds are normal. There is no distension.      Palpations: Abdomen is soft.      Tenderness: There is no abdominal tenderness. There is no rebound.   Musculoskeletal: Normal range of motion.         General: No tenderness.      Cervical back: Normal range of motion and neck supple. Skin:     General: Skin is warm and dry.      Findings: No erythema or rash.   Neurological:      Mental Status: Alert and oriented to person, place, and time.   Psychiatric:         Behavior: Behavior normal.         Thought Content: Thought content normal.         Judgment: Judgment normal.       Lab Review:     ECG 12 Lead    Date/Time: 11/2/2021 10:12 AM  Performed by: [place]" Quin BOWMAN MD  Authorized by: Quin Magana MD   Comparison: compared with previous ECG   Similar to previous ECG  Rhythm: sinus rhythm  Other findings: poor R wave progression  Other findings comments: Consider prior anterior wall infarction.    Clinical impression: abnormal EKG          Assessment:       Diagnosis Plan   1. MVP (mitral valve prolapse)  ECG 12 Lead    Adult Transthoracic Echo Complete W/ Cont if Necessary Per Protocol   2. Non-rheumatic mitral regurgitation  ECG 12 Lead    Adult Transthoracic Echo Complete W/ Cont if Necessary Per Protocol   3. YO (dyspnea on exertion)  ECG 12 Lead    Comprehensive Metabolic Panel    CBC & Differential    proBNP    Adult Transthoracic Echo Complete W/ Cont if Necessary Per Protocol    TSH   4. Primary hypertension     5. Hypercholesterolemia     6. Stage 3 chronic kidney disease, unspecified whether stage 3a or 3b CKD (HCC)       Plan:       1.  New dyspnea and edema.  Symptoms concerning for heart failure and progressive valvular heart disease.  Discussed with patient and son present.  We will check basic blood work as she has not had any in a very long time due to lack of primary care provider care.  We will also check thyroid studies and an echocardiogram.  Depending on these results will likely need to add a diuretic decrease amlodipine and address further if they wish to be more aggressive.  2.  Hypertension.  Controlled on her current regimen.   3.  Mitral valve prolapse with severe mitral regurgitation, as above.  3.  History of renal insufficiency.  We will check labs as above  4.  History of anemia. We will check labs  5.  Hyperlipidemia.  She has no interest in treating this and does not want to have it checked in the future.      Time Spent: I spent 35 minutes caring for Carito on this date of service. This time includes time spent by me in the following activities: preparing for the visit, reviewing tests, obtaining and/or reviewing a separately obtained  history, performing a medically appropriate examination and/or evaluation, counseling and educating the patient/family/caregiver, ordering medications, tests, or procedures, documenting information in the medical record and independently interpreting results and communicating that information with the patient/family/caregiver.   I spent 1 minutes on the separately reported service of ECG. This time is not included in the time used to support the E/M service also reported today.        Your medication list          Accurate as of November 2, 2021 11:59 PM. If you have any questions, ask your nurse or doctor.            CONTINUE taking these medications      Instructions Last Dose Given Next Dose Due   acetaminophen 325 MG tablet  Commonly known as: TYLENOL      Take 2 tablets by mouth Every 6 (Six) Hours As Needed for mild pain (1-3).       amLODIPine 5 MG tablet  Commonly known as: NORVASC      Take one tablet po q am and half tablet po q pm       aspirin 81 MG tablet      Take 1 tablet by mouth Daily.       metoprolol succinate XL 25 MG 24 hr tablet  Commonly known as: TOPROL-XL      Take 1 tablet by mouth Daily.       Ocutabs tablet tablet  Generic drug: multivitamin with minerals      Take 1 tablet by mouth daily.              Patient is no longer taking -.  I corrected the med list to reflect this.  I did not stop these medications.      Dictated utilizing Dragon dictation

## 2021-11-04 ENCOUNTER — TELEPHONE (OUTPATIENT)
Dept: CARDIOLOGY | Facility: CLINIC | Age: 86
End: 2021-11-04

## 2021-11-16 ENCOUNTER — HOSPITAL ENCOUNTER (OUTPATIENT)
Dept: CARDIOLOGY | Facility: HOSPITAL | Age: 86
Discharge: HOME | End: 2021-11-16
Admitting: INTERNAL MEDICINE

## 2021-11-16 VITALS
WEIGHT: 113 LBS | SYSTOLIC BLOOD PRESSURE: 120 MMHG | BODY MASS INDEX: 18.83 KG/M2 | HEART RATE: 59 BPM | HEIGHT: 65 IN | DIASTOLIC BLOOD PRESSURE: 70 MMHG

## 2021-11-16 DIAGNOSIS — I34.0 NON-RHEUMATIC MITRAL REGURGITATION: ICD-10-CM

## 2021-11-16 DIAGNOSIS — I34.1 MVP (MITRAL VALVE PROLAPSE): ICD-10-CM

## 2021-11-16 DIAGNOSIS — R06.09 DOE (DYSPNEA ON EXERTION): ICD-10-CM

## 2021-11-16 LAB
AORTIC ARCH: 2.4 CM
AORTIC DIMENSIONLESS INDEX: 0.4 (DI)
ASCENDING AORTA: 3.1 CM
BH CV ECHO MEAS - ACS: 1.2 CM
BH CV ECHO MEAS - AO ARCH DIAM (PROXIMAL TRANS.): 2.4 CM
BH CV ECHO MEAS - AO MAX PG (FULL): 16.6 MMHG
BH CV ECHO MEAS - AO MAX PG: 20.2 MMHG
BH CV ECHO MEAS - AO MEAN PG (FULL): 8.4 MMHG
BH CV ECHO MEAS - AO MEAN PG: 10.3 MMHG
BH CV ECHO MEAS - AO ROOT AREA (BSA CORRECTED): 1.8
BH CV ECHO MEAS - AO ROOT AREA: 6.1 CM^2
BH CV ECHO MEAS - AO ROOT DIAM: 2.8 CM
BH CV ECHO MEAS - AO V2 MAX: 224.7 CM/SEC
BH CV ECHO MEAS - AO V2 MEAN: 149.8 CM/SEC
BH CV ECHO MEAS - AO V2 VTI: 49.1 CM
BH CV ECHO MEAS - ASC AORTA: 3.1 CM
BH CV ECHO MEAS - AVA(I,A): 1.3 CM^2
BH CV ECHO MEAS - AVA(I,D): 1.3 CM^2
BH CV ECHO MEAS - AVA(V,A): 1.2 CM^2
BH CV ECHO MEAS - AVA(V,D): 1.2 CM^2
BH CV ECHO MEAS - BSA(HAYCOCK): 1.5 M^2
BH CV ECHO MEAS - BSA: 1.6 M^2
BH CV ECHO MEAS - BZI_BMI: 18.8 KILOGRAMS/M^2
BH CV ECHO MEAS - BZI_METRIC_HEIGHT: 165.1 CM
BH CV ECHO MEAS - BZI_METRIC_WEIGHT: 51.3 KG
BH CV ECHO MEAS - DESC AORTA: 1.6 CM
BH CV ECHO MEAS - EDV(CUBED): 47.1 ML
BH CV ECHO MEAS - EDV(MOD-SP2): 48 ML
BH CV ECHO MEAS - EDV(MOD-SP4): 41 ML
BH CV ECHO MEAS - EDV(TEICH): 54.9 ML
BH CV ECHO MEAS - EF(CUBED): 83.2 %
BH CV ECHO MEAS - EF(MOD-BP): 61.6 %
BH CV ECHO MEAS - EF(MOD-SP2): 60.4 %
BH CV ECHO MEAS - EF(MOD-SP4): 63.4 %
BH CV ECHO MEAS - EF(TEICH): 77 %
BH CV ECHO MEAS - ESV(CUBED): 7.9 ML
BH CV ECHO MEAS - ESV(MOD-SP2): 19 ML
BH CV ECHO MEAS - ESV(MOD-SP4): 15 ML
BH CV ECHO MEAS - ESV(TEICH): 12.6 ML
BH CV ECHO MEAS - FS: 44.8 %
BH CV ECHO MEAS - IVS/LVPW: 1.1
BH CV ECHO MEAS - IVSD: 1.2 CM
BH CV ECHO MEAS - LAT PEAK E' VEL: 7.4 CM/SEC
BH CV ECHO MEAS - LV DIASTOLIC VOL/BSA (35-75): 26.4 ML/M^2
BH CV ECHO MEAS - LV MASS(C)D: 136.4 GRAMS
BH CV ECHO MEAS - LV MASS(C)DI: 87.9 GRAMS/M^2
BH CV ECHO MEAS - LV MAX PG: 3.6 MMHG
BH CV ECHO MEAS - LV MEAN PG: 1.9 MMHG
BH CV ECHO MEAS - LV SYSTOLIC VOL/BSA (12-30): 9.7 ML/M^2
BH CV ECHO MEAS - LV V1 MAX: 95.2 CM/SEC
BH CV ECHO MEAS - LV V1 MEAN: 64.3 CM/SEC
BH CV ECHO MEAS - LV V1 VTI: 21.6 CM
BH CV ECHO MEAS - LVIDD: 3.6 CM
BH CV ECHO MEAS - LVIDS: 2 CM
BH CV ECHO MEAS - LVLD AP2: 7.1 CM
BH CV ECHO MEAS - LVLD AP4: 6.5 CM
BH CV ECHO MEAS - LVLS AP2: 6.2 CM
BH CV ECHO MEAS - LVLS AP4: 5.6 CM
BH CV ECHO MEAS - LVOT AREA (M): 2.8 CM^2
BH CV ECHO MEAS - LVOT AREA: 2.9 CM^2
BH CV ECHO MEAS - LVOT DIAM: 1.9 CM
BH CV ECHO MEAS - LVPWD: 1.1 CM
BH CV ECHO MEAS - MED PEAK E' VEL: 5.8 CM/SEC
BH CV ECHO MEAS - MR MAX PG: 229.5 MMHG
BH CV ECHO MEAS - MR MAX VEL: 757.4 CM/SEC
BH CV ECHO MEAS - MR MEAN PG: 136.6 MMHG
BH CV ECHO MEAS - MR MEAN VEL: 563.5 CM/SEC
BH CV ECHO MEAS - MR VTI: 255.2 CM
BH CV ECHO MEAS - MV A DUR: 0.16 SEC
BH CV ECHO MEAS - MV A MAX VEL: 81.4 CM/SEC
BH CV ECHO MEAS - MV DEC SLOPE: 387 CM/SEC^2
BH CV ECHO MEAS - MV DEC TIME: 0.23 SEC
BH CV ECHO MEAS - MV E MAX VEL: 59.1 CM/SEC
BH CV ECHO MEAS - MV E/A: 0.73
BH CV ECHO MEAS - MV MAX PG: 3.7 MMHG
BH CV ECHO MEAS - MV MEAN PG: 1.5 MMHG
BH CV ECHO MEAS - MV P1/2T MAX VEL: 103.8 CM/SEC
BH CV ECHO MEAS - MV P1/2T: 78.5 MSEC
BH CV ECHO MEAS - MV V2 MAX: 96.5 CM/SEC
BH CV ECHO MEAS - MV V2 MEAN: 56.9 CM/SEC
BH CV ECHO MEAS - MV V2 VTI: 33.3 CM
BH CV ECHO MEAS - MVA P1/2T LCG: 2.1 CM^2
BH CV ECHO MEAS - MVA(P1/2T): 2.8 CM^2
BH CV ECHO MEAS - MVA(VTI): 1.9 CM^2
BH CV ECHO MEAS - PA ACC TIME: 0.19 SEC
BH CV ECHO MEAS - PA MAX PG (FULL): 1.1 MMHG
BH CV ECHO MEAS - PA MAX PG: 3.1 MMHG
BH CV ECHO MEAS - PA PR(ACCEL): -5.4 MMHG
BH CV ECHO MEAS - PA V2 MAX: 88.2 CM/SEC
BH CV ECHO MEAS - PULM A REVS DUR: 0.12 SEC
BH CV ECHO MEAS - PULM A REVS VEL: 27.9 CM/SEC
BH CV ECHO MEAS - PULM DIAS VEL: 41.6 CM/SEC
BH CV ECHO MEAS - PULM S/D: 1.6
BH CV ECHO MEAS - PULM SYS VEL: 66.2 CM/SEC
BH CV ECHO MEAS - RAP SYSTOLE: 3 MMHG
BH CV ECHO MEAS - RV MAX PG: 2 MMHG
BH CV ECHO MEAS - RV MEAN PG: 1.1 MMHG
BH CV ECHO MEAS - RV V1 MAX: 70.7 CM/SEC
BH CV ECHO MEAS - RV V1 MEAN: 48.3 CM/SEC
BH CV ECHO MEAS - RV V1 VTI: 18 CM
BH CV ECHO MEAS - RVSP: 29 MMHG
BH CV ECHO MEAS - SI(AO): 191.3 ML/M^2
BH CV ECHO MEAS - SI(CUBED): 25.3 ML/M^2
BH CV ECHO MEAS - SI(LVOT): 40.6 ML/M^2
BH CV ECHO MEAS - SI(MOD-SP2): 18.7 ML/M^2
BH CV ECHO MEAS - SI(MOD-SP4): 16.8 ML/M^2
BH CV ECHO MEAS - SI(TEICH): 27.2 ML/M^2
BH CV ECHO MEAS - SUP REN AO DIAM: 1.6 CM
BH CV ECHO MEAS - SV(AO): 296.8 ML
BH CV ECHO MEAS - SV(CUBED): 39.2 ML
BH CV ECHO MEAS - SV(LVOT): 63 ML
BH CV ECHO MEAS - SV(MOD-SP2): 29 ML
BH CV ECHO MEAS - SV(MOD-SP4): 26 ML
BH CV ECHO MEAS - SV(TEICH): 42.2 ML
BH CV ECHO MEAS - TR MAX VEL: 255.2 CM/SEC
BH CV ECHO MEASUREMENTS AVERAGE E/E' RATIO: 8.95
BH CV XLRA - RV BASE: 2.7 CM
BH CV XLRA - RV LENGTH: 6.2 CM
BH CV XLRA - RV MID: 1.8 CM
BH CV XLRA - TDI S': 10.3 CM/SEC
LEFT ATRIUM VOLUME INDEX: 18 ML/M2
LV EF 2D ECHO EST: 62 %
SINUS: 3.1 CM
STJ: 2.6 CM

## 2021-11-16 PROCEDURE — 93306 TTE W/DOPPLER COMPLETE: CPT | Performed by: INTERNAL MEDICINE

## 2021-11-16 PROCEDURE — 93306 TTE W/DOPPLER COMPLETE: CPT

## 2021-11-17 ENCOUNTER — TELEPHONE (OUTPATIENT)
Dept: CARDIOLOGY | Facility: CLINIC | Age: 86
End: 2021-11-17

## 2021-11-17 NOTE — TELEPHONE ENCOUNTER
Can you let the patient know that overall her echo is largely unchanged since her last in 2016.  Her aortic valve now has mild stenosis.  We will continue to monitor this clinically with a repeat echo in the future as indicated. No changes to her medications at this time.

## 2021-11-17 NOTE — TELEPHONE ENCOUNTER
Called, no answer no vm.  Will continue to try and reach her  Thanks  Suzanne Fu RN  Triage nurse

## 2022-02-18 ENCOUNTER — OFFICE VISIT (OUTPATIENT)
Dept: CARDIOLOGY | Facility: CLINIC | Age: 87
End: 2022-02-18

## 2022-02-18 VITALS
BODY MASS INDEX: 18.99 KG/M2 | HEIGHT: 65 IN | WEIGHT: 114 LBS | HEART RATE: 70 BPM | DIASTOLIC BLOOD PRESSURE: 80 MMHG | SYSTOLIC BLOOD PRESSURE: 120 MMHG

## 2022-02-18 DIAGNOSIS — I34.1 MVP (MITRAL VALVE PROLAPSE): Primary | ICD-10-CM

## 2022-02-18 DIAGNOSIS — I34.0 NON-RHEUMATIC MITRAL REGURGITATION: ICD-10-CM

## 2022-02-18 DIAGNOSIS — I10 PRIMARY HYPERTENSION: ICD-10-CM

## 2022-02-18 PROCEDURE — 93000 ELECTROCARDIOGRAM COMPLETE: CPT | Performed by: NURSE PRACTITIONER

## 2022-02-18 PROCEDURE — 99214 OFFICE O/P EST MOD 30 MIN: CPT | Performed by: NURSE PRACTITIONER

## 2022-02-18 NOTE — PROGRESS NOTES
Date of Office Visit: 2022  Encounter Provider: Ree Rosario, DAVID, APRN  Place of Service: [facility] CARDIOLOGY  Patient Name: Carito Gonzalez  :3/7/1930        Subjective:     Chief Complaint:  Mitral valve disease, hypertension      History of Present Illness:  Carito Gonzalez is a 89 y.o. female patient of Dr. Magana.  This patient is new to me and I have reviewed her records.    Patient has a history of hypertension, mitral valve prolapse with regurgitation, pulmonary hypertension.    In 2016 patient was noted to have normal LV systolic function with EF of 55%, grade 2 diastolic dysfunction, severe mitral regurgitation, severe prolapse of the posterior mitral valve leaflet.  Also with mild tricuspid regurgitation RVSP of 45 mmHg.  2017 she was admitted with acute right DVT T as well as acute renal insufficiency.  She was placed on Eliquis and completed therapy.    She was seen back in the office 2021 at which point she noted edema as well as slight dyspnea on exertion which was new.  proBNP was normal.  CBC showed normal hemoglobin.  TSH was normal.  Echo 2021 showed normal LV systolic function with EF of 62%, grade 1 diastolic dysfunction, severe calcification of the aortic valve with mild aortic stenosis but no regurgitation, moderate mitral valve prolapse with moderate bileaflet mitral valve thickening with moderate to severe mitral regurgitation with pulmonary vein flow reversal, trace tricuspid regurgitation with normal RVSP.  Echo was overall unchanged from 2016.      Patient presents to office today for follow-up appointment.  Patient's son Levi is with her in the office today, per patient preference.  Patient currently lives in [facility].  Patient reports she is feeling really well since last visit.  She reports she might get some occasional trace dependent bilateral lower extremity swelling which resolves with elevation,  unchanged or improved since last visit.  She has increased her walking routine since last visit.  She was previously walking 1 mile a day and now walks 2 miles a day.  Her son does note that she does a half a mile at a time, 4 times a day and patient collaborates this.  Patient denies any exertional symptoms or concerns.  No chest pain or shortness of breath symptoms.  No palpitations, dizziness, syncope, near syncope, falls, fatigue, or abnormal bleeding.  Blood pressure well controlled.        Past Medical History:   Diagnosis Date   • Abscess of leg    • Acute deep vein thrombosis (DVT) of left lower extremity (HCC)    • Acute deep vein thrombosis (DVT) of right lower extremity (HCC)    • Acute renal insufficiency    • Angiotensin converting enzyme inhibitor-aggravated angioedema    • Cellulitis    • CKD (chronic kidney disease) stage 3, GFR 30-59 ml/min (HCC)    • Depression    • Herpes zoster    • Hypercholesterolemia    • Hyperlipidemia    • Hypertension    • Hypotension    • Macular degeneration     Noted February 2014   • Mitral regurgitation    • Mitral valve prolapse    • SOB (shortness of breath)    • Zoster      Past Surgical History:   Procedure Laterality Date   • HIP FRACTURE SURGERY Right 09/18/2017   • SKIN CANCER EXCISION       Outpatient Medications Prior to Visit   Medication Sig Dispense Refill   • amLODIPine (NORVASC) 5 MG tablet Take one tablet po q am and half tablet po q pm (Patient taking differently: Take two tablets po q am and half tablet po q pm) 90 tablet 1   • acetaminophen (TYLENOL) 325 MG tablet Take 2 tablets by mouth Every 6 (Six) Hours As Needed for mild pain (1-3).  0   • aspirin 81 MG tablet Take 1 tablet by mouth Daily. 30 tablet 1   • metoprolol succinate XL (TOPROL-XL) 25 MG 24 hr tablet Take 1 tablet by mouth Daily. 90 tablet 1   • Multiple Vitamins-Minerals (OCUTABS) tablet Take 1 tablet by mouth daily.       No facility-administered medications prior to visit.  "      Allergies as of 2022 - Reviewed 2022   Allergen Reaction Noted   • Lisinopril Angioedema 2020   • Zoloft [sertraline hcl] Shortness Of Breath 2016   • Erythromycin Swelling 2016     Social History     Socioeconomic History   • Marital status:    • Number of children: 3   Tobacco Use   • Smoking status: Former Smoker     Quit date:      Years since quittin.1   • Smokeless tobacco: Never Used   Substance and Sexual Activity   • Alcohol use: No     Comment: Daily caffeine use   • Drug use: No   • Sexual activity: Defer     Family History   Problem Relation Age of Onset   • Heart attack Mother    • Hypertension Mother    • Heart attack Father    • Hypertension Father    • Heart attack Brother 58   • Hypertension Brother          Review of Systems   Constitutional: Negative for malaise/fatigue.   Cardiovascular:        SEE HPI   Respiratory: Negative for shortness of breath.    Hematologic/Lymphatic: Negative for bleeding problem.   Musculoskeletal: Negative for falls.   Gastrointestinal: Negative for melena.   Genitourinary: Negative for hematuria.   Neurological: Negative for dizziness.   Psychiatric/Behavioral: Negative for altered mental status.          Objective:     Vitals:    22 1148   BP: 120/80   BP Location: Left arm   Patient Position: Sitting   Pulse: 70   Weight: 51.7 kg (114 lb)   Height: 165.1 cm (65\")     Body mass index is 18.97 kg/m².      PHYSICAL EXAM:  Constitutional:       General: Not in acute distress.     Appearance: Well-developed. Not diaphoretic.   HENT:      Head: Normocephalic and atraumatic.   Neck:      Vascular: No JVD.   Pulmonary:      Effort: Pulmonary effort is normal. No respiratory distress.      Breath sounds: Normal breath sounds. No wheezing. No rales.   Cardiovascular:      Normal rate. Regular rhythm.      Murmurs: There is a grade 2 to 3/6 systolic murmur.      No gallop. No click. No rub.   Edema:     Peripheral edema " absent.   Abdominal:      General: Bowel sounds are normal. There is no distension.      Palpations: Abdomen is soft.   Musculoskeletal:         General: No tenderness or deformity.      Cervical back: Neck supple. Skin:     General: Skin is warm and dry.      Findings: No erythema or rash.   Neurological:      Mental Status: Alert and oriented to person, place, and time.   Psychiatric:         Behavior: Behavior normal.         Judgment: Judgment normal.             ECG 12 Lead    Date/Time: 2/18/2022 12:03 PM  Performed by: Ree Rosario DNP, APRN  Authorized by: Ree Rosario DNP, ERROL   Comparison: compared with previous ECG from 11/2/2021  Rhythm: sinus rhythm  Rate: normal  BPM: 69  Other findings: non-specific ST-T wave changes  Other findings comments: Consider previous anteroseptal infarction  Comments: No significant changes from previous EKG              Assessment:       Diagnosis Plan   1. MVP (mitral valve prolapse)     2. Non-rheumatic mitral regurgitation     3. Primary hypertension           Plan:     1. Mitral valve prolapse with mitral regurgitation: Moderate to severe regurgitation on recent 11/2021 echo.  Regurgitation was severe back on 2016 echo.  proBNP normal 11/2021.  She is walking 2 miles a day without exertional symptoms or concerns.  She reports that given her age she does not believe she would want any valvular interventions even if she did develop symptoms down the road.  Definitely would not want any surgery.  2. Mild aortic stenosis: Noted on 11/2021 echo  3. Hypertension: Well controlled on current regimen.  Continue to monitor.  No medication changes made today, med list has been updated to reflect medication doses taking at assisted living facility.   4. Chronic renal insufficiency  5. Hyperlipidemia: Managed by outside provider  6. History of anemia    Patient to follow-up with Dr. Magana in 6 months or sooner if needed for any new, recurrent, or worsening symptoms or  concerns.  Discussed in detail signs/symptoms that warrant sooner call or follow-up to the office or ER visit.           Your medication list          Accurate as of February 18, 2022 12:21 PM. If you have any questions, ask your nurse or doctor.            CHANGE how you take these medications      Instructions Last Dose Given Next Dose Due   amLODIPine 5 MG tablet  Commonly known as: NORVASC  What changed: additional instructions      Take one tablet po q am and half tablet po q pm          CONTINUE taking these medications      Instructions Last Dose Given Next Dose Due   acetaminophen 325 MG tablet  Commonly known as: TYLENOL      Take 2 tablets by mouth Every 6 (Six) Hours As Needed for mild pain (1-3).       aspirin 81 MG tablet      Take 1 tablet by mouth Daily.       metoprolol succinate XL 25 MG 24 hr tablet  Commonly known as: TOPROL-XL      Take 1 tablet by mouth Daily.       Ocutabs tablet tablet  Generic drug: multivitamin with minerals      Take 1 tablet by mouth daily.              I did NOT stop or change the above medications.  Patient's medication list was updated to reflect medications they are currently taking including medication changes made by other providers.            Thanks,    Ree Rosario, DAVID, APRN  02/18/2022         Dictated utilizing Dragon dictation

## 2022-03-10 ENCOUNTER — OFFICE VISIT (OUTPATIENT)
Dept: FAMILY MEDICINE CLINIC | Facility: CLINIC | Age: 87
End: 2022-03-10

## 2022-03-10 VITALS
DIASTOLIC BLOOD PRESSURE: 72 MMHG | OXYGEN SATURATION: 96 % | BODY MASS INDEX: 18.99 KG/M2 | WEIGHT: 114 LBS | HEIGHT: 65 IN | TEMPERATURE: 96 F | RESPIRATION RATE: 18 BRPM | SYSTOLIC BLOOD PRESSURE: 122 MMHG | HEART RATE: 76 BPM

## 2022-03-10 DIAGNOSIS — M79.671 PAIN OF RIGHT HEEL: Primary | ICD-10-CM

## 2022-03-10 PROCEDURE — 99214 OFFICE O/P EST MOD 30 MIN: CPT | Performed by: INTERNAL MEDICINE

## 2022-03-11 PROBLEM — M79.671 PAIN OF RIGHT HEEL: Status: ACTIVE | Noted: 2022-03-11

## 2022-03-11 RX ORDER — MELOXICAM 7.5 MG/1
7.5 TABLET ORAL DAILY
Qty: 14 TABLET | Refills: 0 | Status: SHIPPED | OUTPATIENT
Start: 2022-03-11

## 2022-03-11 NOTE — PROGRESS NOTES
Subjective   Carito Gonzalez is a 89 y.o. female. Patient is here today for   Chief Complaint   Patient presents with   • Foot Injury     PT C/O RIGHT HEEL BEING SORE, FOOT IS SWOLLEN AND RED FOR THE LAST 2 WEEKS           Vitals:    03/10/22 1533   BP: 122/72   Pulse: 76   Resp: 18   Temp: 96 °F (35.6 °C)   SpO2: 96%     Body mass index is 18.97 kg/m².      Past Medical History:   Diagnosis Date   • Abscess of leg    • Acute deep vein thrombosis (DVT) of left lower extremity (HCC)    • Acute deep vein thrombosis (DVT) of right lower extremity (HCC)    • Acute renal insufficiency    • Angiotensin converting enzyme inhibitor-aggravated angioedema    • Cellulitis    • CKD (chronic kidney disease) stage 3, GFR 30-59 ml/min ([facility])    • Depression    • Herpes zoster    • Hypercholesterolemia    • Hyperlipidemia    • Hypertension    • Hypotension    • Macular degeneration     Noted 2014   • Mitral regurgitation    • Mitral valve prolapse    • SOB (shortness of breath)    • Zoster       Allergies   Allergen Reactions   • Lisinopril Angioedema   • Zoloft [Sertraline Hcl] Shortness Of Breath   • Erythromycin Swelling      Social History     Socioeconomic History   • Marital status:    • Number of children: 3   Tobacco Use   • Smoking status: Former Smoker     Quit date:      Years since quittin.2   • Smokeless tobacco: Never Used   Substance and Sexual Activity   • Alcohol use: No     Comment: Daily caffeine use   • Drug use: No   • Sexual activity: Defer        Current Outpatient Medications:   •  acetaminophen (TYLENOL) 325 MG tablet, Take 2 tablets by mouth Every 6 (Six) Hours As Needed for mild pain (1-3)., Disp: , Rfl: 0  •  amLODIPine (NORVASC) 5 MG tablet, Take one tablet po q am and half tablet po q pm (Patient taking differently: Take two tablets po q am and half tablet po q pm), Disp: 90 tablet, Rfl: 1  •  aspirin 81 MG tablet, Take 1 tablet by mouth Daily., Disp: 30 tablet, Rfl: 1  •   metoprolol succinate XL (TOPROL-XL) 25 MG 24 hr tablet, Take 1 tablet by mouth Daily., Disp: 90 tablet, Rfl: 1  •  Multiple Vitamins-Minerals (OCUTABS) tablet, Take 1 tablet by mouth daily., Disp: , Rfl:   •  meloxicam (Mobic) 7.5 MG tablet, Take 1 tablet by mouth Daily., Disp: 14 tablet, Rfl: 0     Objective     She complains of right heel pain.    She had an x-ray of her right heel.  She and I reviewed the results.  She brought the printed report from the radiologist reading who indicated that she had a right plantar abnormality but there is no explanation on the report about what that abnormality is.  One might presume that it is a heel spur.        Foot Injury          Review of Systems   Constitutional: Negative.    HENT: Negative.    Respiratory: Negative.    Cardiovascular: Negative.    Musculoskeletal:        She has right heel pain.   Psychiatric/Behavioral: Negative.        Physical Exam  Vitals and nursing note reviewed.   Constitutional:       Appearance: Normal appearance.      Comments: Pleasant, neatly groomed, in no distress.   Cardiovascular:      Rate and Rhythm: Regular rhythm.      Heart sounds: Normal heart sounds. No murmur heard.    No gallop.   Musculoskeletal:      Comments: She had pain on palpation of the plantar calcaneus on the right side.   Neurological:      Mental Status: She is alert and oriented to person, place, and time.   Psychiatric:         Mood and Affect: Mood normal.         Behavior: Behavior normal.         Thought Content: Thought content normal.           Problems Addressed this Visit        Musculoskeletal and Injuries    Pain of right heel - Primary      Diagnoses       Codes Comments    Pain of right heel    -  Primary ICD-10-CM: M79.671  ICD-9-CM: 729.5             PLAN  She tells me her pain is improved somewhat.  Sent out a prescription for meloxicam 7.5 mg once daily.  She can take that for a couple weeks.  She will wear tennis shoes and generally try to keep  weight off for heel for some few days.    She enjoys walking a great deal and I asked her to give that a rest over the next several days.    If she finds that he does not improve, she will let me know, and I would make a referral for her to see orthopedic (Dr. Landeros).    At that point, she might benefit from a steroid injection.  No follow-ups on file.

## 2022-05-04 NOTE — PROGRESS NOTES
Subjective   Carito Gonzalez is a 87 y.o. female.     History of Present Illness     Patient presents today to follow-up hypertension.  His last seen by me prostate 4 months ago which time her blood pressure is well-controlled.  Today she is compliant with medication, she does not add salt at the table, although she does live at [facility] (personal care) and she has little control over the diet.  Did fill out forms for her today to begin an exercise program.  When checked at [facility], her pressures typically normal.    The following portions of the patient's history were reviewed and updated as appropriate: allergies, current medications, past family history, past medical history, past social history, past surgical history and problem list.    Review of Systems   Respiratory: Negative for chest tightness and shortness of breath.    Cardiovascular: Negative for chest pain and palpitations.   Neurological: Negative for dizziness, syncope, speech difficulty, weakness, light-headedness and headaches.       Objective   Physical Exam   Constitutional: She is oriented to person, place, and time. She appears well-developed and well-nourished. No distress.   Cardiovascular: Normal rate, regular rhythm, normal heart sounds and intact distal pulses.  Exam reveals no gallop and no friction rub.    No murmur heard.  Pulmonary/Chest: Effort normal and breath sounds normal. She has no wheezes. She has no rales.   Musculoskeletal: She exhibits no edema.   Neurological: She is alert and oriented to person, place, and time.   Skin: Skin is warm and dry. No rash noted.   Psychiatric: She has a normal mood and affect. Her behavior is normal. Judgment and thought content normal.   Nursing note and vitals reviewed.      Assessment/Plan   Carito was seen today for hypertension.    Diagnoses and all orders for this visit:    Essential hypertension  -     CBC & Differential  -     Comprehensive Metabolic Panel        #1 mild elevation in  pressure at this time, increase hydrochlorothiazide to 25 mg per day.  She will also check and see if there is a no added salt option for her dining at the personal care home.  Patient has an appointment with cardiology the end of the month, her pressure can be assessed there and further intervention undertaken as needed.  We'll check lab as above and follow-up results by mail.  Regular follow-up with me in 5 months        Detail Level: Detailed Detail Level: Zone

## 2023-04-18 ENCOUNTER — OFFICE VISIT (OUTPATIENT)
Dept: CARDIOLOGY | Facility: CLINIC | Age: 88
End: 2023-04-18
Payer: MEDICARE

## 2023-04-18 ENCOUNTER — TELEPHONE (OUTPATIENT)
Dept: CARDIOLOGY | Facility: CLINIC | Age: 88
End: 2023-04-18

## 2023-04-18 VITALS
HEART RATE: 65 BPM | BODY MASS INDEX: 18.49 KG/M2 | DIASTOLIC BLOOD PRESSURE: 80 MMHG | HEIGHT: 65 IN | SYSTOLIC BLOOD PRESSURE: 120 MMHG | WEIGHT: 111 LBS

## 2023-04-18 DIAGNOSIS — N18.30 STAGE 3 CHRONIC KIDNEY DISEASE, UNSPECIFIED WHETHER STAGE 3A OR 3B CKD: ICD-10-CM

## 2023-04-18 DIAGNOSIS — I34.1 MVP (MITRAL VALVE PROLAPSE): ICD-10-CM

## 2023-04-18 DIAGNOSIS — I10 PRIMARY HYPERTENSION: ICD-10-CM

## 2023-04-18 DIAGNOSIS — I34.0 NON-RHEUMATIC MITRAL REGURGITATION: Primary | ICD-10-CM

## 2023-04-18 RX ORDER — AMLODIPINE BESYLATE 5 MG/1
TABLET ORAL
Qty: 270 TABLET | Refills: 3 | Status: SHIPPED | OUTPATIENT
Start: 2023-04-18

## 2023-04-18 RX ORDER — AMOXICILLIN 250 MG
1 CAPSULE ORAL AS NEEDED
COMMUNITY

## 2023-04-18 NOTE — PROGRESS NOTES
Date of Office Visit: 2023  Encounter Provider: Quin Magana MD  Place of Service: [facility] CARD[facility]  Patient Name: Carito Gonzalez  :3/7/1930    Chief complaint  Aortic stenosis, mitral valve prolapse mild regurgitation.    History of Present Illness  Patient is a delightful 89-year-old female with hypertension and mitral valve prolapse with mitral regurgitation.  In 2016 she was noted to have normal left ventricular size and systolic function with an ejection fraction 55%, grade 2 diastolic dysfunction with severe mitral valve regurgitation with severe prolapse of the posterior mitral valve leaflet.  There was also mild tricuspid regurgitation.  RV systolic pressure 45 mmHg. in 2017 she was admitted with an acute right acute deep venous thrombosis as well as acute renal insufficiency.  She was transiently placed on Eliquis and completed therapy for this.  Follow-up echo on 2021 showed ejection fraction 60% with grade 1 diastolic dysfunction mild aortic valve stenosis with moderate mitral prolapse and moderate severe mitral vegetation, RVSP was 29 mmHg.    Since last visit she has had no chest pain, shortness of breath, palpitations, syncope near syncope.  She is walking 2 miles daily lifting 2 pounds 3 times a week.    No recent labs    Past Medical History:   Diagnosis Date   • Abscess of leg    • Acute deep vein thrombosis (DVT) of left lower extremity    • Acute deep vein thrombosis (DVT) of right lower extremity    • Acute renal insufficiency    • Angiotensin converting enzyme inhibitor-aggravated angioedema    • Cellulitis    • CKD (chronic kidney disease) stage 3, GFR 30-59 ml/min    • Depression    • Herpes zoster    • Hypercholesterolemia    • Hyperlipidemia    • Hypertension    • Hypotension    • Macular degeneration     Noted 2014   • Mitral regurgitation    • Mitral valve prolapse    • SOB (shortness of breath)    • Zoster      Past  Surgical History:   Procedure Laterality Date   • HIP FRACTURE SURGERY Right 2017   • SKIN CANCER EXCISION       Outpatient Medications Prior to Visit   Medication Sig Dispense Refill   • acetaminophen (TYLENOL) 325 MG tablet Take 2 tablets by mouth Every 6 (Six) Hours As Needed for mild pain (1-3).  0   • aspirin 81 MG tablet Take 1 tablet by mouth Daily. 30 tablet 1   • metoprolol succinate XL (TOPROL-XL) 25 MG 24 hr tablet Take 1 tablet by mouth Daily. 90 tablet 1   • Multiple Vitamins-Minerals (OCUTABS) tablet Take 1 tablet by mouth Daily.     • sennosides-docusate (Senexon-S) 8.6-50 MG per tablet Take 1 tablet by mouth As Needed for Constipation.     • amLODIPine (NORVASC) 5 MG tablet Take one tablet po q am and half tablet po q pm (Patient taking differently: Take twoand a half tablets po q am and half tablet po q pm) 90 tablet 1   • meloxicam (Mobic) 7.5 MG tablet Take 1 tablet by mouth Daily. (Patient not taking: Reported on 2023) 14 tablet 0     No facility-administered medications prior to visit.       Allergies as of 2023 - Reviewed 2023   Allergen Reaction Noted   • Lisinopril Angioedema 2020   • Zoloft [sertraline hcl] Shortness Of Breath 2016   • Erythromycin Swelling 2016     Social History     Socioeconomic History   • Marital status:    • Number of children: 3   Tobacco Use   • Smoking status: Former     Types: Cigarettes     Quit date:      Years since quittin.3   • Smokeless tobacco: Never   Substance and Sexual Activity   • Alcohol use: No     Comment: Daily caffeine use   • Drug use: No   • Sexual activity: Defer     Family History   Problem Relation Age of Onset   • Heart attack Mother    • Hypertension Mother    • Heart attack Father    • Hypertension Father    • Heart attack Brother 58   • Hypertension Brother      Review of Systems   Constitutional: Negative for chills, fever, weight gain and weight loss.   Cardiovascular: Negative for  "leg swelling.   Respiratory: Negative for cough, snoring and wheezing.    Hematologic/Lymphatic: Negative for bleeding problem. Does not bruise/bleed easily.   Skin: Negative for color change.   Musculoskeletal: Negative for falls, joint pain and myalgias.   Gastrointestinal: Negative for melena.   Genitourinary: Negative for hematuria.   Neurological: Negative for excessive daytime sleepiness.   Psychiatric/Behavioral: Negative for depression. The patient is not nervous/anxious.         Objective:     Vitals:    04/18/23 1214   BP: 120/80   Pulse: 65   Weight: 50.3 kg (111 lb)   Height: 165.1 cm (65\")     Body mass index is 18.47 kg/m².    Vitals reviewed.   Constitutional:       Appearance: Well-developed.   Eyes:      General: No scleral icterus.        Right eye: No discharge.      Conjunctiva/sclera: Conjunctivae normal.      Pupils: Pupils are equal, round, and reactive to light.   HENT:      Head: Normocephalic.      Nose: Nose normal.   Neck:      Thyroid: No thyromegaly.      Vascular: No JVD.   Pulmonary:      Effort: Pulmonary effort is normal. No respiratory distress.      Breath sounds: Normal breath sounds. No wheezing. No rales.   Cardiovascular:      Normal rate. Regular rhythm. Normal S1. Normal S2.      Murmurs: There is a grade 2/6 harsh midsystolic murmur at the URSB, radiating to the neck. There is also a systolic murmur.   Abdominal:      General: Bowel sounds are normal. There is no distension.      Palpations: Abdomen is soft.      Tenderness: There is no abdominal tenderness. There is no rebound.   Musculoskeletal: Normal range of motion.         General: No tenderness.      Cervical back: Normal range of motion and neck supple. Skin:     General: Skin is warm and dry.      Findings: No erythema or rash.   Neurological:      Mental Status: Alert and oriented to person, place, and time.   Psychiatric:         Behavior: Behavior normal.         Thought Content: Thought content normal.         " Judgment: Judgment normal.       Lab Review:         ECG 12 Lead    Date/Time: 4/18/2023 12:15 PM  Performed by: Quin Magana MD  Authorized by: Quin Magana MD   Comparison: compared with previous ECG   Similar to previous ECG  Rhythm: sinus rhythm  Conduction: 1st degree AV block  Other findings: left atrial abnormality  Other findings comments: Consider prior anteroseptal infarction    Clinical impression: abnormal EKG          Assessment:       Diagnosis Plan   1. Non-rheumatic mitral regurgitation  ECG 12 Lead      2. MVP (mitral valve prolapse)        3. Primary hypertension        4. Stage 3 chronic kidney disease, unspecified whether stage 3a or 3b CKD          Plan:       1.  Mitral valve prolapse with severe mitral valve regurgitation.  Clinically stable on the current regimen actually doing quite well.  2.  Aortic valve stenosis, mild.  Continue current regimen follow-up in 6 months.  3.  Hypertension.  Controlled on her current regimen.   3.  Hypertension, controlled  4.  History of renal insufficiency.  We will ask for a copy of recent labs from her facility.  5.  Anemia  6.  Hyperlipidemia.  She has no interest in treating this and does not want to have it checked in the future.      Time Spent: I spent 35 minutes caring for Carito on this date of service. This time includes time spent by me in the following activities: preparing for the visit, reviewing tests, obtaining and/or reviewing a separately obtained history, performing a medically appropriate examination and/or evaluation, counseling and educating the patient/family/caregiver, ordering medications, tests, or procedures, documenting information in the medical record and independently interpreting results and communicating that information with the patient/family/caregiver.   I spent 1 minutes on the separately reported service of ECG. This time is not included in the time used to support the E/M service also reported today.        Your  medication list          Accurate as of April 18, 2023 11:59 PM. If you have any questions, ask your nurse or doctor.            CHANGE how you take these medications      Instructions Last Dose Given Next Dose Due   amLODIPine 2.5 MG tablet  Commonly known as: NORVASC  What changed:   · medication strength  · additional instructions  Changed by: Quin Magana MD      Take two tablets every am and one tablet every pm          CONTINUE taking these medications      Instructions Last Dose Given Next Dose Due   acetaminophen 325 MG tablet  Commonly known as: TYLENOL      Take 2 tablets by mouth Every 6 (Six) Hours As Needed for mild pain (1-3).       aspirin 81 MG tablet      Take 1 tablet by mouth Daily.       metoprolol succinate XL 25 MG 24 hr tablet  Commonly known as: TOPROL-XL      Take 1 tablet by mouth Daily.       Ocutabs tablet tablet  Generic drug: multivitamin with minerals      Take 1 tablet by mouth Daily.       Senexon-S 8.6-50 MG per tablet  Generic drug: sennosides-docusate      Take 1 tablet by mouth As Needed for Constipation.             Where to Get Your Medications      These medications were sent to [facility] - [place], [place] - [address] [phone] [facility] [phone] [address] [address]    Phone: [phone]   · amLODIPine 2.5 MG tablet         Patient is no longer taking -.  I corrected the med list to reflect this.  I did not stop these medications.      Dictated utilizing Dragon dictation

## 2023-04-19 RX ORDER — AMLODIPINE BESYLATE 2.5 MG/1
TABLET ORAL
Qty: 270 TABLET | Refills: 3 | Status: SHIPPED | OUTPATIENT
Start: 2023-04-19

## 2023-04-28 NOTE — TELEPHONE ENCOUNTER
[facility] sent a fax that they have not drawn any labs since 2019.    I caleld the pt and advised her if anyone does take any labs then to let me know.    Dr Magana : Let em know if you want to order anything and [facility] said they will draw if you need something.

## 2023-05-01 NOTE — TELEPHONE ENCOUNTER
PT DOES NOT WANT BLOOD DRAWN AT [facility]- WOULD LIKE ORDERS SENT TO  DR. YAZMIN VEGA'S OFFICE IF POSSIBLE

## 2023-05-02 NOTE — TELEPHONE ENCOUNTER
She has been getting labs with Dr. VEGA, please see if a BMP and CBC have been done there.  If not please have her get these labs at his office.

## 2023-05-03 NOTE — TELEPHONE ENCOUNTER
No labs since 2021.     Facility at [facility] said they will draw these if you want.  Just need orders.      LMM with pt's daughter (Violet) @ [phone] re: labs and where do they want to have labs completed.

## 2023-05-08 NOTE — TELEPHONE ENCOUNTER
DAUGHTER CALLED AND SAID YOU COULD SEND LAB ORDERS  TO DR VEGA OFFICE. THERE IS A LAB IN HIS OFFICE. PLEASE CALL DAUGHTER DEMOND AT  #[phone] BEFORE FAXING LAB ORDERS , SHE WOULD LIKE TO TALK TO YOU FIRST.

## 2023-05-14 NOTE — TELEPHONE ENCOUNTER
I am not sure if they will take these labs.  Jovana, can you please check and find out what the daughter's questions are.

## 2023-05-18 DIAGNOSIS — R06.09 DOE (DYSPNEA ON EXERTION): ICD-10-CM

## 2023-05-18 DIAGNOSIS — I34.0 NON-RHEUMATIC MITRAL REGURGITATION: Primary | ICD-10-CM

## 2023-05-18 NOTE — TELEPHONE ENCOUNTER
[facility] will draw any labs.  They just need an lab order.  Please let me place order and I can send.

## 2023-05-18 NOTE — TELEPHONE ENCOUNTER
Sent note to Dr Turner that orders are in Epic.    Left message for Violet re: Orders were placed.  Call if any issues.

## 2023-05-22 ENCOUNTER — TELEPHONE (OUTPATIENT)
Dept: FAMILY MEDICINE CLINIC | Facility: CLINIC | Age: 88
End: 2023-05-22
Payer: MEDICARE

## 2023-05-22 NOTE — TELEPHONE ENCOUNTER
Caller: MASOUD SCHULZ    Relationship: DAUGHTER    Best call back number: [phone]    What was the call regarding: PATIENT'S DAUGHTER STATED THAT DR RIVERA, PATIENT'S CARDIOLOGIST, HAS ORDERED LABS FOR HER AND THEIR OFFICE SHOULD BE FAXING ORDERS OVER. PATIENT'S DAUGHTER WAS CALLING TO CONFIRM IF THOSE HAVE BEEN RECEIVED OR NOT. PLEASE ADVISE.    Do you require a callback: YES

## 2023-05-23 ENCOUNTER — TELEPHONE (OUTPATIENT)
Dept: FAMILY MEDICINE CLINIC | Facility: CLINIC | Age: 88
End: 2023-05-23

## 2023-05-23 NOTE — TELEPHONE ENCOUNTER
Spoke with pt's daughter and they will go over and complete with Dr Turner.  No other questions.  (Done)

## 2023-05-23 NOTE — TELEPHONE ENCOUNTER
Attempted to contact pt's daughter regarding lab orders. Left a VM to call back.    HUB TO READ:  WE DID RECEIVE THE FAX FOR THE ORDERS.

## 2023-05-23 NOTE — TELEPHONE ENCOUNTER
Caller: Violet Bernstein    Relationship to patient: Emergency Contact    Best call back numbeR   [phone] (Home)       Patient is needing: TO SCHEDULE LABS

## 2023-05-25 NOTE — TELEPHONE ENCOUNTER
Caller: Violet Bernstein    Relationship to patient: Emergency Contact    Best call back number:   [phone] (Home)       Patient is needing: RETURNING A CALL TO [facility]. UNABLE TO WARM TRANSFER. WANTS TO SCHEDULE LABS.

## 2023-05-26 DIAGNOSIS — E78.00 HYPERCHOLESTEROLEMIA: Primary | ICD-10-CM

## 2023-05-26 DIAGNOSIS — R06.02 SOB (SHORTNESS OF BREATH): ICD-10-CM

## 2023-05-31 ENCOUNTER — TELEPHONE (OUTPATIENT)
Dept: CARDIOLOGY | Facility: CLINIC | Age: 88
End: 2023-05-31

## 2023-05-31 LAB
ALBUMIN SERPL-MCNC: 4.2 G/DL (ref 3.5–4.6)
ALBUMIN/GLOB SERPL: 1.8 {RATIO} (ref 1.2–2.2)
ALP SERPL-CCNC: 61 IU/L (ref 44–121)
ALT SERPL-CCNC: 11 IU/L (ref 0–32)
APPEARANCE UR: ABNORMAL
AST SERPL-CCNC: 17 IU/L (ref 0–40)
BACTERIA #/AREA URNS HPF: ABNORMAL /[HPF]
BASOPHILS # BLD AUTO: 0 X10E3/UL (ref 0–0.2)
BASOPHILS NFR BLD AUTO: 1 %
BILIRUB SERPL-MCNC: 0.6 MG/DL (ref 0–1.2)
BILIRUB UR QL STRIP: NEGATIVE
BUN SERPL-MCNC: 17 MG/DL (ref 10–36)
BUN/CREAT SERPL: 19 (ref 12–28)
CALCIUM SERPL-MCNC: 9.4 MG/DL (ref 8.7–10.3)
CASTS URNS QL MICRO: ABNORMAL /LPF
CHLORIDE SERPL-SCNC: 101 MMOL/L (ref 96–106)
CHOLEST SERPL-MCNC: 260 MG/DL (ref 100–199)
CHOLEST/HDLC SERPL: 3.5 RATIO (ref 0–4.4)
CO2 SERPL-SCNC: 23 MMOL/L (ref 20–29)
COLOR UR: YELLOW
CREAT SERPL-MCNC: 0.89 MG/DL (ref 0.57–1)
EGFRCR SERPLBLD CKD-EPI 2021: 60 ML/MIN/1.73
EOSINOPHIL # BLD AUTO: 0.1 X10E3/UL (ref 0–0.4)
EOSINOPHIL NFR BLD AUTO: 1 %
EPI CELLS #/AREA URNS HPF: ABNORMAL /HPF (ref 0–10)
ERYTHROCYTE [DISTWIDTH] IN BLOOD BY AUTOMATED COUNT: 13.3 % (ref 11.7–15.4)
GLOBULIN SER CALC-MCNC: 2.3 G/DL (ref 1.5–4.5)
GLUCOSE SERPL-MCNC: 83 MG/DL (ref 70–99)
GLUCOSE UR QL STRIP: NEGATIVE
HCT VFR BLD AUTO: 38 % (ref 34–46.6)
HDLC SERPL-MCNC: 74 MG/DL
HGB BLD-MCNC: 12.7 G/DL (ref 11.1–15.9)
HGB UR QL STRIP: NEGATIVE
IMM GRANULOCYTES # BLD AUTO: 0 X10E3/UL (ref 0–0.1)
IMM GRANULOCYTES NFR BLD AUTO: 0 %
KETONES UR QL STRIP: ABNORMAL
LDLC SERPL CALC-MCNC: 178 MG/DL (ref 0–99)
LEUKOCYTE ESTERASE UR QL STRIP: ABNORMAL
LYMPHOCYTES # BLD AUTO: 1 X10E3/UL (ref 0.7–3.1)
LYMPHOCYTES NFR BLD AUTO: 19 %
MCH RBC QN AUTO: 30.5 PG (ref 26.6–33)
MCHC RBC AUTO-ENTMCNC: 33.4 G/DL (ref 31.5–35.7)
MCV RBC AUTO: 91 FL (ref 79–97)
MICRO URNS: ABNORMAL
MONOCYTES # BLD AUTO: 0.3 X10E3/UL (ref 0.1–0.9)
MONOCYTES NFR BLD AUTO: 6 %
NEUTROPHILS # BLD AUTO: 3.9 X10E3/UL (ref 1.4–7)
NEUTROPHILS NFR BLD AUTO: 73 %
NITRITE UR QL STRIP: POSITIVE
NT-PROBNP SERPL-MCNC: 251 PG/ML (ref 0–738)
PH UR STRIP: 6 [PH] (ref 5–7.5)
PLATELET # BLD AUTO: 235 X10E3/UL (ref 150–450)
POTASSIUM SERPL-SCNC: 4.4 MMOL/L (ref 3.5–5.2)
PROT SERPL-MCNC: 6.5 G/DL (ref 6–8.5)
PROT UR QL STRIP: ABNORMAL
RBC # BLD AUTO: 4.17 X10E6/UL (ref 3.77–5.28)
RBC #/AREA URNS HPF: ABNORMAL /HPF (ref 0–2)
SODIUM SERPL-SCNC: 139 MMOL/L (ref 134–144)
SP GR UR STRIP: 1.02 (ref 1–1.03)
TRIGL SERPL-MCNC: 53 MG/DL (ref 0–149)
TSH SERPL DL<=0.005 MIU/L-ACNC: 2.18 UIU/ML (ref 0.45–4.5)
UROBILINOGEN UR STRIP-MCNC: 1 MG/DL (ref 0.2–1)
VLDLC SERPL CALC-MCNC: 8 MG/DL (ref 5–40)
WBC # BLD AUTO: 5.4 X10E3/UL (ref 3.4–10.8)
WBC #/AREA URNS HPF: ABNORMAL /HPF (ref 0–5)

## 2023-05-31 NOTE — TELEPHONE ENCOUNTER
I did not speak with the pt yet.  Wanted you also to know that the pt had the labs also.  These are in Epic.

## 2023-05-31 NOTE — TELEPHONE ENCOUNTER
Received BP log from pt's facility for the month of May.        Current Meds:  Amlodipine 2.5mg si in the AM and 1 in the PM  Metoprolol Succinate 25 mg Q AM    **I have requested copy of her med list to verify.

## 2023-05-31 NOTE — TELEPHONE ENCOUNTER
Overall this looks good, but still has some intermittently elevated readings. If this is indeed her regimen, could increase amlodipine to 5 mg BID.

## 2023-06-01 NOTE — TELEPHONE ENCOUNTER
Labs look good overall, but cholesterol is high. She should discuss this with PCP, but Dr. Magana' note mentioned that she is not interested in treating this.

## 2023-06-06 ENCOUNTER — OFFICE VISIT (OUTPATIENT)
Dept: FAMILY MEDICINE CLINIC | Facility: CLINIC | Age: 88
End: 2023-06-06
Payer: MEDICARE

## 2023-06-06 ENCOUNTER — TELEPHONE (OUTPATIENT)
Dept: CARDIOLOGY | Facility: CLINIC | Age: 88
End: 2023-06-06
Payer: MEDICARE

## 2023-06-06 VITALS
HEART RATE: 55 BPM | HEIGHT: 65 IN | WEIGHT: 112 LBS | TEMPERATURE: 96 F | DIASTOLIC BLOOD PRESSURE: 72 MMHG | OXYGEN SATURATION: 95 % | SYSTOLIC BLOOD PRESSURE: 158 MMHG | BODY MASS INDEX: 18.66 KG/M2

## 2023-06-06 DIAGNOSIS — I10 PRIMARY HYPERTENSION: Primary | ICD-10-CM

## 2023-06-06 DIAGNOSIS — E78.00 HYPERCHOLESTEROLEMIA: ICD-10-CM

## 2023-06-06 DIAGNOSIS — N30.90 CYSTITIS: ICD-10-CM

## 2023-06-06 PROBLEM — N18.30 CKD (CHRONIC KIDNEY DISEASE) STAGE 3, GFR 30-59 ML/MIN: Status: RESOLVED | Noted: 2017-01-02 | Resolved: 2023-06-06

## 2023-06-06 PROCEDURE — 99213 OFFICE O/P EST LOW 20 MIN: CPT | Performed by: INTERNAL MEDICINE

## 2023-06-06 RX ORDER — NITROFURANTOIN 25; 75 MG/1; MG/1
100 CAPSULE ORAL 2 TIMES DAILY
Qty: 6 CAPSULE | Refills: 0 | Status: SHIPPED | OUTPATIENT
Start: 2023-06-06

## 2023-06-06 NOTE — PROGRESS NOTES
Subjective   Carito Gonzalez is a 89 y.o. female. Patient is here today for   Chief Complaint   Patient presents with    Hypertension    Hyperlipidemia    Establish Care     Going over results   New patient           Vitals:    23 1132   BP: 158/72   Pulse: 55   Temp: 96 °F (35.6 °C)   SpO2: 95%     Body mass index is 18.64 kg/m².      Past Medical History:   Diagnosis Date    Abscess of leg     Acute deep vein thrombosis (DVT) of left lower extremity     Acute deep vein thrombosis (DVT) of right lower extremity     Acute renal insufficiency     Angiotensin converting enzyme inhibitor-aggravated angioedema     Cellulitis     CKD (chronic kidney disease) stage 3, GFR 30-59 ml/min     Depression     Herpes zoster     Hypercholesterolemia     Hyperlipidemia     Hypertension     Hypotension     Macular degeneration     Noted 2014    Mitral regurgitation     Mitral valve prolapse     SOB (shortness of breath)     Zoster       Allergies   Allergen Reactions    Lisinopril Angioedema    Zoloft [Sertraline Hcl] Shortness Of Breath    Erythromycin Swelling      Social History     Socioeconomic History    Marital status:     Number of children: 3   Tobacco Use    Smoking status: Former     Types: Cigarettes     Quit date:      Years since quittin.4    Smokeless tobacco: Never   Substance and Sexual Activity    Alcohol use: No     Comment: Daily caffeine use    Drug use: No    Sexual activity: Defer        Current Outpatient Medications:     acetaminophen (TYLENOL) 325 MG tablet, Take 2 tablets by mouth Every 6 (Six) Hours As Needed for mild pain (1-3)., Disp: , Rfl: 0    aspirin 81 MG tablet, Take 1 tablet by mouth Daily., Disp: 30 tablet, Rfl: 1    metoprolol succinate XL (TOPROL-XL) 25 MG 24 hr tablet, Take 1 tablet by mouth Daily., Disp: 90 tablet, Rfl: 1    Multiple Vitamins-Minerals (OCUTABS) tablet, Take 1 tablet by mouth Daily., Disp: , Rfl:     amLODIPine (NORVASC) 5 MG tablet, Take 1  tablet by mouth 2 (Two) Times a Day., Disp: 180 tablet, Rfl: 3    nitrofurantoin, macrocrystal-monohydrate, (Macrobid) 100 MG capsule, Take 1 capsule by mouth 2 (Two) Times a Day., Disp: 6 capsule, Rfl: 0    sennosides-docusate (PERICOLACE) 8.6-50 MG per tablet, Take 1 tablet by mouth As Needed for Constipation. (Patient not taking: Reported on 6/6/2023), Disp: , Rfl:      Objective     History of Present Illness  This pleasant 89-year-old woman is here today to review labs.  She lives in assisted living community.  She is severely compromised as far as her vision secondary to macular degeneration.    She is very sharp.  She walks about 2-1/2 miles a day.  she does not drink any significant quantities of alcohol.     Review of Systems   Constitutional: Negative.    HENT: Negative.     Respiratory: Negative.     Cardiovascular: Negative.    Musculoskeletal: Negative.    Psychiatric/Behavioral: Negative.       Physical Exam  Vitals and nursing note reviewed.   Constitutional:       Appearance: Normal appearance. She is normal weight.      Comments: Pleasant, neatly groomed, no distress.   Neck:      Vascular: No carotid bruit.   Cardiovascular:      Rate and Rhythm: Regular rhythm.      Heart sounds: Normal heart sounds. No murmur heard.    No gallop.   Pulmonary:      Effort: No respiratory distress.      Breath sounds: Normal breath sounds. No wheezing or rales.   Neurological:      Mental Status: She is alert and oriented to person, place, and time.   Psychiatric:         Mood and Affect: Mood normal.         Behavior: Behavior normal.         Thought Content: Thought content normal.         Problems Addressed this Visit          Cardiac and Vasculature    Hypercholesterolemia    Hypertension - Primary       Genitourinary and Reproductive     Cystitis     Diagnoses         Codes Comments    Primary hypertension    -  Primary ICD-10-CM: I10  ICD-9-CM: 401.9     Hypercholesterolemia     ICD-10-CM: E78.00  ICD-9-CM:  272.0     Cystitis     ICD-10-CM: N30.90  ICD-9-CM: 595.9               PLAN  She and I reviewed her labs today.  She does not have any urinary tract symptoms, no dysuria, no urinary frequency no strange odor to her urine.  She does however have a urinary tract infection according to labs done last week.  I asked her to start taking nitrofurantoin 100 mg 1 p.o. twice daily number 6 pills no refills.    Her hypertension is pretty well controlled.  When I checked her blood pressure got 130/80.    She has hypercholesterolemia.  She is discussed with her cardiologist the benefits and potential downsides for taking cholesterol-lowering medication.  The patient has decided that she does not work taking medication as is her cholesterol at this point.    I asked her to follow-up for a Medicare annual wellness visit in about 6 months.  Fasting labs prior to that visit should include: Lipid profile, comprehensive metabolic panel, CBC, urinalysis.  No follow-ups on file.

## 2023-06-06 NOTE — TELEPHONE ENCOUNTER
Caller: Violet Bernstein    Relationship: Emergency Contact    Best call back number: [phone]    What is the best time to reach you: ASAP    Who are you requesting to speak with (clinical staff, provider,  specific staff member): ANY      What was the call regarding: PT DAUGHTER FOR HER MOTHER REGARDING BLOOD WORK LAB INFO. SHE NEEDS DR RIVERA'S EVALUATION TO SEE THE OTHER DOCTOR SHE'S GOING TO     Is it okay if the provider responds through MyChart: PREFERS CALL

## 2023-06-15 ENCOUNTER — TELEPHONE (OUTPATIENT)
Dept: CARDIOLOGY | Facility: CLINIC | Age: 88
End: 2023-06-15
Payer: MEDICARE

## 2023-06-15 NOTE — TELEPHONE ENCOUNTER
Update BP Readings:              Current Meds:  Amlodipine 5 mg sig: One BID (This was increased from 5mg in the AM and 2.5mg in the PM on 6/6/2023)  Metoprolol Succinate 25 mg Q AM

## 2023-07-12 ENCOUNTER — TELEPHONE (OUTPATIENT)
Dept: CARDIOLOGY | Facility: CLINIC | Age: 88
End: 2023-07-12
Payer: MEDICARE

## 2023-07-12 NOTE — TELEPHONE ENCOUNTER
Updated BP log since increasing the Metoprolol from 25mg BID to 25mg in the AM and 50mg in the PM.  Click link below.      PATIENT CORRESPONDENCE - SCAN - BP TABLE (07/01/2023)

## 2023-07-14 NOTE — TELEPHONE ENCOUNTER
Nursing Facility said these readings are checked before medication.  They do not check 1-2 hours after.  They will continue to monitor.  Not sure if this changes the Metoprolol wanting to be increased.    Pulse also has been running 57-64.    LMM with pt's daughter (Violet) re: We are getting weekly BP readings and will continue to watch.

## 2023-07-20 NOTE — TELEPHONE ENCOUNTER
I received this today from [facility]. (Phone# [phone])   There was also a note that the order ended and they will no longer be checking b/p unless we send a new order.  Lisa

## 2023-07-24 NOTE — TELEPHONE ENCOUNTER
Please let the daughter know and see what she can work out getting future blood pressure and heart rate readings with the facility.  This last few readings had been relatively stable and was slightly elevated is reasonable for her.  So if they want to get blood pressure readings once or twice a week as she is otherwise feeling well with reasonable blood patient's family will have to work with the facility to arrange this.

## 2023-07-25 NOTE — TELEPHONE ENCOUNTER
I faxed order to [facility] at [phone].  I left a voicemail for Violet informing her that the order was faxed.  Lisa

## 2023-09-20 ENCOUNTER — TELEPHONE (OUTPATIENT)
Dept: CARDIOLOGY | Facility: CLINIC | Age: 88
End: 2023-09-20
Payer: MEDICARE

## 2023-10-31 ENCOUNTER — OFFICE VISIT (OUTPATIENT)
Dept: CARDIOLOGY | Facility: CLINIC | Age: 88
End: 2023-10-31
Payer: MEDICARE

## 2023-10-31 VITALS
SYSTOLIC BLOOD PRESSURE: 120 MMHG | DIASTOLIC BLOOD PRESSURE: 70 MMHG | BODY MASS INDEX: 18.46 KG/M2 | WEIGHT: 110.8 LBS | HEART RATE: 59 BPM | HEIGHT: 65 IN

## 2023-10-31 DIAGNOSIS — I34.1 MVP (MITRAL VALVE PROLAPSE): Primary | ICD-10-CM

## 2023-10-31 DIAGNOSIS — I34.0 NON-RHEUMATIC MITRAL REGURGITATION: ICD-10-CM

## 2023-10-31 DIAGNOSIS — I10 PRIMARY HYPERTENSION: ICD-10-CM

## 2023-10-31 DIAGNOSIS — E78.00 HYPERCHOLESTEROLEMIA: ICD-10-CM

## 2023-10-31 DIAGNOSIS — N18.30 STAGE 3 CHRONIC KIDNEY DISEASE, UNSPECIFIED WHETHER STAGE 3A OR 3B CKD: ICD-10-CM

## 2023-10-31 PROCEDURE — 1159F MED LIST DOCD IN RCRD: CPT | Performed by: NURSE PRACTITIONER

## 2023-10-31 PROCEDURE — 1160F RVW MEDS BY RX/DR IN RCRD: CPT | Performed by: NURSE PRACTITIONER

## 2023-10-31 PROCEDURE — 93000 ELECTROCARDIOGRAM COMPLETE: CPT | Performed by: NURSE PRACTITIONER

## 2023-10-31 PROCEDURE — 99214 OFFICE O/P EST MOD 30 MIN: CPT | Performed by: NURSE PRACTITIONER

## 2023-10-31 NOTE — PROGRESS NOTES
Date of Office Visit: 10/31/2023  Encounter Provider: ERROL Larson  Place of Service: [facility] CARDIOLOGY  Patient Name: Carito Gonzalez  :3/7/1930    Chief complaint  Aortic stenosis, mitral valve prolapse mild regurgitation     History of Present Illness  Patient is a 89-year-old female patient of Dr. Magana.  Past medical history includes hypertension and mitral valve prolapse with mitral regurgitation.  In 2016 she was noted to have normal left ventricular size and systolic function with an ejection fraction 55%, grade 2 diastolic dysfunction with severe mitral valve regurgitation with severe prolapse of the posterior mitral valve leaflet.  There was also mild tricuspid regurgitation.  RV systolic pressure 45 mmHg. in 2017 she was admitted with an acute right acute deep venous thrombosis as well as acute renal insufficiency.  She was transiently placed on Eliquis and completed therapy for this.  Follow-up echo on 2021 showed ejection fraction 60% with grade 1 diastolic dysfunction mild aortic valve stenosis with moderate mitral prolapse and moderate severe mitral regurgitation, RVSP was 29 mmHg.     Interval history  Patient presents today for routine follow-up.  I will visit with her for the first time today and have reviewed her medical record.  Her daughter has accompanied her to appointment per her preference. Since last visit she has been doing well.  She denies palpitations, shortness of breath, edema, dizziness, chest pain or chest pressure, fatigue, syncope or presyncope.  Blood pressure today is at goal.  She brings with her readings from facility taken prior to morning medication that range from 138-142/64-80.  Heart rate is stable between 57-64 bpm.  She is maintained on amlodipine 5 mg twice daily and metoprolol succinate 25 mg every morning and 50 mg every afternoon.  She does not currently exercise but is active around her facility and  denies exertional symptoms.    Past Medical History:   Diagnosis Date    Abscess of leg     Acute deep vein thrombosis (DVT) of left lower extremity     Acute deep vein thrombosis (DVT) of right lower extremity     Acute renal insufficiency     Angiotensin converting enzyme inhibitor-aggravated angioedema     Cellulitis     CKD (chronic kidney disease) stage 3, GFR 30-59 ml/min     Depression     Herpes zoster     Hypercholesterolemia     Hyperlipidemia     Hypertension     Hypotension     Macular degeneration     Noted February 2014    Mitral regurgitation     Mitral valve prolapse     SOB (shortness of breath)     Zoster      Past Surgical History:   Procedure Laterality Date    HIP FRACTURE SURGERY Right 09/18/2017    SKIN CANCER EXCISION       Outpatient Medications Prior to Visit   Medication Sig Dispense Refill    acetaminophen (TYLENOL) 325 MG tablet Take 2 tablets by mouth Every 6 (Six) Hours As Needed for mild pain (1-3).  0    amLODIPine (NORVASC) 5 MG tablet Take 1 tablet by mouth 2 (Two) Times a Day. 180 tablet 3    aspirin 81 MG tablet Take 1 tablet by mouth Daily. 30 tablet 1    metoprolol succinate XL (TOPROL-XL) 25 MG 24 hr tablet Take 1 tablet by mouth Every Morning AND 2 tablets Every Evening. 270 tablet 1    Multiple Vitamins-Minerals (OCUTABS) tablet Take 1 tablet by mouth Daily.      nitrofurantoin, macrocrystal-monohydrate, (Macrobid) 100 MG capsule Take 1 capsule by mouth 2 (Two) Times a Day. (Patient not taking: Reported on 10/31/2023) 6 capsule 0    sennosides-docusate (PERICOLACE) 8.6-50 MG per tablet Take 1 tablet by mouth As Needed for Constipation. (Patient not taking: Reported on 6/6/2023)       No facility-administered medications prior to visit.       Allergies as of 10/31/2023 - Reviewed 10/31/2023   Allergen Reaction Noted    Lisinopril Angioedema 03/17/2020    Zoloft [sertraline hcl] Shortness Of Breath 12/21/2016    Erythromycin Swelling 02/02/2016     Social History  "    Socioeconomic History    Marital status:     Number of children: 3   Tobacco Use    Smoking status: Former     Types: Cigarettes     Quit date:      Years since quittin.8    Smokeless tobacco: Never   Substance and Sexual Activity    Alcohol use: No     Comment: Daily caffeine use    Drug use: No    Sexual activity: Defer     Family History   Problem Relation Age of Onset    Heart attack Mother     Hypertension Mother     Heart attack Father     Hypertension Father     Heart attack Brother 58    Hypertension Brother      Review of Systems   Constitutional: Negative for malaise/fatigue.   Cardiovascular:  Negative for chest pain, claudication, dyspnea on exertion, leg swelling, near-syncope, orthopnea, palpitations, paroxysmal nocturnal dyspnea and syncope.   Respiratory:  Negative for shortness of breath.    Neurological:  Negative for brief paralysis, dizziness, headaches and light-headedness.   All other systems reviewed and are negative.       Objective:     Vitals:    10/31/23 1032   BP: 120/70   BP Location: Left arm   Patient Position: Sitting   Cuff Size: Adult   Pulse: 59   Weight: 50.3 kg (110 lb 12.8 oz)   Height: 165.1 cm (65\")     Body mass index is 18.44 kg/m².    Vitals reviewed.   Constitutional:       General: Not in acute distress.     Appearance: Well-developed and not in distress. Not diaphoretic.   HENT:      Head: Normocephalic.   Pulmonary:      Effort: Pulmonary effort is normal. No respiratory distress.      Breath sounds: Normal breath sounds. No wheezing. No rhonchi. No rales.   Cardiovascular:      Normal rate. Regular rhythm.      Murmurs: There is no murmur.   Pulses:     Radial: 2+ bilaterally.  Edema:     Peripheral edema absent.   Skin:     General: Skin is warm and dry. There is no cyanosis.      Findings: No rash.   Neurological:      Mental Status: Alert and oriented to person, place, and time.   Psychiatric:         Behavior: Behavior normal. Behavior is " cooperative.         Thought Content: Thought content normal.         Judgment: Judgment normal.       Lab Review:     Lab Results   Component Value Date     05/30/2023     11/02/2021    K 4.4 05/30/2023    K 4.0 11/02/2021     05/30/2023     11/02/2021    CO2 23 05/30/2023    CO2 27.4 11/02/2021    BUN 17 05/30/2023    BUN 15 11/02/2021    CREATININE 0.89 05/30/2023    CREATININE 0.78 11/02/2021    EGFRIFNONA 69 11/02/2021    EGFRIFNONA 69 03/05/2020    EGFRIFAFRI 65 01/22/2020    EGFRIFAFRI 79 06/08/2017    GLUCOSE 83 05/30/2023    GLUCOSE 79 11/02/2021    CALCIUM 9.4 05/30/2023    CALCIUM 9.7 (H) 11/02/2021    PROTENTOTREF 6.5 05/30/2023    PROTENTOTREF 6.7 01/22/2020    ALBUMIN 4.2 05/30/2023    ALBUMIN 4.40 11/02/2021    BILITOT 0.6 05/30/2023    BILITOT 0.4 11/02/2021    AST 17 05/30/2023    AST 16 11/02/2021    ALT 11 05/30/2023    ALT 10 11/02/2021     Lab Results   Component Value Date    WBC 5.4 05/30/2023    WBC 6.22 11/02/2021    HGB 12.7 05/30/2023    HGB 12.6 11/02/2021    HCT 38.0 05/30/2023    HCT 38.1 11/02/2021    MCV 91 05/30/2023    MCV 90.3 11/02/2021     05/30/2023     11/02/2021     Lab Results   Component Value Date    PROBNP 251 05/30/2023    PROBNP 158.4 11/02/2021     Lab Results   Component Value Date    CKTOTAL 63 01/01/2017    TROPONINT 0.016 01/01/2017     Lab Results   Component Value Date    TSH 2.180 05/30/2023    TSH 2.230 11/02/2021      Lipid Panel          5/30/2023    10:53   Lipid Panel   Total Cholesterol 260    Triglycerides 53    HDL Cholesterol 74    VLDL Cholesterol 8    LDL Cholesterol  178           ECG 12 Lead    Date/Time: 10/31/2023 10:57 AM  Performed by: Angela Mariee APRN    Authorized by: Angela Mariee APRN  Comparison: compared with previous ECG   Rhythm: sinus rhythm  Rate: normal  BPM: 59  Conduction comments: Borderline prolonged OK interval  Q waves: V1 and V2    T inversion: V1 and V2  QRS axis:  normal  Other findings: left atrial abnormality  Comments: Similar to prior.  No new ischemic changes        Assessment:       Diagnosis Plan   1. MVP (mitral valve prolapse)        2. Non-rheumatic mitral regurgitation        3. Primary hypertension        4. Stage 3 chronic kidney disease, unspecified whether stage 3a or 3b CKD        5. Hypercholesterolemia          Plan:       1.  Mitral valve prolapse with severe mitral valve regurgitation. Clinically stable on the current regimen actually doing quite well with no symptoms.  Discussed checking in on valves with an echocardiogram at next appointment and they are agreeable to this.  2.  Aortic valve stenosis, mild.  Continue current regimen follow-up in 6 months.     3.  Hypertension, controlled on current regimen. Continue checking BP at facility and call for any worrisome trends. Premedication BP appears to be 135-140/60-70, and post medication BP at appt today 120/70.   4.  History of renal insufficiency.  Stable on most recent labs. She has recently been treated for asymptomatic UTI by PCP. No complaints of urinary retention or straining to void.  5.  Anemia that was stable at last check  6.  Hyperlipidemia.  She has no interest in treating this and does not want to have it checked in the future.      Time Spent: I spent 30 minutes caring for Carito on this date of service. This time includes time spent by me in the following activities: preparing for the visit, reviewing tests, performing a medically appropriate examination and/or evaluations, counseling and educating the patient/family/caregiver, ordering medications, tests, or procedures, documenting information in the medical record, independently interpreting results and communicating that information with the patient/family/caregiver, and Obtaining an outside history.   I spent 1 minutes on the separately reported service of ECG. This time is not included in the time used to support the E/M service also  reported today.        Your medication list            Accurate as of October 31, 2023 10:58 AM. If you have any questions, ask your nurse or doctor.                CONTINUE taking these medications        Instructions Last Dose Given Next Dose Due   acetaminophen 325 MG tablet  Commonly known as: TYLENOL      Take 2 tablets by mouth Every 6 (Six) Hours As Needed for mild pain (1-3).       amLODIPine 5 MG tablet  Commonly known as: NORVASC      Take 1 tablet by mouth 2 (Two) Times a Day.       aspirin 81 MG tablet      Take 1 tablet by mouth Daily.       metoprolol succinate XL 25 MG 24 hr tablet  Commonly known as: TOPROL-XL      Take 1 tablet by mouth Every Morning AND 2 tablets Every Evening.       nitrofurantoin (macrocrystal-monohydrate) 100 MG capsule  Commonly known as: Macrobid      Take 1 capsule by mouth 2 (Two) Times a Day.       Ocutabs tablet tablet  Generic drug: multivitamin with minerals      Take 1 tablet by mouth Daily.       sennosides-docusate 8.6-50 MG per tablet  Commonly known as: PERICOLACE      Take 1 tablet by mouth As Needed for Constipation.                Patient is no longer taking -.  I corrected the med list to reflect this.  I did not stop these medications.      Dictated utilizing Dragon dictation

## 2023-12-06 DIAGNOSIS — E78.00 HYPERCHOLESTEROLEMIA: ICD-10-CM

## 2023-12-06 DIAGNOSIS — I10 PRIMARY HYPERTENSION: Primary | ICD-10-CM

## 2023-12-12 LAB
ALBUMIN SERPL-MCNC: 4.2 G/DL (ref 3.6–4.6)
ALBUMIN/GLOB SERPL: 1.6 {RATIO} (ref 1.2–2.2)
ALP SERPL-CCNC: 67 IU/L (ref 44–121)
ALT SERPL-CCNC: 8 IU/L (ref 0–32)
APPEARANCE UR: CLEAR
AST SERPL-CCNC: 14 IU/L (ref 0–40)
BACTERIA #/AREA URNS HPF: NORMAL /[HPF]
BASOPHILS # BLD AUTO: 0.1 X10E3/UL (ref 0–0.2)
BASOPHILS NFR BLD AUTO: 1 %
BILIRUB SERPL-MCNC: 0.5 MG/DL (ref 0–1.2)
BILIRUB UR QL STRIP: NEGATIVE
BUN SERPL-MCNC: 17 MG/DL (ref 10–36)
BUN/CREAT SERPL: 20 (ref 12–28)
CALCIUM SERPL-MCNC: 9.6 MG/DL (ref 8.7–10.3)
CASTS URNS QL MICRO: NORMAL /LPF
CHLORIDE SERPL-SCNC: 99 MMOL/L (ref 96–106)
CHOLEST SERPL-MCNC: 229 MG/DL (ref 100–199)
CO2 SERPL-SCNC: 23 MMOL/L (ref 20–29)
COLOR UR: YELLOW
CREAT SERPL-MCNC: 0.83 MG/DL (ref 0.57–1)
EGFRCR SERPLBLD CKD-EPI 2021: 66 ML/MIN/1.73
EOSINOPHIL # BLD AUTO: 0.2 X10E3/UL (ref 0–0.4)
EOSINOPHIL NFR BLD AUTO: 2 %
EPI CELLS #/AREA URNS HPF: NORMAL /HPF (ref 0–10)
ERYTHROCYTE [DISTWIDTH] IN BLOOD BY AUTOMATED COUNT: 13.4 % (ref 11.7–15.4)
GLOBULIN SER CALC-MCNC: 2.7 G/DL (ref 1.5–4.5)
GLUCOSE SERPL-MCNC: 96 MG/DL (ref 70–99)
GLUCOSE UR QL STRIP: NEGATIVE
HCT VFR BLD AUTO: 39.7 % (ref 34–46.6)
HDLC SERPL-MCNC: 67 MG/DL
HGB BLD-MCNC: 13 G/DL (ref 11.1–15.9)
HGB UR QL STRIP: NEGATIVE
IMM GRANULOCYTES # BLD AUTO: 0 X10E3/UL (ref 0–0.1)
IMM GRANULOCYTES NFR BLD AUTO: 0 %
KETONES UR QL STRIP: NEGATIVE
LDLC SERPL CALC-MCNC: 150 MG/DL (ref 0–99)
LDLC/HDLC SERPL: 2.2 RATIO (ref 0–3.2)
LEUKOCYTE ESTERASE UR QL STRIP: NEGATIVE
LYMPHOCYTES # BLD AUTO: 1.2 X10E3/UL (ref 0.7–3.1)
LYMPHOCYTES NFR BLD AUTO: 18 %
MCH RBC QN AUTO: 29.7 PG (ref 26.6–33)
MCHC RBC AUTO-ENTMCNC: 32.7 G/DL (ref 31.5–35.7)
MCV RBC AUTO: 91 FL (ref 79–97)
MICRO URNS: NORMAL
MICRO URNS: NORMAL
MONOCYTES # BLD AUTO: 0.5 X10E3/UL (ref 0.1–0.9)
MONOCYTES NFR BLD AUTO: 7 %
NEUTROPHILS # BLD AUTO: 4.7 X10E3/UL (ref 1.4–7)
NEUTROPHILS NFR BLD AUTO: 72 %
NITRITE UR QL STRIP: NEGATIVE
PH UR STRIP: 7 [PH] (ref 5–7.5)
PLATELET # BLD AUTO: 285 X10E3/UL (ref 150–450)
POTASSIUM SERPL-SCNC: 4.8 MMOL/L (ref 3.5–5.2)
PROT SERPL-MCNC: 6.9 G/DL (ref 6–8.5)
PROT UR QL STRIP: NORMAL
RBC # BLD AUTO: 4.37 X10E6/UL (ref 3.77–5.28)
RBC #/AREA URNS HPF: NORMAL /HPF (ref 0–2)
SODIUM SERPL-SCNC: 138 MMOL/L (ref 134–144)
SP GR UR STRIP: 1.01 (ref 1–1.03)
TRIGL SERPL-MCNC: 69 MG/DL (ref 0–149)
UROBILINOGEN UR STRIP-MCNC: 1 MG/DL (ref 0.2–1)
VLDLC SERPL CALC-MCNC: 12 MG/DL (ref 5–40)
WBC # BLD AUTO: 6.6 X10E3/UL (ref 3.4–10.8)
WBC #/AREA URNS HPF: NORMAL /HPF (ref 0–5)

## 2024-01-01 ENCOUNTER — APPOINTMENT (OUTPATIENT)
Dept: CT IMAGING | Facility: HOSPITAL | Age: 89
DRG: 193 | End: 2024-01-01
Payer: MEDICARE

## 2024-01-01 ENCOUNTER — APPOINTMENT (OUTPATIENT)
Dept: GENERAL RADIOLOGY | Facility: HOSPITAL | Age: 89
DRG: 193 | End: 2024-01-01
Payer: MEDICARE

## 2024-01-01 ENCOUNTER — HOSPITAL ENCOUNTER (INPATIENT)
Facility: HOSPITAL | Age: 89
LOS: 3 days | End: 2024-03-28
Attending: STUDENT IN AN ORGANIZED HEALTH CARE EDUCATION/TRAINING PROGRAM | Admitting: STUDENT IN AN ORGANIZED HEALTH CARE EDUCATION/TRAINING PROGRAM
Payer: COMMERCIAL

## 2024-01-01 ENCOUNTER — TELEPHONE (OUTPATIENT)
Dept: FAMILY MEDICINE CLINIC | Facility: CLINIC | Age: 89
End: 2024-01-01

## 2024-01-01 ENCOUNTER — HOSPITAL ENCOUNTER (INPATIENT)
Facility: HOSPITAL | Age: 89
LOS: 11 days | Discharge: HOSPICE | DRG: 193 | End: 2024-03-25
Attending: EMERGENCY MEDICINE | Admitting: STUDENT IN AN ORGANIZED HEALTH CARE EDUCATION/TRAINING PROGRAM
Payer: MEDICARE

## 2024-01-01 VITALS
SYSTOLIC BLOOD PRESSURE: 147 MMHG | RESPIRATION RATE: 30 BRPM | TEMPERATURE: 98 F | HEIGHT: 65 IN | BODY MASS INDEX: 17.12 KG/M2 | DIASTOLIC BLOOD PRESSURE: 76 MMHG | WEIGHT: 102.73 LBS | OXYGEN SATURATION: 91 % | HEART RATE: 72 BPM

## 2024-01-01 VITALS — TEMPERATURE: 103.1 F | SYSTOLIC BLOOD PRESSURE: 104 MMHG | DIASTOLIC BLOOD PRESSURE: 49 MMHG

## 2024-01-01 DIAGNOSIS — J96.01 ACUTE RESPIRATORY FAILURE WITH HYPOXIA: Primary | ICD-10-CM

## 2024-01-01 DIAGNOSIS — J47.0 BRONCHIECTASIS WITH ACUTE LOWER RESPIRATORY INFECTION: ICD-10-CM

## 2024-01-01 DIAGNOSIS — J18.9 PNEUMONIA OF RIGHT MIDDLE LOBE DUE TO INFECTIOUS ORGANISM: ICD-10-CM

## 2024-01-01 LAB
ALBUMIN SERPL-MCNC: 3.5 G/DL (ref 3.5–5.2)
ALBUMIN SERPL-MCNC: 4.1 G/DL (ref 3.5–5.2)
ALBUMIN/GLOB SERPL: 1.2 G/DL
ALBUMIN/GLOB SERPL: 1.3 G/DL
ALP SERPL-CCNC: 59 U/L (ref 39–117)
ALP SERPL-CCNC: 70 U/L (ref 39–117)
ALT SERPL W P-5'-P-CCNC: 12 U/L (ref 1–33)
ALT SERPL W P-5'-P-CCNC: 12 U/L (ref 1–33)
ANION GAP SERPL CALCULATED.3IONS-SCNC: 10.3 MMOL/L (ref 5–15)
ANION GAP SERPL CALCULATED.3IONS-SCNC: 11 MMOL/L (ref 5–15)
ANION GAP SERPL CALCULATED.3IONS-SCNC: 11.9 MMOL/L (ref 5–15)
ANION GAP SERPL CALCULATED.3IONS-SCNC: 12.2 MMOL/L (ref 5–15)
ANION GAP SERPL CALCULATED.3IONS-SCNC: 14.8 MMOL/L (ref 5–15)
ANION GAP SERPL CALCULATED.3IONS-SCNC: 15 MMOL/L (ref 5–15)
ANION GAP SERPL CALCULATED.3IONS-SCNC: 16 MMOL/L (ref 5–15)
AST SERPL-CCNC: 10 U/L (ref 1–32)
AST SERPL-CCNC: 18 U/L (ref 1–32)
BACTERIA SPEC AEROBE CULT: NORMAL
BACTERIA SPEC AEROBE CULT: NORMAL
BASOPHILS # BLD AUTO: 0.03 10*3/MM3 (ref 0–0.2)
BASOPHILS # BLD AUTO: 0.04 10*3/MM3 (ref 0–0.2)
BASOPHILS NFR BLD AUTO: 0.4 % (ref 0–1.5)
BASOPHILS NFR BLD AUTO: 0.4 % (ref 0–1.5)
BILIRUB SERPL-MCNC: 0.3 MG/DL (ref 0–1.2)
BILIRUB SERPL-MCNC: 0.5 MG/DL (ref 0–1.2)
BUN SERPL-MCNC: 14 MG/DL (ref 8–23)
BUN SERPL-MCNC: 16 MG/DL (ref 8–23)
BUN SERPL-MCNC: 22 MG/DL (ref 8–23)
BUN SERPL-MCNC: 24 MG/DL (ref 8–23)
BUN SERPL-MCNC: 24 MG/DL (ref 8–23)
BUN SERPL-MCNC: 28 MG/DL (ref 8–23)
BUN SERPL-MCNC: 30 MG/DL (ref 8–23)
BUN/CREAT SERPL: 20.5 (ref 7–25)
BUN/CREAT SERPL: 21.2 (ref 7–25)
BUN/CREAT SERPL: 21.4 (ref 7–25)
BUN/CREAT SERPL: 36.1 (ref 7–25)
BUN/CREAT SERPL: 37.5 (ref 7–25)
BUN/CREAT SERPL: 40 (ref 7–25)
BUN/CREAT SERPL: 50 (ref 7–25)
CALCIUM SPEC-SCNC: 8.5 MG/DL (ref 8.2–9.6)
CALCIUM SPEC-SCNC: 8.6 MG/DL (ref 8.2–9.6)
CALCIUM SPEC-SCNC: 8.8 MG/DL (ref 8.2–9.6)
CALCIUM SPEC-SCNC: 8.8 MG/DL (ref 8.2–9.6)
CALCIUM SPEC-SCNC: 9.1 MG/DL (ref 8.2–9.6)
CALCIUM SPEC-SCNC: 9.1 MG/DL (ref 8.2–9.6)
CALCIUM SPEC-SCNC: 9.7 MG/DL (ref 8.2–9.6)
CHLORIDE SERPL-SCNC: 101 MMOL/L (ref 98–107)
CHLORIDE SERPL-SCNC: 103 MMOL/L (ref 98–107)
CHLORIDE SERPL-SCNC: 104 MMOL/L (ref 98–107)
CHLORIDE SERPL-SCNC: 105 MMOL/L (ref 98–107)
CHLORIDE SERPL-SCNC: 107 MMOL/L (ref 98–107)
CHLORIDE SERPL-SCNC: 98 MMOL/L (ref 98–107)
CHLORIDE SERPL-SCNC: 99 MMOL/L (ref 98–107)
CO2 SERPL-SCNC: 22.2 MMOL/L (ref 22–29)
CO2 SERPL-SCNC: 23 MMOL/L (ref 22–29)
CO2 SERPL-SCNC: 23 MMOL/L (ref 22–29)
CO2 SERPL-SCNC: 24.8 MMOL/L (ref 22–29)
CO2 SERPL-SCNC: 25.7 MMOL/L (ref 22–29)
CO2 SERPL-SCNC: 26.1 MMOL/L (ref 22–29)
CO2 SERPL-SCNC: 28 MMOL/L (ref 22–29)
CREAT SERPL-MCNC: 0.56 MG/DL (ref 0.57–1)
CREAT SERPL-MCNC: 0.6 MG/DL (ref 0.57–1)
CREAT SERPL-MCNC: 0.64 MG/DL (ref 0.57–1)
CREAT SERPL-MCNC: 0.66 MG/DL (ref 0.57–1)
CREAT SERPL-MCNC: 0.78 MG/DL (ref 0.57–1)
CREAT SERPL-MCNC: 0.83 MG/DL (ref 0.57–1)
CREAT SERPL-MCNC: 1.03 MG/DL (ref 0.57–1)
D DIMER PPP FEU-MCNC: 0.68 MCGFEU/ML (ref 0–0.94)
D-LACTATE SERPL-SCNC: 1 MMOL/L (ref 0.5–2)
D-LACTATE SERPL-SCNC: 1 MMOL/L (ref 0.5–2)
DEPRECATED RDW RBC AUTO: 44.6 FL (ref 37–54)
DEPRECATED RDW RBC AUTO: 44.6 FL (ref 37–54)
DEPRECATED RDW RBC AUTO: 51.2 FL (ref 37–54)
EGFRCR SERPLBLD CKD-EPI 2021: 50.5 ML/MIN/1.73
EGFRCR SERPLBLD CKD-EPI 2021: 65.4 ML/MIN/1.73
EGFRCR SERPLBLD CKD-EPI 2021: 70.5 ML/MIN/1.73
EGFRCR SERPLBLD CKD-EPI 2021: 81.4 ML/MIN/1.73
EGFRCR SERPLBLD CKD-EPI 2021: 82 ML/MIN/1.73
EGFRCR SERPLBLD CKD-EPI 2021: 83.3 ML/MIN/1.73
EGFRCR SERPLBLD CKD-EPI 2021: 84.7 ML/MIN/1.73
EOSINOPHIL # BLD AUTO: 0.03 10*3/MM3 (ref 0–0.4)
EOSINOPHIL # BLD AUTO: 0.16 10*3/MM3 (ref 0–0.4)
EOSINOPHIL NFR BLD AUTO: 0.3 % (ref 0.3–6.2)
EOSINOPHIL NFR BLD AUTO: 1.9 % (ref 0.3–6.2)
ERYTHROCYTE [DISTWIDTH] IN BLOOD BY AUTOMATED COUNT: 13.5 % (ref 12.3–15.4)
ERYTHROCYTE [DISTWIDTH] IN BLOOD BY AUTOMATED COUNT: 13.9 % (ref 12.3–15.4)
ERYTHROCYTE [DISTWIDTH] IN BLOOD BY AUTOMATED COUNT: 14.6 % (ref 12.3–15.4)
FLUAV SUBTYP SPEC NAA+PROBE: NOT DETECTED
FLUBV RNA ISLT QL NAA+PROBE: NOT DETECTED
GAMMA INTERFERON BACKGROUND BLD IA-ACNC: 0.02 IU/ML
GLOBULIN UR ELPH-MCNC: 2.8 GM/DL
GLOBULIN UR ELPH-MCNC: 3.5 GM/DL
GLUCOSE SERPL-MCNC: 103 MG/DL (ref 65–99)
GLUCOSE SERPL-MCNC: 120 MG/DL (ref 65–99)
GLUCOSE SERPL-MCNC: 135 MG/DL (ref 65–99)
GLUCOSE SERPL-MCNC: 138 MG/DL (ref 65–99)
GLUCOSE SERPL-MCNC: 74 MG/DL (ref 65–99)
GLUCOSE SERPL-MCNC: 76 MG/DL (ref 65–99)
GLUCOSE SERPL-MCNC: 92 MG/DL (ref 65–99)
HCT VFR BLD AUTO: 37.5 % (ref 34–46.6)
HCT VFR BLD AUTO: 38.9 % (ref 34–46.6)
HCT VFR BLD AUTO: 42.6 % (ref 34–46.6)
HGB BLD-MCNC: 12.1 G/DL (ref 12–15.9)
HGB BLD-MCNC: 13 G/DL (ref 12–15.9)
HGB BLD-MCNC: 13.2 G/DL (ref 12–15.9)
IMM GRANULOCYTES # BLD AUTO: 0.02 10*3/MM3 (ref 0–0.05)
IMM GRANULOCYTES # BLD AUTO: 0.08 10*3/MM3 (ref 0–0.05)
IMM GRANULOCYTES NFR BLD AUTO: 0.2 % (ref 0–0.5)
IMM GRANULOCYTES NFR BLD AUTO: 0.7 % (ref 0–0.5)
L PNEUMO1 AG UR QL IA: NEGATIVE
LYMPHOCYTES # BLD AUTO: 0.77 10*3/MM3 (ref 0.7–3.1)
LYMPHOCYTES # BLD AUTO: 1.1 10*3/MM3 (ref 0.7–3.1)
LYMPHOCYTES NFR BLD AUTO: 13.1 % (ref 19.6–45.3)
LYMPHOCYTES NFR BLD AUTO: 6.9 % (ref 19.6–45.3)
M TB IFN-G BLD-IMP: NEGATIVE
M TB IFN-G CD4+ BCKGRND COR BLD-ACNC: 0.03 IU/ML
M TB IFN-G CD4+CD8+ BCKGRND COR BLD-ACNC: 0.02 IU/ML
MCH RBC QN AUTO: 29.2 PG (ref 26.6–33)
MCH RBC QN AUTO: 29.2 PG (ref 26.6–33)
MCH RBC QN AUTO: 29.5 PG (ref 26.6–33)
MCHC RBC AUTO-ENTMCNC: 31 G/DL (ref 31.5–35.7)
MCHC RBC AUTO-ENTMCNC: 32.3 G/DL (ref 31.5–35.7)
MCHC RBC AUTO-ENTMCNC: 33.4 G/DL (ref 31.5–35.7)
MCV RBC AUTO: 88.4 FL (ref 79–97)
MCV RBC AUTO: 90.6 FL (ref 79–97)
MCV RBC AUTO: 94.2 FL (ref 79–97)
MITOGEN IGNF BCKGRD COR BLD-ACNC: 7.57 IU/ML
MONOCYTES # BLD AUTO: 0.6 10*3/MM3 (ref 0.1–0.9)
MONOCYTES # BLD AUTO: 0.6 10*3/MM3 (ref 0.1–0.9)
MONOCYTES NFR BLD AUTO: 5.4 % (ref 5–12)
MONOCYTES NFR BLD AUTO: 7.2 % (ref 5–12)
NEUTROPHILS NFR BLD AUTO: 6.47 10*3/MM3 (ref 1.7–7)
NEUTROPHILS NFR BLD AUTO: 77.2 % (ref 42.7–76)
NEUTROPHILS NFR BLD AUTO: 86.3 % (ref 42.7–76)
NEUTROPHILS NFR BLD AUTO: 9.58 10*3/MM3 (ref 1.7–7)
NRBC BLD AUTO-RTO: 0 /100 WBC (ref 0–0.2)
NT-PROBNP SERPL-MCNC: 897.2 PG/ML (ref 0–1800)
PLATELET # BLD AUTO: 237 10*3/MM3 (ref 140–450)
PLATELET # BLD AUTO: 255 10*3/MM3 (ref 140–450)
PLATELET # BLD AUTO: 313 10*3/MM3 (ref 140–450)
PMV BLD AUTO: 10.1 FL (ref 6–12)
PMV BLD AUTO: 10.2 FL (ref 6–12)
PMV BLD AUTO: 10.5 FL (ref 6–12)
POTASSIUM SERPL-SCNC: 3.4 MMOL/L (ref 3.5–5.2)
POTASSIUM SERPL-SCNC: 3.6 MMOL/L (ref 3.5–5.2)
POTASSIUM SERPL-SCNC: 3.7 MMOL/L (ref 3.5–5.2)
POTASSIUM SERPL-SCNC: 3.9 MMOL/L (ref 3.5–5.2)
POTASSIUM SERPL-SCNC: 4 MMOL/L (ref 3.5–5.2)
POTASSIUM SERPL-SCNC: 4.1 MMOL/L (ref 3.5–5.2)
POTASSIUM SERPL-SCNC: 4.2 MMOL/L (ref 3.5–5.2)
PROCALCITONIN SERPL-MCNC: 0.09 NG/ML (ref 0–0.25)
PROT SERPL-MCNC: 6.3 G/DL (ref 6–8.5)
PROT SERPL-MCNC: 7.6 G/DL (ref 6–8.5)
QT INTERVAL: 363 MS
QTC INTERVAL: 409 MS
QUANTIFERON INCUBATION: NORMAL
RBC # BLD AUTO: 4.14 10*6/MM3 (ref 3.77–5.28)
RBC # BLD AUTO: 4.4 10*6/MM3 (ref 3.77–5.28)
RBC # BLD AUTO: 4.52 10*6/MM3 (ref 3.77–5.28)
S PNEUM AG SPEC QL LA: NEGATIVE
SARS-COV-2 RNA RESP QL NAA+PROBE: NOT DETECTED
SERVICE CMNT-IMP: NORMAL
SODIUM SERPL-SCNC: 136 MMOL/L (ref 136–145)
SODIUM SERPL-SCNC: 138 MMOL/L (ref 136–145)
SODIUM SERPL-SCNC: 140 MMOL/L (ref 136–145)
SODIUM SERPL-SCNC: 140 MMOL/L (ref 136–145)
SODIUM SERPL-SCNC: 141 MMOL/L (ref 136–145)
SODIUM SERPL-SCNC: 143 MMOL/L (ref 136–145)
SODIUM SERPL-SCNC: 143 MMOL/L (ref 136–145)
TROPONIN T SERPL HS-MCNC: 18 NG/L
WBC NRBC COR # BLD AUTO: 11.1 10*3/MM3 (ref 3.4–10.8)
WBC NRBC COR # BLD AUTO: 5.08 10*3/MM3 (ref 3.4–10.8)
WBC NRBC COR # BLD AUTO: 8.38 10*3/MM3 (ref 3.4–10.8)

## 2024-01-01 PROCEDURE — 84484 ASSAY OF TROPONIN QUANT: CPT | Performed by: EMERGENCY MEDICINE

## 2024-01-01 PROCEDURE — 25010000002 MORPHINE PER 10 MG: Performed by: STUDENT IN AN ORGANIZED HEALTH CARE EDUCATION/TRAINING PROGRAM

## 2024-01-01 PROCEDURE — 25010000002 METHYLPREDNISOLONE PER 40 MG: Performed by: HOSPITALIST

## 2024-01-01 PROCEDURE — 83605 ASSAY OF LACTIC ACID: CPT | Performed by: NURSE PRACTITIONER

## 2024-01-01 PROCEDURE — 25010000002 METHYLPREDNISOLONE PER 125 MG

## 2024-01-01 PROCEDURE — 25010000002 POTASSIUM CHLORIDE 10 MEQ/100ML SOLUTION: Performed by: HOSPITALIST

## 2024-01-01 PROCEDURE — 36415 COLL VENOUS BLD VENIPUNCTURE: CPT

## 2024-01-01 PROCEDURE — 25010000002 LORAZEPAM PER 2 MG: Performed by: STUDENT IN AN ORGANIZED HEALTH CARE EDUCATION/TRAINING PROGRAM

## 2024-01-01 PROCEDURE — 85027 COMPLETE CBC AUTOMATED: CPT | Performed by: NURSE PRACTITIONER

## 2024-01-01 PROCEDURE — 92526 ORAL FUNCTION THERAPY: CPT

## 2024-01-01 PROCEDURE — 71045 X-RAY EXAM CHEST 1 VIEW: CPT

## 2024-01-01 PROCEDURE — 25010000002 CEFTRIAXONE PER 250 MG: Performed by: HOSPITALIST

## 2024-01-01 PROCEDURE — 87449 NOS EACH ORGANISM AG IA: CPT | Performed by: NURSE PRACTITIONER

## 2024-01-01 PROCEDURE — 94761 N-INVAS EAR/PLS OXIMETRY MLT: CPT

## 2024-01-01 PROCEDURE — 25810000003 LACTATED RINGERS PER 1000 ML: Performed by: HOSPITALIST

## 2024-01-01 PROCEDURE — 25010000002 GLYCOPYRROLATE 0.2 MG/ML SOLUTION: Performed by: STUDENT IN AN ORGANIZED HEALTH CARE EDUCATION/TRAINING PROGRAM

## 2024-01-01 PROCEDURE — 87636 SARSCOV2 & INF A&B AMP PRB: CPT | Performed by: EMERGENCY MEDICINE

## 2024-01-01 PROCEDURE — 93010 ELECTROCARDIOGRAM REPORT: CPT | Performed by: INTERNAL MEDICINE

## 2024-01-01 PROCEDURE — 94799 UNLISTED PULMONARY SVC/PX: CPT

## 2024-01-01 PROCEDURE — 97530 THERAPEUTIC ACTIVITIES: CPT

## 2024-01-01 PROCEDURE — 85379 FIBRIN DEGRADATION QUANT: CPT

## 2024-01-01 PROCEDURE — 25010000002 CEFTRIAXONE PER 250 MG

## 2024-01-01 PROCEDURE — 84145 PROCALCITONIN (PCT): CPT

## 2024-01-01 PROCEDURE — 87899 AGENT NOS ASSAY W/OPTIC: CPT | Performed by: NURSE PRACTITIONER

## 2024-01-01 PROCEDURE — 86480 TB TEST CELL IMMUN MEASURE: CPT | Performed by: INTERNAL MEDICINE

## 2024-01-01 PROCEDURE — 93005 ELECTROCARDIOGRAM TRACING: CPT | Performed by: EMERGENCY MEDICINE

## 2024-01-01 PROCEDURE — 25010000002 CEFTRIAXONE PER 250 MG: Performed by: NURSE PRACTITIONER

## 2024-01-01 PROCEDURE — 94640 AIRWAY INHALATION TREATMENT: CPT

## 2024-01-01 PROCEDURE — 85025 COMPLETE CBC W/AUTO DIFF WBC: CPT | Performed by: HOSPITALIST

## 2024-01-01 PROCEDURE — 25810000003 SODIUM CHLORIDE 0.9 % SOLUTION: Performed by: NURSE PRACTITIONER

## 2024-01-01 PROCEDURE — 80048 BASIC METABOLIC PNL TOTAL CA: CPT | Performed by: HOSPITALIST

## 2024-01-01 PROCEDURE — 25010000002 METHYLPREDNISOLONE PER 125 MG: Performed by: NURSE PRACTITIONER

## 2024-01-01 PROCEDURE — 99285 EMERGENCY DEPT VISIT HI MDM: CPT

## 2024-01-01 PROCEDURE — 87040 BLOOD CULTURE FOR BACTERIA: CPT

## 2024-01-01 PROCEDURE — 71250 CT THORAX DX C-: CPT

## 2024-01-01 PROCEDURE — 94664 DEMO&/EVAL PT USE INHALER: CPT

## 2024-01-01 PROCEDURE — 80053 COMPREHEN METABOLIC PANEL: CPT | Performed by: HOSPITALIST

## 2024-01-01 PROCEDURE — 80053 COMPREHEN METABOLIC PANEL: CPT | Performed by: EMERGENCY MEDICINE

## 2024-01-01 PROCEDURE — G0378 HOSPITAL OBSERVATION PER HR: HCPCS

## 2024-01-01 PROCEDURE — 83880 ASSAY OF NATRIURETIC PEPTIDE: CPT | Performed by: EMERGENCY MEDICINE

## 2024-01-01 PROCEDURE — 94760 N-INVAS EAR/PLS OXIMETRY 1: CPT

## 2024-01-01 PROCEDURE — 80048 BASIC METABOLIC PNL TOTAL CA: CPT | Performed by: NURSE PRACTITIONER

## 2024-01-01 PROCEDURE — 85025 COMPLETE CBC W/AUTO DIFF WBC: CPT | Performed by: EMERGENCY MEDICINE

## 2024-01-01 PROCEDURE — 83605 ASSAY OF LACTIC ACID: CPT | Performed by: HOSPITALIST

## 2024-01-01 PROCEDURE — 97162 PT EVAL MOD COMPLEX 30 MIN: CPT

## 2024-01-01 PROCEDURE — 92610 EVALUATE SWALLOWING FUNCTION: CPT

## 2024-01-01 RX ORDER — MORPHINE SULFATE 20 MG/ML
5 SOLUTION ORAL
Status: DISCONTINUED | OUTPATIENT
Start: 2024-01-01 | End: 2024-01-01 | Stop reason: HOSPADM

## 2024-01-01 RX ORDER — LORAZEPAM 2 MG/ML
1 CONCENTRATE ORAL
Status: DISCONTINUED | OUTPATIENT
Start: 2024-01-01 | End: 2024-01-01 | Stop reason: HOSPADM

## 2024-01-01 RX ORDER — PROMETHAZINE HYDROCHLORIDE 25 MG/1
12.5 TABLET ORAL EVERY 4 HOURS PRN
Status: DISCONTINUED | OUTPATIENT
Start: 2024-01-01 | End: 2024-01-01 | Stop reason: HOSPADM

## 2024-01-01 RX ORDER — GLYCOPYRROLATE 0.2 MG/ML
0.2 INJECTION INTRAMUSCULAR; INTRAVENOUS
Status: CANCELLED | OUTPATIENT
Start: 2024-01-01

## 2024-01-01 RX ORDER — SODIUM CHLORIDE 0.9 % (FLUSH) 0.9 %
10 SYRINGE (ML) INJECTION AS NEEDED
Status: DISCONTINUED | OUTPATIENT
Start: 2024-01-01 | End: 2024-01-01

## 2024-01-01 RX ORDER — LORAZEPAM 2 MG/ML
0.5 CONCENTRATE ORAL
Status: CANCELLED | OUTPATIENT
Start: 2024-01-01 | End: 2024-01-01

## 2024-01-01 RX ORDER — HYDROMORPHONE HYDROCHLORIDE 1 MG/ML
0.5 INJECTION, SOLUTION INTRAMUSCULAR; INTRAVENOUS; SUBCUTANEOUS
Status: DISCONTINUED | OUTPATIENT
Start: 2024-01-01 | End: 2024-01-01 | Stop reason: HOSPADM

## 2024-01-01 RX ORDER — MORPHINE SULFATE 20 MG/ML
20 SOLUTION ORAL
Status: CANCELLED | OUTPATIENT
Start: 2024-01-01 | End: 2024-04-02

## 2024-01-01 RX ORDER — KETOROLAC TROMETHAMINE 15 MG/ML
15 INJECTION, SOLUTION INTRAMUSCULAR; INTRAVENOUS EVERY 6 HOURS PRN
Status: CANCELLED | OUTPATIENT
Start: 2024-01-01 | End: 2024-01-01

## 2024-01-01 RX ORDER — KETOROLAC TROMETHAMINE 15 MG/ML
15 INJECTION, SOLUTION INTRAMUSCULAR; INTRAVENOUS EVERY 6 HOURS PRN
Status: DISCONTINUED | OUTPATIENT
Start: 2024-01-01 | End: 2024-01-01 | Stop reason: HOSPADM

## 2024-01-01 RX ORDER — LORAZEPAM 2 MG/ML
0.5 INJECTION INTRAMUSCULAR
Status: DISCONTINUED | OUTPATIENT
Start: 2024-01-01 | End: 2024-01-01 | Stop reason: HOSPADM

## 2024-01-01 RX ORDER — LORAZEPAM 2 MG/ML
0.5 INJECTION INTRAMUSCULAR
Status: CANCELLED | OUTPATIENT
Start: 2024-01-01 | End: 2024-01-01

## 2024-01-01 RX ORDER — MORPHINE SULFATE 4 MG/ML
4 INJECTION, SOLUTION INTRAMUSCULAR; INTRAVENOUS
Status: DISCONTINUED | OUTPATIENT
Start: 2024-01-01 | End: 2024-01-01 | Stop reason: HOSPADM

## 2024-01-01 RX ORDER — LORAZEPAM 2 MG/ML
1 INJECTION INTRAMUSCULAR
Status: DISCONTINUED | OUTPATIENT
Start: 2024-01-01 | End: 2024-01-01 | Stop reason: HOSPADM

## 2024-01-01 RX ORDER — LORAZEPAM 2 MG/ML
2 INJECTION INTRAMUSCULAR
Status: DISCONTINUED | OUTPATIENT
Start: 2024-01-01 | End: 2024-01-01 | Stop reason: HOSPADM

## 2024-01-01 RX ORDER — SODIUM CHLORIDE FOR INHALATION 7 %
4 VIAL, NEBULIZER (ML) INHALATION
Status: DISCONTINUED | OUTPATIENT
Start: 2024-01-01 | End: 2024-01-01

## 2024-01-01 RX ORDER — SALIVA SUBSTITUTE COMBO NO.2
30 SOLUTION, ORAL MUCOUS MEMBRANE AS NEEDED
Status: DISCONTINUED | OUTPATIENT
Start: 2024-01-01 | End: 2024-01-01 | Stop reason: HOSPADM

## 2024-01-01 RX ORDER — LORAZEPAM 2 MG/ML
2 CONCENTRATE ORAL
Status: DISCONTINUED | OUTPATIENT
Start: 2024-01-01 | End: 2024-01-01 | Stop reason: HOSPADM

## 2024-01-01 RX ORDER — ACETAMINOPHEN 325 MG/1
650 TABLET ORAL EVERY 4 HOURS PRN
Status: DISCONTINUED | OUTPATIENT
Start: 2024-01-01 | End: 2024-01-01 | Stop reason: HOSPADM

## 2024-01-01 RX ORDER — SCOLOPAMINE TRANSDERMAL SYSTEM 1 MG/1
1 PATCH, EXTENDED RELEASE TRANSDERMAL
Status: DISCONTINUED | OUTPATIENT
Start: 2024-01-01 | End: 2024-01-01 | Stop reason: HOSPADM

## 2024-01-01 RX ORDER — SCOLOPAMINE TRANSDERMAL SYSTEM 1 MG/1
1 PATCH, EXTENDED RELEASE TRANSDERMAL
Status: CANCELLED | OUTPATIENT
Start: 2024-01-01

## 2024-01-01 RX ORDER — MORPHINE SULFATE 20 MG/ML
10 SOLUTION ORAL
Status: DISCONTINUED | OUTPATIENT
Start: 2024-01-01 | End: 2024-01-01 | Stop reason: HOSPADM

## 2024-01-01 RX ORDER — IPRATROPIUM BROMIDE AND ALBUTEROL SULFATE 2.5; .5 MG/3ML; MG/3ML
3 SOLUTION RESPIRATORY (INHALATION) EVERY 4 HOURS PRN
Status: DISCONTINUED | OUTPATIENT
Start: 2024-01-01 | End: 2024-01-01

## 2024-01-01 RX ORDER — MORPHINE SULFATE 10 MG/ML
6 INJECTION INTRAMUSCULAR; INTRAVENOUS; SUBCUTANEOUS
Status: DISCONTINUED | OUTPATIENT
Start: 2024-01-01 | End: 2024-01-01 | Stop reason: HOSPADM

## 2024-01-01 RX ORDER — MORPHINE SULFATE 2 MG/ML
2 INJECTION, SOLUTION INTRAMUSCULAR; INTRAVENOUS
Status: DISCONTINUED | OUTPATIENT
Start: 2024-01-01 | End: 2024-01-01

## 2024-01-01 RX ORDER — GLYCOPYRROLATE 0.2 MG/ML
0.2 INJECTION INTRAMUSCULAR; INTRAVENOUS
Status: DISCONTINUED | OUTPATIENT
Start: 2024-01-01 | End: 2024-01-01 | Stop reason: HOSPADM

## 2024-01-01 RX ORDER — ATROPINE SULFATE 10 MG/ML
2 SOLUTION/ DROPS OPHTHALMIC 2 TIMES DAILY PRN
Status: DISCONTINUED | OUTPATIENT
Start: 2024-01-01 | End: 2024-01-01 | Stop reason: HOSPADM

## 2024-01-01 RX ORDER — CHLORHEXIDINE GLUCONATE ORAL RINSE 1.2 MG/ML
15 SOLUTION DENTAL EVERY 12 HOURS SCHEDULED
Status: CANCELLED | OUTPATIENT
Start: 2024-01-01

## 2024-01-01 RX ORDER — LORAZEPAM 2 MG/ML
0.5 CONCENTRATE ORAL
Status: DISCONTINUED | OUTPATIENT
Start: 2024-01-01 | End: 2024-01-01 | Stop reason: HOSPADM

## 2024-01-01 RX ORDER — SALIVA SUBSTITUTE COMBO NO.2
30 SOLUTION, ORAL MUCOUS MEMBRANE AS NEEDED
Status: CANCELLED | OUTPATIENT
Start: 2024-01-01

## 2024-01-01 RX ORDER — BISACODYL 10 MG
10 SUPPOSITORY, RECTAL RECTAL DAILY PRN
Status: DISCONTINUED | OUTPATIENT
Start: 2024-01-01 | End: 2024-01-01

## 2024-01-01 RX ORDER — GLYCOPYRROLATE 0.2 MG/ML
0.4 INJECTION INTRAMUSCULAR; INTRAVENOUS
Status: CANCELLED | OUTPATIENT
Start: 2024-01-01

## 2024-01-01 RX ORDER — PANTOPRAZOLE SODIUM 40 MG/1
40 TABLET, DELAYED RELEASE ORAL
Status: DISCONTINUED | OUTPATIENT
Start: 2024-01-01 | End: 2024-01-01

## 2024-01-01 RX ORDER — ALBUTEROL SULFATE 2.5 MG/3ML
2.5 SOLUTION RESPIRATORY (INHALATION) 3 TIMES DAILY
Status: DISCONTINUED | OUTPATIENT
Start: 2024-01-01 | End: 2024-01-01

## 2024-01-01 RX ORDER — PROMETHAZINE HYDROCHLORIDE 12.5 MG/1
12.5 SUPPOSITORY RECTAL EVERY 4 HOURS PRN
Status: DISCONTINUED | OUTPATIENT
Start: 2024-01-01 | End: 2024-01-01 | Stop reason: HOSPADM

## 2024-01-01 RX ORDER — POLYETHYLENE GLYCOL 3350 17 G/17G
17 POWDER, FOR SOLUTION ORAL DAILY PRN
Status: DISCONTINUED | OUTPATIENT
Start: 2024-01-01 | End: 2024-01-01

## 2024-01-01 RX ORDER — DIPHENOXYLATE HYDROCHLORIDE AND ATROPINE SULFATE 2.5; .025 MG/1; MG/1
1 TABLET ORAL
Status: CANCELLED | OUTPATIENT
Start: 2024-01-01 | End: 2024-04-02

## 2024-01-01 RX ORDER — METOPROLOL SUCCINATE 25 MG/1
25 TABLET, EXTENDED RELEASE ORAL EVERY MORNING
Status: DISCONTINUED | OUTPATIENT
Start: 2024-01-01 | End: 2024-01-01

## 2024-01-01 RX ORDER — CHLORHEXIDINE GLUCONATE ORAL RINSE 1.2 MG/ML
15 SOLUTION DENTAL EVERY 12 HOURS SCHEDULED
Status: DISCONTINUED | OUTPATIENT
Start: 2024-01-01 | End: 2024-01-01 | Stop reason: HOSPADM

## 2024-01-01 RX ORDER — AMOXICILLIN 250 MG
2 CAPSULE ORAL 2 TIMES DAILY PRN
Status: DISCONTINUED | OUTPATIENT
Start: 2024-01-01 | End: 2024-01-01

## 2024-01-01 RX ORDER — LORAZEPAM 2 MG/ML
1 INJECTION INTRAMUSCULAR
Status: CANCELLED | OUTPATIENT
Start: 2024-01-01 | End: 2024-01-01

## 2024-01-01 RX ORDER — GLYCOPYRROLATE 0.2 MG/ML
0.4 INJECTION INTRAMUSCULAR; INTRAVENOUS
Status: DISCONTINUED | OUTPATIENT
Start: 2024-01-01 | End: 2024-01-01 | Stop reason: HOSPADM

## 2024-01-01 RX ORDER — MORPHINE SULFATE 4 MG/ML
4 INJECTION, SOLUTION INTRAMUSCULAR; INTRAVENOUS
Status: CANCELLED | OUTPATIENT
Start: 2024-01-01 | End: 2024-04-02

## 2024-01-01 RX ORDER — ACETAMINOPHEN 650 MG/1
650 SUPPOSITORY RECTAL EVERY 4 HOURS PRN
Status: DISCONTINUED | OUTPATIENT
Start: 2024-01-01 | End: 2024-01-01 | Stop reason: HOSPADM

## 2024-01-01 RX ORDER — ATROPINE SULFATE 10 MG/ML
2 SOLUTION/ DROPS OPHTHALMIC 2 TIMES DAILY PRN
Status: CANCELLED | OUTPATIENT
Start: 2024-01-01

## 2024-01-01 RX ORDER — BISACODYL 5 MG/1
5 TABLET, DELAYED RELEASE ORAL DAILY PRN
Status: DISCONTINUED | OUTPATIENT
Start: 2024-01-01 | End: 2024-01-01

## 2024-01-01 RX ORDER — ACETAMINOPHEN 160 MG/5ML
650 SOLUTION ORAL EVERY 4 HOURS PRN
Status: CANCELLED | OUTPATIENT
Start: 2024-01-01

## 2024-01-01 RX ORDER — MORPHINE SULFATE 20 MG/ML
20 SOLUTION ORAL
Status: DISCONTINUED | OUTPATIENT
Start: 2024-01-01 | End: 2024-01-01 | Stop reason: HOSPADM

## 2024-01-01 RX ORDER — LORAZEPAM 2 MG/ML
2 CONCENTRATE ORAL
Status: CANCELLED | OUTPATIENT
Start: 2024-01-01 | End: 2024-01-01

## 2024-01-01 RX ORDER — LORAZEPAM 2 MG/ML
1 CONCENTRATE ORAL
Status: CANCELLED | OUTPATIENT
Start: 2024-01-01 | End: 2024-01-01

## 2024-01-01 RX ORDER — METOPROLOL SUCCINATE 50 MG/1
50 TABLET, EXTENDED RELEASE ORAL EVERY EVENING
Status: DISCONTINUED | OUTPATIENT
Start: 2024-01-01 | End: 2024-01-01

## 2024-01-01 RX ORDER — SODIUM CHLORIDE 0.9 % (FLUSH) 0.9 %
10 SYRINGE (ML) INJECTION EVERY 12 HOURS SCHEDULED
Status: DISCONTINUED | OUTPATIENT
Start: 2024-01-01 | End: 2024-01-01

## 2024-01-01 RX ORDER — METHYLPREDNISOLONE SODIUM SUCCINATE 40 MG/ML
20 INJECTION, POWDER, LYOPHILIZED, FOR SOLUTION INTRAMUSCULAR; INTRAVENOUS 2 TIMES DAILY
Status: DISCONTINUED | OUTPATIENT
Start: 2024-01-01 | End: 2024-01-01

## 2024-01-01 RX ORDER — DIPHENOXYLATE HYDROCHLORIDE AND ATROPINE SULFATE 2.5; .025 MG/1; MG/1
1 TABLET ORAL
Status: DISCONTINUED | OUTPATIENT
Start: 2024-01-01 | End: 2024-01-01 | Stop reason: HOSPADM

## 2024-01-01 RX ORDER — LORAZEPAM 0.5 MG/1
0.5 TABLET ORAL EVERY 6 HOURS PRN
Status: DISCONTINUED | OUTPATIENT
Start: 2024-01-01 | End: 2024-01-01

## 2024-01-01 RX ORDER — MORPHINE SULFATE 20 MG/ML
5 SOLUTION ORAL
Status: CANCELLED | OUTPATIENT
Start: 2024-01-01 | End: 2024-04-02

## 2024-01-01 RX ORDER — LORAZEPAM 2 MG/ML
0.5 CONCENTRATE ORAL EVERY 6 HOURS PRN
Status: DISCONTINUED | OUTPATIENT
Start: 2024-01-01 | End: 2024-01-01

## 2024-01-01 RX ORDER — MULTIPLE VITAMINS W/ MINERALS TAB 9MG-400MCG
1 TAB ORAL DAILY
Status: DISCONTINUED | OUTPATIENT
Start: 2024-01-01 | End: 2024-01-01

## 2024-01-01 RX ORDER — LORAZEPAM 2 MG/ML
1 CONCENTRATE ORAL EVERY 4 HOURS PRN
Status: DISCONTINUED | OUTPATIENT
Start: 2024-01-01 | End: 2024-01-01

## 2024-01-01 RX ORDER — ACETAMINOPHEN 160 MG/5ML
650 SOLUTION ORAL EVERY 4 HOURS PRN
Status: DISCONTINUED | OUTPATIENT
Start: 2024-01-01 | End: 2024-01-01 | Stop reason: HOSPADM

## 2024-01-01 RX ORDER — LORAZEPAM 2 MG/ML
0.5 CONCENTRATE ORAL EVERY 4 HOURS PRN
Status: DISCONTINUED | OUTPATIENT
Start: 2024-01-01 | End: 2024-01-01

## 2024-01-01 RX ORDER — MORPHINE SULFATE 2 MG/ML
2 INJECTION, SOLUTION INTRAMUSCULAR; INTRAVENOUS
Status: CANCELLED | OUTPATIENT
Start: 2024-01-01 | End: 2024-04-02

## 2024-01-01 RX ORDER — AMLODIPINE BESYLATE 5 MG/1
5 TABLET ORAL 2 TIMES DAILY
Status: DISCONTINUED | OUTPATIENT
Start: 2024-01-01 | End: 2024-01-01

## 2024-01-01 RX ORDER — LORAZEPAM 2 MG/ML
2 INJECTION INTRAMUSCULAR
Status: CANCELLED | OUTPATIENT
Start: 2024-01-01 | End: 2024-01-01

## 2024-01-01 RX ORDER — ACETAMINOPHEN 325 MG/1
650 TABLET ORAL EVERY 4 HOURS PRN
Status: CANCELLED | OUTPATIENT
Start: 2024-01-01

## 2024-01-01 RX ORDER — METHYLPREDNISOLONE SODIUM SUCCINATE 125 MG/2ML
60 INJECTION, POWDER, LYOPHILIZED, FOR SOLUTION INTRAMUSCULAR; INTRAVENOUS EVERY 24 HOURS
Status: DISCONTINUED | OUTPATIENT
Start: 2024-01-01 | End: 2024-01-01

## 2024-01-01 RX ORDER — BENZONATATE 100 MG/1
100 CAPSULE ORAL 3 TIMES DAILY PRN
Status: DISCONTINUED | OUTPATIENT
Start: 2024-01-01 | End: 2024-01-01

## 2024-01-01 RX ORDER — HYDROMORPHONE HYDROCHLORIDE 2 MG/ML
1.5 INJECTION, SOLUTION INTRAMUSCULAR; INTRAVENOUS; SUBCUTANEOUS
Status: DISCONTINUED | OUTPATIENT
Start: 2024-01-01 | End: 2024-01-01 | Stop reason: HOSPADM

## 2024-01-01 RX ORDER — HYDROMORPHONE HYDROCHLORIDE 2 MG/ML
1.5 INJECTION, SOLUTION INTRAMUSCULAR; INTRAVENOUS; SUBCUTANEOUS
Status: CANCELLED | OUTPATIENT
Start: 2024-01-01 | End: 2024-04-02

## 2024-01-01 RX ORDER — IPRATROPIUM BROMIDE AND ALBUTEROL SULFATE 2.5; .5 MG/3ML; MG/3ML
3 SOLUTION RESPIRATORY (INHALATION) ONCE
Status: COMPLETED | OUTPATIENT
Start: 2024-01-01 | End: 2024-01-01

## 2024-01-01 RX ORDER — MORPHINE SULFATE 2 MG/ML
2 INJECTION, SOLUTION INTRAMUSCULAR; INTRAVENOUS
Status: DISCONTINUED | OUTPATIENT
Start: 2024-01-01 | End: 2024-01-01 | Stop reason: HOSPADM

## 2024-01-01 RX ORDER — MORPHINE SULFATE 20 MG/ML
10 SOLUTION ORAL
Status: CANCELLED | OUTPATIENT
Start: 2024-01-01 | End: 2024-04-02

## 2024-01-01 RX ORDER — SODIUM CHLORIDE, SODIUM LACTATE, POTASSIUM CHLORIDE, CALCIUM CHLORIDE 600; 310; 30; 20 MG/100ML; MG/100ML; MG/100ML; MG/100ML
75 INJECTION, SOLUTION INTRAVENOUS CONTINUOUS
Status: DISCONTINUED | OUTPATIENT
Start: 2024-01-01 | End: 2024-01-01

## 2024-01-01 RX ORDER — ACETAMINOPHEN 650 MG/1
650 SUPPOSITORY RECTAL EVERY 4 HOURS PRN
Status: CANCELLED | OUTPATIENT
Start: 2024-01-01

## 2024-01-01 RX ORDER — PROMETHAZINE HYDROCHLORIDE 12.5 MG/1
12.5 SUPPOSITORY RECTAL EVERY 4 HOURS PRN
Status: CANCELLED | OUTPATIENT
Start: 2024-01-01

## 2024-01-01 RX ORDER — MORPHINE SULFATE 10 MG/ML
6 INJECTION INTRAMUSCULAR; INTRAVENOUS; SUBCUTANEOUS
Status: CANCELLED | OUTPATIENT
Start: 2024-01-01 | End: 2024-04-02

## 2024-01-01 RX ORDER — POTASSIUM CHLORIDE 7.45 MG/ML
10 INJECTION INTRAVENOUS
Status: COMPLETED | OUTPATIENT
Start: 2024-01-01 | End: 2024-01-01

## 2024-01-01 RX ORDER — PROMETHAZINE HYDROCHLORIDE 25 MG/1
12.5 TABLET ORAL EVERY 4 HOURS PRN
Status: CANCELLED | OUTPATIENT
Start: 2024-01-01

## 2024-01-01 RX ORDER — SODIUM CHLORIDE 9 MG/ML
75 INJECTION, SOLUTION INTRAVENOUS CONTINUOUS
Status: DISCONTINUED | OUTPATIENT
Start: 2024-01-01 | End: 2024-01-01

## 2024-01-01 RX ORDER — SODIUM CHLORIDE 9 MG/ML
40 INJECTION, SOLUTION INTRAVENOUS AS NEEDED
Status: DISCONTINUED | OUTPATIENT
Start: 2024-01-01 | End: 2024-01-01

## 2024-01-01 RX ORDER — METHYLPREDNISOLONE SODIUM SUCCINATE 125 MG/2ML
125 INJECTION, POWDER, LYOPHILIZED, FOR SOLUTION INTRAMUSCULAR; INTRAVENOUS ONCE
Status: COMPLETED | OUTPATIENT
Start: 2024-01-01 | End: 2024-01-01

## 2024-01-01 RX ORDER — HYDROMORPHONE HYDROCHLORIDE 1 MG/ML
0.5 INJECTION, SOLUTION INTRAMUSCULAR; INTRAVENOUS; SUBCUTANEOUS
Status: CANCELLED | OUTPATIENT
Start: 2024-01-01 | End: 2024-04-02

## 2024-01-01 RX ORDER — MORPHINE SULFATE 2 MG/ML
4 INJECTION, SOLUTION INTRAMUSCULAR; INTRAVENOUS
Status: DISCONTINUED | OUTPATIENT
Start: 2024-01-01 | End: 2024-01-01 | Stop reason: HOSPADM

## 2024-01-01 RX ORDER — MORPHINE SULFATE 2 MG/ML
6 INJECTION, SOLUTION INTRAMUSCULAR; INTRAVENOUS
Status: DISCONTINUED | OUTPATIENT
Start: 2024-01-01 | End: 2024-01-01 | Stop reason: HOSPADM

## 2024-01-01 RX ADMIN — MORPHINE SULFATE 4 MG: 4 INJECTION, SOLUTION INTRAMUSCULAR; INTRAVENOUS at 12:11

## 2024-01-01 RX ADMIN — LORAZEPAM 0.5 MG: 2 INJECTION INTRAMUSCULAR; INTRAVENOUS at 20:24

## 2024-01-01 RX ADMIN — IPRATROPIUM BROMIDE AND ALBUTEROL SULFATE 3 ML: .5; 3 SOLUTION RESPIRATORY (INHALATION) at 11:10

## 2024-01-01 RX ADMIN — Medication 10 ML: at 08:42

## 2024-01-01 RX ADMIN — PANTOPRAZOLE SODIUM 40 MG: 40 TABLET, DELAYED RELEASE ORAL at 06:31

## 2024-01-01 RX ADMIN — AMLODIPINE BESYLATE 5 MG: 5 TABLET ORAL at 09:03

## 2024-01-01 RX ADMIN — METHYLPREDNISOLONE SODIUM SUCCINATE 60 MG: 125 INJECTION, POWDER, FOR SOLUTION INTRAMUSCULAR; INTRAVENOUS at 22:46

## 2024-01-01 RX ADMIN — CEFTRIAXONE SODIUM 1000 MG: 1 INJECTION, POWDER, FOR SOLUTION INTRAMUSCULAR; INTRAVENOUS at 23:37

## 2024-01-01 RX ADMIN — SODIUM CHLORIDE, POTASSIUM CHLORIDE, SODIUM LACTATE AND CALCIUM CHLORIDE 75 ML/HR: 600; 310; 30; 20 INJECTION, SOLUTION INTRAVENOUS at 03:47

## 2024-01-01 RX ADMIN — MORPHINE SULFATE 2 MG: 2 INJECTION, SOLUTION INTRAMUSCULAR; INTRAVENOUS at 20:24

## 2024-01-01 RX ADMIN — SODIUM CHLORIDE 75 ML/HR: 9 INJECTION, SOLUTION INTRAVENOUS at 18:35

## 2024-01-01 RX ADMIN — METOPROLOL SUCCINATE 50 MG: 50 TABLET, EXTENDED RELEASE ORAL at 17:54

## 2024-01-01 RX ADMIN — ALBUTEROL SULFATE 2.5 MG: 2.5 SOLUTION RESPIRATORY (INHALATION) at 15:43

## 2024-01-01 RX ADMIN — METOPROLOL SUCCINATE 25 MG: 25 TABLET, EXTENDED RELEASE ORAL at 06:15

## 2024-01-01 RX ADMIN — METOPROLOL SUCCINATE 50 MG: 50 TABLET, EXTENDED RELEASE ORAL at 18:17

## 2024-01-01 RX ADMIN — SODIUM CHLORIDE, POTASSIUM CHLORIDE, SODIUM LACTATE AND CALCIUM CHLORIDE 75 ML/HR: 600; 310; 30; 20 INJECTION, SOLUTION INTRAVENOUS at 22:37

## 2024-01-01 RX ADMIN — Medication 10 ML: at 09:03

## 2024-01-01 RX ADMIN — METOPROLOL SUCCINATE 25 MG: 25 TABLET, EXTENDED RELEASE ORAL at 06:33

## 2024-01-01 RX ADMIN — MORPHINE SULFATE 2 MG: 2 INJECTION, SOLUTION INTRAMUSCULAR; INTRAVENOUS at 04:40

## 2024-01-01 RX ADMIN — CEFTRIAXONE SODIUM 1000 MG: 1 INJECTION, POWDER, FOR SOLUTION INTRAMUSCULAR; INTRAVENOUS at 23:04

## 2024-01-01 RX ADMIN — METOPROLOL SUCCINATE 25 MG: 25 TABLET, EXTENDED RELEASE ORAL at 06:13

## 2024-01-01 RX ADMIN — SODIUM CHLORIDE, POTASSIUM CHLORIDE, SODIUM LACTATE AND CALCIUM CHLORIDE 75 ML/HR: 600; 310; 30; 20 INJECTION, SOLUTION INTRAVENOUS at 08:59

## 2024-01-01 RX ADMIN — CEFTRIAXONE SODIUM 1000 MG: 1 INJECTION, POWDER, FOR SOLUTION INTRAMUSCULAR; INTRAVENOUS at 22:46

## 2024-01-01 RX ADMIN — METOPROLOL SUCCINATE 50 MG: 50 TABLET, EXTENDED RELEASE ORAL at 16:40

## 2024-01-01 RX ADMIN — Medication 10 ML: at 08:48

## 2024-01-01 RX ADMIN — METHYLPREDNISOLONE SODIUM SUCCINATE 20 MG: 40 INJECTION, POWDER, FOR SOLUTION INTRAMUSCULAR; INTRAVENOUS at 08:07

## 2024-01-01 RX ADMIN — AMLODIPINE BESYLATE 5 MG: 5 TABLET ORAL at 20:43

## 2024-01-01 RX ADMIN — MORPHINE SULFATE 2 MG: 2 INJECTION, SOLUTION INTRAMUSCULAR; INTRAVENOUS at 00:50

## 2024-01-01 RX ADMIN — Medication 10 ML: at 22:14

## 2024-01-01 RX ADMIN — Medication 1 TABLET: at 08:41

## 2024-01-01 RX ADMIN — METOPROLOL SUCCINATE 50 MG: 50 TABLET, EXTENDED RELEASE ORAL at 17:24

## 2024-01-01 RX ADMIN — LORAZEPAM 1 MG: 2 INJECTION INTRAMUSCULAR; INTRAVENOUS at 04:30

## 2024-01-01 RX ADMIN — LORAZEPAM 1 MG: 2 INJECTION INTRAMUSCULAR; INTRAVENOUS at 20:21

## 2024-01-01 RX ADMIN — AMLODIPINE BESYLATE 5 MG: 5 TABLET ORAL at 08:30

## 2024-01-01 RX ADMIN — AMLODIPINE BESYLATE 5 MG: 5 TABLET ORAL at 21:10

## 2024-01-01 RX ADMIN — CEFTRIAXONE SODIUM 1000 MG: 1 INJECTION, POWDER, FOR SOLUTION INTRAMUSCULAR; INTRAVENOUS at 22:15

## 2024-01-01 RX ADMIN — METOPROLOL SUCCINATE 50 MG: 50 TABLET, EXTENDED RELEASE ORAL at 21:05

## 2024-01-01 RX ADMIN — ALBUTEROL SULFATE 2.5 MG: 2.5 SOLUTION RESPIRATORY (INHALATION) at 20:49

## 2024-01-01 RX ADMIN — MORPHINE SULFATE 4 MG: 4 INJECTION, SOLUTION INTRAMUSCULAR; INTRAVENOUS at 00:27

## 2024-01-01 RX ADMIN — CEFTRIAXONE SODIUM 1000 MG: 1 INJECTION, POWDER, FOR SOLUTION INTRAMUSCULAR; INTRAVENOUS at 21:48

## 2024-01-01 RX ADMIN — METHYLPREDNISOLONE SODIUM SUCCINATE 60 MG: 125 INJECTION, POWDER, FOR SOLUTION INTRAMUSCULAR; INTRAVENOUS at 23:05

## 2024-01-01 RX ADMIN — METOPROLOL SUCCINATE 50 MG: 50 TABLET, EXTENDED RELEASE ORAL at 16:51

## 2024-01-01 RX ADMIN — CEFTRIAXONE SODIUM 1000 MG: 1 INJECTION, POWDER, FOR SOLUTION INTRAMUSCULAR; INTRAVENOUS at 22:24

## 2024-01-01 RX ADMIN — AMLODIPINE BESYLATE 5 MG: 5 TABLET ORAL at 08:47

## 2024-01-01 RX ADMIN — Medication 10 ML: at 21:47

## 2024-01-01 RX ADMIN — AMLODIPINE BESYLATE 5 MG: 5 TABLET ORAL at 21:48

## 2024-01-01 RX ADMIN — ALBUTEROL SULFATE 2.5 MG: 2.5 SOLUTION RESPIRATORY (INHALATION) at 15:29

## 2024-01-01 RX ADMIN — Medication 10 ML: at 08:43

## 2024-01-01 RX ADMIN — METOPROLOL SUCCINATE 25 MG: 25 TABLET, EXTENDED RELEASE ORAL at 06:31

## 2024-01-01 RX ADMIN — Medication 1 TABLET: at 09:03

## 2024-01-01 RX ADMIN — LORAZEPAM 0.5 MG: 2 INJECTION INTRAMUSCULAR; INTRAVENOUS at 05:08

## 2024-01-01 RX ADMIN — Medication 10 ML: at 21:29

## 2024-01-01 RX ADMIN — AMLODIPINE BESYLATE 5 MG: 5 TABLET ORAL at 08:42

## 2024-01-01 RX ADMIN — Medication 10 ML: at 21:10

## 2024-01-01 RX ADMIN — AMLODIPINE BESYLATE 5 MG: 5 TABLET ORAL at 08:22

## 2024-01-01 RX ADMIN — AMLODIPINE BESYLATE 5 MG: 5 TABLET ORAL at 20:31

## 2024-01-01 RX ADMIN — ALBUTEROL SULFATE 2.5 MG: 2.5 SOLUTION RESPIRATORY (INHALATION) at 08:38

## 2024-01-01 RX ADMIN — PANTOPRAZOLE SODIUM 40 MG: 40 TABLET, DELAYED RELEASE ORAL at 06:13

## 2024-01-01 RX ADMIN — METHYLPREDNISOLONE SODIUM SUCCINATE 20 MG: 40 INJECTION, POWDER, FOR SOLUTION INTRAMUSCULAR; INTRAVENOUS at 21:27

## 2024-01-01 RX ADMIN — IPRATROPIUM BROMIDE AND ALBUTEROL SULFATE 3 ML: .5; 2.5 SOLUTION RESPIRATORY (INHALATION) at 20:35

## 2024-01-01 RX ADMIN — MORPHINE SULFATE 2 MG: 2 INJECTION, SOLUTION INTRAMUSCULAR; INTRAVENOUS at 09:14

## 2024-01-01 RX ADMIN — Medication 1 TABLET: at 10:18

## 2024-01-01 RX ADMIN — AMLODIPINE BESYLATE 5 MG: 5 TABLET ORAL at 21:29

## 2024-01-01 RX ADMIN — Medication 1 TABLET: at 09:24

## 2024-01-01 RX ADMIN — Medication 10 ML: at 20:03

## 2024-01-01 RX ADMIN — ALBUTEROL SULFATE 2.5 MG: 2.5 SOLUTION RESPIRATORY (INHALATION) at 08:30

## 2024-01-01 RX ADMIN — Medication 10 ML: at 20:26

## 2024-01-01 RX ADMIN — MORPHINE SULFATE 2 MG: 2 INJECTION, SOLUTION INTRAMUSCULAR; INTRAVENOUS at 16:14

## 2024-01-01 RX ADMIN — METOPROLOL SUCCINATE 25 MG: 25 TABLET, EXTENDED RELEASE ORAL at 07:00

## 2024-01-01 RX ADMIN — Medication 10 ML: at 23:04

## 2024-01-01 RX ADMIN — MORPHINE SULFATE 4 MG: 4 INJECTION, SOLUTION INTRAMUSCULAR; INTRAVENOUS at 20:21

## 2024-01-01 RX ADMIN — ALBUTEROL SULFATE 2.5 MG: 2.5 SOLUTION RESPIRATORY (INHALATION) at 22:35

## 2024-01-01 RX ADMIN — AMLODIPINE BESYLATE 5 MG: 5 TABLET ORAL at 22:48

## 2024-01-01 RX ADMIN — LORAZEPAM 0.5 MG: 2 INJECTION INTRAMUSCULAR; INTRAVENOUS at 00:23

## 2024-01-01 RX ADMIN — CEFTRIAXONE SODIUM 1000 MG: 1 INJECTION, POWDER, FOR SOLUTION INTRAMUSCULAR; INTRAVENOUS at 17:21

## 2024-01-01 RX ADMIN — SODIUM CHLORIDE, POTASSIUM CHLORIDE, SODIUM LACTATE AND CALCIUM CHLORIDE 75 ML/HR: 600; 310; 30; 20 INJECTION, SOLUTION INTRAVENOUS at 12:00

## 2024-01-01 RX ADMIN — GLYCOPYRROLATE 0.2 MG: 0.2 INJECTION INTRAMUSCULAR; INTRAVENOUS at 12:17

## 2024-01-01 RX ADMIN — SODIUM CHLORIDE 4 ML: 7 NEBU SOLN,3 % NEBU at 20:36

## 2024-01-01 RX ADMIN — SODIUM CHLORIDE, POTASSIUM CHLORIDE, SODIUM LACTATE AND CALCIUM CHLORIDE 75 ML/HR: 600; 310; 30; 20 INJECTION, SOLUTION INTRAVENOUS at 14:33

## 2024-01-01 RX ADMIN — METOPROLOL SUCCINATE 50 MG: 50 TABLET, EXTENDED RELEASE ORAL at 18:51

## 2024-01-01 RX ADMIN — MORPHINE SULFATE 2 MG: 2 INJECTION, SOLUTION INTRAMUSCULAR; INTRAVENOUS at 18:15

## 2024-01-01 RX ADMIN — AMLODIPINE BESYLATE 5 MG: 5 TABLET ORAL at 22:14

## 2024-01-01 RX ADMIN — LORAZEPAM 0.5 MG: 2 INJECTION INTRAMUSCULAR; INTRAVENOUS at 04:40

## 2024-01-01 RX ADMIN — METOPROLOL SUCCINATE 25 MG: 25 TABLET, EXTENDED RELEASE ORAL at 06:41

## 2024-01-01 RX ADMIN — METOPROLOL SUCCINATE 50 MG: 50 TABLET, EXTENDED RELEASE ORAL at 17:13

## 2024-01-01 RX ADMIN — Medication 1 TABLET: at 08:42

## 2024-01-01 RX ADMIN — Medication 10 ML: at 23:06

## 2024-01-01 RX ADMIN — AMLODIPINE BESYLATE 5 MG: 5 TABLET ORAL at 20:03

## 2024-01-01 RX ADMIN — METOPROLOL SUCCINATE 50 MG: 50 TABLET, EXTENDED RELEASE ORAL at 22:48

## 2024-01-01 RX ADMIN — LORAZEPAM 1 MG: 2 INJECTION INTRAMUSCULAR; INTRAVENOUS at 12:11

## 2024-01-01 RX ADMIN — LORAZEPAM 1 MG: 2 INJECTION INTRAMUSCULAR; INTRAVENOUS at 08:39

## 2024-01-01 RX ADMIN — MORPHINE SULFATE 4 MG: 4 INJECTION, SOLUTION INTRAMUSCULAR; INTRAVENOUS at 04:31

## 2024-01-01 RX ADMIN — MORPHINE SULFATE 4 MG: 4 INJECTION, SOLUTION INTRAMUSCULAR; INTRAVENOUS at 08:39

## 2024-01-01 RX ADMIN — LORAZEPAM 0.5 MG: 2 INJECTION INTRAMUSCULAR; INTRAVENOUS at 09:15

## 2024-01-01 RX ADMIN — AMLODIPINE BESYLATE 5 MG: 5 TABLET ORAL at 08:41

## 2024-01-01 RX ADMIN — LORAZEPAM 0.5 MG: 2 INJECTION INTRAMUSCULAR; INTRAVENOUS at 16:14

## 2024-01-01 RX ADMIN — MORPHINE SULFATE 2 MG: 2 INJECTION, SOLUTION INTRAMUSCULAR; INTRAVENOUS at 00:23

## 2024-01-01 RX ADMIN — METHYLPREDNISOLONE SODIUM SUCCINATE 20 MG: 40 INJECTION, POWDER, FOR SOLUTION INTRAMUSCULAR; INTRAVENOUS at 08:34

## 2024-01-01 RX ADMIN — LORAZEPAM 1 MG: 2 INJECTION INTRAMUSCULAR; INTRAVENOUS at 17:14

## 2024-01-01 RX ADMIN — MORPHINE SULFATE 2 MG: 2 INJECTION, SOLUTION INTRAMUSCULAR; INTRAVENOUS at 17:14

## 2024-01-01 RX ADMIN — MORPHINE SULFATE 2 MG: 2 INJECTION, SOLUTION INTRAMUSCULAR; INTRAVENOUS at 13:36

## 2024-01-01 RX ADMIN — LORAZEPAM 1 MG: 2 INJECTION INTRAMUSCULAR; INTRAVENOUS at 00:27

## 2024-01-01 RX ADMIN — AMLODIPINE BESYLATE 5 MG: 5 TABLET ORAL at 22:24

## 2024-01-01 RX ADMIN — METHYLPREDNISOLONE SODIUM SUCCINATE 125 MG: 125 INJECTION, POWDER, FOR SOLUTION INTRAMUSCULAR; INTRAVENOUS at 16:23

## 2024-01-01 RX ADMIN — METHYLPREDNISOLONE SODIUM SUCCINATE 20 MG: 40 INJECTION, POWDER, FOR SOLUTION INTRAMUSCULAR; INTRAVENOUS at 20:03

## 2024-01-01 RX ADMIN — AMLODIPINE BESYLATE 5 MG: 5 TABLET ORAL at 09:24

## 2024-01-01 RX ADMIN — LORAZEPAM 0.5 MG: 2 INJECTION INTRAMUSCULAR; INTRAVENOUS at 13:37

## 2024-01-01 RX ADMIN — SODIUM CHLORIDE 75 ML/HR: 9 INJECTION, SOLUTION INTRAVENOUS at 23:06

## 2024-01-01 RX ADMIN — SODIUM CHLORIDE 75 ML/HR: 9 INJECTION, SOLUTION INTRAVENOUS at 08:57

## 2024-01-01 RX ADMIN — 0.12% CHLORHEXIDINE GLUCONATE 15 ML: 1.2 RINSE ORAL at 08:39

## 2024-01-01 RX ADMIN — IPRATROPIUM BROMIDE AND ALBUTEROL SULFATE 3 ML: 2.5; .5 SOLUTION RESPIRATORY (INHALATION) at 16:23

## 2024-01-01 RX ADMIN — METOPROLOL SUCCINATE 25 MG: 25 TABLET, EXTENDED RELEASE ORAL at 06:09

## 2024-01-01 RX ADMIN — Medication 1 TABLET: at 08:47

## 2024-01-01 RX ADMIN — LORAZEPAM 0.5 MG: 2 INJECTION INTRAMUSCULAR; INTRAVENOUS at 00:50

## 2024-01-01 RX ADMIN — POTASSIUM CHLORIDE 10 MEQ: 7.46 INJECTION, SOLUTION INTRAVENOUS at 18:13

## 2024-01-01 RX ADMIN — POTASSIUM CHLORIDE 10 MEQ: 7.46 INJECTION, SOLUTION INTRAVENOUS at 20:11

## 2024-01-01 RX ADMIN — MORPHINE SULFATE 2 MG: 2 INJECTION, SOLUTION INTRAMUSCULAR; INTRAVENOUS at 08:30

## 2024-01-01 RX ADMIN — AMLODIPINE BESYLATE 5 MG: 5 TABLET ORAL at 10:18

## 2024-01-01 RX ADMIN — ALBUTEROL SULFATE 2.5 MG: 2.5 SOLUTION RESPIRATORY (INHALATION) at 20:44

## 2024-01-01 RX ADMIN — CEFTRIAXONE SODIUM 1000 MG: 1 INJECTION, POWDER, FOR SOLUTION INTRAMUSCULAR; INTRAVENOUS at 23:05

## 2024-01-01 RX ADMIN — PANTOPRAZOLE SODIUM 40 MG: 40 TABLET, DELAYED RELEASE ORAL at 05:54

## 2024-01-01 RX ADMIN — METOPROLOL SUCCINATE 25 MG: 25 TABLET, EXTENDED RELEASE ORAL at 07:27

## 2024-01-08 ENCOUNTER — OFFICE VISIT (OUTPATIENT)
Dept: FAMILY MEDICINE CLINIC | Facility: CLINIC | Age: 89
End: 2024-01-08
Payer: MEDICARE

## 2024-01-08 VITALS
WEIGHT: 108.9 LBS | TEMPERATURE: 97.5 F | SYSTOLIC BLOOD PRESSURE: 138 MMHG | HEIGHT: 65 IN | RESPIRATION RATE: 15 BRPM | HEART RATE: 69 BPM | DIASTOLIC BLOOD PRESSURE: 80 MMHG | BODY MASS INDEX: 18.15 KG/M2 | OXYGEN SATURATION: 96 %

## 2024-01-08 DIAGNOSIS — Z00.00 MEDICARE ANNUAL WELLNESS VISIT, SUBSEQUENT: ICD-10-CM

## 2024-01-08 DIAGNOSIS — E78.00 HYPERCHOLESTEROLEMIA: ICD-10-CM

## 2024-01-08 DIAGNOSIS — I10 PRIMARY HYPERTENSION: Primary | ICD-10-CM

## 2024-01-08 NOTE — PROGRESS NOTES
The ABCs of the Annual Wellness Visit  Subsequent Medicare Wellness Visit    Subjective    Carito Gonzalez is a 89 y.o. female who presents for a Subsequent Medicare Wellness Visit.    The following portions of the patient's history were reviewed and   updated as appropriate: allergies, current medications, past family history, past medical history, past social history, past surgical history, and problem list.    Compared to one year ago, the patient feels her physical   health is the same.    Compared to one year ago, the patient feels her mental   health is the same.    Recent Hospitalizations:  She was not admitted to the hospital during the last year.       Current Medical Providers:  Patient Care Team:  Eb Turner MD as PCP - General (Internal Medicine)    Outpatient Medications Prior to Visit   Medication Sig Dispense Refill    acetaminophen (TYLENOL) 325 MG tablet Take 2 tablets by mouth Every 6 (Six) Hours As Needed for mild pain (1-3).  0    amLODIPine (NORVASC) 5 MG tablet Take 1 tablet by mouth 2 (Two) Times a Day. 180 tablet 3    aspirin 81 MG tablet Take 1 tablet by mouth Daily. 30 tablet 1    metoprolol succinate XL (TOPROL-XL) 25 MG 24 hr tablet Take 1 tablet by mouth Every Morning AND 2 tablets Every Evening. 270 tablet 1    Multiple Vitamins-Minerals (OCUTABS) tablet Take 1 tablet by mouth Daily.       No facility-administered medications prior to visit.       No opioid medication identified on active medication list. I have reviewed chart for other potential  high risk medication/s and harmful drug interactions in the elderly.        Aspirin is on active medication list. Aspirin use is indicated based on review of current medical condition/s. Pros and cons of this therapy have been discussed today. Benefits of this medication outweigh potential harm.  Patient has been encouraged to continue taking this medication.  .      Patient Active Problem List   Diagnosis    Hypercholesterolemia     "Hypertension    Swelling of limb, left    MVP (mitral valve prolapse)    Non-rheumatic mitral regurgitation    Bilateral carotid bruits    Depression    Protein calorie malnutrition    Azotemia    SOB (shortness of breath)    Pain of right heel    Cystitis    Medicare annual wellness visit, subsequent     Advance Care Planning   Advance Care Planning     Advance Directive is on file.  ACP discussion was held with the patient during this visit. Patient has an advance directive in EMR which is still valid.      Objective    Vitals:    24 1327   BP: 138/80   BP Location: Right arm   Patient Position: Sitting   Cuff Size: Adult   Pulse: 69   Resp: 15   Temp: 97.5 °F (36.4 °C)   TempSrc: Temporal   SpO2: 96%   Weight: 49.4 kg (108 lb 14.4 oz)   Height: 165.1 cm (65\")     Estimated body mass index is 18.12 kg/m² as calculated from the following:    Height as of this encounter: 165.1 cm (65\").    Weight as of this encounter: 49.4 kg (108 lb 14.4 oz).    BMI is below normal parameters (malnutrition). Recommendations: none (medical contraindication)      Does the patient have evidence of cognitive impairment? No    Lab Results   Component Value Date    CHLPL 229 (H) 2023    TRIG 69 2023    HDL 67 2023     (H) 2023    VLDL 12 2023        HEALTH RISK ASSESSMENT    Smoking Status:  Social History     Tobacco Use   Smoking Status Former    Types: Cigarettes    Quit date: 1977    Years since quittin.0   Smokeless Tobacco Never     Alcohol Consumption:  Social History     Substance and Sexual Activity   Alcohol Use No    Comment: Daily caffeine use     Fall Risk Screen:    RUSLANADI Fall Risk Assessment has not been completed.    Depression Screenin/8/2024     1:00 PM   PHQ-2/PHQ-9 Depression Screening   Little Interest or Pleasure in Doing Things 0-->not at all   Feeling Down, Depressed or Hopeless 0-->not at all   PHQ-9: Brief Depression Severity Measure Score 0       Health " Habits and Functional and Cognitive Screenin/8/2024     1:00 PM   Functional & Cognitive Status   Do you have difficulty preparing food and eating? No   Do you have difficulty bathing yourself, getting dressed or grooming yourself? No   Do you have difficulty using the toilet? No   Do you have difficulty moving around from place to place? No   Do you have trouble with steps or getting out of a bed or a chair? No   Current Diet Well Balanced Diet   Dental Exam Up to date   Eye Exam Up to date   Exercise (times per week) 7 times per week   Current Exercises Include Walking;Weightlifting   Do you need help using the phone?  No   Are you deaf or do you have serious difficulty hearing?  No   Do you need help to go to places out of walking distance? Yes   Do you need help shopping? Yes   Do you need help preparing meals?  Yes   Do you need help with housework?  Yes   Do you need help with laundry? Yes   Do you need help taking your medications? Yes   Do you need help managing money? Yes   Do you ever drive or ride in a car without wearing a seat belt? No   Have you felt unusual stress, anger or loneliness in the last month? No   Who do you live with? Community   If you need help, do you have trouble finding someone available to you? No   Have you been bothered in the last four weeks by sexual problems? No   Do you have difficulty concentrating, remembering or making decisions? No       Age-appropriate Screening Schedule:  Refer to the list below for future screening recommendations based on patient's age, sex and/or medical conditions. Orders for these recommended tests are listed in the plan section. The patient has been provided with a written plan.    Health Maintenance   Topic Date Due    BMI FOLLOWUP  Never done    INFLUENZA VACCINE  2023    COVID-19 Vaccine (2023- season) 2023    TDAP/TD VACCINES (2 - Td or Tdap) 10/15/2023    LIPID PANEL  2024    ANNUAL WELLNESS VISIT  2025     "Pneumococcal Vaccine 65+  Completed    ZOSTER VACCINE  Addressed    DXA SCAN  Discontinued                  CMS Preventative Services Quick Reference  Risk Factors Identified During Encounter  None Identified  The above risks/problems have been discussed with the patient.  Pertinent information has been shared with the patient in the After Visit Summary.  An After Visit Summary and PPPS were made available to the patient.    Follow Up:   Next Medicare Wellness visit to be scheduled in 1 year.       Additional E&M Note during same encounter follows:  Patient has multiple medical problems which are significant and separately identifiable that require additional work above and beyond the Medicare Wellness Visit.      Chief Complaint  Medicare Wellness-subsequent    Subjective        This delightful 89-year-old woman is here for a Medicare annual wellness visit.    She has no complaints.      Carito Gonzalez is also being seen today for hypertension and hypercholesterolemia    Review of Systems   Constitutional: Negative.    HENT: Negative.     Respiratory: Negative.     Cardiovascular: Negative.    Musculoskeletal: Negative.    Psychiatric/Behavioral: Negative.         Objective   Vital Signs:  /80 (BP Location: Right arm, Patient Position: Sitting, Cuff Size: Adult)   Pulse 69   Temp 97.5 °F (36.4 °C) (Temporal)   Resp 15   Ht 165.1 cm (65\")   Wt 49.4 kg (108 lb 14.4 oz)   SpO2 96%   BMI 18.12 kg/m²     Physical Exam  Vitals and nursing note reviewed.   Constitutional:       Appearance: Normal appearance.      Comments: Pleasant, neatly groomed, no distress.  BMI 18.   Cardiovascular:      Rate and Rhythm: Regular rhythm.      Heart sounds: Normal heart sounds. No murmur heard.     No gallop.   Pulmonary:      Effort: No respiratory distress.      Breath sounds: Normal breath sounds. No wheezing or rales.   Neurological:      Mental Status: She is alert and oriented to person, place, and time. "   Psychiatric:         Mood and Affect: Mood normal.         Behavior: Behavior normal.         Thought Content: Thought content normal.                         Assessment and Plan   Diagnoses and all orders for this visit:    1. Primary hypertension (Primary)    2. Hypercholesterolemia    3. Medicare annual wellness visit, subsequent    She and I reviewed her labs.  She does have hypercholesterolemia.  She been asked by Dr. Magana to take 81 mg aspirin once daily and she does continue to do that.    She does have an elevated cholesterol.  She refuses to take HMG Co. a reductase inhibitors.    Overall she is doing well for a 89-year-old woman.  She is a bit underweight and I asked her to eat a bit more than she typically does on a regular basis and continue to get regular physical activity.     I asked her to follow-up in about 6 months.  Fasting labs prior to that visit should include comprehensive metabolic panel and CBC and urinalysis.  I do not see the need to check a lipid profile.    Follow Up   No follow-ups on file.  Patient was given instructions and counseling regarding her condition or for health maintenance advice. Please see specific information pulled into the AVS if appropriate.

## 2024-01-21 PROBLEM — Z00.00 MEDICARE ANNUAL WELLNESS VISIT, SUBSEQUENT: Status: ACTIVE | Noted: 2024-01-21

## 2024-02-06 ENCOUNTER — TELEPHONE (OUTPATIENT)
Dept: FAMILY MEDICINE CLINIC | Facility: CLINIC | Age: 89
End: 2024-02-06
Payer: MEDICARE

## 2024-02-06 DIAGNOSIS — R30.0 DYSURIA: Primary | ICD-10-CM

## 2024-02-06 NOTE — TELEPHONE ENCOUNTER
Caller: MASOUD    Relationship: LPN FROM [facility]    Best call back number: [phone] ASK TO PAGE TAMIKO    What orders are you requesting (i.e. lab or imaging): STAT UA    Additional notes: MASOUD IS REQUESTING SHE CAN GET A STAT UA ON PATIENT AS SHE HAS BEEN HAVING TROUBLE CHOOSING THE RIGHT WORDS AND ONE OF HER FRIENDS SAID SHE HAD NOT BEEN HERSELF.PROVIDED FAX NUMBER:[phone]. SHE STATED IF [facility]ACKEL[facility] VEGA WANTED BLOOD WORK DONE STAT THERE LABS COME ON THURSDAY AND THEY COULD DO THEM THERE IT WILL JUST HAVE TO BE STAT ORDER.

## 2024-02-07 ENCOUNTER — APPOINTMENT (OUTPATIENT)
Dept: CT IMAGING | Facility: HOSPITAL | Age: 89
End: 2024-02-07
Payer: MEDICARE

## 2024-02-07 ENCOUNTER — TELEPHONE (OUTPATIENT)
Dept: FAMILY MEDICINE CLINIC | Facility: CLINIC | Age: 89
End: 2024-02-07

## 2024-02-07 ENCOUNTER — HOSPITAL ENCOUNTER (OUTPATIENT)
Facility: HOSPITAL | Age: 89
Setting detail: OBSERVATION
Discharge: HOME | End: 2024-02-08
Attending: EMERGENCY MEDICINE | Admitting: INTERNAL MEDICINE
Payer: MEDICARE

## 2024-02-07 ENCOUNTER — APPOINTMENT (OUTPATIENT)
Dept: GENERAL RADIOLOGY | Facility: HOSPITAL | Age: 89
End: 2024-02-07
Payer: MEDICARE

## 2024-02-07 DIAGNOSIS — I62.03 CHRONIC SUBDURAL HEMATOMA: ICD-10-CM

## 2024-02-07 DIAGNOSIS — H35.30 MACULAR DEGENERATION, UNSPECIFIED LATERALITY, UNSPECIFIED TYPE: ICD-10-CM

## 2024-02-07 DIAGNOSIS — I49.3 PVC'S (PREMATURE VENTRICULAR CONTRACTIONS): ICD-10-CM

## 2024-02-07 DIAGNOSIS — R53.1 GENERALIZED WEAKNESS: ICD-10-CM

## 2024-02-07 DIAGNOSIS — R47.01 APHASIA: Primary | ICD-10-CM

## 2024-02-07 LAB
ALBUMIN SERPL-MCNC: 4 G/DL (ref 3.5–5.2)
ALBUMIN/GLOB SERPL: 1.3 G/DL
ALP SERPL-CCNC: 63 U/L (ref 39–117)
ALT SERPL W P-5'-P-CCNC: 10 U/L (ref 1–33)
ANION GAP SERPL CALCULATED.3IONS-SCNC: 9.6 MMOL/L (ref 5–15)
AST SERPL-CCNC: 16 U/L (ref 1–32)
BACTERIA UR QL AUTO: ABNORMAL /HPF
BASOPHILS # BLD AUTO: 0.04 10*3/MM3 (ref 0–0.2)
BASOPHILS NFR BLD AUTO: 0.6 % (ref 0–1.5)
BILIRUB SERPL-MCNC: 0.5 MG/DL (ref 0–1.2)
BILIRUB UR QL STRIP: NEGATIVE
BUN SERPL-MCNC: 19 MG/DL (ref 8–23)
BUN/CREAT SERPL: 24.4 (ref 7–25)
CALCIUM SPEC-SCNC: 9.9 MG/DL (ref 8.2–9.6)
CHLORIDE SERPL-SCNC: 103 MMOL/L (ref 98–107)
CLARITY UR: CLEAR
CO2 SERPL-SCNC: 26.4 MMOL/L (ref 22–29)
COLOR UR: YELLOW
CREAT SERPL-MCNC: 0.78 MG/DL (ref 0.57–1)
DEPRECATED RDW RBC AUTO: 45.9 FL (ref 37–54)
EGFRCR SERPLBLD CKD-EPI 2021: 70.9 ML/MIN/1.73
EOSINOPHIL # BLD AUTO: 0.08 10*3/MM3 (ref 0–0.4)
EOSINOPHIL NFR BLD AUTO: 1.1 % (ref 0.3–6.2)
ERYTHROCYTE [DISTWIDTH] IN BLOOD BY AUTOMATED COUNT: 13.3 % (ref 12.3–15.4)
GEN 5 2HR TROPONIN T REFLEX: 22 NG/L
GLOBULIN UR ELPH-MCNC: 3 GM/DL
GLUCOSE BLDC GLUCOMTR-MCNC: 87 MG/DL (ref 70–130)
GLUCOSE SERPL-MCNC: 103 MG/DL (ref 65–99)
GLUCOSE UR STRIP-MCNC: NEGATIVE MG/DL
HCT VFR BLD AUTO: 37.7 % (ref 34–46.6)
HGB BLD-MCNC: 12.1 G/DL (ref 12–15.9)
HGB UR QL STRIP.AUTO: ABNORMAL
HYALINE CASTS UR QL AUTO: ABNORMAL /LPF
IMM GRANULOCYTES # BLD AUTO: 0.03 10*3/MM3 (ref 0–0.05)
IMM GRANULOCYTES NFR BLD AUTO: 0.4 % (ref 0–0.5)
INR PPP: 1.02 (ref 0.9–1.1)
KETONES UR QL STRIP: NEGATIVE
LEUKOCYTE ESTERASE UR QL STRIP.AUTO: ABNORMAL
LYMPHOCYTES # BLD AUTO: 1.12 10*3/MM3 (ref 0.7–3.1)
LYMPHOCYTES NFR BLD AUTO: 15.9 % (ref 19.6–45.3)
MAGNESIUM SERPL-MCNC: 2.3 MG/DL (ref 1.7–2.3)
MCH RBC QN AUTO: 29.7 PG (ref 26.6–33)
MCHC RBC AUTO-ENTMCNC: 32.1 G/DL (ref 31.5–35.7)
MCV RBC AUTO: 92.4 FL (ref 79–97)
MONOCYTES # BLD AUTO: 0.49 10*3/MM3 (ref 0.1–0.9)
MONOCYTES NFR BLD AUTO: 7 % (ref 5–12)
NEUTROPHILS NFR BLD AUTO: 5.29 10*3/MM3 (ref 1.7–7)
NEUTROPHILS NFR BLD AUTO: 75 % (ref 42.7–76)
NITRITE UR QL STRIP: NEGATIVE
NRBC BLD AUTO-RTO: 0 /100 WBC (ref 0–0.2)
PH UR STRIP.AUTO: 6 [PH] (ref 5–8)
PLATELET # BLD AUTO: 276 10*3/MM3 (ref 140–450)
PMV BLD AUTO: 10.6 FL (ref 6–12)
POTASSIUM SERPL-SCNC: 4.2 MMOL/L (ref 3.5–5.2)
PROT SERPL-MCNC: 7 G/DL (ref 6–8.5)
PROT UR QL STRIP: ABNORMAL
PROTHROMBIN TIME: 13.5 SECONDS (ref 11.7–14.2)
QT INTERVAL: 389 MS
QTC INTERVAL: 417 MS
RBC # BLD AUTO: 4.08 10*6/MM3 (ref 3.77–5.28)
RBC # UR STRIP: ABNORMAL /HPF
REF LAB TEST METHOD: ABNORMAL
SODIUM SERPL-SCNC: 139 MMOL/L (ref 136–145)
SP GR UR STRIP: 1.02 (ref 1–1.03)
SQUAMOUS #/AREA URNS HPF: ABNORMAL /HPF
T4 FREE SERPL-MCNC: 1.15 NG/DL (ref 0.93–1.7)
TROPONIN T DELTA: 1 NG/L
TROPONIN T SERPL HS-MCNC: 21 NG/L
TSH SERPL DL<=0.05 MIU/L-ACNC: 1.87 UIU/ML (ref 0.27–4.2)
UROBILINOGEN UR QL STRIP: ABNORMAL
WBC # UR STRIP: ABNORMAL /HPF
WBC NRBC COR # BLD AUTO: 7.05 10*3/MM3 (ref 3.4–10.8)

## 2024-02-07 PROCEDURE — 80053 COMPREHEN METABOLIC PANEL: CPT | Performed by: EMERGENCY MEDICINE

## 2024-02-07 PROCEDURE — 85610 PROTHROMBIN TIME: CPT | Performed by: EMERGENCY MEDICINE

## 2024-02-07 PROCEDURE — 70496 CT ANGIOGRAPHY HEAD: CPT

## 2024-02-07 PROCEDURE — G0378 HOSPITAL OBSERVATION PER HR: HCPCS

## 2024-02-07 PROCEDURE — 83735 ASSAY OF MAGNESIUM: CPT | Performed by: EMERGENCY MEDICINE

## 2024-02-07 PROCEDURE — 36415 COLL VENOUS BLD VENIPUNCTURE: CPT

## 2024-02-07 PROCEDURE — 93010 ELECTROCARDIOGRAM REPORT: CPT | Performed by: INTERNAL MEDICINE

## 2024-02-07 PROCEDURE — 85025 COMPLETE CBC W/AUTO DIFF WBC: CPT | Performed by: EMERGENCY MEDICINE

## 2024-02-07 PROCEDURE — 99285 EMERGENCY DEPT VISIT HI MDM: CPT

## 2024-02-07 PROCEDURE — 73130 X-RAY EXAM OF HAND: CPT

## 2024-02-07 PROCEDURE — 99213 OFFICE O/P EST LOW 20 MIN: CPT

## 2024-02-07 PROCEDURE — 25510000001 IOPAMIDOL PER 1 ML: Performed by: INTERNAL MEDICINE

## 2024-02-07 PROCEDURE — 84439 ASSAY OF FREE THYROXINE: CPT | Performed by: EMERGENCY MEDICINE

## 2024-02-07 PROCEDURE — 25810000003 SODIUM CHLORIDE 0.9 % SOLUTION: Performed by: INTERNAL MEDICINE

## 2024-02-07 PROCEDURE — 70450 CT HEAD/BRAIN W/O DYE: CPT

## 2024-02-07 PROCEDURE — 84484 ASSAY OF TROPONIN QUANT: CPT | Performed by: EMERGENCY MEDICINE

## 2024-02-07 PROCEDURE — 93005 ELECTROCARDIOGRAM TRACING: CPT | Performed by: EMERGENCY MEDICINE

## 2024-02-07 PROCEDURE — 84443 ASSAY THYROID STIM HORMONE: CPT | Performed by: EMERGENCY MEDICINE

## 2024-02-07 PROCEDURE — 82948 REAGENT STRIP/BLOOD GLUCOSE: CPT

## 2024-02-07 PROCEDURE — 70498 CT ANGIOGRAPHY NECK: CPT

## 2024-02-07 PROCEDURE — 87086 URINE CULTURE/COLONY COUNT: CPT | Performed by: EMERGENCY MEDICINE

## 2024-02-07 PROCEDURE — 81001 URINALYSIS AUTO W/SCOPE: CPT | Performed by: EMERGENCY MEDICINE

## 2024-02-07 RX ORDER — ACETAMINOPHEN 650 MG/1
650 SUPPOSITORY RECTAL EVERY 4 HOURS PRN
Status: DISCONTINUED | OUTPATIENT
Start: 2024-02-07 | End: 2024-02-08 | Stop reason: HOSPADM

## 2024-02-07 RX ORDER — METOPROLOL SUCCINATE 50 MG/1
50 TABLET, EXTENDED RELEASE ORAL EVERY EVENING
Status: DISCONTINUED | OUTPATIENT
Start: 2024-02-07 | End: 2024-02-08 | Stop reason: HOSPADM

## 2024-02-07 RX ORDER — ONDANSETRON 2 MG/ML
4 INJECTION INTRAMUSCULAR; INTRAVENOUS EVERY 6 HOURS PRN
Status: DISCONTINUED | OUTPATIENT
Start: 2024-02-07 | End: 2024-02-08 | Stop reason: HOSPADM

## 2024-02-07 RX ORDER — AMLODIPINE BESYLATE 5 MG/1
5 TABLET ORAL 2 TIMES DAILY
Status: DISCONTINUED | OUTPATIENT
Start: 2024-02-07 | End: 2024-02-08 | Stop reason: HOSPADM

## 2024-02-07 RX ORDER — ACETAMINOPHEN 325 MG/1
650 TABLET ORAL EVERY 4 HOURS PRN
Status: DISCONTINUED | OUTPATIENT
Start: 2024-02-07 | End: 2024-02-08 | Stop reason: HOSPADM

## 2024-02-07 RX ORDER — BISACODYL 10 MG
10 SUPPOSITORY, RECTAL RECTAL DAILY PRN
Status: DISCONTINUED | OUTPATIENT
Start: 2024-02-07 | End: 2024-02-08 | Stop reason: HOSPADM

## 2024-02-07 RX ORDER — METOPROLOL SUCCINATE 25 MG/1
25 TABLET, EXTENDED RELEASE ORAL EVERY MORNING
Status: DISCONTINUED | OUTPATIENT
Start: 2024-02-08 | End: 2024-02-08 | Stop reason: HOSPADM

## 2024-02-07 RX ORDER — SODIUM CHLORIDE 0.9 % (FLUSH) 0.9 %
10 SYRINGE (ML) INJECTION AS NEEDED
Status: DISCONTINUED | OUTPATIENT
Start: 2024-02-07 | End: 2024-02-08 | Stop reason: HOSPADM

## 2024-02-07 RX ORDER — SODIUM CHLORIDE 9 MG/ML
75 INJECTION, SOLUTION INTRAVENOUS CONTINUOUS
Status: DISCONTINUED | OUTPATIENT
Start: 2024-02-07 | End: 2024-02-08 | Stop reason: HOSPADM

## 2024-02-07 RX ORDER — ASPIRIN 300 MG/1
300 SUPPOSITORY RECTAL DAILY
Status: DISCONTINUED | OUTPATIENT
Start: 2024-02-07 | End: 2024-02-08

## 2024-02-07 RX ORDER — MULTIPLE VITAMINS W/ MINERALS TAB 9MG-400MCG
1 TAB ORAL DAILY
Status: DISCONTINUED | OUTPATIENT
Start: 2024-02-08 | End: 2024-02-08 | Stop reason: HOSPADM

## 2024-02-07 RX ORDER — ASPIRIN 325 MG
325 TABLET ORAL DAILY
Status: DISCONTINUED | OUTPATIENT
Start: 2024-02-07 | End: 2024-02-08

## 2024-02-07 RX ORDER — ATORVASTATIN CALCIUM 80 MG/1
80 TABLET, FILM COATED ORAL NIGHTLY
Status: DISCONTINUED | OUTPATIENT
Start: 2024-02-07 | End: 2024-02-08 | Stop reason: HOSPADM

## 2024-02-07 RX ADMIN — AMLODIPINE BESYLATE 5 MG: 5 TABLET ORAL at 22:29

## 2024-02-07 RX ADMIN — ASPIRIN 325 MG: 325 TABLET ORAL at 22:30

## 2024-02-07 RX ADMIN — ATORVASTATIN CALCIUM 80 MG: 80 TABLET, FILM COATED ORAL at 22:30

## 2024-02-07 RX ADMIN — METOPROLOL SUCCINATE 50 MG: 50 TABLET, FILM COATED, EXTENDED RELEASE ORAL at 22:31

## 2024-02-07 RX ADMIN — IOPAMIDOL 95 ML: 755 INJECTION, SOLUTION INTRAVENOUS at 20:04

## 2024-02-07 RX ADMIN — SODIUM CHLORIDE 75 ML/HR: 9 INJECTION, SOLUTION INTRAVENOUS at 22:09

## 2024-02-07 NOTE — ED NOTES
"Nursing report ED to floor  Carito Gonzalez  89 y.o.  female    HPI :   Chief Complaint   Patient presents with    Speech Problem       Admitting doctor:   Aurelia August MD    Admitting diagnosis:   The primary encounter diagnosis was Aphasia-intermittent. Diagnoses of PVC's (premature ventricular contractions), Macular degeneration, unspecified laterality, unspecified type, and Chronic subdural hematoma were also pertinent to this visit.    Code status:   Current Code Status       Date Active Code Status Order ID Comments User Context       Prior            Allergies:   Lisinopril, Zoloft [sertraline hcl], and Erythromycin    Isolation:   No active isolations    Intake and Output  No intake or output data in the 24 hours ending 02/07/24 1652    Weight:       02/07/24  1307   Weight: 49 kg (108 lb)       Most recent vitals:   Vitals:    02/07/24 1307   BP: 126/89   Pulse: 74   Resp: 16   Temp: 98.6 °F (37 °C)   TempSrc: Tympanic   SpO2: 95%   Weight: 49 kg (108 lb)   Height: 165.1 cm (65\")       Active LDAs/IV Access:   Lines, Drains & Airways       Active LDAs       Name Placement date Placement time Site Days    Peripheral IV 02/07/24 1307 Anterior;Right Forearm 02/07/24  1307  Forearm  less than 1                    Labs (abnormal labs have a star):   Labs Reviewed   COMPREHENSIVE METABOLIC PANEL - Abnormal; Notable for the following components:       Result Value    Glucose 103 (*)     Calcium 9.9 (*)     All other components within normal limits    Narrative:     GFR Normal >60  Chronic Kidney Disease <60  Kidney Failure <15    The GFR formula is only valid for adults with stable renal function between ages 18 and 70.   TROPONIN - Abnormal; Notable for the following components:    HS Troponin T 21 (*)     All other components within normal limits    Narrative:     High Sensitive Troponin T Reference Range:  <14.0 ng/L- Negative Female for AMI  <22.0 ng/L- Negative Male for AMI  >=14 - Abnormal Female " indicating possible myocardial injury.  >=22 - Abnormal Male indicating possible myocardial injury.   Clinicians would have to utilize clinical acumen, EKG, Troponin, and serial changes to determine if it is an Acute Myocardial Infarction or myocardial injury due to an underlying chronic condition.        URINALYSIS W/ MICROSCOPIC IF INDICATED (NO CULTURE) - Abnormal; Notable for the following components:    Blood, UA Trace (*)     Protein, UA Trace (*)     Leuk Esterase, UA Small (1+) (*)     All other components within normal limits   CBC WITH AUTO DIFFERENTIAL - Abnormal; Notable for the following components:    Lymphocyte % 15.9 (*)     All other components within normal limits   URINALYSIS, MICROSCOPIC ONLY - Abnormal; Notable for the following components:    WBC, UA 6-10 (*)     Squamous Epithelial Cells, UA 3-6 (*)     All other components within normal limits   HIGH SENSITIVITIY TROPONIN T 2HR - Abnormal; Notable for the following components:    HS Troponin T 22 (*)     All other components within normal limits    Narrative:     High Sensitive Troponin T Reference Range:  <14.0 ng/L- Negative Female for AMI  <22.0 ng/L- Negative Male for AMI  >=14 - Abnormal Female indicating possible myocardial injury.  >=22 - Abnormal Male indicating possible myocardial injury.   Clinicians would have to utilize clinical acumen, EKG, Troponin, and serial changes to determine if it is an Acute Myocardial Infarction or myocardial injury due to an underlying chronic condition.        PROTIME-INR - Normal   MAGNESIUM - Normal   TSH - Normal   T4, FREE - Normal    Narrative:     Results may be falsely increased if patient taking Biotin.     URINALYSIS W/ CULTURE IF INDICATED   CBC AND DIFFERENTIAL    Narrative:     The following orders were created for panel order CBC & Differential.  Procedure                               Abnormality         Status                     ---------                               -----------          ------                     CBC Auto Differential[[id number]]        Abnormal            Final result                 Please view results for these tests on the individual orders.       EKG:   ECG 12 Lead Stroke Evaluation   Final Result   HEART RATE= 69  bpm   RR Interval= 870  ms   ND Interval= 216  ms   P Horizontal Axis=   deg   P Front Axis= 51  deg   QRSD Interval= 96  ms   QT Interval= 389  ms   QTcB= 417  ms   QRS Axis= 35  deg   T Wave Axis= 78  deg   - ABNORMAL ECG -   Sinus rhythm   Multiple ventricular premature complexes   Borderline prolonged ND interval   Anterior infarct, old   Borderline ST elevation, lateral leads   PVCs are new   Electronically Signed By: Kristin Huntley ([facility]) 07-Feb-2024 15:42:42   Date and Time of Study: 2024-02-07 14:30:25          Meds given in ED:   Medications   sodium chloride 0.9 % flush 10 mL (has no administration in time range)       Imaging results:  CT Head Without Contrast    Result Date: 2/7/2024   There is a subdural collection lateral to the left cerebral hemisphere that is compatible with a chronic subdural hematoma. Serpiginous appearing areas of higher density material are felt to most likely predominantly represent membrane formation, although small superimposed areas of acute hemorrhage cannot be entirely excluded. There is prominent mass effect upon the left cerebral hemisphere with shift of the midline structures to the right by approximately 6 mm.  These findings were discussed with Dr. Mykel Ng on 02/07/2024 at approximately 3:45 p.m.   Radiation dose reduction techniques were utilized, including automated exposure control and exposure modulation based on body size.  This report was finalized on 2/7/2024 3:50 PM by Dr. Gilberto Gaston M.D on Workstation: [id number]      XR Hand 3+ View Left    Result Date: 2/7/2024   As described.    This report was finalized on 2/7/2024 2:17 PM by Dr. Sam Griffin M.D on Workstation: [id number]       Ambulatory status:        Social issues:   Social History     Socioeconomic History    Marital status:     Number of children: 3   Tobacco Use    Smoking status: Former     Types: Cigarettes     Quit date:      Years since quittin.1    Smokeless tobacco: Never   Substance and Sexual Activity    Alcohol use: No     Comment: Daily caffeine use    Drug use: No    Sexual activity: Defer       NIH Stroke Scale:         Amber Arellano RN  24 16:52 EST

## 2024-02-07 NOTE — CONSULTS
[facility] NEUROSURGERY CONSULT NOTE    Patient name: Carito Gonzalez  Referring Provider:    Allan Ng MD     Reason for Consultation: Left subdural hematoma    Patient Care Team:  Eb Turner MD as PCP - General (Internal Medicine)    Chief complaint: Difficulty with expressive speech    Subjective .     History of present illness:    Patient is a 89 y.o.  female with hypertension, hypercholesterolemia, MVP, mitral regurgitation who presents to the ED today for complaints of difficulty finding words.  The patient states that she fell in an elevator 1 month ago.  She developed a significant scalp bruise but never presented to the hospital.  Today, she was having issues with finding words or expressing her speech and she was brought into the hospital.  She denies headache, any new vision issues (she has macular degeneration), she denies confusion, upper or lower extremity weakness, numbness or tingling.  She currently denies and does not exhibit confusion and she does not have any issues with expressive/receptive speech.  She denies the use of blood thinners but does apparently take daily baby aspirin.  She reports to be at her baseline which her daughter-in-law affirms.    Review of Systems  Review of Systems   Eyes:  Negative for visual disturbance (No new visual disturbance but the patient has baseline macular degeneration).   Musculoskeletal:  Negative for back pain.   Neurological:  Negative for facial asymmetry, speech difficulty (No current speech difficulty but patient has recent history of intermittent speech difficulty consistent with expressive aphasia), weakness, numbness and headaches.   Psychiatric/Behavioral:  Negative for confusion.        History  PAST MEDICAL HISTORY  Past Medical History:   Diagnosis Date    Abscess of leg     Acute deep vein thrombosis (DVT) of left lower extremity     Acute deep vein thrombosis (DVT) of right lower extremity     Acute renal insufficiency     Angiotensin  converting enzyme inhibitor-aggravated angioedema     Cellulitis     CKD (chronic kidney disease) stage 3, GFR 30-59 ml/min     Depression     Herpes zoster     Hypercholesterolemia     Hyperlipidemia     Hypertension     Hypotension     Macular degeneration     Noted 2014    Mitral regurgitation     Mitral valve prolapse     SOB (shortness of breath)     Zoster        PAST SURGICAL HISTORY  Past Surgical History:   Procedure Laterality Date    HIP FRACTURE SURGERY Right 2017    SKIN CANCER EXCISION         FAMILY HISTORY  Family History   Problem Relation Age of Onset    Heart attack Mother     Hypertension Mother     Heart attack Father     Hypertension Father     Heart attack Brother 58    Hypertension Brother        SOCIAL HISTORY  Social History     Tobacco Use    Smoking status: Former     Types: Cigarettes     Quit date:      Years since quittin.1    Smokeless tobacco: Never   Substance Use Topics    Alcohol use: No     Comment: Daily caffeine use    Drug use: No         Allergies:  Lisinopril, Zoloft [sertraline hcl], and Erythromycin    MEDICATIONS:  (Not in a hospital admission)      Objective     Results Review:  LABS:    Admission on 2024   Component Date Value Ref Range Status    Glucose 2024 103 (H)  65 - 99 mg/dL Final    BUN 2024 19  8 - 23 mg/dL Final    Creatinine 2024 0.78  0.57 - 1.00 mg/dL Final    Sodium 2024 139  136 - 145 mmol/L Final    Potassium 2024 4.2  3.5 - 5.2 mmol/L Final    Chloride 2024 103  98 - 107 mmol/L Final    CO2 2024 26.4  22.0 - 29.0 mmol/L Final    Calcium 2024 9.9 (H)  8.2 - 9.6 mg/dL Final    Total Protein 2024 7.0  6.0 - 8.5 g/dL Final    Albumin 2024 4.0  3.5 - 5.2 g/dL Final    ALT (SGPT) 2024 10  1 - 33 U/L Final    AST (SGOT) 2024 16  1 - 32 U/L Final    Alkaline Phosphatase 2024 63  39 - 117 U/L Final    Total Bilirubin 2024 0.5  0.0 - 1.2 mg/dL Final     Globulin 02/07/2024 3.0  gm/dL Final    A/G Ratio 02/07/2024 1.3  g/dL Final    BUN/Creatinine Ratio 02/07/2024 24.4  7.0 - 25.0 Final    Anion Gap 02/07/2024 9.6  5.0 - 15.0 mmol/L Final    eGFR 02/07/2024 70.9  >60.0 mL/min/1.73 Final    Protime 02/07/2024 13.5  11.7 - 14.2 Seconds Final    INR 02/07/2024 1.02  0.90 - 1.10 Final    Magnesium 02/07/2024 2.3  1.7 - 2.3 mg/dL Final    TSH 02/07/2024 1.870  0.270 - 4.200 uIU/mL Final    Free T4 02/07/2024 1.15  0.93 - 1.70 ng/dL Final    HS Troponin T 02/07/2024 21 (H)  <14 ng/L Final    QT Interval 02/07/2024 389  ms Final    QTC Interval 02/07/2024 417  ms Final    Color, UA 02/07/2024 Yellow  Yellow, Straw Final    Appearance, UA 02/07/2024 Clear  Clear Final    pH, UA 02/07/2024 6.0  5.0 - 8.0 Final    Specific Gravity, UA 02/07/2024 1.018  1.005 - 1.030 Final    Glucose, UA 02/07/2024 Negative  Negative Final    Ketones, UA 02/07/2024 Negative  Negative Final    Bilirubin, UA 02/07/2024 Negative  Negative Final    Blood, UA 02/07/2024 Trace (A)  Negative Final    Protein, UA 02/07/2024 Trace (A)  Negative Final    Leuk Esterase, UA 02/07/2024 Small (1+) (A)  Negative Final    Nitrite, UA 02/07/2024 Negative  Negative Final    Urobilinogen, UA 02/07/2024 1.0 E.U./dL  0.2 - 1.0 E.U./dL Final    WBC 02/07/2024 7.05  3.40 - 10.80 10*3/mm3 Final    RBC 02/07/2024 4.08  3.77 - 5.28 10*6/mm3 Final    Hemoglobin 02/07/2024 12.1  12.0 - 15.9 g/dL Final    Hematocrit 02/07/2024 37.7  34.0 - 46.6 % Final    MCV 02/07/2024 92.4  79.0 - 97.0 fL Final    MCH 02/07/2024 29.7  26.6 - 33.0 pg Final    MCHC 02/07/2024 32.1  31.5 - 35.7 g/dL Final    RDW 02/07/2024 13.3  12.3 - 15.4 % Final    RDW-SD 02/07/2024 45.9  37.0 - 54.0 fl Final    MPV 02/07/2024 10.6  6.0 - 12.0 fL Final    Platelets 02/07/2024 276  140 - 450 10*3/mm3 Final    Neutrophil % 02/07/2024 75.0  42.7 - 76.0 % Final    Lymphocyte % 02/07/2024 15.9 (L)  19.6 - 45.3 % Final    Monocyte % 02/07/2024 7.0  5.0 -  12.0 % Final    Eosinophil % 02/07/2024 1.1  0.3 - 6.2 % Final    Basophil % 02/07/2024 0.6  0.0 - 1.5 % Final    Immature Grans % 02/07/2024 0.4  0.0 - 0.5 % Final    Neutrophils, Absolute 02/07/2024 5.29  1.70 - 7.00 10*3/mm3 Final    Lymphocytes, Absolute 02/07/2024 1.12  0.70 - 3.10 10*3/mm3 Final    Monocytes, Absolute 02/07/2024 0.49  0.10 - 0.90 10*3/mm3 Final    Eosinophils, Absolute 02/07/2024 0.08  0.00 - 0.40 10*3/mm3 Final    Basophils, Absolute 02/07/2024 0.04  0.00 - 0.20 10*3/mm3 Final    Immature Grans, Absolute 02/07/2024 0.03  0.00 - 0.05 10*3/mm3 Final    nRBC 02/07/2024 0.0  0.0 - 0.2 /100 WBC Final    RBC, UA 02/07/2024 0-2  None Seen, 0-2 /HPF Final    WBC, UA 02/07/2024 6-10 (A)  None Seen, 0-2 /HPF Final    Bacteria, UA 02/07/2024 None Seen  None Seen /HPF Final    Squamous Epithelial Cells, UA 02/07/2024 3-6 (A)  None Seen, 0-2 /HPF Final    Hyaline Casts, UA 02/07/2024 3-6  None Seen /LPF Final    Methodology 02/07/2024 Manual Light Microscopy   Final    HS Troponin T 02/07/2024 22 (H)  <14 ng/L Final    Troponin T Delta 02/07/2024 1  >=-4 - <+4 ng/L Final       DIAGNOSTICS:      FINDINGS:       There is a large subdural collection lateral to the left cerebral  hemisphere and predominantly lateral to the left frontal, parietal, and  occipital lobes that is consistent with predominantly a chronic subdural  hematoma. There are some serpiginous appearing areas of higher density  material within this collection that I suspect are predominantly  representative of membranes, although small foci of superimposed acute  hemorrhage cannot be entirely excluded. The collection measures up to  approximately 1.7 cm in diameter lateral to the left parietal lobe.  There is prominent mass effect with ventricular and sulcal effacement.  There is shift of the midline structures to the right by approximately 6  mm.     Otherwise, the gray-white matter differentiation is within normal  limits. The basal ganglia  and thalami are unremarkable in appearance.  The posterior fossa structures are within normal limits. Atherosclerotic  calcifications are incidentally appreciated within the intracranial  vasculature.     Incidental note is made of an air-fluid level within the right maxillary  sinus compatible with changes of inflammatory paranasal sinus disease.     IMPRESSION:     There is a subdural collection lateral to the left cerebral hemisphere  that is compatible with a chronic subdural hematoma. Serpiginous  appearing areas of higher density material are felt to most likely  predominantly represent membrane formation, although small superimposed  areas of acute hemorrhage cannot be entirely excluded. There is  prominent mass effect upon the left cerebral hemisphere with shift of  the midline structures to the right by approximately 6 mm.    I personally reviewed the imaging with the patient and also discussed with Dr. Bledsoe.  Results Review:   I reviewed the patient's new clinical results.  I personally viewed and interpreted the patient's labs, imaging study, medications and chart    Vital Signs   Temp:  [98.6 °F (37 °C)] 98.6 °F (37 °C)  Heart Rate:  [74] 74  Resp:  [16] 16  BP: (126)/(89) 126/89    Physical Exam:  Physical Exam  Vitals reviewed.   Constitutional:       General: She is not in acute distress.     Appearance: Normal appearance. She is not ill-appearing, toxic-appearing or diaphoretic.   HENT:      Head: Normocephalic.      Nose: Nose normal.      Mouth/Throat:      Mouth: Mucous membranes are moist.      Pharynx: Oropharynx is clear.   Eyes:      Extraocular Movements: Extraocular movements intact.      Conjunctiva/sclera: Conjunctivae normal.      Pupils: Pupils are equal, round, and reactive to light.   Cardiovascular:      Rate and Rhythm: Normal rate.   Pulmonary:      Effort: Pulmonary effort is normal.   Musculoskeletal:         General: Normal range of motion.      Cervical back: Normal range of  motion.   Skin:     General: Skin is warm.   Neurological:      General: No focal deficit present.      Mental Status: She is alert and oriented to person, place, and time. Mental status is at baseline.      Cranial Nerves: Cranial nerves 2-12 are intact.      Coordination: Finger-Nose-Finger Test and Heel to Shin Test normal.   Psychiatric:         Mood and Affect: Mood normal.         Speech: Speech normal.         Behavior: Behavior normal.         Thought Content: Thought content normal.         Judgment: Judgment normal.       Neurologic Exam     Mental Status   Oriented to person, place, and time.   Recall at 5 minutes: recalls 3 of 3 objects. Follows 3 step commands.   Attention: normal. Concentration: normal.   Speech: speech is normal   Level of consciousness: alert  Knowledge: consistent with education.   Able to name object. Able to repeat.     Cranial Nerves   Cranial nerves II through XII intact.     CN II   Visual acuity: (Patient vision intact except for has central vision problems secondary to macular degeneration)    CN III, IV, VI   Pupils are equal, round, and reactive to light.  Right pupil: Size: 3 mm. Shape: regular. Reactivity: brisk.   Left pupil: Size: 3 mm. Shape: regular. Reactivity: brisk.     CN V   Facial sensation intact.     CN VIII   Hearing: intact    Motor Exam   Muscle bulk: normal  Overall muscle tone: normal  Right arm tone: normal  Left arm tone: normal  Right arm pronator drift: absent  Left arm pronator drift: absent  Right leg tone: normal  Left leg tone: normalBilateral hand  and bilateral plantarflexion and dorsiflexion is 5/5.  No upper or lower extremity drift     Sensory Exam   Light touch normal.     Gait, Coordination, and Reflexes     Gait  Gait: (Gait deferred)    Coordination   Finger to nose coordination: normal  Heel to shin coordination: normal      Assessment & Plan       * No active hospital problems. *    89-year-old female with history of fall in  "elevator approximately 1 month ago.  She began to develop intermittent issues with expressive speech.  She presented to ED today and CT head scan shows chronic left chronic subdural with resultant left to right midline shift of 6 mm.  Currently she is at baseline on exam without any neurologic deficits.  We will check a CT head scan in the morning.  Please continue neurologic checks.  Given age, she would not be the best candidate for any kind of neurosurgical procedure.  Please hold aspirin at this time.      PLAN:     CT head in a.m.  Okay for transfer to floor  Keep SBP less than 160  Neurochecks every 4 hours  Hold aspirin      I discussed the patient's findings and my recommendations with patient, family, and consulting provider and Dr. Bledsoe.    During patient visit, I utilized appropriate personal protective equipment including mask and gloves.  Mask used was standard procedure mask. Appropriate PPE was worn during the entire visit.  Hand hygiene was completed before and after.     Mark Rojo, APRN  02/07/24  16:24 EST    \"Dictated utilizing Dragon dictation\".    "

## 2024-02-07 NOTE — ED PROVIDER NOTES
EMERGENCY DEPARTMENT ENCOUNTER    Room Number:  S511/1  Date seen:  2/8/2024  PCP: Eb Turner MD  Historian: Patient, family and EMS      HPI:  Chief Complaint: Speech problems  Context: Carito Gonzalez is a 89 y.o. female who presents to the ED c/o intermittent episodes of difficulty finding her words for the past 5 days.  She states this first happened last Friday when she cannot think of something to say for several seconds and then returned to her baseline.  The patient states she then had a fall in which she hit her head and injured her left index finger.  The patient again on Saturday had another episode of difficulty finding words.  Since then she has had multiple episodes where she will have seconds where she cannot find her words and is back to her neurologic baseline without difficulty.  She has had no focal deficits.  The patient has macular degeneration and has had no change in her vision.      PAST MEDICAL HISTORY  Active Ambulatory Problems     Diagnosis Date Noted    Hypercholesterolemia 02/03/2016    Hypertension 02/03/2016    Swelling of limb, left 03/29/2016    MVP (mitral valve prolapse) 05/16/2016    Non-rheumatic mitral regurgitation 05/16/2016    Bilateral carotid bruits 05/16/2016    Depression 01/02/2017    Protein calorie malnutrition 01/02/2017    Azotemia 09/18/2017    SOB (shortness of breath) 07/29/2019    Pain of right heel 03/11/2022    Cystitis 06/06/2023    Medicare annual wellness visit, subsequent 01/21/2024     Resolved Ambulatory Problems     Diagnosis Date Noted    DVT (deep venous thrombosis) 04/04/2016    Acute renal insufficiency 01/02/2017    CKD (chronic kidney disease) stage 3, GFR 30-59 ml/min 01/02/2017     Past Medical History:   Diagnosis Date    Abscess of leg     Acute deep vein thrombosis (DVT) of left lower extremity     Acute deep vein thrombosis (DVT) of right lower extremity     Angiotensin converting enzyme inhibitor-aggravated angioedema     Cellulitis      Herpes zoster     Hyperlipidemia     Hypotension     Macular degeneration     Mitral regurgitation     Mitral valve prolapse     Zoster          REVIEW OF SYSTEMS  All systems reviewed and negative except for those discussed in HPI.       PAST SURGICAL HISTORY  Past Surgical History:   Procedure Laterality Date    HIP FRACTURE SURGERY Right 09/18/2017    SKIN CANCER EXCISION           FAMILY HISTORY  Family History   Problem Relation Age of Onset    Heart attack Mother     Hypertension Mother     Heart attack Father     Hypertension Father     Heart attack Brother 58    Hypertension Brother          SOCIAL HISTORY  Social History     Socioeconomic History    Marital status:     Number of children: 3   Tobacco Use    Smoking status: Never     Passive exposure: Never    Smokeless tobacco: Never   Vaping Use    Vaping Use: Never used   Substance and Sexual Activity    Alcohol use: No     Comment: Daily caffeine use    Drug use: No    Sexual activity: Defer     Birth control/protection: None         ALLERGIES  Lisinopril, Zoloft [sertraline hcl], and Erythromycin      PHYSICAL EXAM  ED Triage Vitals [02/07/24 1307]   Temp Heart Rate Resp BP SpO2   98.6 °F (37 °C) 74 16 126/89 95 %      Temp src Heart Rate Source Patient Position BP Location FiO2 (%)   Tympanic Monitor -- -- --       Physical Exam      GENERAL: 89-year-old female in no acute distress  HENT: NCAT: nares patent: Neck supple: Mild ptosis of left eyelid which the patient states is old  EYES: no scleral icterus: PERRLA: EOMI  CV: regular rhythm, normal rate  RESPIRATORY: normal effort  ABDOMEN: soft, NTND: Bowel sounds positive  MUSCULOSKELETAL: no deformity  NEURO: alert and oriented x 3 with a nonfocal neuroexam  PSYCH:  calm, cooperative  SKIN: warm, dry    Vital signs and nursing notes reviewed.      LAB RESULTS  Recent Results (from the past 24 hour(s))   Comprehensive Metabolic Panel    Collection Time: 02/07/24  1:39 PM    Specimen: Blood    Result Value Ref Range    Glucose 103 (H) 65 - 99 mg/dL    BUN 19 8 - 23 mg/dL    Creatinine 0.78 0.57 - 1.00 mg/dL    Sodium 139 136 - 145 mmol/L    Potassium 4.2 3.5 - 5.2 mmol/L    Chloride 103 98 - 107 mmol/L    CO2 26.4 22.0 - 29.0 mmol/L    Calcium 9.9 (H) 8.2 - 9.6 mg/dL    Total Protein 7.0 6.0 - 8.5 g/dL    Albumin 4.0 3.5 - 5.2 g/dL    ALT (SGPT) 10 1 - 33 U/L    AST (SGOT) 16 1 - 32 U/L    Alkaline Phosphatase 63 39 - 117 U/L    Total Bilirubin 0.5 0.0 - 1.2 mg/dL    Globulin 3.0 gm/dL    A/G Ratio 1.3 g/dL    BUN/Creatinine Ratio 24.4 7.0 - 25.0    Anion Gap 9.6 5.0 - 15.0 mmol/L    eGFR 70.9 >60.0 mL/min/1.73   Protime-INR    Collection Time: 02/07/24  1:39 PM    Specimen: Blood   Result Value Ref Range    Protime 13.5 11.7 - 14.2 Seconds    INR 1.02 0.90 - 1.10   Magnesium    Collection Time: 02/07/24  1:39 PM    Specimen: Blood   Result Value Ref Range    Magnesium 2.3 1.7 - 2.3 mg/dL   TSH    Collection Time: 02/07/24  1:39 PM    Specimen: Blood   Result Value Ref Range    TSH 1.870 0.270 - 4.200 uIU/mL   T4, Free    Collection Time: 02/07/24  1:39 PM    Specimen: Blood   Result Value Ref Range    Free T4 1.15 0.93 - 1.70 ng/dL   High Sensitivity Troponin T    Collection Time: 02/07/24  1:39 PM    Specimen: Blood   Result Value Ref Range    HS Troponin T 21 (H) <14 ng/L   CBC Auto Differential    Collection Time: 02/07/24  1:39 PM    Specimen: Blood   Result Value Ref Range    WBC 7.05 3.40 - 10.80 10*3/mm3    RBC 4.08 3.77 - 5.28 10*6/mm3    Hemoglobin 12.1 12.0 - 15.9 g/dL    Hematocrit 37.7 34.0 - 46.6 %    MCV 92.4 79.0 - 97.0 fL    MCH 29.7 26.6 - 33.0 pg    MCHC 32.1 31.5 - 35.7 g/dL    RDW 13.3 12.3 - 15.4 %    RDW-SD 45.9 37.0 - 54.0 fl    MPV 10.6 6.0 - 12.0 fL    Platelets 276 140 - 450 10*3/mm3    Neutrophil % 75.0 42.7 - 76.0 %    Lymphocyte % 15.9 (L) 19.6 - 45.3 %    Monocyte % 7.0 5.0 - 12.0 %    Eosinophil % 1.1 0.3 - 6.2 %    Basophil % 0.6 0.0 - 1.5 %    Immature Grans % 0.4 0.0 -  0.5 %    Neutrophils, Absolute 5.29 1.70 - 7.00 10*3/mm3    Lymphocytes, Absolute 1.12 0.70 - 3.10 10*3/mm3    Monocytes, Absolute 0.49 0.10 - 0.90 10*3/mm3    Eosinophils, Absolute 0.08 0.00 - 0.40 10*3/mm3    Basophils, Absolute 0.04 0.00 - 0.20 10*3/mm3    Immature Grans, Absolute 0.03 0.00 - 0.05 10*3/mm3    nRBC 0.0 0.0 - 0.2 /100 WBC   Urinalysis With Microscopic If Indicated (No Culture) - Urine, Clean Catch    Collection Time: 02/07/24  1:50 PM    Specimen: Urine, Clean Catch   Result Value Ref Range    Color, UA Yellow Yellow, Straw    Appearance, UA Clear Clear    pH, UA 6.0 5.0 - 8.0    Specific Gravity, UA 1.018 1.005 - 1.030    Glucose, UA Negative Negative    Ketones, UA Negative Negative    Bilirubin, UA Negative Negative    Blood, UA Trace (A) Negative    Protein, UA Trace (A) Negative    Leuk Esterase, UA Small (1+) (A) Negative    Nitrite, UA Negative Negative    Urobilinogen, UA 1.0 E.U./dL 0.2 - 1.0 E.U./dL   Urinalysis, Microscopic Only - Urine, Clean Catch    Collection Time: 02/07/24  1:50 PM    Specimen: Urine, Clean Catch   Result Value Ref Range    RBC, UA 0-2 None Seen, 0-2 /HPF    WBC, UA 6-10 (A) None Seen, 0-2 /HPF    Bacteria, UA None Seen None Seen /HPF    Squamous Epithelial Cells, UA 3-6 (A) None Seen, 0-2 /HPF    Hyaline Casts, UA 3-6 None Seen /LPF    Methodology Manual Light Microscopy    Urine Culture - Urine, Urine, Clean Catch    Collection Time: 02/07/24  1:50 PM    Specimen: Urine, Clean Catch   Result Value Ref Range    Urine Culture No growth    ECG 12 Lead Stroke Evaluation    Collection Time: 02/07/24  2:30 PM   Result Value Ref Range    QT Interval 389 ms    QTC Interval 417 ms   High Sensitivity Troponin T 2Hr    Collection Time: 02/07/24  3:47 PM    Specimen: Blood   Result Value Ref Range    HS Troponin T 22 (H) <14 ng/L    Troponin T Delta 1 >=-4 - <+4 ng/L   POC Glucose Once    Collection Time: 02/07/24  8:55 PM    Specimen: Blood   Result Value Ref Range     Glucose 87 70 - 130 mg/dL   POC Glucose Once    Collection Time: 02/08/24  6:13 AM    Specimen: Blood   Result Value Ref Range    Glucose 79 70 - 130 mg/dL       Ordered the above labs and reviewed the results.        RADIOLOGY  CT Head Without Contrast    Result Date: 2/8/2024  CT OF THE HEAD WITHOUT CONTRAST  HISTORY: Subdural hematoma  COMPARISON: February 7, 2024  TECHNIQUE: Axial CT imaging was obtained through the brain. No IV contrast was administered.  FINDINGS: Mixed density subdural collection overlying the left cerebral convexity is stable in size and attenuation when compared to prior exam. Midline shift measures up to 6 mm, unchanged. No new areas of hemorrhage are seen. There is atrophy. There is periventricular and deep white matter microangiopathic change. Mass effect upon the left lateral ventricle is stable. Sinus inflammatory changes are again noted.      No significant interval change.  Radiation dose reduction techniques were utilized, including automated exposure control and exposure modulation based on body size.   This report was finalized on 2/8/2024 5:09 AM by Dr. Bridgette Leach M.D on Workstation: [id number]      CT Angiogram Neck, CT Angiogram Head w AI Analysis of LVO    Result Date: 2/8/2024  NONCONTRAST CRANIAL CT SCAN, CT ANGIOGRAM NECK, CT ANGIOGRAM HEAD.  HISTORY: Subdural hematoma  COMPARISON: February 7, 2024.  TECHNIQUE:  Radiation dose reduction techniques were utilized, including automated exposure control and exposure modulation based on body size. Initially, a routine noncontrast cranial CT performed from the skull base through the vertex region. After review, thin-section contrast enhanced CT angiogram images obtained from the aortic arch through the calvarial vertex utilizing angiographic technique. Multi projection 3-D MIP reformatted images were supplemented and reviewed.   FINDINGS CRANIAL CT: Large mixed density left cerebral convexity subdural hematoma is again  noted. Maximum thickness is 2.3 cm. This is probably not significantly changed when compared to the prior study. There is midline shift to the right, measuring up to 7 mm, also unchanged. There is diffuse atrophy. There is periventricular and deep white matter microangiopathic change. No new areas of hemorrhage are seen.. Postcontrast imaging does not show any evidence of venous occlusion or abnormal enhancement. Bone window images demonstrate mucosal thickening within the ethmoid sinuses, and an air-fluid level within the right maxillary sinus. Correlation with any evidence of sinusitis is recommended..  CERVICAL CAROTID CT ANGIOGRAM:  FINDINGS: There is normal arch anatomy. There does appear to be some stenosis involving the proximal left subclavian artery. This is favored to be at least 30 to 40%. There is some mild calcified plaque noted at the origin of the common carotid arteries bilaterally, without hemodynamically significant stenosis. Additional calcified plaque is noted within both carotid arteries, although more significant on the left. Calcification continues into the carotid bifurcations bilaterally. There is minimal narrowing at the origin of the right internal carotid artery. Narrowing at the origin of the left internal carotid artery is in the range of 30%. The vessels remain patent. Distal cervical internal carotid arteries are patent. There is calcified plaque noted at the origins of the vertebral arteries bilaterally. It does not appear to result in any hemodynamically significant stenosis. The vertebral arteries are patent throughout their courses. Left is slightly larger in caliber. Images through the lung apices demonstrate reticulonodular infiltrates, as well as biapical scarring. There is bronchial wall thickening. Patient does appear to have some cavitary nodules within the lungs. Single largest is seen within the right lower lobe, and further evaluation with dedicated imaging of the chest  is suggested. Prominent right hilar node measures 2.4 x 1.1 cm. NASCET criteria utilized in stenosis measurements. stenosis mild, 0-49%.   CRANIAL CTA ANGIOGRAM:  FINDINGS: The intracranial vertebral arteries are patent. Basilar artery is unremarkable. Posterior cerebral arteries are within normal limits. Intracranial internal carotid arteries are patent. There may be a tiny anterior communicating artery. Anterior cerebral arteries are patent, as are the middle cerebral arteries. There does appear to be some narrowing involving the most proximal portion of the right A1.           1. Stable appearance to previously identified mixed density left cerebral convexity subdural hematoma. 2. Atherosclerotic involvement of the aortic arch and cervical vessels. Patient does have some mild narrowing involving the origin of the left internal carotid artery, in the range of 30%. 3. Intracranial vessels appear patent. There is potentially some narrowing involving the most proximal aspect of the right A1. 4. Bronchial wall thickening and reticulonodular infiltrates noted at the lung apices bilaterally appearance raises possibility of bronchiolitis. There are also cavitary nodules which are noted within both lungs. There appearance is nonspecific, and includes infectious nodules, including atypical mycobacterial infection, as well as septic emboli and malignancy. Further assessment with dedicated CT of the chest is recommended.  This report was finalized on 2/8/2024 1:33 AM by Dr. Bridgette Leach M.D on Workstation: [id number]      CT Head Without Contrast    Result Date: 2/7/2024  CT HEAD WITHOUT CONTRAST  CLINICAL HISTORY: Intermittent episodes of aphasia. Patient fell this morning.  TECHNIQUE: CT scan of the head was obtained with 3 mm axial soft tissue and 2 mm axial bone algorithm algorithm images. No intravenous contrast was administered. Sagittal and coronal reconstructions were obtained.  COMPARISON: No previous similar  studies are available for comparison.  FINDINGS:   There is a large subdural collection lateral to the left cerebral hemisphere and predominantly lateral to the left frontal, parietal, and occipital lobes that is consistent with predominantly a chronic subdural hematoma. There are some serpiginous appearing areas of higher density material within this collection that I suspect are predominantly representative of membranes, although small foci of superimposed acute hemorrhage cannot be entirely excluded. The collection measures up to approximately 1.7 cm in diameter lateral to the left parietal lobe. There is prominent mass effect with ventricular and sulcal effacement. There is shift of the midline structures to the right by approximately 6 mm.  Otherwise, the gray-white matter differentiation is within normal limits. The basal ganglia and thalami are unremarkable in appearance. The posterior fossa structures are within normal limits. Atherosclerotic calcifications are incidentally appreciated within the intracranial vasculature.  Incidental note is made of an air-fluid level within the right maxillary sinus compatible with changes of inflammatory paranasal sinus disease.       There is a subdural collection lateral to the left cerebral hemisphere that is compatible with a chronic subdural hematoma. Serpiginous appearing areas of higher density material are felt to most likely predominantly represent membrane formation, although small superimposed areas of acute hemorrhage cannot be entirely excluded. There is prominent mass effect upon the left cerebral hemisphere with shift of the midline structures to the right by approximately 6 mm.  These findings were discussed with Dr. Mykel Ng on 02/07/2024 at approximately 3:45 p.m.   Radiation dose reduction techniques were utilized, including automated exposure control and exposure modulation based on body size.  This report was finalized on 2/7/2024 3:50 PM by   Gilberto Gaston M.D on Workstation: [id number]      XR Hand 3+ View Left    Result Date: 2/7/2024  XR HAND 3+ VW LEFT-  INDICATIONS: Trauma  TECHNIQUE: 3 VIEWS OF THE LEFT HAND  COMPARISON: None available  FINDINGS:  The bones are diffusely osteopenic. Osteoarthritic degenerative changes are noted, predominantly at distal interphalangeal joints at the lateral aspect of the wrist. No acute fracture, erosion, or dislocation is identified. Follow-up/further evaluation can be obtained as indications persist.       As described.    This report was finalized on 2/7/2024 2:17 PM by Dr. Sam Griffin M.D on Workstation: [id number]       Ordered the above noted radiological studies. Reviewed by me in PACS.            PROCEDURES  Procedures          MEDICATIONS GIVEN IN ER  Medications   sodium chloride 0.9 % flush 10 mL (has no administration in time range)   sodium chloride 0.9 % infusion (75 mL/hr Intravenous New Bag 2/7/24 2209)   acetaminophen (TYLENOL) tablet 650 mg (has no administration in time range)     Or   acetaminophen (TYLENOL) suppository 650 mg (has no administration in time range)   aspirin tablet 325 mg (325 mg Oral Given 2/7/24 2230)     Or   aspirin suppository 300 mg ( Rectal Not Given:  See Alt 2/7/24 2230)   atorvastatin (LIPITOR) tablet 80 mg (80 mg Oral Given 2/7/24 2230)   ondansetron (ZOFRAN) injection 4 mg (has no administration in time range)   bisacodyl (DULCOLAX) suppository 10 mg (has no administration in time range)   amLODIPine (NORVASC) tablet 5 mg (5 mg Oral Given 2/7/24 2229)   metoprolol succinate XL (TOPROL-XL) 24 hr tablet 25 mg (25 mg Oral Given 2/8/24 0634)     And   metoprolol succinate XL (TOPROL-XL) 24 hr tablet 50 mg (50 mg Oral Given 2/7/24 2231)   multivitamin with minerals 1 tablet (has no administration in time range)   iopamidol (ISOVUE-370) 76 % injection 100 mL (95 mL Intravenous Given by Other 2/7/24 2004)             MEDICAL DECISION MAKING, PROGRESS, and CONSULTS    All  labs have been independently reviewed by me.  All radiology studies have been reviewed by me and I have also reviewed the radiology report.   EKG's independently viewed and interpreted by me.  Discussion below represents my analysis of pertinent findings related to patient's condition, differential diagnosis, treatment plan and final disposition.      Additional sources:  - Discussed/ obtained information from independent historians: The patient's daughter is here who states that the patient currently is at her baseline.    - External (non-ED) record review: I reviewed the patient's office visit with her PCP last month for hypertension and hypercholesterolemia    - Chronic or social conditions impacting care: Patient's family is in town and supportive    - Shared decision making: After shared decision-making discussion to myself, the patient and her daughter and grandson we agree she needs to be admitted to the hospital for further evaluation and care      Orders placed during this visit:  Orders Placed This Encounter   Procedures    Urine Culture - Urine,    XR Hand 3+ View Left    CT Head Without Contrast    CT Head Without Contrast    MRI Brain Without Contrast    CT Angiogram Neck    CT Angiogram Head w AI Analysis of LVO    Comprehensive Metabolic Panel    Protime-INR    Magnesium    TSH    T4, Free    High Sensitivity Troponin T    Urinalysis With Microscopic If Indicated (No Culture) - Urine, Clean Catch    CBC Auto Differential    Urinalysis, Microscopic Only - Urine, Clean Catch    High Sensitivity Troponin T 2Hr    CBC (No Diff)    Comprehensive Metabolic Panel    Hemoglobin A1c    Lipid Panel    TSH    Diet: Cardiac Diets, Diabetic Diets; Healthy Heart (2-3 Na+); Consistent Carbohydrate; Texture: Regular Texture (IDDSI 7); Fluid Consistency: Thin (IDDSI 0)    Monitor Blood Pressure    Keep SBP less than 160  Physician Communication Order    Vital Signs    Pulse Oximetry, Continuous    Telemetry - Maintain  IV Access    Telemetry - Place Orders & Notify Provider of Results When Patient Experiences Acute Chest Pain, Dysrhythmia or Respiratory Distress    Notify physician of changes in level of consciousness, worsening of stroke symptoms, acute headache or severe nausea and vomiting or any of the following vital sign parameters:    Activity As Tolerated    Nursing Dysphagia Screening    RN to Place Order SLP Consult - Eval & Treat Choosing Reason of RN Dysphagia Screen Failed    Nurse to Call MD or Nutrition Services for Diet if Patient Passes Dysphagia Screen    Intake and Output    Neuro Checks    NIHSS Assessment    Order CT Head Without Contrast for Neurological Decline    Provide Stroke Education Material    Tobacco Cessation Education    Place Sequential Compression Device    Maintain Sequential Compression Device    Code Status and Medical Interventions:    LHA (on-call MD unless specified) Details    Neurosurgery (on-call MD unless specified)    Notify Stroke Coordinator    Inpatient Rehab Admission Consult    Consult to Case Management     Consult to Diabetes Educator    Inpatient Neurology Consult Stroke    OT Consult: Eval & Treat    PT Consult: Eval & Treat as tolerated    Oxygen Therapy-    SLP Consult: Eval & Treat Communication Disorder    POC Glucose Q6H    POC Glucose Once    POC Glucose Once    ECG 12 Lead Stroke Evaluation    ECG 12 Lead Stroke Evaluation    SCANNED - TELEMETRY      Adult transthoracic echo complete    Insert Peripheral IV    Initiate Observation Status    Fall Precautions    CBC & Differential         Differential diagnosis:  My differential diagnosis includes but is not limited to stroke, TIA, seizures, intracranial hemorrhage or UTI      Independent interpretation of labs, radiology studies, and discussions with consultants:  ED Course as of 02/08/24 0730   Wed Feb 07, 2024   1332 The patient presents to the ER for 5 days of intermittent aphasia lasting seconds at a  time.  On my arrival to the room she had about 5 seconds of difficulty talking with registration tach but throughout my exam she had no difficulty speech and she has a nonfocal exam. [GP]   1333 Spoken with the patient's grandson on the phone who is an ER doctor at [place] who has noted this as well.  Will obtain labs, urinalysis and CT scan for further evaluation. [GP]   1333 Currently the patient is not a team D or thrombolytic candidate with onset of symptoms 5 days ago and at baseline currently [GP]   1429 The patient's urinalysis shows 6-10 WBCs and no bacteria with 3 6 squamous epithelial cells.  She is having no urinary symptoms.  I will add a urine culture will not treat until we see the culture results [GP]   1430 The patient's labs are otherwise unremarkable [GP]   1431 My independent interpretation the patient's left hand x-ray is no fracture or dislocation [GP]   1439 EKG    EKG time: 1430  Rhythm/Rate: Normal sinus rhythm at 69 with multiple PVCs  No Acute Ischemia  Non-Specific ST-T changes    Similar compared to prior on 1/1/2017 except the PVCs are new    Interpreted Contemporaneously by me.  Independently viewed by me     [GP]   1543 I reviewed the patient's head CT scan with Dr. Gaston from radiology.  He states the patient has a large chronic subdural hematoma over the left side with some hyperintensity that could be vessels versus some small areas of acute blood. [GP]   1549 On repeat examination the patient is at her neurologic baseline per her family and has no aphasia.  I advised patient, her daughter and her grandson by phone of the patient's chronic subdural hematoma and the need for admission to the hospital with neurosurgical consultation.  They understand and agree with the plan. [GP]   1615 Neurosurgery has seen the patient in the emergency room.  They were all consulted and recommended repeat head CT in the morning. [GP]      ED Course User Index  [GP] Allan Ng MD                DIAGNOSIS  Final diagnoses:   Aphasia-intermittent   PVC's (premature ventricular contractions)   Macular degeneration, unspecified laterality, unspecified type   Chronic subdural hematoma         DISPOSITION  ADMISSION    Discussed treatment plan and reason for admission with pt/family and admitting physician.  Pt/family voiced understanding of the plan for admission for further testing/treatment as needed.            Latest Documented Vital Signs:  As of 07:30 EST  BP- 143/81 HR- 60 Temp- 97.7 °F (36.5 °C) (Oral) O2 sat- 95%--      --------------------  Please note that portions of this were completed with a voice recognition program.       Note Disclaimer: At [facility], we believe that sharing information builds trust and better relationships. You are receiving this note because you are receiving care at [facility] or recently visited. It is possible you will see health information before a provider has talked with you about it. This kind of information can be easy to misunderstand. To help you fully understand what it means for your health, we urge you to discuss this note with your provider.             Allan Ng MD  02/08/24 0761

## 2024-02-07 NOTE — TELEPHONE ENCOUNTER
Caller: JORGE PITT [facility]      Best call back number: [phone]    Patient is needing: SHE WAS VERY INCOHERENT, CONFUSED, AGGITATED AND NOT REMEMBERING THINGS, JUST NOT HERSELF.  THEY SENT HER TO THE E.R. AROUND 12:30 TODAY

## 2024-02-07 NOTE — ED NOTES
Pt arrives via ems from [facility] for difficulty getting words out for the past week per the facility. Pt states she would want to say something but couldn't get the words out. Pt states this would occur intermittently over the past week. Ems noted bigeminy on the EKG.

## 2024-02-08 ENCOUNTER — APPOINTMENT (OUTPATIENT)
Dept: NEUROLOGY | Facility: HOSPITAL | Age: 89
End: 2024-02-08
Payer: MEDICARE

## 2024-02-08 ENCOUNTER — APPOINTMENT (OUTPATIENT)
Dept: MRI IMAGING | Facility: HOSPITAL | Age: 89
End: 2024-02-08
Payer: MEDICARE

## 2024-02-08 ENCOUNTER — APPOINTMENT (OUTPATIENT)
Dept: CT IMAGING | Facility: HOSPITAL | Age: 89
End: 2024-02-08
Payer: MEDICARE

## 2024-02-08 ENCOUNTER — READMISSION MANAGEMENT (OUTPATIENT)
Dept: CALL CENTER | Facility: HOSPITAL | Age: 89
End: 2024-02-08
Payer: MEDICARE

## 2024-02-08 VITALS
RESPIRATION RATE: 16 BRPM | SYSTOLIC BLOOD PRESSURE: 153 MMHG | TEMPERATURE: 97.2 F | DIASTOLIC BLOOD PRESSURE: 62 MMHG | HEIGHT: 65 IN | WEIGHT: 115.52 LBS | BODY MASS INDEX: 19.25 KG/M2 | HEART RATE: 61 BPM | OXYGEN SATURATION: 95 %

## 2024-02-08 DIAGNOSIS — S06.5XAA SUBDURAL HEMATOMA: Primary | ICD-10-CM

## 2024-02-08 LAB
ALBUMIN SERPL-MCNC: 3.6 G/DL (ref 3.5–5.2)
ALBUMIN/GLOB SERPL: 1.3 G/DL
ALP SERPL-CCNC: 61 U/L (ref 39–117)
ALT SERPL W P-5'-P-CCNC: 7 U/L (ref 1–33)
ANION GAP SERPL CALCULATED.3IONS-SCNC: 12.6 MMOL/L (ref 5–15)
AST SERPL-CCNC: 14 U/L (ref 1–32)
BACTERIA SPEC AEROBE CULT: NO GROWTH
BILIRUB SERPL-MCNC: 0.6 MG/DL (ref 0–1.2)
BUN SERPL-MCNC: 12 MG/DL (ref 8–23)
BUN/CREAT SERPL: 18.5 (ref 7–25)
CALCIUM SPEC-SCNC: 8.8 MG/DL (ref 8.2–9.6)
CHLORIDE SERPL-SCNC: 100 MMOL/L (ref 98–107)
CHOLEST SERPL-MCNC: 205 MG/DL (ref 0–200)
CO2 SERPL-SCNC: 25.4 MMOL/L (ref 22–29)
CREAT SERPL-MCNC: 0.65 MG/DL (ref 0.57–1)
DEPRECATED RDW RBC AUTO: 45.3 FL (ref 37–54)
EGFRCR SERPLBLD CKD-EPI 2021: 82.2 ML/MIN/1.73
ERYTHROCYTE [DISTWIDTH] IN BLOOD BY AUTOMATED COUNT: 13.3 % (ref 12.3–15.4)
GLOBULIN UR ELPH-MCNC: 2.8 GM/DL
GLUCOSE BLDC GLUCOMTR-MCNC: 79 MG/DL (ref 70–130)
GLUCOSE BLDC GLUCOMTR-MCNC: 87 MG/DL (ref 70–130)
GLUCOSE BLDC GLUCOMTR-MCNC: 92 MG/DL (ref 70–130)
GLUCOSE SERPL-MCNC: 83 MG/DL (ref 65–99)
HBA1C MFR BLD: 5.3 % (ref 4.8–5.6)
HCT VFR BLD AUTO: 37.3 % (ref 34–46.6)
HDLC SERPL-MCNC: 56 MG/DL (ref 40–60)
HGB BLD-MCNC: 11.9 G/DL (ref 12–15.9)
LDLC SERPL CALC-MCNC: 138 MG/DL (ref 0–100)
LDLC/HDLC SERPL: 2.45 {RATIO}
MCH RBC QN AUTO: 29.3 PG (ref 26.6–33)
MCHC RBC AUTO-ENTMCNC: 31.9 G/DL (ref 31.5–35.7)
MCV RBC AUTO: 91.9 FL (ref 79–97)
PLATELET # BLD AUTO: 279 10*3/MM3 (ref 140–450)
PMV BLD AUTO: 10.7 FL (ref 6–12)
POTASSIUM SERPL-SCNC: 3.7 MMOL/L (ref 3.5–5.2)
PROT SERPL-MCNC: 6.4 G/DL (ref 6–8.5)
RBC # BLD AUTO: 4.06 10*6/MM3 (ref 3.77–5.28)
SODIUM SERPL-SCNC: 138 MMOL/L (ref 136–145)
TRIGL SERPL-MCNC: 60 MG/DL (ref 0–150)
TSH SERPL DL<=0.05 MIU/L-ACNC: 3.27 UIU/ML (ref 0.27–4.2)
VLDLC SERPL-MCNC: 11 MG/DL (ref 5–40)
WBC NRBC COR # BLD AUTO: 5.27 10*3/MM3 (ref 3.4–10.8)

## 2024-02-08 PROCEDURE — 85027 COMPLETE CBC AUTOMATED: CPT | Performed by: INTERNAL MEDICINE

## 2024-02-08 PROCEDURE — 95816 EEG AWAKE AND DROWSY: CPT | Performed by: PSYCHIATRY & NEUROLOGY

## 2024-02-08 PROCEDURE — 80061 LIPID PANEL: CPT | Performed by: INTERNAL MEDICINE

## 2024-02-08 PROCEDURE — 99214 OFFICE O/P EST MOD 30 MIN: CPT | Performed by: NURSE PRACTITIONER

## 2024-02-08 PROCEDURE — 70450 CT HEAD/BRAIN W/O DYE: CPT

## 2024-02-08 PROCEDURE — G0378 HOSPITAL OBSERVATION PER HR: HCPCS

## 2024-02-08 PROCEDURE — 99213 OFFICE O/P EST LOW 20 MIN: CPT

## 2024-02-08 PROCEDURE — 82948 REAGENT STRIP/BLOOD GLUCOSE: CPT

## 2024-02-08 PROCEDURE — 97110 THERAPEUTIC EXERCISES: CPT

## 2024-02-08 PROCEDURE — 36415 COLL VENOUS BLD VENIPUNCTURE: CPT | Performed by: INTERNAL MEDICINE

## 2024-02-08 PROCEDURE — 70551 MRI BRAIN STEM W/O DYE: CPT

## 2024-02-08 PROCEDURE — 83036 HEMOGLOBIN GLYCOSYLATED A1C: CPT | Performed by: INTERNAL MEDICINE

## 2024-02-08 PROCEDURE — 80053 COMPREHEN METABOLIC PANEL: CPT | Performed by: INTERNAL MEDICINE

## 2024-02-08 PROCEDURE — 84443 ASSAY THYROID STIM HORMONE: CPT | Performed by: INTERNAL MEDICINE

## 2024-02-08 PROCEDURE — 97161 PT EVAL LOW COMPLEX 20 MIN: CPT

## 2024-02-08 PROCEDURE — 95819 EEG AWAKE AND ASLEEP: CPT

## 2024-02-08 RX ADMIN — METOPROLOL SUCCINATE 25 MG: 25 TABLET, EXTENDED RELEASE ORAL at 06:34

## 2024-02-08 RX ADMIN — Medication 1 TABLET: at 08:03

## 2024-02-08 RX ADMIN — ASPIRIN 325 MG: 325 TABLET ORAL at 08:03

## 2024-02-08 RX ADMIN — AMLODIPINE BESYLATE 5 MG: 5 TABLET ORAL at 08:03

## 2024-02-08 NOTE — PROGRESS NOTES
[facility], [place], operated by [facility] NEUROSURGERY INTRACRANIAL PROGRESS NOTE    PATIENT IDENTIFICATION:   Name:  Carito Gonzalez      MRN:  [id number]     89 y.o.  female               CC: expressive aphasia      Subjective     Interval History: No acute issues overnight.  Patient denies any issues with word finding since yesterday morning.    ROS:  Constitutional: No fever, chills  HEENT: No headache, no vision changes, no tinnitus, no hearing difficulties  Neck: no stiffness  GI: No nausea, vomiting, no swallow difficulties  Neuro: No numbness, tingling, or weakness, no speech difficulties, no balance difficulties    Objective     Vital signs in last 24 hours:  Temp:  [97.2 °F (36.2 °C)-98.6 °F (37 °C)] 97.2 °F (36.2 °C)  Heart Rate:  [60-80] 61  Resp:  [16-18] 16  BP: (126-171)/(62-90) 153/62      Intake/Output this shift:  No intake/output data recorded.      Intake/Output last 3 shifts:  I/O last 3 completed shifts:  In: 480 [P.O.:480]  Out: -     LABS:  .  Results from last 7 days   Lab Units 02/08/24  0707 02/07/24  1339   WBC 10*3/mm3 5.27 7.05   HEMOGLOBIN g/dL 11.9* 12.1   HEMATOCRIT % 37.3 37.7   PLATELETS 10*3/mm3 279 276     .  Results from last 7 days   Lab Units 02/08/24  0707 02/07/24  1339   SODIUM mmol/L 138 139   POTASSIUM mmol/L 3.7 4.2   CHLORIDE mmol/L 100 103   CO2 mmol/L 25.4 26.4   BUN mg/dL 12 19   CREATININE mg/dL 0.65 0.78   CALCIUM mg/dL 8.8 9.9*   BILIRUBIN mg/dL 0.6 0.5   ALK PHOS U/L 61 63   ALT (SGPT) U/L 7 10   AST (SGOT) U/L 14 16   GLUCOSE mg/dL 83 103*          IMAGING STUDIES:  CT head without contrast 2/8/2024:  She has mixed density subdural collection over the left cerebral convexity stable in size compared to yesterday CT.  There is an unchanged midline shift of 6 mm.  No new hemorrhage seen.  Mass effect upon left ventricle is unchanged.    I personally viewed and interpreted the patient's labs, imaging study, medications and chart.    Meds reviewed/changed: Yes    Current Facility-Administered  Medications:     acetaminophen (TYLENOL) tablet 650 mg, 650 mg, Oral, Q4H PRN **OR** acetaminophen (TYLENOL) suppository 650 mg, 650 mg, Rectal, Q4H PRN, Aurelia August MD    amLODIPine (NORVASC) tablet 5 mg, 5 mg, Oral, BID, Aurelia August MD, 5 mg at 02/08/24 0803    atorvastatin (LIPITOR) tablet 80 mg, 80 mg, Oral, Nightly, Aurelia August MD, 80 mg at 02/07/24 2230    bisacodyl (DULCOLAX) suppository 10 mg, 10 mg, Rectal, Daily PRN, Aurelia August MD    metoprolol succinate XL (TOPROL-XL) 24 hr tablet 25 mg, 25 mg, Oral, QAM, 25 mg at 02/08/24 0634 **AND** metoprolol succinate XL (TOPROL-XL) 24 hr tablet 50 mg, 50 mg, Oral, Q PM, Aurelia August MD, 50 mg at 02/07/24 2231    multivitamin with minerals 1 tablet, 1 tablet, Oral, Daily, Aurelia August MD, 1 tablet at 02/08/24 0803    ondansetron (ZOFRAN) injection 4 mg, 4 mg, Intravenous, Q6H PRN, Aurelia August MD    Insert Peripheral IV, , , Once **AND** sodium chloride 0.9 % flush 10 mL, 10 mL, Intravenous, PRN, Allan Ng MD    sodium chloride 0.9 % infusion, 75 mL/hr, Intravenous, Continuous, Aurelia August MD, Last Rate: 75 mL/hr at 02/07/24 2209, 75 mL/hr at 02/07/24 2209      Physical Exam:    General:   Awake, alert, oriented x3. Speech clear with no aphasia  HEENT:     Neck:    supple  CN II-12: Intact     CN III IV VI:   Extraocular movements are full , PERRL.  Pupils 3 mm bilaterally and brisk to light  CN VII:    Facial movements are symmetric. No weakness.  Motor:    Normal muscle strength, bulk and tone in upper and lower extremities.  No fasciculations, rigidity, spasticity, or abnormal movements.  Reflexes:   Plantar responses are flexor.   Sensation:   Normal to light touch; no extinction  Station and Gait:  Gait deferred  Coordination:   Finger-to-nose and heel-to-shin test shows no dysmetria.  Extremities:   Wearing SCD    Assessment & Plan     ASSESSMENT:      Aphasia     "Subdural hematoma      89-year-old female with left cerebral convexity subdural hematoma with midline shift of 6 mm.  She initially presented with intermittent expressive aphasia.  She has not had any issues since evaluation yesterday and repeat CT head scan appears stable today.  From a neurosurgical perspective, she is okay for discharge.  She will need to follow-up in 4 weeks with a repeat CT head scan.  She will observe for any new neurologic deficits in the meantime and call immediately or present to the ED with any changes.  She is to stop taking aspirin-I spoke with her daughter about this and have updated the patient that she will need to stop this.  Dr. Magana, cardiologist, placed her on this medication for her mitral valve issues.  I asked the patient's POA to reach out to cardiology to assess if this medication is really needed or if it can just be discontinued.  Regardless, she will stop taking aspirin at least until follow-up.      PLAN:     Follow-up with neurosurgery in 4 weeks with repeat CT head scan  Stop aspirin  Call for any acute neurologic changes  Neurosurgery signing off    I discussed the patient's findings and my recommendations with patient, family, and Dr. Bledsoe.    During patient visit, I utilized appropriate personal protective equipment including mask and gloves.  Mask used was standard procedure mask. Appropriate PPE was worn during the entire visit.  Hand hygiene was completed before and after.      LOS: 0 days       Mark Rojo, APRN  2/8/2024  10:23 EST    \"Dictated utilizing Dragon dictation\".    "

## 2024-02-08 NOTE — PLAN OF CARE
Goal Outcome Evaluation:  Plan of Care Reviewed With: patient, daughter           Outcome Evaluation: Orders received, chart reviewed. RN reports tolerating diet w/o difficulties. Patient denies need for speech or swallow evaluation.  Daughter states neuro surgery states no acute stroke and feels blood will reabsorb.  Daughter requests to defer evaulations at this time.  If difficulties persist, they will f/u after discharge.  Will s/o.

## 2024-02-08 NOTE — PLAN OF CARE
Pt. transferred from ER to the unit. A&O X 4. Grandson at bedside before bedtime. No complain of pain. Dysphagia screening passed. NIH is 2. NSR on Tele. RA. BG check Q6hrs. IVF is at 75 ml/hr. CT Scan completed at night. Neurology consult stroke today. Call light within reach.     Goal Outcome Evaluation:  Plan of Care Reviewed With: patient  Progress: improving     Problem: Adult Inpatient Plan of Care  Goal: Absence of Hospital-Acquired Illness or Injury  Intervention: Identify and Manage Fall Risk  Recent Flowsheet Documentation  Taken 2/8/2024 0416 by Dm Escobar RN  Safety Promotion/Fall Prevention: (CT scan) patient off unit  Taken 2/8/2024 0400 by Dm Escobar RN  Safety Promotion/Fall Prevention:   safety round/check completed   room organization consistent   lighting adjusted   fall prevention program maintained   clutter free environment maintained   activity supervised  Taken 2/8/2024 0202 by Dm Escobar RN  Safety Promotion/Fall Prevention:   safety round/check completed   room organization consistent   lighting adjusted   fall prevention program maintained   clutter free environment maintained   activity supervised  Taken 2/8/2024 0000 by Dm Escobar RN  Safety Promotion/Fall Prevention:   safety round/check completed   room organization consistent   lighting adjusted   fall prevention program maintained   clutter free environment maintained   activity supervised  Taken 2/7/2024 2229 by Dm Escobar RN  Safety Promotion/Fall Prevention:   safety round/check completed   room organization consistent   lighting adjusted   fall prevention program maintained   clutter free environment maintained   activity supervised  Taken 2/7/2024 2018 by Dm Escobar RN  Safety Promotion/Fall Prevention:   safety round/check completed   room organization consistent   lighting adjusted   fall prevention program maintained   clutter free environment maintained   activity supervised     Problem: Adult Inpatient Plan of  Care  Goal: Absence of Hospital-Acquired Illness or Injury  Intervention: Prevent Infection  Recent Flowsheet Documentation  Taken 2/8/2024 0400 by Dm Escobar RN  Infection Prevention:   rest/sleep promoted   single patient room provided   hand hygiene promoted  Taken 2/8/2024 0202 by Dm Escobar RN  Infection Prevention:   rest/sleep promoted   single patient room provided   hand hygiene promoted  Taken 2/8/2024 0000 by Dm Escobar RN  Infection Prevention:   rest/sleep promoted   single patient room provided   hand hygiene promoted  Taken 2/7/2024 2229 by Dm Escobar RN  Infection Prevention:   rest/sleep promoted   single patient room provided   hand hygiene promoted  Taken 2/7/2024 2018 by Dm Escobar RN  Infection Prevention:   rest/sleep promoted   single patient room provided   hand hygiene promoted     Problem: Adult Inpatient Plan of Care  Goal: Optimal Comfort and Wellbeing  Outcome: Ongoing, Progressing  Intervention: Provide Person-Centered Care  Recent Flowsheet Documentation  Taken 2/8/2024 0000 by Dm Escobar RN  Trust Relationship/Rapport:   care explained   choices provided   emotional support provided   empathic listening provided   questions answered   questions encouraged  Taken 2/7/2024 2018 by Dm Escobar RN  Trust Relationship/Rapport:   care explained   choices provided   emotional support provided   empathic listening provided   questions encouraged   questions answered   thoughts/feelings acknowledged     Problem: Skin Injury Risk Increased  Goal: Skin Health and Integrity  Outcome: Ongoing, Progressing  Intervention: Optimize Skin Protection  Recent Flowsheet Documentation  Taken 2/8/2024 0400 by Dm Escobar RN  Head of Bed (HOB) Positioning: HOB at 20-30 degrees  Taken 2/8/2024 0202 by Dm Escobar RN  Head of Bed (HOB) Positioning: HOB at 20-30 degrees  Taken 2/8/2024 0000 by Dm Escobar RN  Head of Bed (HOB) Positioning: HOB at 20-30 degrees  Taken 2/7/2024 2229 by Dm Escobar RN  Head  of Bed (HOB) Positioning: HOB at 20-30 degrees  Taken 2/7/2024 2018 by Dm Escobar RN  Pressure Reduction Techniques: frequent weight shift encouraged  Head of Bed (HOB) Positioning: HOB at 20-30 degrees  Pressure Reduction Devices: elbow protectors utilized  Skin Protection: adhesive use limited     Problem: Adult Inpatient Plan of Care  Goal: Plan of Care Review  Outcome: Ongoing, Progressing  Flowsheets (Taken 2/8/2024 2301)  Progress: improving  Plan of Care Reviewed With: patient

## 2024-02-08 NOTE — PLAN OF CARE
Goal Outcome Evaluation:              Outcome Evaluation: pt being discharge.

## 2024-02-08 NOTE — THERAPY DISCHARGE NOTE
Acute Care - Physical Therapy Initial Evaluation/Discharge  [facility]     Patient Name: Carito Gonzalez  : 3/7/1930  MRN: [id number]  Today's Date: 2024                Admit Date: 2024    Visit Dx:    ICD-10-CM ICD-9-CM   1. Aphasia-intermittent  R47.01 784.3   2. PVC's (premature ventricular contractions)  I49.3 427.69   3. Macular degeneration, unspecified laterality, unspecified type  H35.30 362.50   4. Chronic subdural hematoma  I62.03 432.1   5. Generalized weakness  R53.1 780.79     Patient Active Problem List   Diagnosis    Hypercholesterolemia    Hypertension    Swelling of limb, left    MVP (mitral valve prolapse)    Non-rheumatic mitral regurgitation    Bilateral carotid bruits    Depression    Protein calorie malnutrition    Azotemia    SOB (shortness of breath)    Pain of right heel    Cystitis    Medicare annual wellness visit, subsequent    Aphasia    Subdural hematoma     Past Medical History:   Diagnosis Date    Abscess of leg     Acute deep vein thrombosis (DVT) of left lower extremity     Acute deep vein thrombosis (DVT) of right lower extremity     Acute renal insufficiency     Angiotensin converting enzyme inhibitor-aggravated angioedema     Cellulitis     CKD (chronic kidney disease) stage 3, GFR 30-59 ml/min     Depression     Herpes zoster     Hypercholesterolemia     Hyperlipidemia     Hypertension     Hypotension     Macular degeneration     Noted 2014    Mitral regurgitation     Mitral valve prolapse     SOB (shortness of breath)     Zoster      Past Surgical History:   Procedure Laterality Date    HIP FRACTURE SURGERY Right 2017    SKIN CANCER EXCISION         PT Assessment (last 12 hours)       PT Evaluation and Treatment       Row Name 24 1100          Physical Therapy Time and Intention    Subjective Information no complaints  -LH     Document Type discharge evaluation/summary  -     Mode of Treatment individual therapy;physical therapy  -      Patient Effort excellent  -     Symptoms Noted During/After Treatment none  -[facility] Name 02/08/24 1100          General Information    Patient Profile Reviewed yes  -     Patient Observations cooperative;alert;agree to therapy  -     General Observations of Patient pt supine in bed no acute distress, family bedside  -     Prior Level of Function independent:  -     Equipment Currently Used at Home none  -     Pertinent History of Current Functional Problem aphasia  -     Existing Precautions/Restrictions fall  -     Benefits Reviewed patient and family:  -[facility] Name 02/08/24 1100          Living Environment    Current Living Arrangements assisted living facility  -[facility] Name 02/08/24 1100          Pain    Pretreatment Pain Rating 0/10 - no pain  -     Posttreatment Pain Rating 0/10 - no pain  -[facility] Name 02/08/24 1100          Cognition    Affect/Mental Status (Cognition) WFL  -[facility] Name 02/08/24 1100          Range of Motion Comprehensive    General Range of Motion bilateral lower extremity ROM WFL  -[facility] Name 02/08/24 1100          Strength (Manual Muscle Testing)    Strength (Manual Muscle Testing) strength is WFL;bilateral lower extremities  -[facility] Name 02/08/24 1100          Bed Mobility    Bed Mobility sit-supine;supine-sit  -     Supine-Sit Sharkey (Bed Mobility) modified independence  -     Sit-Supine Sharkey (Bed Mobility) modified independence  -     Assistive Device (Bed Mobility) bed rails  -[facility] Name 02/08/24 1100          Transfers    Transfers stand-sit transfer;sit-stand transfer  -[facility] Name 02/08/24 1100          Sit-Stand Transfer    Sit-Stand Sharkey (Transfers) supervision  -[facility] Name 02/08/24 1100          Stand-Sit Transfer    Stand-Sit Sharkey (Transfers) supervision  -[facility] Name 02/08/24 1100          Gait/Stairs (Locomotion)    Sharkey Level (Gait) supervision   "-     Assistive Device (Gait) walker, front-wheeled  -     Distance in Feet (Gait) 150  -LH     Pattern (Gait) step-through  -     Deviations/Abnormal Patterns (Gait) shavon decreased  -     Bilateral Gait Deviations forward flexed posture  -     Comment, (Gait/Stairs) 30ft SBA no AD- pt does have a tendency to \"furniture walk\" discussed using AD  -       Row Name 02/08/24 1100          Balance    Balance Assessment sitting static balance;standing static balance  -     Static Sitting Balance independent  -     Position, Sitting Balance unsupported;sitting edge of bed  -     Static Standing Balance supervision  -     Position/Device Used, Standing Balance unsupported;walker, front-wheeled  -       Row Name 02/08/24 1100          Plan of Care Review    Plan of Care Reviewed With patient;family  -     Outcome Evaluation Pt 89 yo female admitted w aphasia/word finding difficulty. Pt baseline from [facility] independent w mobilization doesnt use AD. On PT exam pt ambulated supervision 150ft rwx. Pt/family encouraged to consider rwx/cane as needed for fall prevention, family agreeable. Family reports pt baseline w functional mobility. Currently no skilled PT warranted. Encouraged continued mobilization as tolerated, will sign off. COnsider C as needed.  -[facility] Name 02/08/24 1100          Positioning and Restraints    Pre-Treatment Position in bed  -     Post Treatment Position bed  -     In Bed fowlers;call light within reach;encouraged to call for assist;exit alarm on;with family/caregiver  -[facility] Name 02/08/24 1100          Therapy Assessment/Plan (PT)    Criteria for Skilled Interventions Met (PT) no problems identified which require skilled intervention  -     Therapy Frequency (PT) evaluation only  -       Row Name 02/08/24 1100          PT Evaluation Complexity    History, PT Evaluation Complexity 1-2 personal factors and/or comorbidities  -     Examination of Body Systems " (PT Eval Complexity) total of 3 or more elements  -     Clinical Presentation (PT Evaluation Complexity) stable  -     Clinical Decision Making (PT Evaluation Complexity) low complexity  -     Overall Complexity (PT Evaluation Complexity) low complexity  -               User Key  (r) = Recorded By, (t) = Taken By, (c) = Cosigned By      Initials Name Provider Type     Suzanne Gonzalez, PT Physical Therapist                      Physical Therapy Education       Title: PT OT SLP Therapies (Done)       Topic: Physical Therapy (Done)       Point: Mobility training (Done)       Learning Progress Summary             Patient Acceptance, E, VU,DU by  at 2/8/2024 1255   Family Acceptance, E, VU,DU by  at 2/8/2024 1255                         Point: Home exercise program (Done)       Learning Progress Summary             Patient Acceptance, E, VU,DU by  at 2/8/2024 1255   Family Acceptance, E, VU,DU by  at 2/8/2024 1255                         Point: Body mechanics (Done)       Learning Progress Summary             Patient Acceptance, E, VU,DU by  at 2/8/2024 1255   Family Acceptance, E, VU,DU by  at 2/8/2024 1255                         Point: Precautions (Done)       Learning Progress Summary             Patient Acceptance, E, VU,DU by  at 2/8/2024 1255   Family Acceptance, E, VU,DU by  at 2/8/2024 1255                                         User Key       Initials Effective Dates Name Provider Type Discipline     06/16/21 -  Suzanne Gonzalez, PT Physical Therapist PT                    PT Recommendation and Plan  Anticipated Discharge Disposition (PT): home with home health, assisted living  Therapy Frequency (PT): evaluation only  Plan of Care Reviewed With: patient, family  Outcome Evaluation: Pt 89 yo female admitted w aphasia/word finding difficulty. Pt baseline from [facility] independent w mobilization doesnt use AD. On PT exam pt ambulated supervision 150ft rwx. Pt/family encouraged to consider  rwx/cane as needed for fall prevention, family agreeable. Family reports pt baseline w functional mobility. Currently no skilled PT warranted. Encouraged continued mobilization as tolerated, will sign off. COnsider HHC as needed.     Outcome Measures       Row Name 02/08/24 1200             How much help from another person do you currently need...    Turning from your back to your side while in flat bed without using bedrails? 4  -LH      Moving from lying on back to sitting on the side of a flat bed without bedrails? 4  -LH      Moving to and from a bed to a chair (including a wheelchair)? 4  -LH      Standing up from a chair using your arms (e.g., wheelchair, bedside chair)? 4  -LH      Climbing 3-5 steps with a railing? 3  -LH      To walk in hospital room? 3  -      AM-PAC 6 Clicks Score (PT) 22  -      Highest Level of Mobility Goal 7 --> Walk 25 feet or more  -         Functional Assessment    Outcome Measure Options AM-PAC 6 Clicks Basic Mobility (PT)  -                User Key  (r) = Recorded By, (t) = Taken By, (c) = Cosigned By      Initials Name Provider Type     Suzanne Gonzalez, PT Physical Therapist                     Time Calculation:    PT Charges       Row Name 02/08/24 1256             Time Calculation    Start Time 0929  -      Stop Time 0948  -      Time Calculation (min) 19 min  -      PT Received On 02/08/24  -         Time Calculation- PT    Total Timed Code Minutes- PT 10 minute(s)  -                User Key  (r) = Recorded By, (t) = Taken By, (c) = Cosigned By      Initials Name Provider Type     Suzanne Gonzalez, PT Physical Therapist                  Therapy Charges for Today       Code Description Service Date Service Provider Modifiers Qty    45480188667  PT EVAL LOW COMPLEXITY 3 2/8/2024 Suzanne Gonzalez, PT GP 1    [id number] HC PT THER PROC EA 15 MIN 2/8/2024 Suzanne Gonzalez, PT GP 1            PT G-Codes  Outcome Measure Options: AM-PAC 6 Clicks Basic Mobility  (PT)  AM-PAC 6 Clicks Score (PT): 22    PT Discharge Summary  Anticipated Discharge Disposition (PT): home with home health, assisted living    Suzanne Gonzalez, PT  2/8/2024

## 2024-02-08 NOTE — CONSULTS
Neurology Consult Note    Consult Date: 2/8/2024    Referring MD: Aurelia August, *    Reason for Consult I have been asked to see the patient in neurological consultation to render advice and opinion regarding stroke    Carito Gonzalez is a 89 y.o. right-handed female with hypertension, hyperlipidemia, history of DVT, CKD, macular degeneration, mitral valve prolapse and severe mitral valve regurgitation, on low-dose aspirin per cardiology, who presented with complaints of intermittent word finding difficulty and garbled speech.  She lives in an assisted living facility.  She reports over the last week and a half she has had a few episodes a day of either the wrong word coming out when talking or word finding difficulty.  She does report a fall hitting her head about a month ago when the elevator door was closing and she fell backward.  She had a hematoma on her head at that time but did not seek evaluation because she did not have any additional symptoms or headache. She denies facial droop, unilateral weakness/numbness, involuntary movements, difficulty walking, or abrupt change in vision.  She also reports a couple days ago she was rolling over in bed and rolled into the wall hitting her head.    On arrival to the ED BP was 126/89, ECG showed NSR with PVCs, CT head showed chronic left subdural hematoma with 6 mm shift.  CTA head and neck was unremarkable, showed minimal left ICA stenosis.  She was seen by neurosurgery who stopped aspirin.  Repeat CT head this morning stable and they are signing off.  Primary team ordered an MRI and 2D echo.  Patient is requesting to be discharged.       Past Medical/Surgical Hx:  Past Medical History:   Diagnosis Date    Abscess of leg     Acute deep vein thrombosis (DVT) of left lower extremity     Acute deep vein thrombosis (DVT) of right lower extremity     Acute renal insufficiency     Angiotensin converting enzyme inhibitor-aggravated angioedema     Cellulitis     CKD  (chronic kidney disease) stage 3, GFR 30-59 ml/min     Depression     Herpes zoster     Hypercholesterolemia     Hyperlipidemia     Hypertension     Hypotension     Macular degeneration     Noted February 2014    Mitral regurgitation     Mitral valve prolapse     SOB (shortness of breath)     Zoster      Past Surgical History:   Procedure Laterality Date    HIP FRACTURE SURGERY Right 09/18/2017    SKIN CANCER EXCISION         Medications On Admission  Medications Prior to Admission   Medication Sig Dispense Refill Last Dose    acetaminophen (TYLENOL) 325 MG tablet Take 2 tablets by mouth Every 6 (Six) Hours As Needed for mild pain (1-3).  0 2/6/2024    amLODIPine (NORVASC) 5 MG tablet Take 1 tablet by mouth 2 (Two) Times a Day. 180 tablet 3 2/7/2024    aspirin 81 MG tablet Take 1 tablet by mouth Daily. 30 tablet 1 2/6/2024    metoprolol succinate XL (TOPROL-XL) 25 MG 24 hr tablet Take 1 tablet by mouth Every Morning AND 2 tablets Every Evening. 270 tablet 1 2/7/2024    Multiple Vitamins-Minerals (OCUTABS) tablet Take 1 tablet by mouth Daily.   2/7/2024       Allergies:  Allergies   Allergen Reactions    Lisinopril Angioedema    Zoloft [Sertraline Hcl] Shortness Of Breath    Erythromycin Swelling       Social Hx:  Social History     Socioeconomic History    Marital status:     Number of children: 3   Tobacco Use    Smoking status: Never     Passive exposure: Never    Smokeless tobacco: Never   Vaping Use    Vaping Use: Never used   Substance and Sexual Activity    Alcohol use: No     Comment: Daily caffeine use    Drug use: No    Sexual activity: Defer     Birth control/protection: None       Family Hx:  Family History   Problem Relation Age of Onset    Heart attack Mother     Hypertension Mother     Heart attack Father     Hypertension Father     Heart attack Brother 58    Hypertension Brother        REVIEW OF SYSTEMS:   Constitutional: [No fevers, chills, sweats or weight loss/gain]   Eye: delclining vision  "r/t macular degeneration  HEENT: [No headaches, tenderness, dizziness, or tinnitus. Normal smell and taste. Normal speech and swallowing]   Respiratory: [No shortness of breath, coughing, wheezing]   Cardiovascular: [No Chest pain, palpitations, syncope, YO]   Gastrointestinal: [Normal bowel function. No nausea, vomiting, diarrhea]   Genitourinary: [Normal bladder function]   Musculoskeletal: [No trauma, joint or neck pain, myalgias, cramping or weakness]   Skin: [No itching, burning, pain, rashes, or birthmarks]   Endocrinology: [No heat or cold intolerance]   Psychiatric: [No sleep disturbance. No anxiety or depression]   Neurologic: [See HPI, above]       Exam    /62 (BP Location: Left arm, Patient Position: Lying)   Pulse 61   Temp 97.2 °F (36.2 °C) (Oral)   Resp 16   Ht 165.1 cm (65\")   Wt 52.4 kg (115 lb 8.3 oz)   LMP  (LMP Unknown)   SpO2 95%   BMI 19.22 kg/m²   General appearance: Well developed, well nourished, thin,  alert and cooperative.   HEENT: Normocephalic.   Neck: Supple.   Cardiac: Regular rate and rhythm.   Peripheral Vasculature: Radial pulses are equal and symmetric.  Chest Exam: Clear to auscultation bilaterally, no wheezes, no rhonchi.  Extremities: Normal, no edema.   Skin: No rashes or birthmarks.     Higher integrative function: Oriented to time, place, person, intact recent and remote memory, attention span, concentration and language except stated she would be \"44\" and then \"64\" on her next birthday. Spontaneous speech, fund of vocabulary are normal.   CN II: Normal visual fields.   CN III IV VI: Extraocular movements are full without nystagmus. Pupils are equal, round, and reactive to light. Left ptosis chronic.  CN V: Normal facial sensation.  CN VII: Facial movements are symmetric, no weakness.   CN VIII: Auditory acuity is normal.   CN IX & X: Symmetric palatal movement.   CN XI: Sternocleidomastoid and trapezius are normal. No weakness.   CN XII: The tongue is midline. "   Motor: Normal muscle strength, bulk, and tone in upper and lower extremities. No fasciculations, rigidity, spasticity or abnormal movements.   Sensation: Normal light touch in extremities.   Station and gait: Deferred.  Muscle stretch reflexes: Plantar reflexes are flexor bilaterally.   Coordination: Finger to nose test showed no dysmetria. Heel to shin normal.           DATA:    Lab Results   Component Value Date    GLUCOSE 83 02/08/2024    CALCIUM 8.8 02/08/2024     02/08/2024    K 3.7 02/08/2024    CO2 25.4 02/08/2024     02/08/2024    BUN 12 02/08/2024    CREATININE 0.65 02/08/2024    EGFRIFAFRI 65 01/22/2020    EGFRIFNONA 69 11/02/2021    BCR 18.5 02/08/2024    ANIONGAP 12.6 02/08/2024     Lab Results   Component Value Date    WBC 5.27 02/08/2024    HGB 11.9 (L) 02/08/2024    HCT 37.3 02/08/2024    MCV 91.9 02/08/2024     02/08/2024     Lab Results   Component Value Date    CHOL 205 (H) 02/08/2024     Lab Results   Component Value Date    HDL 56 02/08/2024    HDL 67 12/11/2023    HDL 74 05/30/2023     Lab Results   Component Value Date     (H) 02/08/2024     (H) 12/11/2023     (H) 05/30/2023     Lab Results   Component Value Date    TRIG 60 02/08/2024    TRIG 69 12/11/2023    TRIG 53 05/30/2023     Lab Results   Component Value Date    HGBA1C 5.30 02/08/2024     Lab Results   Component Value Date    INR 1.02 02/07/2024    PROTIME 13.5 02/07/2024     ECG 2/7 tracings viewed by me, shows NSR with 2 PVCs    Imaging review:   CT head images viewed by me, shows L frontal chronic appearing SDH with some shift.   CT Head Without Contrast    Result Date: 2/8/2024  CT OF THE HEAD WITHOUT CONTRAST  HISTORY: Subdural hematoma  COMPARISON: February 7, 2024  TECHNIQUE: Axial CT imaging was obtained through the brain. No IV contrast was administered.  FINDINGS: Mixed density subdural collection overlying the left cerebral convexity is stable in size and attenuation when compared to  prior exam. Midline shift measures up to 6 mm, unchanged. No new areas of hemorrhage are seen. There is atrophy. There is periventricular and deep white matter microangiopathic change. Mass effect upon the left lateral ventricle is stable. Sinus inflammatory changes are again noted.      No significant interval change.  Radiation dose reduction techniques were utilized, including automated exposure control and exposure modulation based on body size.   This report was finalized on 2/8/2024 5:09 AM by Dr. Bridgette Leach M.D on Workstation: [id number]      CT Angiogram Neck    Result Date: 2/8/2024  NONCONTRAST CRANIAL CT SCAN, CT ANGIOGRAM NECK, CT ANGIOGRAM HEAD.  HISTORY: Subdural hematoma  COMPARISON: February 7, 2024.  TECHNIQUE:  Radiation dose reduction techniques were utilized, including automated exposure control and exposure modulation based on body size. Initially, a routine noncontrast cranial CT performed from the skull base through the vertex region. After review, thin-section contrast enhanced CT angiogram images obtained from the aortic arch through the calvarial vertex utilizing angiographic technique. Multi projection 3-D MIP reformatted images were supplemented and reviewed.   FINDINGS CRANIAL CT: Large mixed density left cerebral convexity subdural hematoma is again noted. Maximum thickness is 2.3 cm. This is probably not significantly changed when compared to the prior study. There is midline shift to the right, measuring up to 7 mm, also unchanged. There is diffuse atrophy. There is periventricular and deep white matter microangiopathic change. No new areas of hemorrhage are seen.. Postcontrast imaging does not show any evidence of venous occlusion or abnormal enhancement. Bone window images demonstrate mucosal thickening within the ethmoid sinuses, and an air-fluid level within the right maxillary sinus. Correlation with any evidence of sinusitis is recommended..  CERVICAL CAROTID CT  ANGIOGRAM:  FINDINGS: There is normal arch anatomy. There does appear to be some stenosis involving the proximal left subclavian artery. This is favored to be at least 30 to 40%. There is some mild calcified plaque noted at the origin of the common carotid arteries bilaterally, without hemodynamically significant stenosis. Additional calcified plaque is noted within both carotid arteries, although more significant on the left. Calcification continues into the carotid bifurcations bilaterally. There is minimal narrowing at the origin of the right internal carotid artery. Narrowing at the origin of the left internal carotid artery is in the range of 30%. The vessels remain patent. Distal cervical internal carotid arteries are patent. There is calcified plaque noted at the origins of the vertebral arteries bilaterally. It does not appear to result in any hemodynamically significant stenosis. The vertebral arteries are patent throughout their courses. Left is slightly larger in caliber. Images through the lung apices demonstrate reticulonodular infiltrates, as well as biapical scarring. There is bronchial wall thickening. Patient does appear to have some cavitary nodules within the lungs. Single largest is seen within the right lower lobe, and further evaluation with dedicated imaging of the chest is suggested. Prominent right hilar node measures 2.4 x 1.1 cm. NASCET criteria utilized in stenosis measurements. stenosis mild, 0-49%.   CRANIAL CTA ANGIOGRAM:  FINDINGS: The intracranial vertebral arteries are patent. Basilar artery is unremarkable. Posterior cerebral arteries are within normal limits. Intracranial internal carotid arteries are patent. There may be a tiny anterior communicating artery. Anterior cerebral arteries are patent, as are the middle cerebral arteries. There does appear to be some narrowing involving the most proximal portion of the right A1.           1. Stable appearance to previously identified  mixed density left cerebral convexity subdural hematoma. 2. Atherosclerotic involvement of the aortic arch and cervical vessels. Patient does have some mild narrowing involving the origin of the left internal carotid artery, in the range of 30%. 3. Intracranial vessels appear patent. There is potentially some narrowing involving the most proximal aspect of the right A1. 4. Bronchial wall thickening and reticulonodular infiltrates noted at the lung apices bilaterally appearance raises possibility of bronchiolitis. There are also cavitary nodules which are noted within both lungs. There appearance is nonspecific, and includes infectious nodules, including atypical mycobacterial infection, as well as septic emboli and malignancy. Further assessment with dedicated CT of the chest is recommended.  This report was finalized on 2/8/2024 1:33 AM by Dr. Bridgette Leach M.D on Workstation: [id number]      CT Angiogram Head w AI Analysis of LVO    Result Date: 2/8/2024  NONCONTRAST CRANIAL CT SCAN, CT ANGIOGRAM NECK, CT ANGIOGRAM HEAD.  HISTORY: Subdural hematoma  COMPARISON: February 7, 2024.  TECHNIQUE:  Radiation dose reduction techniques were utilized, including automated exposure control and exposure modulation based on body size. Initially, a routine noncontrast cranial CT performed from the skull base through the vertex region. After review, thin-section contrast enhanced CT angiogram images obtained from the aortic arch through the calvarial vertex utilizing angiographic technique. Multi projection 3-D MIP reformatted images were supplemented and reviewed.   FINDINGS CRANIAL CT: Large mixed density left cerebral convexity subdural hematoma is again noted. Maximum thickness is 2.3 cm. This is probably not significantly changed when compared to the prior study. There is midline shift to the right, measuring up to 7 mm, also unchanged. There is diffuse atrophy. There is periventricular and deep white matter  microangiopathic change. No new areas of hemorrhage are seen.. Postcontrast imaging does not show any evidence of venous occlusion or abnormal enhancement. Bone window images demonstrate mucosal thickening within the ethmoid sinuses, and an air-fluid level within the right maxillary sinus. Correlation with any evidence of sinusitis is recommended..  CERVICAL CAROTID CT ANGIOGRAM:  FINDINGS: There is normal arch anatomy. There does appear to be some stenosis involving the proximal left subclavian artery. This is favored to be at least 30 to 40%. There is some mild calcified plaque noted at the origin of the common carotid arteries bilaterally, without hemodynamically significant stenosis. Additional calcified plaque is noted within both carotid arteries, although more significant on the left. Calcification continues into the carotid bifurcations bilaterally. There is minimal narrowing at the origin of the right internal carotid artery. Narrowing at the origin of the left internal carotid artery is in the range of 30%. The vessels remain patent. Distal cervical internal carotid arteries are patent. There is calcified plaque noted at the origins of the vertebral arteries bilaterally. It does not appear to result in any hemodynamically significant stenosis. The vertebral arteries are patent throughout their courses. Left is slightly larger in caliber. Images through the lung apices demonstrate reticulonodular infiltrates, as well as biapical scarring. There is bronchial wall thickening. Patient does appear to have some cavitary nodules within the lungs. Single largest is seen within the right lower lobe, and further evaluation with dedicated imaging of the chest is suggested. Prominent right hilar node measures 2.4 x 1.1 cm. NASCET criteria utilized in stenosis measurements. stenosis mild, 0-49%.   CRANIAL CTA ANGIOGRAM:  FINDINGS: The intracranial vertebral arteries are patent. Basilar artery is unremarkable. Posterior  cerebral arteries are within normal limits. Intracranial internal carotid arteries are patent. There may be a tiny anterior communicating artery. Anterior cerebral arteries are patent, as are the middle cerebral arteries. There does appear to be some narrowing involving the most proximal portion of the right A1.           1. Stable appearance to previously identified mixed density left cerebral convexity subdural hematoma. 2. Atherosclerotic involvement of the aortic arch and cervical vessels. Patient does have some mild narrowing involving the origin of the left internal carotid artery, in the range of 30%. 3. Intracranial vessels appear patent. There is potentially some narrowing involving the most proximal aspect of the right A1. 4. Bronchial wall thickening and reticulonodular infiltrates noted at the lung apices bilaterally appearance raises possibility of bronchiolitis. There are also cavitary nodules which are noted within both lungs. There appearance is nonspecific, and includes infectious nodules, including atypical mycobacterial infection, as well as septic emboli and malignancy. Further assessment with dedicated CT of the chest is recommended.  This report was finalized on 2/8/2024 1:33 AM by Dr. Bridgette Leach M.D on Workstation: [id number]      CT Head Without Contrast    Result Date: 2/7/2024  CT HEAD WITHOUT CONTRAST  CLINICAL HISTORY: Intermittent episodes of aphasia. Patient fell this morning.  TECHNIQUE: CT scan of the head was obtained with 3 mm axial soft tissue and 2 mm axial bone algorithm algorithm images. No intravenous contrast was administered. Sagittal and coronal reconstructions were obtained.  COMPARISON: No previous similar studies are available for comparison.  FINDINGS:   There is a large subdural collection lateral to the left cerebral hemisphere and predominantly lateral to the left frontal, parietal, and occipital lobes that is consistent with predominantly a chronic subdural  hematoma. There are some serpiginous appearing areas of higher density material within this collection that I suspect are predominantly representative of membranes, although small foci of superimposed acute hemorrhage cannot be entirely excluded. The collection measures up to approximately 1.7 cm in diameter lateral to the left parietal lobe. There is prominent mass effect with ventricular and sulcal effacement. There is shift of the midline structures to the right by approximately 6 mm.  Otherwise, the gray-white matter differentiation is within normal limits. The basal ganglia and thalami are unremarkable in appearance. The posterior fossa structures are within normal limits. Atherosclerotic calcifications are incidentally appreciated within the intracranial vasculature.  Incidental note is made of an air-fluid level within the right maxillary sinus compatible with changes of inflammatory paranasal sinus disease.       There is a subdural collection lateral to the left cerebral hemisphere that is compatible with a chronic subdural hematoma. Serpiginous appearing areas of higher density material are felt to most likely predominantly represent membrane formation, although small superimposed areas of acute hemorrhage cannot be entirely excluded. There is prominent mass effect upon the left cerebral hemisphere with shift of the midline structures to the right by approximately 6 mm.  These findings were discussed with Dr. Mykel Ng on 02/07/2024 at approximately 3:45 p.m.   Radiation dose reduction techniques were utilized, including automated exposure control and exposure modulation based on body size.  This report was finalized on 2/7/2024 3:50 PM by Dr. Gilberto Gaston M.D on Workstation: [id number]      XR Hand 3+ View Left    Result Date: 2/7/2024  XR HAND 3+ VW LEFT-  INDICATIONS: Trauma  TECHNIQUE: 3 VIEWS OF THE LEFT HAND  COMPARISON: None available  FINDINGS:  The bones are diffusely osteopenic. Osteoarthritic  degenerative changes are noted, predominantly at distal interphalangeal joints at the lateral aspect of the wrist. No acute fracture, erosion, or dislocation is identified. Follow-up/further evaluation can be obtained as indications persist.       As described.    This report was finalized on 2/7/2024 2:17 PM by Dr. Sam Griffin M.D on Workstation: [id number]       Impression/Plan:  1) Intermittent expressive aphasia.    CTA h/n unremarkable.   Check MRI brain wo to evaluate for ischemic stroke.   Cancel 2d echo for now.   Has refused statins in the past.     2) L chronic SDH- seen by OWEN, asa held. Stable on repeat CTH.     D/W Dr Campbell and Dr Julien. Daughter updated at bedside. Will follow.

## 2024-02-08 NOTE — CASE MANAGEMENT/SOCIAL WORK
Continued Stay Note  [facility]     Patient Name: Carito Gonzalez  MRN: [id number]  Today's Date: 2/8/2024    Admit Date: 2/7/2024    Plan: Return to [facility]   Discharge Plan       Row Name 02/08/24 1706       Plan    Final Discharge Disposition Code 01 - home or self-care  [facility]    Final Note DC back to [facility].                   Discharge Codes    No documentation.                 Expected Discharge Date and Time       Expected Discharge Date Expected Discharge Time    Feb 8, 2024               Shaye Elder RN

## 2024-02-08 NOTE — PLAN OF CARE
Goal Outcome Evaluation:  Plan of Care Reviewed With: patient, family           Outcome Evaluation: Pt 89 yo female admitted w aphasia/word finding difficulty. Pt baseline from [facility] independent w mobilization doesnt use AD. On PT exam pt ambulated supervision 150ft rwx. Pt/family encouraged to consider rwx/cane as needed for fall prevention, family agreeable. Family reports pt baseline w functional mobility. Currently no skilled PT warranted. Encouraged continued mobilization as tolerated, will sign off. COnsider HHC as needed.      Anticipated Discharge Disposition (PT): home with home health, assisted living

## 2024-02-08 NOTE — DISCHARGE SUMMARY
NAME: Carito Gonzalez ADMIT: 2024   : 3/7/1930  PCP: Eb Turner MD    MRN: [id number] LOS: 0 days   AGE/SEX: 89 y.o. female  ROOM: [facility]/     Date of Admission:  2024  Date of Discharge:  2024    PCP: Eb Turner MD    CHIEF COMPLAINT  Speech Problem      DISCHARGE DIAGNOSIS  Active Hospital Problems    Diagnosis  POA    **Subdural hematoma [S06.5XAA]  Unknown    Aphasia [R47.01]  Yes    MVP (mitral valve prolapse) [I34.1]  Yes    Hypertension [I10]  Yes      Resolved Hospital Problems   No resolved problems to display.       SECONDARY DIAGNOSES  Past Medical History:   Diagnosis Date    Abscess of leg     Acute deep vein thrombosis (DVT) of left lower extremity     Acute deep vein thrombosis (DVT) of right lower extremity     Acute renal insufficiency     Angiotensin converting enzyme inhibitor-aggravated angioedema     Cellulitis     CKD (chronic kidney disease) stage 3, GFR 30-59 ml/min     Depression     Herpes zoster     Hypercholesterolemia     Hyperlipidemia     Hypertension     Hypotension     Macular degeneration     Noted 2014    Mitral regurgitation     Mitral valve prolapse     SOB (shortness of breath)     Zoster        CONSULTS   [facility]  Neurology    HOSPITAL COURSE  Patient is a 89 y.o. female with history of hypertension, kidney disease, mitral valve prolapse on low-dose aspirin who presented with about a week and a half of some difficulty with speech.  She came to [facility] emergency room and was found to have a subdural hematoma.  Repeat CT was negative.  She was seen by neurology as well who obtain MRI which was negative for any acute ischemic stroke.  They also have obtain EEG.    Patient feels well and wishes for discharge today.  She will hold her aspirin for the time being and follow-up with neurosurgery in 4 weeks with repeat CT scan of the head. She feels well and wishes for discharge back to her facility.       DIAGNOSTICS     MRI Brain  Without Contrast [[id number]] Levi as Reviewed   Order Status: Completed Collected: 02/08/24 1255    Updated: 02/08/24 1255   Narrative:     MRI EXAMINATION OF THE BRAIN WITHOUT CONTRAST     HISTORY: Subdural, difficulty speaking.     TECHNIQUE: A MRI examination of the brain was performed utilizing  sagittal T1, axial diffusion, T1, T2, T2 FLAIR and gradient echo T2  imaging.     FINDINGS: There is no evidence of restricted diffusion to suggest acute  infarction. A heterogeneous subdural is appreciated overlying the left  cerebral hemisphere superolaterally measuring approximately 18 mm in  maximum thickness. Multiple septations are appreciated involving the  subdural collection on the axial T2 FLAIR sequence. The subdural is T1  and T2 hyperintense. The axial T2 FLAIR sequence also demonstrates a  sulcal T2 FLAIR hyperintensity involving the left frontal, parietal and  occipital lobes adjacent to the subdural. The largest is also T1  hyperintense. This is suggestive of subarachnoid blood or proteinaceous  fluid. A remote infarct involving the medial aspect of the right  occipital lobe is appreciated with volume loss.     There is approximately 6 mm of midline shift to the left.      Impression:     1.  A heterogeneous multiseptated subdural is appreciated overlying the  left cerebral hemisphere measuring 18 mm in maximum thickness. The  subdural is T1 and T2 hyperintense. Scattered foci of diffusion  hyperintensity are also appreciated which are likely related to residual  blood products rather than representing an infected collection. Adjacent  areas of T2 FLAIR hyperintensity are appreciated involving the adjacent  sulci, the largest of which is also T1 hyperintense. This may represent  proteinaceous fluid and/or a trace amount of subarachnoid blood.  2.  6 mm of midline shift to the right is noted, similar in appearance  as compared to the CT examination of 02/07/2024.  3.  There is no evidence of acute  infarction. Atrophy and a remote  infarct involving the right occipital lobe medially is noted.       CT Head Without Contrast [[id number]] Levi as Reviewed   Order Status: Completed Collected: 02/08/24 0456    Updated: 02/08/24 0512   Narrative:     CT OF THE HEAD WITHOUT CONTRAST     HISTORY: Subdural hematoma     COMPARISON: February 7, 2024     TECHNIQUE: Axial CT imaging was obtained through the brain. No IV  contrast was administered.     FINDINGS:  Mixed density subdural collection overlying the left cerebral convexity  is stable in size and attenuation when compared to prior exam. Midline  shift measures up to 6 mm, unchanged. No new areas of hemorrhage are  seen. There is atrophy. There is periventricular and deep white matter  microangiopathic change. Mass effect upon the left lateral ventricle is  stable. Sinus inflammatory changes are again noted.      Impression:     No significant interval change.        Collected Updated Procedure Result Status    02/08/2024 0707 02/08/2024 0805 CBC (No Diff) [[id number]]   (Abnormal)   Blood    Final result Component Value Units   WBC 5.27 10*3/mm3   RBC 4.06 10*6/mm3   Hemoglobin 11.9 Low  g/dL   Hematocrit 37.3 %   MCV 91.9 fL   MCH 29.3 pg   MCHC 31.9 g/dL   RDW 13.3 %   RDW-SD 45.3 fl   MPV 10.7 fL   Platelets 279 10*3/mm3          02/08/2024 0707 02/08/2024 0828 Comprehensive Metabolic Panel [[id number]]    Blood    Final result Component Value Units   Glucose 83 mg/dL   BUN 12 mg/dL   Creatinine 0.65 mg/dL   Sodium 138 mmol/L   Potassium 3.7 mmol/L   Chloride 100 mmol/L   CO2 25.4 mmol/L   Calcium 8.8 mg/dL   Total Protein 6.4 g/dL   Albumin 3.6 g/dL   ALT (SGPT) 7 U/L   AST (SGOT) 14 U/L   Alkaline Phosphatase 61 U/L   Total Bilirubin 0.6 mg/dL   Globulin 2.8 gm/dL   A/G Ratio 1.3 g/dL   BUN/Creatinine Ratio 18.5    Anion Gap 12.6 mmol/L   eGFR 82.2 mL/min/1.73          02/08/2024 0707 02/08/2024 0815 Hemoglobin A1c [[id number]]    Blood    Final result Component  "Value Units   Hemoglobin A1C 5.30 %          02/08/2024 0707 02/08/2024 0828 Lipid Panel [[id number]]    (Abnormal)   Blood    Final result Component Value Units   Total Cholesterol 205 High  mg/dL   Triglycerides 60 mg/dL   HDL Cholesterol 56 mg/dL   LDL Cholesterol 138 High  mg/dL   VLDL Cholesterol 11 mg/dL   LDL/HDL Ratio 2.45           02/08/2024 0707 02/08/2024 0832 TSH [[id number]]   Blood    Final result Component Value Units   TSH 3.270 uIU/mL        PHYSICAL EXAM  Objective:  Vital signs: (most recent): Blood pressure 153/62, pulse 61, temperature 97.2 °F (36.2 °C), temperature source Oral, resp. rate 16, height 165.1 cm (65\"), weight 52.4 kg (115 lb 8.3 oz), SpO2 95%, not currently breastfeeding.                Alert  nad  No resp distress  Pleasant  Elderly     CONDITION ON DISCHARGE  Stable.      DISCHARGE DISPOSITION   Home or Self Care      DISCHARGE MEDICATIONS       Your medication list        CONTINUE taking these medications        Instructions Last Dose Given Next Dose Due   acetaminophen 325 MG tablet  Commonly known as: TYLENOL      Take 2 tablets by mouth Every 6 (Six) Hours As Needed for mild pain (1-3).       amLODIPine 5 MG tablet  Commonly known as: NORVASC      Take 1 tablet by mouth 2 (Two) Times a Day.       metoprolol succinate XL 25 MG 24 hr tablet  Commonly known as: TOPROL-XL      Take 1 tablet by mouth Every Morning AND 2 tablets Every Evening.       Ocutabs tablet tablet  Generic drug: multivitamin with minerals      Take 1 tablet by mouth Daily.              STOP taking these medications      aspirin 81 MG tablet                  Future Appointments   Date Time Provider Department Center   2/8/2024  3:00 PM [facility] NEURO KRES IP EEG 1  [facility] OTTO K [facility]   4/22/2024 10:00 AM [facility] E[facility]/VAS FRONT [facility] ECHO [facility]   4/22/2024 10:40 AM Quin Magana MD [facility] CD LCGKR [facility]   7/15/2024 10:00 AM [facility]   7/22/2024 10:15 AM Eb Turner MD [facility] BLKBR " [facility]     Additional Instructions for the Follow-ups that You Need to Schedule       Discharge Follow-up with Specialty: PCP in 1-2 weeks, OWEN in 4 weeks   As directed      Specialty: PCP in 1-2 weeks, OWEN in 4 weeks               Contact information for follow-up providers       Eb Turner MD .    Specialty: Internal Medicine  Contact information:  [address] [facility] [id number] [phone]                       Contact information for after-discharge care       Destination       [facility] .    Service: Assisted Living  Contact information:  [address] [place] [address] [phone]                                   TEST  RESULTS PENDING AT DISCHARGE  Pending Labs       Order Current Status    Urine Culture - Urine, Urine, Clean Catch Preliminary result               Kemal Campbell MD  [facility]  02/08/24  14:49 EST      Time: greater than 32 minutes on discharge  It was a pleasure taking care of this patient while in the hospital.

## 2024-02-08 NOTE — H&P
HISTORY AND PHYSICAL   [facility]        Date of Admission: 2024  Patient Identification:  Name: Carito Gonzalez  Age: 89 y.o.  Sex: female  :  3/7/1930  MRN: [id number]                     Primary Care Physician: Eb Turner MD    Chief Complaint:  89 year old female presented to the emergency room with intermittent difficulty word finding for the last few days; she fell and hit her head a few days ago and these episodes started afterwards; she denies weakness, numbness or tingling; no fever or chills     History of Present Illness:   As above    Past Medical History:  Past Medical History:   Diagnosis Date    Abscess of leg     Acute deep vein thrombosis (DVT) of left lower extremity     Acute deep vein thrombosis (DVT) of right lower extremity     Acute renal insufficiency     Angiotensin converting enzyme inhibitor-aggravated angioedema     Cellulitis     CKD (chronic kidney disease) stage 3, GFR 30-59 ml/min     Depression     Herpes zoster     Hypercholesterolemia     Hyperlipidemia     Hypertension     Hypotension     Macular degeneration     Noted 2014    Mitral regurgitation     Mitral valve prolapse     SOB (shortness of breath)     Zoster      Past Surgical History:  Past Surgical History:   Procedure Laterality Date    HIP FRACTURE SURGERY Right 2017    SKIN CANCER EXCISION        Home Meds:  Medications Prior to Admission   Medication Sig Dispense Refill Last Dose    acetaminophen (TYLENOL) 325 MG tablet Take 2 tablets by mouth Every 6 (Six) Hours As Needed for mild pain (1-3).  0     amLODIPine (NORVASC) 5 MG tablet Take 1 tablet by mouth 2 (Two) Times a Day. 180 tablet 3     aspirin 81 MG tablet Take 1 tablet by mouth Daily. 30 tablet 1     metoprolol succinate XL (TOPROL-XL) 25 MG 24 hr tablet Take 1 tablet by mouth Every Morning AND 2 tablets Every Evening. 270 tablet 1     Multiple Vitamins-Minerals (OCUTABS) tablet Take 1 tablet by mouth Daily.           Allergies:  Allergies   Allergen Reactions    Lisinopril Angioedema    Zoloft [Sertraline Hcl] Shortness Of Breath    Erythromycin Swelling     Immunizations:  Immunization History   Administered Date(s) Administered    COVID-19 (PFIZER) BIVALENT 12+YRS 10/27/2022, 2023    COVID-19 (PFIZER) Purple Cap Monovalent 2021, 2021, 10/13/2021    Covid-19 (Pfizer) Gray Cap Monovalent 2022    Flu Vaccine Intradermal Quad 18-64YR 2017    FluMist 2-49yrs 10/15/2016    Fluzone High Dose =>65 Years (Vaxcare ONLY) 2017    Fluzone High-Dose 65+yrs 10/05/2021    H1N1 Inj 2017    Hepatitis A 2018    Influenza Quad Vaccine (Inpatient) 2017, 2019    Influenza TIV (IM) 2017    Pneumococcal Conjugate 13-Valent (PCV13) 09/15/2016    Pneumococcal Polysaccharide (PPSV23) 2018    Shingrix 2018, 2018     Social History:   Social History     Social History Narrative    Not on file     Social History     Socioeconomic History    Marital status:     Number of children: 3   Tobacco Use    Smoking status: Former     Types: Cigarettes     Quit date:      Years since quittin.1    Smokeless tobacco: Never   Substance and Sexual Activity    Alcohol use: No     Comment: Daily caffeine use    Drug use: No    Sexual activity: Defer       Family History:  Family History   Problem Relation Age of Onset    Heart attack Mother     Hypertension Mother     Heart attack Father     Hypertension Father     Heart attack Brother 58    Hypertension Brother         Review of Systems  See history of present illness and past medical history.  Patient denies headache, dizziness, syncope, change in vision, change in hearing, change in taste, changes in weight, changes in appetite, focal weakness, numbness, or paresthesia.  Patient denies chest pain, palpitations, dyspnea, orthopnea, PND, cough, sinus pressure, rhinorrhea, epistaxis, hemoptysis, nausea,  "vomiting,hematemesis, diarrhea, constipation or hematochezia.  Denies cold or heat intolerance, polydipsia, polyuria, polyphagia. Denies hematuria, pyuria, dysuria, hesitancy, frequency or urgency. Denies consumption of raw and under cooked meats foods or change in water source.      Objective:  T Max 24 hrs: Temp (24hrs), Av.6 °F (37 °C), Min:98.6 °F (37 °C), Max:98.6 °F (37 °C)    Vitals Ranges:   Temp:  [98.6 °F (37 °C)] 98.6 °F (37 °C)  Heart Rate:  [63-74] 73  Resp:  [16] 16  BP: (126-171)/(70-90) 160/88      Exam:  /88   Pulse 73   Temp 98.6 °F (37 °C) (Tympanic)   Resp 16   Ht 165.1 cm (65\")   Wt 49 kg (108 lb)   LMP  (LMP Unknown)   SpO2 100%   BMI 17.97 kg/m²     General Appearance:    Alert, cooperative, no distress, appears stated age   Head:    Normocephalic, without obvious abnormality, atraumatic   Eyes:    PERRL, conjunctivae/corneas clear, EOM's intact, both eyes   Ears:    Normal external ear canals, both ears   Nose:   Nares normal, septum midline, mucosa normal, no drainage    or sinus tenderness   Throat:   Lips, mucosa, and tongue normal   Neck:   Supple, symmetrical, trachea midline, no adenopathy;     thyroid:  no enlargement/tenderness/nodules; no carotid    bruit or JVD   Back:     Symmetric, no curvature, ROM normal, no CVA tenderness   Lungs:     Clear to auscultation bilaterally, respirations unlabored   Chest Wall:    No tenderness or deformity    Heart:    Regular rate and rhythm, S1 and S2 normal, no murmur, rub   or gallop   Abdomen:     Soft, nontender, bowel sounds active all four quadrants,     no masses, no hepatomegaly, no splenomegaly   Extremities:   Extremities normal, atraumatic, no cyanosis or edema                       .    Data Review:  Labs in chart were reviewed.  WBC   Date Value Ref Range Status   2024 7.05 3.40 - 10.80 10*3/mm3 Final     Hemoglobin   Date Value Ref Range Status   2024 12.1 12.0 - 15.9 g/dL Final     Hematocrit   Date " Value Ref Range Status   02/07/2024 37.7 34.0 - 46.6 % Final     Platelets   Date Value Ref Range Status   02/07/2024 276 140 - 450 10*3/mm3 Final     Sodium   Date Value Ref Range Status   02/07/2024 139 136 - 145 mmol/L Final     Potassium   Date Value Ref Range Status   02/07/2024 4.2 3.5 - 5.2 mmol/L Final     Chloride   Date Value Ref Range Status   02/07/2024 103 98 - 107 mmol/L Final     CO2   Date Value Ref Range Status   02/07/2024 26.4 22.0 - 29.0 mmol/L Final     BUN   Date Value Ref Range Status   02/07/2024 19 8 - 23 mg/dL Final     Creatinine   Date Value Ref Range Status   02/07/2024 0.78 0.57 - 1.00 mg/dL Final     Glucose   Date Value Ref Range Status   02/07/2024 103 (H) 65 - 99 mg/dL Final     Calcium   Date Value Ref Range Status   02/07/2024 9.9 (H) 8.2 - 9.6 mg/dL Final     Magnesium   Date Value Ref Range Status   02/07/2024 2.3 1.7 - 2.3 mg/dL Final       Results from last 7 days   Lab Units 02/07/24  1339   TSH uIU/mL 1.870   FREE T4 ng/dL 1.15          Imaging Results (All)       Procedure Component Value Units Date/Time    CT Angiogram Neck [[id number]] Resulted: 02/07/24 2007     Updated: 02/07/24 2010    CT Angiogram Head w AI Analysis of LVO [[id number]] Resulted: 02/07/24 2007     Updated: 02/07/24 2010    CT Head Without Contrast [[id number]] Collected: 02/07/24 1535     Updated: 02/07/24 1553    Narrative:      CT HEAD WITHOUT CONTRAST     CLINICAL HISTORY: Intermittent episodes of aphasia. Patient fell this  morning.     TECHNIQUE: CT scan of the head was obtained with 3 mm axial soft tissue  and 2 mm axial bone algorithm algorithm images. No intravenous contrast  was administered. Sagittal and coronal reconstructions were obtained.     COMPARISON: No previous similar studies are available for comparison.     FINDINGS:       There is a large subdural collection lateral to the left cerebral  hemisphere and predominantly lateral to the left frontal, parietal, and  occipital lobes that is  consistent with predominantly a chronic subdural  hematoma. There are some serpiginous appearing areas of higher density  material within this collection that I suspect are predominantly  representative of membranes, although small foci of superimposed acute  hemorrhage cannot be entirely excluded. The collection measures up to  approximately 1.7 cm in diameter lateral to the left parietal lobe.  There is prominent mass effect with ventricular and sulcal effacement.  There is shift of the midline structures to the right by approximately 6  mm.     Otherwise, the gray-white matter differentiation is within normal  limits. The basal ganglia and thalami are unremarkable in appearance.  The posterior fossa structures are within normal limits. Atherosclerotic  calcifications are incidentally appreciated within the intracranial  vasculature.     Incidental note is made of an air-fluid level within the right maxillary  sinus compatible with changes of inflammatory paranasal sinus disease.       Impression:         There is a subdural collection lateral to the left cerebral hemisphere  that is compatible with a chronic subdural hematoma. Serpiginous  appearing areas of higher density material are felt to most likely  predominantly represent membrane formation, although small superimposed  areas of acute hemorrhage cannot be entirely excluded. There is  prominent mass effect upon the left cerebral hemisphere with shift of  the midline structures to the right by approximately 6 mm.     These findings were discussed with Dr. Mykel Ng on 02/07/2024 at  approximately 3:45 p.m.        Radiation dose reduction techniques were utilized, including automated  exposure control and exposure modulation based on body size.     This report was finalized on 2/7/2024 3:50 PM by Dr. Gilberto Gaston M.D  on Workstation: [id number]       XR Hand 3+ View Left [[id number]] Collected: 02/07/24 1414     Updated: 02/07/24 1421    Narrative:      XR  HAND 3+ VW LEFT-     INDICATIONS: Trauma     TECHNIQUE: 3 VIEWS OF THE LEFT HAND     COMPARISON: None available     FINDINGS:     The bones are diffusely osteopenic. Osteoarthritic degenerative changes  are noted, predominantly at distal interphalangeal joints at the lateral  aspect of the wrist. No acute fracture, erosion, or dislocation is  identified. Follow-up/further evaluation can be obtained as indications  persist.       Impression:         As described.           This report was finalized on 2/7/2024 2:17 PM by Dr. Sam Griffin M.D on Workstation: [id number]                 Assessment:  Active Hospital Problems    Diagnosis  POA    **Aphasia [R47.01]  Yes      Resolved Hospital Problems   No resolved problems to display.   Fall  Subdural hematoma  Ckd3  Hypertension  Hyperlipidemia  Hyperglycemia  Underweight      Plan:  Stroke workup in progress  Repeat ct head in am for subdural hematoma  Therapies evaluation  Neurology consult  Appreciate help from neurosurgery  Dw patient and family as well as ed provider    Aurelia August MD  2/7/2024  20:22 EST

## 2024-02-08 NOTE — PROGRESS NOTES
[facility] Acute Inpt Rehab Note     Referral received via stroke order set.  Please note this is a screening only, rehab admissions will not actively be evaluating this patient.  If felt patient is appropriate for our services once therapies begin, please call our office at [phone], to initiate a full referral.    Thank you,   Soraya Mensah RN   Rehab Admission Nurse

## 2024-02-08 NOTE — PROGRESS NOTES
Discharge Planning Assessment  [facility]     Patient Name: Carito Gonzalez  MRN: [id number]  Today's Date: 2/8/2024    Admit Date: 2/7/2024    Plan: Return to [facility]   Discharge Needs Assessment       Row Name 02/08/24 1305       Living Environment    Unique Family Situation Assisted living at [facility]    Current Living Arrangements assisted living facility    Primary Care Provided by other (see comments)    Family Caregiver if Needed child(danitza), adult    Family Caregiver Names daughter Violet Bernstein [phone]    Quality of Family Relationships involved;supportive;helpful    Able to Return to Prior Arrangements yes       Transition Planning    Patient/Family Anticipates Transition to home    Transportation Anticipated family or friend will provide       Discharge Needs Assessment    Concerns to be Addressed no discharge needs identified    Equipment Needed After Discharge none    Discharge Facility/Level of Care Needs assisted living facility    Provided Post Acute Provider List? N/A    N/A Provider List Comment [facility]    Provided Post Acute Provider Quality & Resource List? N/A                   Discharge Plan       Row Name 02/08/24 4778       Plan    Plan Return to [facility]    Patient/Family in Agreement with Plan yes    Plan Comments Spoke with patient and daughter Violet Bernstein/[phone] at bedside face sheet verified. She lives at [facility] and up until recently she was able to walk 1-2 miles per day. She doesnt use any medical equipment. The facility gives her medication and she eats in the dining room. She will return to [facility], spoke with her nurse Carito/[phone]  at [place] she requested that MD notes and CT results sent with patient. Requested information added to packet. Daughter will transport.                  Continued Care and Services - Admitted Since 2/7/2024        Destination Coordination complete.      Service Provider Request Status Selected Services Address Phone Fax Patient Preferred    [facility]  Selected Assisted Living [address] [facility] [address] [phone] --                     Demographic Summary       Row Name 02/08/24 1303       General Information    Admission Type observation    Arrived From emergency department    Referral Source admission list    Reason for Consult discharge planning    Preferred Language English                   Functional Status       Row Name 02/08/24 1303       Functional Status, IADL    Medications completely dependent    Meal Preparation completely dependent    Housekeeping completely dependent    Laundry completely dependent    Shopping completely dependent                   Psychosocial    No documentation.                  Abuse/Neglect    No documentation.                  Legal    No documentation.                  Substance Abuse    No documentation.                  Patient Forms    No documentation.                     Melanie Celeste, RN

## 2024-02-09 ENCOUNTER — TRANSITIONAL CARE MANAGEMENT TELEPHONE ENCOUNTER (OUTPATIENT)
Dept: CALL CENTER | Facility: HOSPITAL | Age: 89
End: 2024-02-09
Payer: MEDICARE

## 2024-02-09 ENCOUNTER — TELEPHONE (OUTPATIENT)
Dept: NEUROSURGERY | Facility: CLINIC | Age: 89
End: 2024-02-09
Payer: MEDICARE

## 2024-02-09 NOTE — OUTREACH NOTE
Prep Survey      Flowsheet Row Responses   Hinduism facility patient discharged from? [place]   Is LACE score < 7 ? Yes   Eligibility [facility]   Date of Admission 02/07/24   Date of Discharge 02/08/24   Discharge Disposition Home or Self Care   Discharge diagnosis Subdural hematoma     Aphasia   Does the patient have one of the following disease processes/diagnoses(primary or secondary)? Other   Does the patient have Home health ordered? No   Is there a DME ordered? No   Prep survey completed? Yes            KATIE RODRIGUES - Registered Nurse

## 2024-02-09 NOTE — TELEPHONE ENCOUNTER
Called patient. Scheduled f/u with  for 03/15/2024. Gave patient central scheduling number of [phone] to schedule CT. Routed referral to [facility].

## 2024-02-12 ENCOUNTER — TELEPHONE (OUTPATIENT)
Dept: CARDIOLOGY | Facility: CLINIC | Age: 89
End: 2024-02-12
Payer: MEDICARE

## 2024-02-15 ENCOUNTER — OFFICE VISIT (OUTPATIENT)
Dept: FAMILY MEDICINE CLINIC | Facility: CLINIC | Age: 89
End: 2024-02-15
Payer: MEDICARE

## 2024-02-15 VITALS
OXYGEN SATURATION: 94 % | DIASTOLIC BLOOD PRESSURE: 78 MMHG | RESPIRATION RATE: 15 BRPM | HEART RATE: 68 BPM | HEIGHT: 65 IN | TEMPERATURE: 97.8 F | WEIGHT: 110.3 LBS | BODY MASS INDEX: 18.38 KG/M2 | SYSTOLIC BLOOD PRESSURE: 136 MMHG

## 2024-02-15 DIAGNOSIS — I34.0 NON-RHEUMATIC MITRAL REGURGITATION: Primary | ICD-10-CM

## 2024-02-15 DIAGNOSIS — S06.5XAA SUBDURAL HEMATOMA: ICD-10-CM

## 2024-02-15 DIAGNOSIS — R47.01 APHASIA: ICD-10-CM

## 2024-02-15 RX ORDER — NEOMYCIN SULFATE, POLYMYXIN B SULFATE, AND DEXAMETHASONE 3.5; 10000; 1 MG/G; [USP'U]/G; MG/G
OINTMENT OPHTHALMIC
COMMUNITY
Start: 2024-02-14

## 2024-02-19 DIAGNOSIS — R30.0 DYSURIA: Primary | ICD-10-CM

## 2024-03-08 ENCOUNTER — HOSPITAL ENCOUNTER (OUTPATIENT)
Facility: HOSPITAL | Age: 89
Discharge: HOME | End: 2024-03-08
Payer: MEDICARE

## 2024-03-08 DIAGNOSIS — S06.5XAA SUBDURAL HEMATOMA: ICD-10-CM

## 2024-03-08 PROCEDURE — 70450 CT HEAD/BRAIN W/O DYE: CPT

## 2024-03-12 NOTE — TELEPHONE ENCOUNTER
Caller: [facility]    Relationship:     Best call back number:  [phone]  ASK TO THU MEREDITH    What is the best time to reach you: UNTIL 2 PM    Who are you requesting to speak with (clinical staff, provider,  specific staff member):     Do you know the name of the person who called: MASOUD    What was the call regarding: THEY ARE WONDERING IF THEY CAN GET A STAT CHEST XRAY. PATIENT HAS A HORRIBLE COUGH IN UPPER AND LOWER LOBES IN CHEST. PATIENTS THROAT IS RED. THEY TESTED HER FOR COVID, NO COVID. PATIENT IS SAYING HER THROAT ISN'T SORE. THEY SAID [facility] CAN COME TO FACILITY TO DO CHEST XRAY. PATIENT HAS NO ORDERS FOR COUGH MEDICATION. HER COUGH IS REAL LOSE. NO FEVER.     IT HAS TO BE ORDERED STAT BECAUSE IF NOT IT WILL BE 2 DAYS BEFORE THEY COME

## 2024-03-12 NOTE — TELEPHONE ENCOUNTER
SPOKE WITH TAMIKO AND SHE WAS INFORMED STAT XRAY APPROVED FOR 2 VIEWS AND PCP IS SENDING LANDON ROLLINS TO PHARMACY.

## 2024-03-13 RX ORDER — BENZONATATE 100 MG/1
100 CAPSULE ORAL 3 TIMES DAILY PRN
Qty: 90 CAPSULE | Refills: 0 | Status: ON HOLD | OUTPATIENT
Start: 2024-03-13

## 2024-03-13 NOTE — TELEPHONE ENCOUNTER
Caller: [facility]    Relationship: TAMIKO/ANTWON    Best call back number: [phone]     What was the call regarding: PHARMACY HAS NOT RECEIVED TESRONDAON RIA/THEY WOULD LIKE TO HAVE DONE BY 2PM SO THEY CAN GIVE TO PATIENT BÁRBARA [facility] - [address] - [phone]  - [phone]  [phone]

## 2024-03-14 PROBLEM — J18.9 PNEUMONIA OF RIGHT MIDDLE LOBE DUE TO INFECTIOUS ORGANISM: Status: ACTIVE | Noted: 2024-01-01

## 2024-03-14 PROBLEM — J47.9 BRONCHIECTASIS: Status: ACTIVE | Noted: 2024-01-01

## 2024-03-14 PROBLEM — R09.02 HYPOXIA: Status: RESOLVED | Noted: 2024-01-01 | Resolved: 2024-01-01

## 2024-03-14 PROBLEM — J96.01 ACUTE RESPIRATORY FAILURE WITH HYPOXIA: Status: ACTIVE | Noted: 2024-03-14

## 2024-03-14 PROBLEM — J96.00 ACUTE RESPIRATORY FAILURE: Status: ACTIVE | Noted: 2024-01-01

## 2024-03-14 PROBLEM — R09.02 HYPOXIA: Status: ACTIVE | Noted: 2024-01-01

## 2024-03-14 NOTE — FSED PROVIDER NOTE
"Subjective   History of Present Illness  Patient is a 89-year-old female with history of HTN, HLD, CKD, DVT, mitral regurgitation, macular degeneration, PVCs who presents to the emergency department for cough, weakness, shortness of breath x 2 days.  Symptoms brought on by walking down the hallway to the dining palomares.  Resolves with rest.  \"I feel fine. I really don't feel bad.\" Cough is congested and productive.  Patient in assisted living at home.  Take vitals daily.  Noted O2 saturation less than 90% today.  Has been normal previously.  Denies chest pain, nausea, vomiting, diarrhea, constipation, abdominal pain, leg swelling.  Patient takes amlodipine, metoprolol, and a vitamin daily.  No other medications.  No history of heart failure.  Patient has very remote 30-year history of smoking.  No formal pulmonary diagnoses.  Does not use oxygen or inhalers at home.        Review of Systems   Constitutional:  Positive for fatigue. Negative for appetite change, chills, diaphoresis and fever.   HENT:  Negative for congestion, ear discharge, ear pain, postnasal drip, rhinorrhea, sinus pressure, sinus pain, sore throat and trouble swallowing.    Eyes:  Negative for pain and redness.   Respiratory:  Positive for cough and shortness of breath.    Cardiovascular:  Negative for chest pain and leg swelling.   Gastrointestinal:  Negative for abdominal pain, constipation, diarrhea, nausea and vomiting.   Genitourinary:  Negative for decreased urine volume, difficulty urinating, dysuria, frequency and urgency.   Musculoskeletal:  Negative for myalgias.   Skin:  Negative for color change, pallor, rash and wound.   Neurological:  Negative for dizziness, seizures, syncope, light-headedness and headaches.   Psychiatric/Behavioral:  Negative for agitation and confusion.        Past Medical History:   Diagnosis Date    Abscess of leg     Acute deep vein thrombosis (DVT) of left lower extremity     Acute deep vein thrombosis (DVT) of " right lower extremity     Acute renal insufficiency     Angiotensin converting enzyme inhibitor-aggravated angioedema     Cellulitis     CKD (chronic kidney disease) stage 3, GFR 30-59 ml/min     Depression     Herpes zoster     Hypercholesterolemia     Hyperlipidemia     Hypertension     Hypotension     Macular degeneration     Noted February 2014    Mitral regurgitation     Mitral valve prolapse     SOB (shortness of breath)     Zoster        Allergies   Allergen Reactions    Lisinopril Angioedema    Zoloft [Sertraline Hcl] Shortness Of Breath    Erythromycin Swelling       Past Surgical History:   Procedure Laterality Date    HIP FRACTURE SURGERY Right 09/18/2017    SKIN CANCER EXCISION         Family History   Problem Relation Age of Onset    Heart attack Mother     Hypertension Mother     Heart attack Father     Hypertension Father     Heart attack Brother 58    Hypertension Brother        Social History     Socioeconomic History    Marital status:     Number of children: 3   Tobacco Use    Smoking status: Never     Passive exposure: Never    Smokeless tobacco: Never   Vaping Use    Vaping status: Never Used   Substance and Sexual Activity    Alcohol use: No     Comment: Daily caffeine use    Drug use: No    Sexual activity: Defer     Birth control/protection: None           Objective   Physical Exam  Constitutional:       General: She is not in acute distress.     Appearance: She is not ill-appearing, toxic-appearing or diaphoretic.      Comments: Thin frail elderly female.   HENT:      Ears:      Comments: Bilateral ear canals with partially obstructing cerumen.  Visualization of partial TM normal.     Mouth/Throat:      Mouth: Mucous membranes are moist.      Pharynx: Oropharynx is clear. Posterior oropharyngeal erythema present. No oropharyngeal exudate.      Comments: Bilateral 2+ tonsillitis without exudate.  Mild posterior oropharyngeal erythema.  Midline uvula without swelling.  Patent airway  without stridor, drooling, wheezing.  Cardiovascular:      Rate and Rhythm: Normal rate and regular rhythm.      Heart sounds: Murmur heard.   Pulmonary:      Effort: Pulmonary effort is normal.      Comments: Occasional congested cough present.  Generalized expiratory snoring/crackles, worse on the right side.  Abdominal:      General: Abdomen is flat. There is no distension.      Palpations: Abdomen is soft. There is no mass.      Tenderness: There is no abdominal tenderness. There is no guarding or rebound.      Hernia: No hernia is present.   Musculoskeletal:      Cervical back: Normal range of motion.      Right lower leg: No edema.      Left lower leg: No edema.   Skin:     General: Skin is warm and dry.   Neurological:      General: No focal deficit present.      Mental Status: She is alert.   Psychiatric:         Mood and Affect: Mood normal.         Behavior: Behavior normal.         Procedures           ED Course  ED Course as of 03/14/24 1825   Thu Mar 14, 2024   1638 CBC unremarkable. [AS]   1638 proBNP: 897.2 [AS]   1639 HS Troponin T(!): 18  Baseline per chart review.  Nonspecific range.  No chest pain.  EKG without acute ischemic findings. [AS]   1652 CMP unremarkable. [AS]   1654 Negative COVID and flu. [AS]   1711 D-Dimer, Quant: 0.68 [AS]   1712 XR CHEST 1 VW-     INDICATION: Shortness of breath     COMPARISON: Chest radiographs dating back to 12/20/2016. CT abdomen  pelvis 1/2/2017     IMPRESSION:  Right middle lobe tubular opacities with intermixed hazy  opacities suggesting right middle lobe bronchiectasis with possible  infectious/inflammatory process. Recommend imaging follow-up to ensure  clearance. No pleural effusion or pneumothorax. Normal size  cardiomediastinal silhouette. No focal osseous abnormality. [AS]   1722 Call put out to [facility].  [AS]      ED Course User Index  [AS] Mar Cintron, PAHawkC                                           Medical Decision Making  Patient is a 89-year-old female  with cough, shortness of breath, weakness.  Hypoxia noted at assisted living.  Hypoxia noted here upon arrival down to 74% while walking.  Requires O2 nasal cannula throughout stay.  However, patient states that she feels fine and does deny shortness of breath often.  Does not appear to be in any respiratory distress.  No tachypnea or tachycardia.  Normal BP.  Afebrile.  Lung sounds do reveal some snoring and crackles.  Patient otherwise appears well, no acute distress, nontoxic.  Lab work is unremarkable.  Normal CBC, CMP, BNP, troponin at baseline, dimer.  Negative COVID and flu.  X-ray does reveal bronchiectasis possible pneumonia.  Will cover with antibiotics.  Seeking admission due to respiratory failure and hypoxia.  Dr. August accepts.  Patient to go via EMS.  She and daughter are agreeable to plan.  Answered all questions.    My differential diagnosis for dyspnea includes but is not limited to:  Asthma, COPD, pneumonia, pulmonary embolus, acute respiratory distress syndrome, pneumothorax, pleural effusion, pulmonary fibrosis, congestive heart failure, myocardial infarction, DKA, uremia, acidosis, sepsis, anemia, drug related, hyperventilation, CNS disease     Problems Addressed:  Acute respiratory failure with hypoxia: complicated acute illness or injury  Bronchiectasis with acute lower respiratory infection: complicated acute illness or injury  Pneumonia of right middle lobe due to infectious organism: complicated acute illness or injury    Amount and/or Complexity of Data Reviewed  Labs: ordered. Decision-making details documented in ED Course.  Radiology: ordered.  ECG/medicine tests: ordered.    Risk  Prescription drug management.  Decision regarding hospitalization.        Final diagnoses:   Acute respiratory failure with hypoxia   Bronchiectasis with acute lower respiratory infection   Pneumonia of right middle lobe due to infectious organism       ED Disposition  ED Disposition       ED Disposition    Decision to Admit    Condition   --    Comment   Level of Care: Med/Surg [1]   Diagnosis: Acute respiratory failure [518.81.ICD-9-CM]   Admitting Physician: JHOAN STEIN [[id number]]   Attending Physician: JHOAN STEIN [[id number]]   Certification: I Certify That Inpatient Hospital Services Are Medically Necessary For Greater Than 2 Midnights                 No follow-up provider specified.       Medication List      No changes were made to your prescriptions during this visit.

## 2024-03-15 ENCOUNTER — TELEPHONE (OUTPATIENT)
Dept: NEUROSURGERY | Facility: CLINIC | Age: 89
End: 2024-03-15

## 2024-03-15 NOTE — TELEPHONE ENCOUNTER
Patient's daughter called. She stated her mom has been hospitalized since last night 03/14/2024. Asked daughter will she be discharged today. She did not know yet. Asked daughter to call us back at the office when patient is discharged and we can get the patient back on  schedule in the next week.

## 2024-03-15 NOTE — H&P
Patient Name:  Carito Gonzalez  YOB: 1930  MRN:  [id number]  Admit Date:  3/14/2024  Patient Care Team:  Eb Turner MD as PCP - General (Internal Medicine)      Subjective   History Present Illness     Chief Complaint   Patient presents with   • Shortness of Breath     History of Present Illness  Ms. Gonzalez is a 89 y o female with a history of CKD, DVT, HLD, HTN, MVP,MR, and recent hospitalization for SDH and dysphagia  February 7, 2024 who presents to [facility] from her assisted living facility who sent her to free standing emergency room at [facility] after they found she was hypoxia. Upon arrival to  the freestanding emergency she was found to be hypoxic with oxygen saturations of 74% on room air.  She was placed on 2 L of oxygen with improvement to 98%.  He is unremarkable, CT was unremarkable exception of noted 77.2% neutrophils.  Chest x-ray reveals right middle lobe tubular opacities with intermixed hazy opacity suggesting right middle lobe bronchiectasis with possible infectious/ inflammatory process/ She was started on ceftriaxone, Solu-Medrol, and DuoNebs.  Ms. Gonzalez denies any shortness of breath, she does confirm a cough over the last two days, she endorses nasal congestion, and intermittent sputum production, but she has unfortunately not been able to clear it. She denies any fever or flu like symptoms.  She reports her dysphagia has resolved and has had no further issues with speech/word finding, swallowing etc.  She denies any acute distress chest pain palpitations, this, dizziness, headache, fever, chills, abdominal pain, nausea, vomiting, diarrhea, constipation, or  symptoms.    On exam she is awake, alert, x 3, no focal deficits, she in no acute distress, she is wearing 2 L nasal cannula, with oxygen saturations 98%, There is rhonchi in bilateral lower lobes, on auscultation, her heart rate and rhythm are regular, normal, no clicks, rubs, murmurs noted,  her abdomen is soft, nondistended, nontender with audible bowel sounds throughout.    Ms Gonzalez will be admitted for further evaluation and treatment of acute respiratory failure with hypoxia and other acute and or chronic conditions.    Review of Systems   Constitutional: Negative.    HENT:  Positive for congestion.    Eyes: Negative.    Respiratory:  Positive for cough and shortness of breath.    Cardiovascular: Negative.    Gastrointestinal: Negative.    Endocrine: Negative.    Genitourinary: Negative.    Musculoskeletal: Negative.    Skin: Negative.    Allergic/Immunologic: Negative.    Neurological: Negative.    Hematological: Negative.    Psychiatric/Behavioral: Negative.     All other systems reviewed and are negative.       Personal History     Past Medical History:   Diagnosis Date   • Abscess of leg    • Acute deep vein thrombosis (DVT) of left lower extremity    • Acute deep vein thrombosis (DVT) of right lower extremity    • Acute renal insufficiency    • Angiotensin converting enzyme inhibitor-aggravated angioedema    • Cellulitis    • CKD (chronic kidney disease) stage 3, GFR 30-59 ml/min    • Depression    • Herpes zoster    • Hypercholesterolemia    • Hyperlipidemia    • Hypertension    • Hypotension    • Macular degeneration     Noted February 2014   • Mitral regurgitation    • Mitral valve prolapse    • SOB (shortness of breath)    • Zoster      Past Surgical History:   Procedure Laterality Date   • HIP FRACTURE SURGERY Right 09/18/2017   • SKIN CANCER EXCISION       Family History   Problem Relation Age of Onset   • Heart attack Mother    • Hypertension Mother    • Heart attack Father    • Hypertension Father    • Heart attack Brother 58   • Hypertension Brother      Social History     Tobacco Use   • Smoking status: Never     Passive exposure: Never   • Smokeless tobacco: Never   Vaping Use   • Vaping status: Never Used   Substance Use Topics   • Alcohol use: No     Comment: Daily caffeine use    • Drug use: No     No current facility-administered medications on file prior to encounter.     Current Outpatient Medications on File Prior to Encounter   Medication Sig Dispense Refill   • amLODIPine (NORVASC) 5 MG tablet Take 1 tablet by mouth 2 (Two) Times a Day. 180 tablet 3   • metoprolol succinate XL (TOPROL-XL) 25 MG 24 hr tablet Take 1 tablet by mouth Every Morning AND 2 tablets Every Evening. 270 tablet 1   • Multiple Vitamins-Minerals (OCUTABS) tablet Take 1 tablet by mouth Daily.     • acetaminophen (TYLENOL) 325 MG tablet Take 2 tablets by mouth Every 6 (Six) Hours As Needed for mild pain (1-3).  0   • benzonatate (Tessalon Perles) 100 MG capsule Take 1 capsule by mouth 3 (Three) Times a Day As Needed for Cough. 90 capsule 0   • neomycin-polymyxin-dexamethamethasone (POLYDEX) 3.5-89052-2.1 ointment ophthalmic ointment      • [DISCONTINUED] amLODIPine (NORVASC) 5 MG tablet Take two tablets every am and one tablet every pm 270 tablet 3     Allergies   Allergen Reactions   • Lisinopril Angioedema   • Zoloft [Sertraline Hcl] Shortness Of Breath   • Erythromycin Swelling       Objective    Objective     Vital Signs  Temp:  [97 °F (36.1 °C)-98 °F (36.7 °C)] 97 °F (36.1 °C)  Heart Rate:  [74-83] 79  Resp:  [17-18] 17  BP: (119-170)/(48-89) 132/67  SpO2:  [74 %-95 %] 91 %  on  Flow (L/min):  [2-4] 2;   Device (Oxygen Therapy): nasal cannula  Body mass index is 17.87 kg/m².    Physical Exam  Vitals and nursing note reviewed.   Constitutional:       General: She is not in acute distress.     Appearance: Normal appearance.   HENT:      Head: Normocephalic and atraumatic.      Mouth/Throat:      Mouth: Mucous membranes are moist.      Pharynx: No posterior oropharyngeal erythema.   Eyes:      General: No scleral icterus.  Neck:      Vascular: No JVD.   Cardiovascular:      Rate and Rhythm: Normal rate and regular rhythm.      Pulses: Normal pulses.      Heart sounds: Normal heart sounds. No murmur  heard.  Pulmonary:      Effort: Pulmonary effort is normal. No respiratory distress.      Breath sounds: Rhonchi present.   Abdominal:      General: Bowel sounds are normal. There is no distension.      Palpations: Abdomen is soft.      Tenderness: There is no abdominal tenderness.   Musculoskeletal:         General: No swelling or tenderness.      Cervical back: Neck supple.   Skin:     General: Skin is warm and dry.      Coloration: Skin is not jaundiced.      Findings: No rash.   Neurological:      General: No focal deficit present.      Mental Status: She is alert and oriented to person, place, and time. Mental status is at baseline.   Psychiatric:         Mood and Affect: Mood normal.         Behavior: Behavior normal.         Results Review:  I reviewed the patient's new clinical results.  I reviewed the patient's new imaging results and agree with the interpretation.  I reviewed the patient's other test results and agree with the interpretation  I personally viewed and interpreted the patient's EKG/Telemetry data  Discussed with ED provider.    Lab Results (last 24 hours)       Procedure Component Value Units Date/Time    CBC & Differential [[id number]]  (Abnormal) Collected: 03/14/24 1601    Specimen: Blood Updated: 03/14/24 1609    Narrative:      The following orders were created for panel order CBC & Differential.  Procedure                               Abnormality         Status                     ---------                               -----------         ------                     CBC Auto Differential[[id number]]        Abnormal            Final result                 Please view results for these tests on the individual orders.    Comprehensive Metabolic Panel [[id number]]  (Abnormal) Collected: 03/14/24 1601    Specimen: Blood Updated: 03/14/24 1647     Glucose 92 mg/dL      BUN 16 mg/dL      Creatinine 0.78 mg/dL      Sodium 136 mmol/L      Potassium 4.1 mmol/L      Chloride 98 mmol/L      CO2 26.1  mmol/L      Calcium 9.7 mg/dL      Total Protein 7.6 g/dL      Albumin 4.1 g/dL      ALT (SGPT) 12 U/L      AST (SGOT) 18 U/L      Alkaline Phosphatase 70 U/L      Total Bilirubin 0.5 mg/dL      Globulin 3.5 gm/dL      A/G Ratio 1.2 g/dL      BUN/Creatinine Ratio 20.5     Anion Gap 11.9 mmol/L      eGFR 70.5 mL/min/1.73     Narrative:      GFR Normal >60  Chronic Kidney Disease <60  Kidney Failure <15    The GFR formula is only valid for adults with stable renal function between ages 18 and 70.    BNP [[id number]]  (Normal) Collected: 03/14/24 1601    Specimen: Blood Updated: 03/14/24 1634     proBNP 897.2 pg/mL     Narrative:      This assay is used as an aid in the diagnosis of individuals suspected of having heart failure. It can be used as an aid in the diagnosis of acute decompensated heart failure (ADHF) in patients presenting with signs and symptoms of ADHF to the emergency department (ED). In addition, NT-proBNP of <300 pg/mL indicates ADHF is not likely.    Age Range Result Interpretation  NT-proBNP Concentration (pg/mL:      <50             Positive            >450                   Gray                 300-450                    Negative             <300    50-75           Positive            >900                  Gray                300-900                  Negative            <300      >75             Positive            >1800                  Gray                300-1800                  Negative            <300    Single High Sensitivity Troponin T [[id number]]  (Abnormal) Collected: 03/14/24 1601    Specimen: Blood Updated: 03/14/24 1638     HS Troponin T 18 ng/L     Narrative:      High Sensitive Troponin T Reference Range:  <14.0 ng/L- Negative Female for AMI  <22.0 ng/L- Negative Male for AMI  >=14 - Abnormal Female indicating possible myocardial injury.  >=22 - Abnormal Male indicating possible myocardial injury.   Clinicians would have to utilize clinical acumen, EKG, Troponin, and serial changes  to determine if it is an Acute Myocardial Infarction or myocardial injury due to an underlying chronic condition.         CBC Auto Differential [[id number]]  (Abnormal) Collected: 03/14/24 1601    Specimen: Blood Updated: 03/14/24 1609     WBC 8.38 10*3/mm3      RBC 4.52 10*6/mm3      Hemoglobin 13.2 g/dL      Hematocrit 42.6 %      MCV 94.2 fL      MCH 29.2 pg      MCHC 31.0 g/dL      RDW 14.6 %      RDW-SD 51.2 fl      MPV 10.2 fL      Platelets 255 10*3/mm3      Neutrophil % 77.2 %      Lymphocyte % 13.1 %      Monocyte % 7.2 %      Eosinophil % 1.9 %      Basophil % 0.4 %      Immature Grans % 0.2 %      Neutrophils, Absolute 6.47 10*3/mm3      Lymphocytes, Absolute 1.10 10*3/mm3      Monocytes, Absolute 0.60 10*3/mm3      Eosinophils, Absolute 0.16 10*3/mm3      Basophils, Absolute 0.03 10*3/mm3      Immature Grans, Absolute 0.02 10*3/mm3     Procalcitonin [[id number]] Collected: 03/14/24 1601    Specimen: Blood Updated: 03/14/24 1718    COVID-19 and FLU A/B PCR, 1 HR TAT - Swab, Nasopharynx [[id number]]  (Normal) Collected: 03/14/24 1602    Specimen: Swab from Nasopharynx Updated: 03/14/24 1627     COVID19 Not Detected     Influenza A PCR Not Detected     Influenza B PCR Not Detected    Narrative:      Fact sheet for providers: https://www.fda.gov/media/214938/download    Fact sheet for patients: https://www.fda.gov/media/546271/download    Test performed by PCR.    D-dimer, Quantitative [[id number]]  (Normal) Collected: 03/14/24 1654    Specimen: Blood Updated: 03/14/24 1704     D-Dimer, Quantitative 0.68 MCGFEU/mL     Narrative:      According to the assay 's published package insert, a normal (<0.50 MCGFEU/mL) D-dimer result in conjunction with a non-high clinical probability assessment, excludes deep vein thrombosis (DVT) and pulmonary embolism (PE) with high sensitivity.    D-dimer values increase with age and this can make VTE exclusion of an older population difficult. To address this, the  "American College [employer], based on best available evidence and recent guidelines, recommends that clinicians use age-adjusted D-dimer thresholds in patients greater than 50 years of age with: a) a low probability of PE who do not meet all Pulmonary Embolism Rule Out Criteria, or b) in those with intermediate probability of PE.   The formula for an age-adjusted D-dimer cut-off is \"age/100\".  For example, a 60 year old patient would have an age-adjusted cut-off of 0.60 MCGFEU/mL and an 80 year old 0.80 MCGFEU/mL.    Blood Culture - Blood, Arm, Left [[id number]] Collected: 03/14/24 1656    Specimen: Blood from Arm, Left Updated: 03/14/24 1703    Blood Culture - Blood, Arm, Right [[id number]] Collected: 03/14/24 1656    Specimen: Blood from Arm, Right Updated: 03/14/24 1703            Imaging Results (Last 24 Hours)       Procedure Component Value Units Date/Time    XR Chest 1 View [[id number]] Collected: 03/14/24 1701     Updated: 03/14/24 1711    Narrative:      XR CHEST 1 VW-     INDICATION: Shortness of breath     COMPARISON: Chest radiographs dating back to 12/20/2016. CT abdomen  pelvis 1/2/2017       Impression:      Right middle lobe tubular opacities with intermixed hazy  opacities suggesting right middle lobe bronchiectasis with possible  infectious/inflammatory process. Recommend imaging follow-up to ensure  clearance. No pleural effusion or pneumothorax. Normal size  cardiomediastinal silhouette. No focal osseous abnormality.     This report was finalized on 3/14/2024 5:08 PM by Dr. Perez Flynn M.D on Workstation: [id number]               Results for orders placed during the hospital encounter of 11/16/21    Adult Transthoracic Echo Complete W/ Cont if Necessary Per Protocol    Interpretation Summary  · Calculated left ventricular EF = 61.6% Estimated left ventricular EF = 62% Estimated left ventricular EF was in agreement with the calculated left ventricular EF. Left ventricular systolic function is " normal. Normal left ventricular cavity size and wall thickness noted. All left ventricular wall segments contract normally. Left ventricular diastolic function is consistent with (grade I) impaired relaxation.  · There is severe calcification of the aortic valve. No aortic valve regurgitation is present. Mild aortic valve stenosis is present. Peak velocity of the flow distal to the aortic valve is 224.7 cm/s. Aortic valve mean pressure gradient is 10.3 mmHg. Aortic valve area is 1.3 cm2.  · There is moderate mitral valve prolapse of the posterior mitral leaflet. There is moderate, bileaflet mitral valve thickening present. Moderate to severe mitral valve regurgitation is present. Pulmonary vein flow reversal is present.  · Trace tricuspid valve regurgitation is present. Estimated right ventricular systolic pressure from tricuspid regurgitation is normal (<35 mmHg). Calculated right ventricular systolic pressure from tricuspid regurgitation is 29.0 mmHg.      ECG 12 Lead ED Triage Standing Order; SOA   Final Result   HEART RATE= 76  bpm   RR Interval= 789  ms   AZ Interval= 200  ms   P Horizontal Axis= -35  deg   P Front Axis= 76  deg   QRSD Interval= 92  ms   QT Interval= 363  ms   QTcB= 409  ms   QRS Axis= -5  deg   T Wave Axis= 73  deg   - ABNORMAL ECG -   Sinus rhythm   Ventricular premature complex   Left atrial enlargement   Anterior infarct, old   When compared with ECG of 07-Feb-2024 14:30:25,   No significant change   Electronically Signed By: Norberto Conner ([facility]) 14-Mar-2024 21:28:49   Date and Time of Study: 2024-03-14 15:55:01           Assessment/Plan     Active Hospital Problems    Diagnosis  POA   • **Acute respiratory failure with hypoxia [J96.01]  Yes   • Bronchiectasis [J47.9]  No   • Aphasia [R47.01]  Yes   • MVP (mitral valve prolapse) [I34.1]  Yes   • Hypertension [I10]  Yes      Resolved Hospital Problems    Diagnosis Date Resolved POA   • Hypoxia [R09.02] 03/14/2024 Yes     Acute respiratory  failure with hypoxia  Secondary possible pneumonia/bronchiectasis    Patient 74% time of presentation  Supplemental oxygen as needed for proxy/respiratory distress maintain oxygen saturation greater than 90%  Chest x-ray positive for possible middle lobe pneumonia/   Continue ceftriaxone  Blood cultures ordered  Check sputum  Check urine antigens for Legionella's, strep pneumo  Admit to MedSurg unit  Recheck CBC, BMP  Encourage pulmonary hygiene-TCDB-IS   Solu-Medrol  Tessalon Perles  DuoNebs    Bronchiectasis   New finding on X ray   CT scan ordered   DuoNebs Solu-Medrol as above  Consider pulmonary consult form further evaluation and confirmation of diagnosis     Hypertension  Chronic/Stable   VS per protocol   Continue Norvasc and metoprolol    MVP (mitral valve prolapse)  Chronic off her low dose ASA due to SDH               I discussed the patient's findings and my recommendations with patient.    VTE Prophylaxis - SCDs.  Code Status - DNR. Dicussed with patient        ERROL Brandon  [facility]  03/14/24  21:43 EDT

## 2024-03-15 NOTE — PROGRESS NOTES
"Nutrition Services    Patient Name:  Carito Gonzalez  YOB: 1930  MRN: [id number]  Admit Date:  3/14/2024    Assessment Date:  03/15/24    Summary: BMI 17.8    Visit initiated per BMI of 17.8. 89yoF admitted for acute resp failure w/ hypoxia. Hx of CKD3, HTN, HLD, SDH, dysphagia. Pt is also legally blind. She reports good appetite currently and prior to admission. No changes in appetite noted. Has gradual wt loss over the years. States that her  passed away in 2019 and has lived in an assisted living by herself for 7 years where she goes to the dining room to eat. Reports no chewing/swallowing problems. Has muscle and fat wasting but she reports adequate oral intake, eating % PO intake. Pt doesn't meet criteria for malnutrition dx. Denied needs for ONS right now. Labs and meds reviewed.     REC:  Continue to monitor PO intake  Encourage good PO intake and nutrition    RD to follow nutrition needs.     CLINICAL NUTRITION ASSESSMENT      Reason for Assessment BMI     Diagnosis/Problem   acute respiratory failure with hypoxia   Medical/Surgical History Past Medical History:   Diagnosis Date    Abscess of leg     Acute deep vein thrombosis (DVT) of left lower extremity     Acute deep vein thrombosis (DVT) of right lower extremity     Acute renal insufficiency     Angiotensin converting enzyme inhibitor-aggravated angioedema     Cellulitis     CKD (chronic kidney disease) stage 3, GFR 30-59 ml/min     Depression     Herpes zoster     Hypercholesterolemia     Hyperlipidemia     Hypertension     Hypotension     Macular degeneration     Noted February 2014    Mitral regurgitation     Mitral valve prolapse     SOB (shortness of breath)     Zoster        Past Surgical History:   Procedure Laterality Date    HIP FRACTURE SURGERY Right 09/18/2017    SKIN CANCER EXCISION          Anthropometrics        Current Height  Current Weight  BMI kg/m2 Height: 165.1 cm (65\")  Weight: 48.7 kg (107 lb 5.8 oz) " (03/15/24 0531)  Body mass index is 17.87 kg/m².   Adjusted BMI (if applicable)    BMI Category Underweight (18.4 or below)   Ideal Body Weight (IBW) 125#   Usual Body Weight (UBW) 110#   Weight Trend Loss   Weight History Wt Readings from Last 30 Encounters:   03/15/24 0531 48.7 kg (107 lb 5.8 oz)   03/14/24 2113 48.7 kg (107 lb 5.8 oz)   03/14/24 1548 50 kg (110 lb 4.8 oz)   02/15/24 1505 50 kg (110 lb 4.8 oz)   02/07/24 2027 52.4 kg (115 lb 8.3 oz)   02/07/24 1307 49 kg (108 lb)   01/08/24 1327 49.4 kg (108 lb 14.4 oz)   10/31/23 1032 50.3 kg (110 lb 12.8 oz)   06/06/23 1132 50.8 kg (112 lb)   04/18/23 1214 50.3 kg (111 lb)   03/10/22 1533 51.7 kg (114 lb)   02/18/22 1148 51.7 kg (114 lb)   11/16/21 1001 51.3 kg (113 lb)   11/02/21 1011 51.3 kg (113 lb)   03/25/21 1451 51.9 kg (114 lb 6.4 oz)   03/16/21 1228 51.3 kg (113 lb)   02/13/21 0908 54.4 kg (120 lb)   03/05/20 0901 52.6 kg (116 lb)   03/03/20 0953 52.6 kg (116 lb)   01/22/20 1046 52.6 kg (116 lb)   07/22/19 1139 53.1 kg (117 lb)   07/22/19 1039 53.1 kg (117 lb)   01/14/19 1336 54 kg (119 lb)   11/12/18 1113 53.1 kg (117 lb)   07/16/18 1415 53.7 kg (118 lb 6.4 oz)   06/05/18 1341 54.4 kg (120 lb)   05/14/18 1144 55.3 kg (122 lb)   01/02/18 1539 54.4 kg (120 lb)   11/17/17 1215 54.4 kg (120 lb)   10/03/17 1043 55.3 kg (122 lb)   06/26/17 1023 56.7 kg (125 lb)   06/08/17 1238 55.2 kg (121 lb 9.6 oz)   04/21/17 1226 55.3 kg (122 lb)        Estimated Requirements         Weight used  48.7 kg    Calories  1461 - 1705 kcal (30-35 kcal/kg)    Protein  58 - 73 g (1.2 - 1.5 gm/kg)    Fluid   (1 mL/kcal)       Labs       Pertinent Labs    Results from last 7 days   Lab Units 03/15/24  0507 03/14/24  1601   SODIUM mmol/L 141 136   POTASSIUM mmol/L 4.0 4.1   CHLORIDE mmol/L 104 98   CO2 mmol/L 24.8 26.1   BUN mg/dL 14 16   CREATININE mg/dL 0.66 0.78   CALCIUM mg/dL 8.5 9.7*   BILIRUBIN mg/dL  --  0.5   ALK PHOS U/L  --  70   ALT (SGPT) U/L  --  12   AST (SGOT) U/L  --   18   GLUCOSE mg/dL 138* 92     Results from last 7 days   Lab Units 03/15/24  0507 03/14/24  1601   HEMOGLOBIN g/dL 12.1 13.2   HEMATOCRIT % 37.5 42.6   WBC 10*3/mm3 5.08 8.38   ALBUMIN g/dL  --  4.1     Results from last 7 days   Lab Units 03/15/24  0507 03/14/24  1601   PLATELETS 10*3/mm3 237 255     COVID19   Date Value Ref Range Status   03/14/2024 Not Detected Not Detected - Ref. Range Final     Lab Results   Component Value Date    HGBA1C 5.30 02/08/2024          Medications           Scheduled Medications amLODIPine, 5 mg, Oral, BID  cefTRIAXone, 1,000 mg, Intravenous, Q24H  methylPREDNISolone sodium succinate, 60 mg, Intravenous, Q24H  metoprolol succinate XL, 25 mg, Oral, QAM   And  metoprolol succinate XL, 50 mg, Oral, Q PM  multivitamin with minerals, 1 tablet, Oral, Daily  sodium chloride, 10 mL, Intravenous, Q12H       Infusions sodium chloride, 75 mL/hr, Last Rate: 75 mL/hr (03/14/24 2306)       PRN Medications   benzonatate    senna-docusate sodium **AND** polyethylene glycol **AND** bisacodyl **AND** bisacodyl    ipratropium-albuterol    sodium chloride    sodium chloride    sodium chloride     Physical Findings          General Findings alert, oriented, underweight   Oral/Mouth Cavity WDL   Edema  no edema   Gastrointestinal WDL   Skin  skin intact   Tubes/Drains/Lines none   NFPE Date Completed: 3/15   --  Current Nutrition Orders & Evaluation of Intake       Oral Nutrition     Food Allergies NKFA   Current PO Diet Diet: Cardiac; Healthy Heart (2-3 Na+); Fluid Consistency: Thin (IDDSI 0)   Supplement n/a   PO Evaluation     % PO Intake % PO intake    Factors Affecting Intake: visual impairment   --  PES STATEMENT / NUTRITION DIAGNOSIS      Nutrition Dx Problem  Problem: Nutrition Appropriate for Condition at this Time  Etiology: Medical Diagnosis - acute resp failure w/ hypoxia    Signs/Symptoms: PO intake and Report of Minimal PO Intake     NUTRITION INTERVENTION / PLAN OF CARE       Intervention Goal(s) Maintain nutrition status, Meet estimated needs, Maintain intake, No significant weight loss, Appropriate weight gain, and PO intake goal %: %         RD Intervention/Action Supplement offered/declined, Encourage intake, Continue to monitor, and Care plan reviewed   --      Prescription/Orders:       PO Diet       Supplements       Enteral Nutrition       Parenteral Nutrition    New Prescription Ordered? No changes at this time   --      Monitor/Evaluation Per protocol, PO intake, Weight, Swallow function   Discharge Plan/Needs Pending clinical course   --    RD to follow per protocol.      Electronically signed by:  Chary Chopra  03/15/24 12:58 EDT

## 2024-03-15 NOTE — DISCHARGE PLACEMENT REQUEST
"Km Gonzalez (89 y.o. Female)       Date of Birth   1930    Social Security Number       Address   [address] [place] [address]    Home Phone   [phone]    MRN   [id number]       [place]   [employer]    Marital Status                               Admission Date   3/14/24    Admission Type   Urgent    Admitting Provider   Lucio Carr MD    Attending Provider   Sangita Cole MD    Department, Room/Bed   [address], 85/1       Discharge Date       Discharge Disposition       Discharge Destination                                 Attending Provider: Sangita Cole MD    Allergies: Lisinopril, Zoloft [Sertraline Hcl], Erythromycin    Isolation: None   Infection: None   Code Status: No CPR    Ht: 165.1 cm (65\")   Wt: 48.7 kg (107 lb 5.8 oz)    Admission Cmt: None   Principal Problem: Acute respiratory failure with hypoxia [J96.01]                   Active Insurance as of 3/14/2024       Primary Coverage       Payor Plan Insurance Group Employer/Plan Group    ANTHEM MEDICARE REPLACEMENT ANTHEM MEDICARE ADVANTAGE [id number]       Payor Plan Address Payor Plan Phone Number Payor Plan Fax Number Effective Dates    [address] [phone]  2016 - None Entered    [facility] [address]         Subscriber Name Subscriber Birth Date Member ID       KM GONZALEZ 3/7/1930 [id number]                     Emergency Contacts        (Rel.) Home Phone Work Phone Mobile Phone    Violet Bernstein (Daughter) [phone] -- [phone]                "

## 2024-03-15 NOTE — PROGRESS NOTES
Continued Stay Note  [facility]     Patient Name: Carito Gonzalez  MRN: [id number]  Today's Date: 3/15/2024    Admit Date: 3/14/2024    Plan: From/return to [facility] [place]   Discharge Plan       Row Name 03/15/24 1224       Plan    Plan From/return to [facility] [place]    Plan Comments Introduced self and role of CCP. Patient confirmed DC plan is to return to home. Patient stated she has lived at [facility] for 7 years. Stated she has a kitchenette  (refrdge, microwave and sink) but goes to dinning room for all meals. Stated she bathes and dresses self. Uses a rollator at all times. DC plan is to return. Spoke to Kathy with [facility] at [phone] who confirmed patient is able to return at [place]. Patient stated her daughter will provide transportation at [place]. Denies any needs/equipment.                   Discharge Codes    No documentation.                       Shaye Cox RN

## 2024-03-15 NOTE — PROGRESS NOTES
Name: Carito Gonzalez ADMIT: 3/14/2024   : 3/7/1930  PCP: Eb Turner MD    MRN: [id number] LOS: 1 days   AGE/SEX: 89 y.o. female  ROOM: [facility]     Subjective   Subjective   Resting in bed, no complaints at this time.  Awaiting CT scan.  Feels about the same as compared to when she came in.    Objective   Objective   Vital Signs  Temp:  [96.6 °F (35.9 °C)-98 °F (36.7 °C)] 97.1 °F (36.2 °C)  Heart Rate:  [62-83] 66  Resp:  [17-18] 17  BP: (108-170)/(48-89) 160/88  SpO2:  [74 %-95 %] 90 %  on  Flow (L/min):  [2-4] 2;   Device (Oxygen Therapy): nasal cannula  Body mass index is 17.87 kg/m².  Physical Exam  Constitutional:       General: She is not in acute distress.     Appearance: Normal appearance. She is not ill-appearing.      Comments: Elderly, frail   Eyes:      Comments: Ptosis- left eye   Cardiovascular:      Rate and Rhythm: Normal rate and regular rhythm.   Pulmonary:      Effort: Pulmonary effort is normal. No respiratory distress.      Breath sounds: No wheezing.      Comments: Coarse breath sounds, worse on right  Abdominal:      General: Abdomen is flat. Bowel sounds are normal. There is no distension.      Palpations: Abdomen is soft.   Musculoskeletal:         General: No swelling or tenderness.      Right lower leg: No edema.      Left lower leg: No edema.   Skin:     General: Skin is warm and dry.   Neurological:      General: No focal deficit present.      Mental Status: She is alert and oriented to person, place, and time. Mental status is at baseline.         Results Review     I reviewed the patient's new clinical results.  Results from last 7 days   Lab Units 03/15/24  0507 24  1601   WBC 10*3/mm3 5.08 8.38   HEMOGLOBIN g/dL 12.1 13.2   PLATELETS 10*3/mm3 237 255     Results from last 7 days   Lab Units 03/15/24  0507 24  1601   SODIUM mmol/L 141 136   POTASSIUM mmol/L 4.0 4.1   CHLORIDE mmol/L 104 98   CO2 mmol/L 24.8 26.1   BUN mg/dL 14 16   CREATININE mg/dL 0.66 0.78  "  GLUCOSE mg/dL 138* 92   EGFR mL/min/1.73 81.4 70.5     Results from last 7 days   Lab Units 03/14/24  1601   ALBUMIN g/dL 4.1   BILIRUBIN mg/dL 0.5   ALK PHOS U/L 70   AST (SGOT) U/L 18   ALT (SGPT) U/L 12     Results from last 7 days   Lab Units 03/15/24  0507 03/14/24  1601   CALCIUM mg/dL 8.5 9.7*   ALBUMIN g/dL  --  4.1     Results from last 7 days   Lab Units 03/14/24  2259 03/14/24  1601   PROCALCITONIN ng/mL  --  0.09   LACTATE mmol/L 1.0  --      No results found for: \"HGBA1C\", \"POCGLU\"    CT Chest Without Contrast Diagnostic    Result Date: 3/15/2024   1. Airways disease with thickened airway walls, bronchiolectasis and subsegmental airway impactions, greatest in the anterior segment right upper lobe, right middle lobe and lingula. Finding may indicate underlying MAC type infection. 2. Scattered centrilobular and tree-in-bud nodules consistent with infectious bronchiolitis. 3. Mildly thick walled cavity in the superior segment right lower lobe with small air-fluid level and scattered bronchocentric pulmonary nodules with a few demonstrating cavitation. Findings probably related to an atypical infection. Pulmonology evaluation. Short follow-up following treatment course for presumed atypical infectious etiology. If persistent or progressive, sampling could be considered.  This report was finalized on 3/15/2024 11:54 AM by Dr. Yunior Delgado M.D on Workstation: [id number]      XR Chest 1 View    Result Date: 3/14/2024  Right middle lobe tubular opacities with intermixed hazy opacities suggesting right middle lobe bronchiectasis with possible infectious/inflammatory process. Recommend imaging follow-up to ensure clearance. No pleural effusion or pneumothorax. Normal size cardiomediastinal silhouette. No focal osseous abnormality.  This report was finalized on 3/14/2024 5:08 PM by Dr. Perez Flynn M.D on Workstation: [id number]     Scheduled Medications  amLODIPine, 5 mg, Oral, BID  cefTRIAXone, 1,000 " mg, Intravenous, Q24H  methylPREDNISolone sodium succinate, 60 mg, Intravenous, Q24H  metoprolol succinate XL, 25 mg, Oral, QAM   And  metoprolol succinate XL, 50 mg, Oral, Q PM  multivitamin with minerals, 1 tablet, Oral, Daily  sodium chloride, 10 mL, Intravenous, Q12H    Infusions  sodium chloride, 75 mL/hr, Last Rate: 75 mL/hr (03/14/24 2306)    Diet  Diet: Cardiac; Healthy Heart (2-3 Na+); Fluid Consistency: Thin (IDDSI 0)       Assessment/Plan     Active Hospital Problems    Diagnosis  POA   • **Acute respiratory failure with hypoxia [J96.01]  Yes   • Bronchiectasis [J47.9]  No   • Pneumonia of right middle lobe due to infectious organism [J18.9]  Yes   • Aphasia [R47.01]  Yes   • MVP (mitral valve prolapse) [I34.1]  Yes   • Hypertension [I10]  Yes      Resolved Hospital Problems    Diagnosis Date Resolved POA   • Hypoxia [R09.02] 03/14/2024 Yes       89 y.o. female admitted with Acute respiratory failure with hypoxia.    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  Anticipated discharge Pending PT and/or OT kira., TBD            Sangita Cole MD  [facility]  03/15/24  14:58 EDT

## 2024-03-15 NOTE — PLAN OF CARE
Goal Outcome Evaluation:  Plan of Care Reviewed With: patient           Outcome Evaluation: Patient is a 89 y/o female admitted to [facility] with hypoxia. PMHx includes but is not limited to: CKD, DVT, HLD, HTN, MVP, MR, and recent hospitalization for SDH and dysphagia. The patient resides in an correction and reports (I) ADLs and mobility at baseline with use of rollator. Today she is UIC on arrival, performed STS sba, and ambulated 18' cga + rwx. She is overall steady with no LOB. Anticipate patient will be OK to return to [facility] at DC. PT will continue to follow along more peripherally to ensure no decline in function or additional needs for DC.      Anticipated Discharge Disposition (PT): assisted living

## 2024-03-15 NOTE — PLAN OF CARE
Goal Outcome Evaluation:      Pt arrived from [facility] via EMS, VSS, on 2LO2, A&O, no c/o pain or nausea, up with asst to bathroom, uses rollator at home, reg diet, IVF infusing, IV steroids given, urine and sputum samples needed, very infrequent cough this evening, on falls with bed alarm, continuing with plan of care.

## 2024-03-15 NOTE — CONSULTS
Pulmonary Consultation     Patient Name: Carito Gonzalez  Age/Sex: 89 y.o. 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  She had an accident while trying to exit an elevator while the door was automatically closing and fell backward and on her imaging study there was some abnormal finding on the lung imaging and we were consulted for further recommendations  I was able to pull her older films she had CT of the abdomen from 2017 that showed nodular infiltrate in the right middle lobe with the finding on the CAT scan done on this admission are consistent with possible atypical infection specifically Mycobacterium AVM however she now has a cavitary lesion and would not have an old CT of the chest to compare if present on the prior films.  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, she has multiple travel over her life span [place] the [place] with no travel to any endemic area, did not work around any active TB patient that she knows of  According to her recollection her yearly PPD skin test were negative  She did lose some weight since the death of her  5 years ago but not sure how much is because of  "lifestyle changes  She is not on any inhalers  Denies any smoking  No history of asthma or COPD  No history of any hemoptysis or pleurisy  The patient was found to be hypoxic on room air with sats in the mid 70s even though she was not feeling any subjective dyspnea  At the time of my assessment she was on room air and she was comfortable but has been on and off oxygen based on the medical records.  During the initial presentation patient did report to the admitting physician that she has some cough and congestion of couple of days duration but she denied the time of my interview  No GI or  complaints    Pulmonary Functions Testing Results:    No results found for: \"FEV1\", \"FVC\", \"HPL3JQH\", \"TLC\", \"DLCO\"    Review of Systems:   Review of Systems  As per above     Past Medical History:  Past Medical History:   Diagnosis Date    Abscess of leg     Acute deep vein thrombosis (DVT) of left lower extremity     Acute deep vein thrombosis (DVT) of right lower extremity     Acute renal insufficiency     Angiotensin converting enzyme inhibitor-aggravated angioedema     Cellulitis     CKD (chronic kidney disease) stage 3, GFR 30-59 ml/min     Depression     Herpes zoster     Hypercholesterolemia     Hyperlipidemia     Hypertension     Hypotension     Macular degeneration     Noted February 2014    Mitral regurgitation     Mitral valve prolapse     SOB (shortness of breath)     Zoster        Past Surgical History:   Procedure Laterality Date    HIP FRACTURE SURGERY Right 09/18/2017    SKIN CANCER EXCISION         Home Medications:   Medications Prior to Admission   Medication Sig Dispense Refill Last Dose    amLODIPine (NORVASC) 5 MG tablet Take 1 tablet by mouth 2 (Two) Times a Day. 180 tablet 3 3/14/2024    metoprolol succinate XL (TOPROL-XL) 25 MG 24 hr tablet Take 1 tablet by mouth Every Morning AND 2 tablets Every Evening. 270 tablet 1 3/14/2024    Multiple Vitamins-Minerals (OCUTABS) tablet Take 1 tablet by mouth " Daily.   3/14/2024    acetaminophen (TYLENOL) 325 MG tablet Take 2 tablets by mouth Every 6 (Six) Hours As Needed for mild pain (1-3).  0 More than a month    benzonatate (Tessalon Perles) 100 MG capsule Take 1 capsule by mouth 3 (Three) Times a Day As Needed for Cough. 90 capsule 0 Unknown    neomycin-polymyxin-dexamethamethasone (POLYDEX) 3.5-20564-0.1 ointment ophthalmic ointment    Unknown       Inpatient Medications:  Scheduled Meds:amLODIPine, 5 mg, Oral, BID  cefTRIAXone, 1,000 mg, Intravenous, Q24H  methylPREDNISolone sodium succinate, 60 mg, Intravenous, Q24H  metoprolol succinate XL, 25 mg, Oral, QAM   And  metoprolol succinate XL, 50 mg, Oral, Q PM  multivitamin with minerals, 1 tablet, Oral, Daily  sodium chloride, 10 mL, Intravenous, Q12H      Continuous Infusions:sodium chloride, 75 mL/hr, Last Rate: 75 mL/hr (03/14/24 6129)      PRN Meds:.  benzonatate    senna-docusate sodium **AND** polyethylene glycol **AND** bisacodyl **AND** bisacodyl    ipratropium-albuterol    sodium chloride    sodium chloride    sodium chloride    Allergies:  Allergies   Allergen Reactions    Lisinopril Angioedema    Zoloft [Sertraline Hcl] Shortness Of Breath    Erythromycin Swelling       Past Social History:  Social History     Socioeconomic History    Marital status:     Number of children: 3   Tobacco Use    Smoking status: Never     Passive exposure: Never    Smokeless tobacco: Never   Vaping Use    Vaping status: Never Used   Substance and Sexual Activity    Alcohol use: No     Comment: Daily caffeine use    Drug use: No    Sexual activity: Defer     Birth control/protection: None       Past Family History:  Family History   Problem Relation Age of Onset    Heart attack Mother     Hypertension Mother     Heart attack Father     Hypertension Father     Heart attack Brother 58    Hypertension Brother            Objective:   Temp:  [96.6 °F (35.9 °C)-98 °F (36.7 °C)] 96.9 °F (36.1 °C)  Heart Rate:  [62-83]  62  Resp:  [17-18] 17  BP: (108-170)/(48-89) 108/65  SpO2:  [74 %-95 %] 95 %  on  Flow (L/min):  [2-4] 2 Device (Oxygen Therapy): nasal cannula     Intake/Output Summary (Last 24 hours) at 3/15/2024 1309  Last data filed at 3/15/2024 0531  Gross per 24 hour   Intake 100 ml   Output 500 ml   Net -400 ml     Body mass index is 17.87 kg/m².      03/14/24  1548 03/14/24  2113 03/15/24  0531   Weight: 50 kg (110 lb 4.8 oz) 48.7 kg (107 lb 5.8 oz) 48.7 kg (107 lb 5.8 oz)     Weight change:     Physical Exam:   Physical Exam   General:    No acute distress, alert and oriented x4, pleasant                   Head:    Normocephalic, atraumatic. External ears and nose are normal   Eyes:          Conjunctivae and sclerae normal, left eye ptosis   Throat:   No oral lesions, no thrush, oral mucosa moist.    Neck:   Supple, trachea midline. No JVD, no cervical or supraclavicular lymphadenopathy    Lungs:     Normal chest on inspection, abnormal lung exam with positive crackles, minimal expiratory wheeze.      Heart:    Regular rhythm and normal rate.  No murmurs, gallops, or rubs noted.   Abdomen:     Soft, nontender, nondistended, positive bowel sounds. No hepatosplenomegaly    Extremities:   No clubbing, cyanosis, or edema.     Pulses:   Pulses palpable and equal bilaterally.    Skin:   No bleeding or rash. No bumps, good turgor pressure    Neuro:   Nonfocal.  Moves all extremities well. Strength 5/5 and symmetrical, no sensory deficit    Psychiatric:   Normal mood and affect.     Lab Review:   Results from last 7 days   Lab Units 03/15/24  0507 03/14/24  1601   SODIUM mmol/L 141 136   POTASSIUM mmol/L 4.0 4.1   CHLORIDE mmol/L 104 98   CO2 mmol/L 24.8 26.1   BUN mg/dL 14 16   CREATININE mg/dL 0.66 0.78   GLUCOSE mg/dL 138* 92   CALCIUM mg/dL 8.5 9.7*   AST (SGOT) U/L  --  18   ALT (SGPT) U/L  --  12   ALBUMIN g/dL  --  4.1     Results from last 7 days   Lab Units 03/14/24  1601   HSTROP T ng/L 18*     Results from last 7 days  "  Lab Units 03/15/24  0507 03/14/24  1601   WBC 10*3/mm3 5.08 8.38   HEMOGLOBIN g/dL 12.1 13.2   HEMATOCRIT % 37.5 42.6   PLATELETS 10*3/mm3 237 255   MCV fL 90.6 94.2   MCH pg 29.2 29.2   MCHC g/dL 32.3 31.0*   RDW % 13.5 14.6   RDW-SD fl 44.6 51.2   MPV fL 10.5 10.2   NEUTROPHIL % %  --  77.2*   LYMPHOCYTE % %  --  13.1*   MONOCYTES % %  --  7.2   EOSINOPHIL % %  --  1.9   BASOPHIL % %  --  0.4   IMM GRAN % %  --  0.2   NEUTROS ABS 10*3/mm3  --  6.47   LYMPHS ABS 10*3/mm3  --  1.10   MONOS ABS 10*3/mm3  --  0.60   EOS ABS 10*3/mm3  --  0.16   BASOS ABS 10*3/mm3  --  0.03   IMMATURE GRANS (ABS) 10*3/mm3  --  0.02                   Invalid input(s): \"LDLCALC\"  Results from last 7 days   Lab Units 03/14/24  1654 03/14/24  1601   PROBNP pg/mL  --  897.2   D DIMER QUANT MCGFEU/mL 0.68  --              Results from last 7 days   Lab Units 03/14/24  2259 03/14/24  1601   PROCALCITONIN ng/mL  --  0.09   LACTATE mmol/L 1.0  --              Results from last 7 days   Lab Units 03/15/24  0612   STREP PNEUMO AG  Negative   L. PNEUMOPHILA SEROGP 1 UR AG  Negative         Results from last 7 days   Lab Units 03/14/24  1602   COVID19  Not Detected   INFLUENZA B PCR  Not Detected             Imaging:  Imaging Results (Most Recent)       Procedure Component Value Units Date/Time    CT Chest Without Contrast Diagnostic [[id number]] Collected: 03/15/24 1133     Updated: 03/15/24 1157    Narrative:      CT CHEST WO CONTRAST DIAGNOSTIC-     INDICATION: Pneumonia. Possible complication. Shortness of air.     COMPARISON: None     TECHNIQUE:  Routine CT chest without IV contrast. Coronal and sagittal reformats.  Radiation dose reduction techniques were utilized, including automated  exposure control and exposure modulation based on body size.     FINDINGS:      Chest wall: No lymphadenopathy.     Thyroid: Normal.     Mediastinum: Three-vessel coronary artery atherosclerotic  calcifications. Mild cardiomegaly. Aortic valve calcification. " No  pericardial effusion. Bulky calcific atherosclerotic disease in the  central left subclavian artery with suspected stenosis. No mediastinal  or hilar lymphadenopathy.     Lungs/pleura: Trace right pleural fluid. Trace left pleural fluid.  Airways wall thickening. Subsegmental airway occlusions in the basilar  lower lobes. Bronchiolectasis seen in the anterior right upper lobe,  right middle lobe and lingula with subsegmental airway impactions.  Biapical pleural-parenchymal scarring. Scattered centrilobular and  tree-in-bud nodules in the bilateral lungs. For example, tree-in-bud  nodules in the lateral right upper lobe, series 3, axial mage 34. Mildly  thick walled cavity seen in the superior segment right lower lobe,  containing tiny air-fluid level, series 3, axial mage 45, measures 3.3  cm. Bronchovascular pulmonary nodules seen adjacent to this cavity,  series 3, axial image 45, with the largest nodule measuring 10 mm. Solid  pulmonary nodule in the superior segment right lower lobe, series 3,  axial image 36, measures 8 mm. Cavitating nodule seen in the superior  segment right lower lobe, series 3, axial mage 39, measures 6 mm. Small  heterogeneous bronchovascular opacities seen in the superior lingula.  Possible additional cavitating nodule in the lingula, series 3, axial  mage 41, measures 11 mm.     Upper abdomen: Fluid attenuating cyst in the superior pole left kidney.  Colonic diverticulosis. Severe abdominal aortic atherosclerotic  calcification.     Osseous structures: Right glenohumeral osteoarthritis.       Impression:         1. Airways disease with thickened airway walls, bronchiolectasis and  subsegmental airway impactions, greatest in the anterior segment right  upper lobe, right middle lobe and lingula. Finding may indicate  underlying MAC type infection.  2. Scattered centrilobular and tree-in-bud nodules consistent with  infectious bronchiolitis.   3. Mildly thick walled cavity in the  superior segment right lower lobe  with small air-fluid level and scattered bronchocentric pulmonary  nodules with a few demonstrating cavitation. Findings probably related  to an atypical infection. Pulmonology evaluation. Short follow-up  following treatment course for presumed atypical infectious etiology. If  persistent or progressive, sampling could be considered.     This report was finalized on 3/15/2024 11:54 AM by Dr. Yunior Delgado M.D on Workstation: [id number]       XR Chest 1 View [[id number]] Collected: 03/14/24 1701     Updated: 03/14/24 1711    Narrative:      XR CHEST 1 VW-     INDICATION: Shortness of breath     COMPARISON: Chest radiographs dating back to 12/20/2016. CT abdomen  pelvis 1/2/2017       Impression:      Right middle lobe tubular opacities with intermixed hazy  opacities suggesting right middle lobe bronchiectasis with possible  infectious/inflammatory process. Recommend imaging follow-up to ensure  clearance. No pleural effusion or pneumothorax. Normal size  cardiomediastinal silhouette. No focal osseous abnormality.     This report was finalized on 3/14/2024 5:08 PM by Dr. Perez Flynn M.D on Workstation: [id number]                   I personally viewed and interpreted the patient's imaging studies.    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 but with no known positive PPD skin testing      Plan:     Even though the patient is reporting no symptoms however she was hypoxic when she came in, she does have evidence of right middle lobe infiltrate which were actually present on her CT of the abdomen although it back in 2017  The location is not consistent with aspiration pneumonia even though it is a possibility and is suggestive  more of a chronic infectious process like Mycobacterium Avium however for a retired nurse who also happens to have a cavitary lesion, this needs to be ruled out for Mycobacterium tuberculosis  If able to cough we will try to do a 3 days AFB otherwise may need to do a bronchoscopy  Will do a gold QuantiFERON test  Patient need to be changed to airborne isolation until TB is ruled out  May consider bronchoscopy if unable to cough , in order to help take off isolation sooner   Discussed with the patient, we will follow, consider infectious disease consultation        Thank you for allowing me to participate in the care of Carito Gonzalez. Feel free to contact me directly with any further questions or concerns.    Jorden Caceres MD  [facility]   03/15/24  13:09 EDT    Dictated utilizing Dragon dictation

## 2024-03-15 NOTE — THERAPY EVALUATION
Patient Name: Carito Gonzalez  : 3/7/1930    MRN: [id number]                              Today's Date: 3/15/2024       Admit Date: 3/14/2024    Visit Dx:     ICD-10-CM ICD-9-CM   1. Acute respiratory failure with hypoxia  J96.01 518.81   2. Bronchiectasis with acute lower respiratory infection  J47.0 494.1   3. Pneumonia of right middle lobe due to infectious organism  J18.9 486     Patient Active Problem List   Diagnosis    Hypercholesterolemia    Hypertension    Swelling of limb, left    MVP (mitral valve prolapse)    Non-rheumatic mitral regurgitation    Bilateral carotid bruits    Depression    Protein calorie malnutrition    Azotemia    SOB (shortness of breath)    Pain of right heel    Cystitis    Medicare annual wellness visit, subsequent    Aphasia    Subdural hematoma    Acute respiratory failure with hypoxia    Bronchiectasis    Pneumonia of right middle lobe due to infectious organism     Past Medical History:   Diagnosis Date    Abscess of leg     Acute deep vein thrombosis (DVT) of left lower extremity     Acute deep vein thrombosis (DVT) of right lower extremity     Acute renal insufficiency     Angiotensin converting enzyme inhibitor-aggravated angioedema     Cellulitis     CKD (chronic kidney disease) stage 3, GFR 30-59 ml/min     Depression     Herpes zoster     Hypercholesterolemia     Hyperlipidemia     Hypertension     Hypotension     Macular degeneration     Noted 2014    Mitral regurgitation     Mitral valve prolapse     SOB (shortness of breath)     Zoster      Past Surgical History:   Procedure Laterality Date    HIP FRACTURE SURGERY Right 2017    SKIN CANCER EXCISION        General Information       Row Name 03/15/24 1409          Physical Therapy Time and Intention    Document Type evaluation  -ZB     Mode of Treatment individual therapy;physical therapy  -ZB       Row Name 03/15/24 1403          General Information    Patient Profile Reviewed yes  -ZB     Prior Level of  Function independent:  from retirement, (I) ADLs and mobility with rollator  -ZB     Existing Precautions/Restrictions oxygen therapy device and L/min;fall  -ZB     Barriers to Rehab none identified  -ZB       Row Name 03/15/24 1409          Living Environment    People in Home facility resident  [facility]  -ZB       Row Name 03/15/24 1409          Home Main Entrance    Number of Stairs, Main Entrance none  -ZB       Row Name 03/15/24 1409          Stairs Within Home, Primary    Number of Stairs, Within Home, Primary none  -ZB       Row Name 03/15/24 1409          Cognition    Orientation Status (Cognition) oriented x 4  -ZB       Row Name 03/15/24 1409          Safety Issues, Functional Mobility    Impairments Affecting Function (Mobility) endurance/activity tolerance;shortness of breath  -ZB     Comment, Safety Issues/Impairments (Mobility) gait belt and non skid socks donned  -ZB               User Key  (r) = Recorded By, (t) = Taken By, (c) = Cosigned By      Initials Name Provider Type    ZB Froilan Meyers, PT Physical Therapist                   Mobility       Row Name 03/15/24 1410          Bed Mobility    Comment, (Bed Mobility) NT [facility]  -ZB       Row Name 03/15/24 1410          Bed-Chair Transfer    Bed-Chair [place] (Transfers) not tested  -ZB     Comment, (Bed-Chair Transfer) [facility]  -ZB       Row Name 03/15/24 1410          Sit-Stand Transfer    Sit-Stand [place] (Transfers) standby assist  -ZB     Assistive Device (Sit-Stand Transfers) walker, front-wheeled  -ZB       Row Name 03/15/24 1410          Gait/Stairs (Locomotion)    [place] Level (Gait) contact guard  -ZB     Assistive Device (Gait) walker, front-wheeled  -ZB     Patient was able to Ambulate yes  -ZB     Distance in Feet (Gait) 18  -ZB     Deviations/Abnormal Patterns (Gait) gait speed decreased;shavon decreased  -ZB     Bilateral Gait Deviations forward flexed posture  -ZB     Gait Assessment/Intervention steady overall with no LOB  -ZB      [place] Level (Stairs) not tested  -ZB               User Key  (r) = Recorded By, (t) = Taken By, (c) = Cosigned By      Initials Name Provider Type    ZB Froilan Meyers PT Physical Therapist                   Obj/Interventions       Row Name 03/15/24 1411          Range of Motion Comprehensive    General Range of Motion bilateral lower extremity ROM WFL  -ZB       Row Name 03/15/24 1411          Strength Comprehensive (MMT)    Comment, General Manual Muscle Testing (MMT) Assessment gross B LE MMT 4-/5  -ZB       Row Name 03/15/24 1411          Motor Skills    Motor Skills functional endurance  -ZB     Functional Endurance Fair  -ZB       Row Name 03/15/24 1411          Balance    Balance Assessment standing static balance;standing dynamic balance  -ZB     Static Standing Balance standby assist  -ZB     Dynamic Standing Balance contact guard  -ZB     Position/Device Used, Standing Balance supported;walker, front-wheeled  -ZB     Balance Interventions sitting;standing;sit to stand;supported;static;dynamic  -ZB       Row Name 03/15/24 1411          Sensory Assessment (Somatosensory)    Sensory Assessment (Somatosensory) LE sensation intact  -ZB               User Key  (r) = Recorded By, (t) = Taken By, (c) = Cosigned By      Initials Name Provider Type    ZB Froilan Meyers, PT Physical Therapist                   Goals/Plan       Row Name 03/15/24 1415          Bed Mobility Goal 1 (PT)    Activity/Assistive Device (Bed Mobility Goal 1, PT) bed mobility activities, all  -ZB     [place] Level/Cues Needed (Bed Mobility Goal 1, PT) independent  -ZB     Time Frame (Bed Mobility Goal 1, PT) short term goal (STG);1 week  -ZB       Row Name 03/15/24 1415          Transfer Goal 1 (PT)    Activity/Assistive Device (Transfer Goal 1, PT) sit-to-stand/stand-to-sit;bed-to-chair/chair-to-bed  -ZB     [place] Level/Cues Needed (Transfer Goal 1, PT) modified independence  -ZB     Time Frame (Transfer Goal 1, PT) short  term goal (STG);1 week  -ZB       Row Name 03/15/24 1415          Gait Training Goal 1 (PT)    Activity/Assistive Device (Gait Training Goal 1, PT) gait (walking locomotion)  -ZB     [place] Level (Gait Training Goal 1, PT) modified independence  -ZB     Distance (Gait Training Goal 1, PT) 150'  -ZB     Time Frame (Gait Training Goal 1, PT) short term goal (STG);1 week  -ZB       Row Name 03/15/24 1415          Therapy Assessment/Plan (PT)    Planned Therapy Interventions (PT) balance training;strengthening;transfer training;patient/family education;bed mobility training;gait training;home exercise program  -ZB               User Key  (r) = Recorded By, (t) = Taken By, (c) = Cosigned By      Initials Name Provider Type    ZFroilan Grady, PT Physical Therapist                   Clinical Impression       Row Name 03/15/24 1411          Pain    Pretreatment Pain Rating 0/10 - no pain  -ZB     Posttreatment Pain Rating 0/10 - no pain  -ZB     Pain Intervention(s) Ambulation/increased activity;Repositioned;Rest  -ZB       Row Name 03/15/24 1411          Plan of Care Review    Plan of Care Reviewed With patient  -ZB     Outcome Evaluation Patient is a 89 y/o female admitted to [facility] with hypoxia. PMHx includes but is not limited to: CKD, DVT, HLD, HTN, MVP, MR, and recent hospitalization for SDH and dysphagia. The patient resides in an [facility] and reports (I) ADLs and mobility at baseline with use of rollator. Today she is [facility] on arrival, performed STS sba, and ambulated 18' cga + rwx. She is overall steady with no LOB. Anticipate patient will be OK to return to [facility] at [place]. PT will continue to follow along more peripherally to ensure no decline in function or additional needs for DC.  -ZB       Row Name 03/15/24 1411          Therapy Assessment/Plan (PT)    Rehab Potential (PT) good, to achieve stated therapy goals  -ZB     Criteria for Skilled Interventions Met (PT) yes  -ZB     Therapy Frequency (PT) 3 times/wk  -ZB        Row Name 03/15/24 1411          Vital Signs    O2 Delivery Pre Treatment nasal cannula  2L  -ZB     O2 Delivery Intra Treatment nasal cannula  -ZB     O2 Delivery Post Treatment nasal cannula  -ZB     Pre Patient Position Sitting  -ZB     Intra Patient Position Standing  -ZB     Post Patient Position Sitting  -ZB       Row Name 03/15/24 1411          Positioning and Restraints    Pre-Treatment Position sitting in chair/recliner  -ZB     Post Treatment Position chair  -ZB     In Chair notified nsg;reclined;call light within reach;encouraged to call for assist;exit alarm on  -ZB               User Key  (r) = Recorded By, (t) = Taken By, (c) = Cosigned By      Initials Name Provider Type    ZB Froilan Meyers, [place] Physical Therapist                   Outcome Measures       Row Name 03/15/24 1416          How much help from another person do you currently need...    Turning from your back to your side while in flat bed without using bedrails? 4  -ZB     Moving from lying on back to sitting on the side of a flat bed without bedrails? 4  -ZB     Moving to and from a bed to a chair (including a wheelchair)? 3  -ZB     Standing up from a chair using your arms (e.g., wheelchair, bedside chair)? 3  -ZB     Climbing 3-5 steps with a railing? 3  -ZB     To walk in hospital room? 3  -ZB     AM-PAC 6 Clicks Score (PT) 20  -ZB     Highest Level of Mobility Goal 6 --> Walk 10 steps or more  -ZB       Row Name 03/15/24 1416          Functional Assessment    Outcome Measure Options AM-PAC 6 Clicks Basic Mobility (PT)  -ZB               User Key  (r) = Recorded By, (t) = Taken By, (c) = Cosigned By      Initials Name Provider Type    ZB Froilan Meyers, PT Physical Therapist                                 Physical Therapy Education       Title: PT OT SLP Therapies (Done)       Topic: Physical Therapy (Done)       Point: Mobility training (Done)       Learning Progress Summary             Patient Acceptance, E,D, RACHNA,DU by LAWRENCE at  3/15/2024 1416                         Point: Home exercise program (Done)       Learning Progress Summary             Patient Acceptance, E,D, VU,DU by LAWRENCE at 3/15/2024 1416                         Point: Body mechanics (Done)       Learning Progress Summary             Patient Acceptance, E,D, VU,DU by LAWRENCE at 3/15/2024 1416                         Point: Precautions (Done)       Learning Progress Summary             Patient Acceptance, E,D, VU,DU by LAWRENCE at 3/15/2024 1416                                         User Key       Initials Effective Dates Name Provider Type Discipline    LAWRENCE 08/30/23 -  Froilan Meyers, PT Physical Therapist PT                  PT Recommendation and Plan  Planned Therapy Interventions (PT): balance training, strengthening, transfer training, patient/family education, bed mobility training, gait training, home exercise program  Plan of Care Reviewed With: patient  Outcome Evaluation: Patient is a 89 y/o female admitted to [facility] with hypoxia. PMHx includes but is not limited to: CKD, DVT, HLD, HTN, MVP, MR, and recent hospitalization for SDH and dysphagia. The patient resides in an [facility] and reports (I) ADLs and mobility at baseline with use of rollator. Today she is [facility] on arrival, performed STS sba, and ambulated 18' cga + rwx. She is overall steady with no LOB. Anticipate patient will be OK to return to [facility] at [place]. PT will continue to follow along more peripherally to ensure no decline in function or additional needs for DC.     Time Calculation:         PT Charges       Row Name 03/15/24 1417             Time Calculation    Start Time 1345  -ZB      Stop Time 1403  -ZB      Time Calculation (min) 18 min  -ZB      PT Received On 03/15/24  -ZB      PT - Next Appointment 03/18/24  -ZB      PT Goal Re-Cert Due Date 03/29/24  -ZB         Time Calculation- PT    Total Timed Code Minutes- PT 10 minute(s)  -ZB         Timed Charges    25161 - PT Therapeutic Activity Minutes 10  -ZB         Total Minutes     Timed Charges Total Minutes 10  -ZB       Total Minutes 10  -ZB                User Key  (r) = Recorded By, (t) = Taken By, (c) = Cosigned By      Initials Name Provider Type    Froilan Bailey, PT Physical Therapist                  Therapy Charges for Today       Code Description Service Date Service Provider Modifiers Qty    99111426330 HC PT THERAPEUTIC ACT EA 15 MIN 3/15/2024 Froilan Meyers, PT GP 1    73674358808 HC PT EVAL MOD COMPLEXITY 2 3/15/2024 Froilan Meyers, PT GP 1            PT G-Codes  Outcome Measure Options: AM-PAC 6 Clicks Basic Mobility (PT)  AM-PAC 6 Clicks Score (PT): 20  PT Discharge Summary  Anticipated Discharge Disposition (PT): assisted living    Dean Meyers PT  3/15/2024

## 2024-03-16 NOTE — PROGRESS NOTES
[facility]     LOS: 2 days     Subjective:    CC:Shortness of Breath    DIET:  Diet Order   Procedures    Diet: Cardiac; Healthy Heart (2-3 Na+); Fluid Consistency: Thin (IDDSI 0)       Objective:    Vital Signs:  Temp:  [96.4 °F (35.8 °C)-97.3 °F (36.3 °C)] 96.7 °F (35.9 °C)  Heart Rate:  [60-76] 76  Resp:  [16] 16  BP: (124-147)/(62-75) 147/75    SpO2:  [91 %-94 %] 92 %  on  Flow (L/min):  [2] 2;   Device (Oxygen Therapy): room air  Body mass index is 17.72 kg/m².    Physical Exam  Constitutional:       Appearance: Normal appearance.   HENT:      Head: Normocephalic and atraumatic.   Cardiovascular:      Rate and Rhythm: Normal rate and regular rhythm.      Heart sounds: No murmur heard.     No friction rub.   Pulmonary:      Effort: Pulmonary effort is normal.      Breath sounds: Normal breath sounds.   Abdominal:      General: Bowel sounds are normal. There is no distension.      Palpations: Abdomen is soft.      Tenderness: There is no abdominal tenderness.   Skin:     General: Skin is warm and dry.   Neurological:      Mental Status: She is alert.   Psychiatric:         Mood and Affect: Mood normal.         Behavior: Behavior normal.         Results Review:    Glucose   Date Value Ref Range Status   03/15/2024 138 (H) 65 - 99 mg/dL Final   03/14/2024 92 65 - 99 mg/dL Final     Results from last 7 days   Lab Units 03/15/24  0507   WBC 10*3/mm3 5.08   HEMOGLOBIN g/dL 12.1   HEMATOCRIT % 37.5   PLATELETS 10*3/mm3 237     Results from last 7 days   Lab Units 03/15/24  0507 03/14/24  1601   SODIUM mmol/L 141 136   POTASSIUM mmol/L 4.0 4.1   CHLORIDE mmol/L 104 98   CO2 mmol/L 24.8 26.1   BUN mg/dL 14 16   CREATININE mg/dL 0.66 0.78   CALCIUM mg/dL 8.5 9.7*   BILIRUBIN mg/dL  --  0.5   ALK PHOS U/L  --  70   ALT (SGPT) U/L  --  12   AST (SGOT) U/L  --  18   GLUCOSE mg/dL 138* 92             Results from last 7 days   Lab Units 03/14/24  1601   HSTROP T ng/L 18*     Cultures:  Blood  Culture   Date Value Ref Range Status   03/14/2024 No growth at 2 days  Preliminary   03/14/2024 No growth at 2 days  Preliminary       I have reviewed daily medications and changes in CPOE    Scheduled meds  amLODIPine, 5 mg, Oral, BID  cefTRIAXone, 1,000 mg, Intravenous, Q24H  metoprolol succinate XL, 25 mg, Oral, QAM   And  metoprolol succinate XL, 50 mg, Oral, Q PM  multivitamin with minerals, 1 tablet, Oral, Daily  sodium chloride, 10 mL, Intravenous, Q12H           PRN meds    benzonatate    senna-docusate sodium **AND** polyethylene glycol **AND** bisacodyl **AND** bisacodyl    ipratropium-albuterol    sodium chloride    sodium chloride    sodium chloride        Acute respiratory failure with hypoxia    Hypertension    MVP (mitral valve prolapse)    Aphasia    Bronchiectasis    Pneumonia of right middle lobe due to infectious organism        Assessment/Plan:    89 y.o. female admitted with Acute respiratory failure with hypoxia.     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

## 2024-03-16 NOTE — PLAN OF CARE
Goal Outcome Evaluation:  Plan of Care Reviewed With: patient, daughter        Progress: improving  Outcome Evaluation: RA O2 sat 91% today. no cough. denies SOA. significantly visual impaired but able to feed self with tray set-up and sat in chair most of day, amb with walker and gait belt to bathroom. voiding frequently, brief for incontience. No BM today. IV abx continue, IV saline locked.

## 2024-03-16 NOTE — PLAN OF CARE
Goal Outcome Evaluation:  Plan of Care Reviewed With: patient        Progress: improving  Outcome Evaluation: VSS. O2 at 2L/min. Alert and oriented, anxious at times, care explained. Cooperative. [place], legally blind. No cough noted. Sputum sample to send.  Up with assist to the BR, voided , incontinent at times. IV Fluids on flow, due IV Ceftriaxone given. Fall precaution and airborne isolation precation maintained. Slept at short intervals.

## 2024-03-16 NOTE — PROGRESS NOTES
[facility] INPATIENT PROGRESS NOTE         [address]    3/16/2024      PATIENT IDENTIFICATION:  Name: Carito Gonzalez ADMIT: 3/14/2024   : 3/7/1930  PCP: Eb Turner MD    MRN: [id number] LOS: 2 days   AGE/SEX: 89 y.o. female  ROOM: [facility]                     LOS 2    Reason for visit: Cavitary lung lesion and pulmonary infiltrates      SUBJECTIVE:      Resting comfortably.  Denies productive cough, chest pain or shortness of breath at rest.  She is on room air currently.  Scattered coarse breath sounds noted in the bases on auscultation.  No wheezing. I am seeing the patient for the first time today.  All patient problems are new to me.      Objective   OBJECTIVE:    Vital Sign Min/Max for last 24 hours  Temp  Min: 96.8 °F (36 °C)  Max: 97.3 °F (36.3 °C)   BP  Min: 108/65  Max: 160/88   Pulse  Min: 60  Max: 75   Resp  Min: 16  Max: 17   SpO2  Min: 90 %  Max: 95 %   No data recorded   Weight  Min: 48.3 kg (106 lb 7.7 oz)  Max: 48.3 kg (106 lb 7.7 oz)    Vitals:    03/15/24 1704 03/15/24 2145 24 0147 24 0535   BP: 124/69  127/62 146/75   BP Location: Right arm  Right arm Left arm   Patient Position: Sitting  Sitting Lying   Pulse: 71 60 63 75   Resp: 16 16 16 16   Temp: 97.1 °F (36.2 °C)  97.3 °F (36.3 °C) 96.8 °F (36 °C)   TempSrc: Oral  Oral Oral   SpO2: 91%  91% 94%   Weight:    48.3 kg (106 lb 7.7 oz)   Height:                24  2113 03/15/24  0531 24  0535   Weight: 48.7 kg (107 lb 5.8 oz) 48.7 kg (107 lb 5.8 oz) 48.3 kg (106 lb 7.7 oz)       Body mass index is 17.72 kg/m².                          Body mass index is 17.72 kg/m².    Intake/Output Summary (Last 24 hours) at 3/16/2024 0880  Last data filed at 3/16/2024 0825  Gross per 24 hour   Intake 270 ml   Output 450 ml   Net -180 ml         Exam:  GEN:  No distress, appears stated age  EYES:   PERRL, anicteric sclerae  ENT:    External ears/nose normal, OP clear  NECK:  No adenopathy, midline  "trachea  LUNGS: Normal chest on inspection, palpation and coarse breath sounds bases right greater than left on auscultation  CV:  Normal S1S2, without murmur  ABD:  Nontender, nondistended, no hepatosplenomegaly, +BS  EXT:  No edema.  No cyanosis or clubbing.  No mottling and normal cap refill.    Assessment     Scheduled meds:  amLODIPine, 5 mg, Oral, BID  cefTRIAXone, 1,000 mg, Intravenous, Q24H  methylPREDNISolone sodium succinate, 60 mg, Intravenous, Q24H  metoprolol succinate XL, 25 mg, Oral, QAM   And  metoprolol succinate XL, 50 mg, Oral, Q PM  multivitamin with minerals, 1 tablet, Oral, Daily  sodium chloride, 10 mL, Intravenous, Q12H      IV meds:                      sodium chloride, 75 mL/hr, Last Rate: 75 mL/hr (03/15/24 1835)      Data Review:  Results from last 7 days   Lab Units 03/15/24  0507 03/14/24  1601   SODIUM mmol/L 141 136   POTASSIUM mmol/L 4.0 4.1   CHLORIDE mmol/L 104 98   CO2 mmol/L 24.8 26.1   BUN mg/dL 14 16   CREATININE mg/dL 0.66 0.78   GLUCOSE mg/dL 138* 92   CALCIUM mg/dL 8.5 9.7*         Estimated Creatinine Clearance: 39.7 mL/min (by C-G formula based on SCr of 0.66 mg/dL).  Results from last 7 days   Lab Units 03/15/24  0507 03/14/24  1601   WBC 10*3/mm3 5.08 8.38   HEMOGLOBIN g/dL 12.1 13.2   PLATELETS 10*3/mm3 237 255         Results from last 7 days   Lab Units 03/14/24  1601   ALT (SGPT) U/L 12   AST (SGOT) U/L 18         Results from last 7 days   Lab Units 03/14/24  2259 03/14/24  1601   PROCALCITONIN ng/mL  --  0.09   LACTATE mmol/L 1.0  --          No results found for: \"HGBA1C\", \"POCGLU\"      Imaging reviewed  Chest x-ray 3/14 reviewed  CT chest 3/15 reviewed    Microbiology reviewed            Active Hospital Problems    Diagnosis  POA    **Acute respiratory failure with hypoxia [J96.01]  Yes    Bronchiectasis [J47.9]  No    Pneumonia of right middle lobe due to infectious organism [J18.9]  Yes    Aphasia [R47.01]  Yes    MVP (mitral valve prolapse) [I34.1]  Yes    " Hypertension [I10]  Yes      Resolved Hospital Problems    Diagnosis Date Resolved POA    Hypoxia [R09.02] 03/14/2024 Yes         ASSESSMENT:  Right lower lobe cavitary lesion  Right middle lobe infiltrate  Pectus excavatum  Bronchiectasis        PLAN:  Very frail but in no distress.  Very well could be an aspiration pneumonia causing the cavitary lesion superior segment of the right lower lobe especially if she is aspirating in her sleep.  Awaiting final culture results.  Noted QuantiFERON gold test ordered.  Dr. Caceres's note mentioned consider bronchoscopy however in this 89-year-old very frail woman I would be reluctant to take her to endoscopy for anesthesia and the procedure at this time.  Continue current antibiotics.  Continue bronchodilators to help with clearance.  Discontinue steroids.    Levi Ames MD  Pulmonary and Critical Care Medicine  [facility], [facility]  3/16/2024    08:53 EDT

## 2024-03-16 NOTE — PLAN OF CARE
Goal Outcome Evaluation:  Plan of Care Reviewed With: patient, daughter        Progress: improving  Outcome Evaluation: Patient alert and oriented x4, no c/o pain, on 2L O2, no coughing noted, deep breathing, intentional coughing is weak and nonproductive, unable to provide sputum sample, initiated airborne isolation to rule out TB, vss, afebrile, ivf infusing, falls precautions maintained.

## 2024-03-17 NOTE — PLAN OF CARE
Goal Outcome Evaluation:  Plan of Care Reviewed With: patient        Progress: improving  Outcome Evaluation: denies any SOA.  airborne precaution maintained.Assist x1 using gaitbelt and walker.  legally blind.  falls precaution maintained.

## 2024-03-17 NOTE — PROGRESS NOTES
[facility] INPATIENT PROGRESS NOTE         [address]    3/17/2024      PATIENT IDENTIFICATION:  Name: Carito Gonzalez ADMIT: 3/14/2024   : 3/7/1930  PCP: Eb Turner MD    MRN: [id number] LOS: 3 days   AGE/SEX: 89 y.o. female  ROOM: [facility]                     LOS 3    Reason for visit: Cavitary lung lesion and pulmonary infiltrates      SUBJECTIVE:      Resting comfortably.  No new pulmonary issues overnight.      Objective   OBJECTIVE:    Vital Sign Min/Max for last 24 hours  Temp  Min: 96.4 °F (35.8 °C)  Max: 97 °F (36.1 °C)   BP  Min: 124/68  Max: 158/87   Pulse  Min: 62  Max: 76   Resp  Min: 16  Max: 18   SpO2  Min: 91 %  Max: 95 %   No data recorded   Weight  Min: 48.5 kg (106 lb 14.8 oz)  Max: 48.5 kg (106 lb 14.8 oz)    Vitals:    24 1328 24 1747 24 2108 24 0612   BP: 124/68 147/75 151/74 158/87   BP Location: Left arm Left arm Left arm Right arm   Patient Position: Sitting Sitting Lying Lying   Pulse: 70 76 73 71   Resp: 16 16 18 18   Temp: 97 °F (36.1 °C) 96.7 °F (35.9 °C) 96.8 °F (36 °C) 97 °F (36.1 °C)   TempSrc: Oral Oral Oral Oral   SpO2: 94% 92% 95% 91%   Weight:    48.5 kg (106 lb 14.8 oz)   Height:                03/15/24  0531 24  0535 24  0612   Weight: 48.7 kg (107 lb 5.8 oz) 48.3 kg (106 lb 7.7 oz) 48.5 kg (106 lb 14.8 oz)       Body mass index is 17.79 kg/m².                          Body mass index is 17.79 kg/m².    Intake/Output Summary (Last 24 hours) at 3/17/2024 0915  Last data filed at 3/17/2024 0612  Gross per 24 hour   Intake 788 ml   Output 1200 ml   Net -412 ml         Exam:  GEN:  No distress, appears stated age  EYES:   anicteric sclerae  ENT:    External ears/nose normal, OP clear  NECK:  No adenopathy, midline trachea  LUNGS: Normal chest on inspection, palpation and coarse breath sounds bases right greater than left on auscultation      Assessment     Scheduled meds:  amLODIPine, 5 mg, Oral, BID  cefTRIAXone, 1,000  "mg, Intravenous, Q24H  metoprolol succinate XL, 25 mg, Oral, QAM   And  metoprolol succinate XL, 50 mg, Oral, Q PM  multivitamin with minerals, 1 tablet, Oral, Daily  sodium chloride, 10 mL, Intravenous, Q12H      IV meds:                           Data Review:  Results from last 7 days   Lab Units 03/15/24  0507 03/14/24  1601   SODIUM mmol/L 141 136   POTASSIUM mmol/L 4.0 4.1   CHLORIDE mmol/L 104 98   CO2 mmol/L 24.8 26.1   BUN mg/dL 14 16   CREATININE mg/dL 0.66 0.78   GLUCOSE mg/dL 138* 92   CALCIUM mg/dL 8.5 9.7*         Estimated Creatinine Clearance: 39.9 mL/min (by C-G formula based on SCr of 0.66 mg/dL).  Results from last 7 days   Lab Units 03/15/24  0507 03/14/24  1601   WBC 10*3/mm3 5.08 8.38   HEMOGLOBIN g/dL 12.1 13.2   PLATELETS 10*3/mm3 237 255         Results from last 7 days   Lab Units 03/14/24  1601   ALT (SGPT) U/L 12   AST (SGOT) U/L 18         Results from last 7 days   Lab Units 03/14/24  2259 03/14/24  1601   PROCALCITONIN ng/mL  --  0.09   LACTATE mmol/L 1.0  --          No results found for: \"HGBA1C\", \"POCGLU\"      Imaging reviewed  Chest x-ray 3/14 reviewed  CT chest 3/15 reviewed    Microbiology reviewed            Active Hospital Problems    Diagnosis  POA    **Acute respiratory failure with hypoxia [J96.01]  Yes    Bronchiectasis [J47.9]  No    Pneumonia of right middle lobe due to infectious organism [J18.9]  Yes    Aphasia [R47.01]  Yes    MVP (mitral valve prolapse) [I34.1]  Yes    Hypertension [I10]  Yes      Resolved Hospital Problems    Diagnosis Date Resolved POA    Hypoxia [R09.02] 03/14/2024 Yes         ASSESSMENT:  Right lower lobe cavitary lesion  Right middle lobe infiltrate  Pectus excavatum  Bronchiectasis        PLAN:  Very frail but in no distress.  Very well could be an aspiration pneumonia causing the cavitary lesion superior segment of the right lower lobe especially if she is aspirating in her sleep.  Awaiting final culture results.  Noted QuantiFERON gold test " ordered and pending.  Dr. Caceres's note mentioned consider bronchoscopy however in this 89-year-old very frail woman I would be reluctant to take her to endoscopy for anesthesia and the procedure at this time.  Continue current antibiotics.  Continue bronchodilators to help with clearance.  Discontinued steroids.    Levi Ames MD  Pulmonary and Critical Care Medicine  [facility], [facility]  3/17/2024    09:15 EDT

## 2024-03-17 NOTE — PROGRESS NOTES
[facility]     LOS: 3 days     Subjective:    CC:Shortness of Breath    DIET:  Diet Order   Procedures    Diet: Cardiac; Healthy Heart (2-3 Na+); Fluid Consistency: Thin (IDDSI 0)     No complaints    Objective:    Vital Signs:  Temp:  [96.7 °F (35.9 °C)-97 °F (36.1 °C)] 97 °F (36.1 °C)  Heart Rate:  [58-76] 58  Resp:  [16-18] 18  BP: (124-158)/(68-87) 138/75    SpO2:  [91 %-98 %] 98 %  on   ;   Device (Oxygen Therapy): room air  Body mass index is 17.79 kg/m².    Physical Exam  Constitutional:       Appearance: Normal appearance.   HENT:      Head: Normocephalic and atraumatic.   Cardiovascular:      Rate and Rhythm: Normal rate and regular rhythm.      Heart sounds: No murmur heard.     No friction rub.   Pulmonary:      Effort: Pulmonary effort is normal.      Breath sounds: Normal breath sounds.   Abdominal:      General: Bowel sounds are normal. There is no distension.      Palpations: Abdomen is soft.      Tenderness: There is no abdominal tenderness.   Skin:     General: Skin is warm and dry.   Neurological:      Mental Status: She is alert.   Psychiatric:         Mood and Affect: Mood normal.         Behavior: Behavior normal.         Results Review:    Glucose   Date Value Ref Range Status   03/15/2024 138 (H) 65 - 99 mg/dL Final   03/14/2024 92 65 - 99 mg/dL Final     Results from last 7 days   Lab Units 03/15/24  0507   WBC 10*3/mm3 5.08   HEMOGLOBIN g/dL 12.1   HEMATOCRIT % 37.5   PLATELETS 10*3/mm3 237     Results from last 7 days   Lab Units 03/15/24  0507 03/14/24  1601   SODIUM mmol/L 141 136   POTASSIUM mmol/L 4.0 4.1   CHLORIDE mmol/L 104 98   CO2 mmol/L 24.8 26.1   BUN mg/dL 14 16   CREATININE mg/dL 0.66 0.78   CALCIUM mg/dL 8.5 9.7*   BILIRUBIN mg/dL  --  0.5   ALK PHOS U/L  --  70   ALT (SGPT) U/L  --  12   AST (SGOT) U/L  --  18   GLUCOSE mg/dL 138* 92             Results from last 7 days   Lab Units 03/14/24  1601   HSTROP T ng/L 18*     Cultures:  Blood Culture    Date Value Ref Range Status   03/14/2024 No growth at 2 days  Preliminary   03/14/2024 No growth at 2 days  Preliminary       I have reviewed daily medications and changes in CPOE    Scheduled meds  amLODIPine, 5 mg, Oral, BID  cefTRIAXone, 1,000 mg, Intravenous, Q24H  metoprolol succinate XL, 25 mg, Oral, QAM   And  metoprolol succinate XL, 50 mg, Oral, Q PM  multivitamin with minerals, 1 tablet, Oral, Daily  sodium chloride, 10 mL, Intravenous, Q12H           PRN meds    benzonatate    senna-docusate sodium **AND** polyethylene glycol **AND** bisacodyl **AND** bisacodyl    ipratropium-albuterol    sodium chloride    sodium chloride    sodium chloride        Acute respiratory failure with hypoxia    Hypertension    MVP (mitral valve prolapse)    Aphasia    Bronchiectasis    Pneumonia of right middle lobe due to infectious organism        Assessment/Plan:    89 y.o. female admitted with Acute respiratory failure with hypoxia.     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

## 2024-03-17 NOTE — PLAN OF CARE
Goal Outcome Evaluation:  Plan of Care Reviewed With: patient        Progress: no change  Outcome Evaluation: Pt denies SOA. very infrequent dry, nonproductive cough. unable to produce any sputum for AFB. VSS on room air. enc IS use, but pt unable to see numbers. soft call light in use. up in chair most of day. waffle cushion for comfort. wears brief. amb to bathroom with gait belt and walker. leon diet, needs tray set up asst. airborne isolation maintained,  Quanterferon gold lab still pending.

## 2024-03-18 NOTE — THERAPY EVALUATION
Acute Care - Speech Language Pathology   Swallow Initial Evaluation [facility]     Patient Name: Carito Gonzalez  : 3/7/1930  MRN: [id number]  Today's Date: 3/18/2024               Admit Date: 3/14/2024    Visit Dx:     ICD-10-CM ICD-9-CM   1. Acute respiratory failure with hypoxia  J96.01 518.81   2. Bronchiectasis with acute lower respiratory infection  J47.0 494.1   3. Pneumonia of right middle lobe due to infectious organism  J18.9 486     Patient Active Problem List   Diagnosis    Hypercholesterolemia    Hypertension    Swelling of limb, left    MVP (mitral valve prolapse)    Non-rheumatic mitral regurgitation    Bilateral carotid bruits    Depression    Protein calorie malnutrition    Azotemia    SOB (shortness of breath)    Pain of right heel    Cystitis    Medicare annual wellness visit, subsequent    Aphasia    Subdural hematoma    Acute respiratory failure with hypoxia    Bronchiectasis    Pneumonia of right middle lobe due to infectious organism     Past Medical History:   Diagnosis Date    Abscess of leg     Acute deep vein thrombosis (DVT) of left lower extremity     Acute deep vein thrombosis (DVT) of right lower extremity     Acute renal insufficiency     Angiotensin converting enzyme inhibitor-aggravated angioedema     Cellulitis     CKD (chronic kidney disease) stage 3, GFR 30-59 ml/min     Depression     Herpes zoster     Hypercholesterolemia     Hyperlipidemia     Hypertension     Hypotension     Macular degeneration     Noted 2014    Mitral regurgitation     Mitral valve prolapse     SOB (shortness of breath)     Zoster      Past Surgical History:   Procedure Laterality Date    HIP FRACTURE SURGERY Right 2017    SKIN CANCER EXCISION         SLP Recommendation and Plan  SLP Swallowing Diagnosis: R/O pharyngeal dysphagia (24 1500)  SLP Diet Recommendation: regular textures, thin liquids (24 1500)  Recommended Precautions and Strategies: upright posture during/after  eating, small bites of food and sips of liquid, general aspiration precautions (03/18/24 1500)  SLP Rec. for Method of Medication Administration: meds whole, meds crushed, as tolerated (03/18/24 1500)     Monitor for Signs of Aspiration: yes, notify SLP if any concerns (03/18/24 1500)  Recommended Diagnostics: VFSS (MBS) (if further concerned for aspiration (when/if TB ruled out)) (03/18/24 1500)           Therapy Frequency (Swallow): PRN (03/18/24 1500)  Predicted Duration Therapy Intervention (Days): until discharge (03/18/24 1500)  Oral Care Recommendations: Oral Care BID/PRN (03/18/24 1500)                                        Outcome Evaluation: Clinical swallow evaluation completed. Voice clear with phonation, however, wet congestion noted during laughter prior to PO trials. No overt s/s of aspiration with ice chips, thins by spoon/cup/straw, puree, soft/chopped solid, mixed consistecny, or regular solid trials. If further concerned for aspiration, consider VFSS if TB results are negative to rule out silent aspiration. At this time, continue regular diet with thin liquids. Meds as tolerated. Sitting upright, slow rate, small bites/sips.      SWALLOW EVALUATION (Last 72 Hours)       SLP Adult Swallow Evaluation       Row Name 03/18/24 1500                   Rehab Evaluation    Document Type evaluation  -CR        Subjective Information no complaints  -CR        Patient Observations alert;cooperative  -CR        Patient Effort good  -CR        Symptoms Noted During/After Treatment none  -CR           General Information    Patient Profile Reviewed yes  -CR        Pertinent History Of Current Problem 89 y/o female admitted with respiratory failure with hypoxia. Right mid lung infiltrate. Right low lobe cavitary lesion. Recent discharge on 2/8/24 for SDH. Awaiting TB results.  -CR        Current Method of Nutrition regular textures;thin liquids  -CR        Precautions/Limitations, Vision difficult to assess  -CR         Precautions/Limitations, Hearing hearing impairment, bilaterally  -CR        Prior Level of Function-Swallowing no diet consistency restrictions  -CR        Plans/Goals Discussed with patient;agreed upon  -CR        Barriers to Rehab none identified  -CR           Pain Scale: Numbers Pre/Post-Treatment    Pretreatment Pain Rating 0/10 - no pain  -CR        Posttreatment Pain Rating 0/10 - no pain  -CR           Oral Motor Structure and Function    Dentition Assessment natural, present and adequate  -CR        Secretion Management WNL/WFL  -CR        Mucosal Quality moist, healthy  -CR           Oral Musculature and Cranial Nerve Assessment    Oral Motor General Assessment generalized oral motor weakness  -CR           Clinical Swallow Eval    Clinical Swallow Evaluation Summary Clinical swallow evaluation completed. Voice clear with phonation, however, wet congestion noted during laughter prior to PO trials. No overt s/s of aspiration with ice chips, thins by spoon/cup/straw, puree, soft/chopped solid, mixed consistecny, or regular solid trials. If further concerned for aspiration, consider VFSS if TB results are negative to rule out silent aspiration. At this time, continue regular diet with thin liquids. Meds as tolerated. Sitting upright, slow rate, small bites/sips.  -CR           SLP Evaluation Clinical Impression    SLP Swallowing Diagnosis R/O pharyngeal dysphagia  -CR           Recommendations    Therapy Frequency (Swallow) PRN  -CR        Predicted Duration Therapy Intervention (Days) until discharge  -CR        SLP Diet Recommendation regular textures;thin liquids  -CR        Recommended Diagnostics VFSS (MBS)  if further concerned for aspiration (when/if TB ruled out)  -CR        Recommended Precautions and Strategies upright posture during/after eating;small bites of food and sips of liquid;general aspiration precautions  -CR        Oral Care Recommendations Oral Care BID/PRN  -CR        SLP Rec.  for Method of Medication Administration meds whole;meds crushed;as tolerated  -CR        Monitor for Signs of Aspiration yes;notify SLP if any concerns  -CR                  User Key  (r) = Recorded By, (t) = Taken By, (c) = Cosigned By      Initials Name Effective Dates    Kimberley Kaba SLP 08/28/23 -                     EDUCATION  The patient has been educated in the following areas:   Dysphagia (Swallowing Impairment).              Time Calculation:    Time Calculation- SLP       Row Name 03/18/24 1542             Time Calculation- SLP    SLP Start Time 0745  -CR      SLP Received On 03/18/24  -CR         Untimed Charges    [id number] Eval Oral Pharyng Swallow Minutes 60  -CR         Total Minutes    Untimed Charges Total Minutes 60  -CR       Total Minutes 60  -CR                User Key  (r) = Recorded By, (t) = Taken By, (c) = Cosigned By      Initials Name Provider Type    Kimberley Kaba SLP Speech and Language Pathologist                    Therapy Charges for Today       Code Description Service Date Service Provider Modifiers Qty    77970900903 HC ST EVAL ORAL PHARYNG SWALLOW 4 3/18/2024 Kimberley Sutton SLP GN 1                 STACY Chawla  3/18/2024

## 2024-03-18 NOTE — THERAPY TREATMENT NOTE
Patient Name: Carito Gonzalez  : 3/7/1930    MRN: [id number]                              Today's Date: 3/18/2024       Admit Date: 3/14/2024    Visit Dx:     ICD-10-CM ICD-9-CM   1. Acute respiratory failure with hypoxia  J96.01 518.81   2. Bronchiectasis with acute lower respiratory infection  J47.0 494.1   3. Pneumonia of right middle lobe due to infectious organism  J18.9 486     Patient Active Problem List   Diagnosis    Hypercholesterolemia    Hypertension    Swelling of limb, left    MVP (mitral valve prolapse)    Non-rheumatic mitral regurgitation    Bilateral carotid bruits    Depression    Protein calorie malnutrition    Azotemia    SOB (shortness of breath)    Pain of right heel    Cystitis    Medicare annual wellness visit, subsequent    Aphasia    Subdural hematoma    Acute respiratory failure with hypoxia    Bronchiectasis    Pneumonia of right middle lobe due to infectious organism     Past Medical History:   Diagnosis Date    Abscess of leg     Acute deep vein thrombosis (DVT) of left lower extremity     Acute deep vein thrombosis (DVT) of right lower extremity     Acute renal insufficiency     Angiotensin converting enzyme inhibitor-aggravated angioedema     Cellulitis     CKD (chronic kidney disease) stage 3, GFR 30-59 ml/min     Depression     Herpes zoster     Hypercholesterolemia     Hyperlipidemia     Hypertension     Hypotension     Macular degeneration     Noted 2014    Mitral regurgitation     Mitral valve prolapse     SOB (shortness of breath)     Zoster      Past Surgical History:   Procedure Laterality Date    HIP FRACTURE SURGERY Right 2017    SKIN CANCER EXCISION        General Information       Row Name 24 1141          Physical Therapy Time and Intention    Document Type therapy note (daily note)  -PH     Mode of Treatment physical therapy  -PH       Row Name 24 1141          General Information    Existing Precautions/Restrictions fall  -PH     Barriers to  Rehab visual deficit  macular degeneration  -       Row Name 03/18/24 1141          Cognition    Orientation Status (Cognition) oriented x 4  -PH       Row Name 03/18/24 1141          Safety Issues, Functional Mobility    Impairments Affecting Function (Mobility) endurance/activity tolerance  -PH     Comment, Safety Issues/Impairments (Mobility) gt belt and non skid socks donned  -PH               User Key  (r) = Recorded By, (t) = Taken By, (c) = Cosigned By      Initials Name Provider Type     Deann Miles PTA Physical Therapist Assistant                   Mobility       Row Name 03/18/24 1141          Bed Mobility    Comment, (Bed Mobility) NT UIC and returned to chair  -PH       Row Name 03/18/24 1141          Bed-Chair Transfer    Bed-Chair [place] (Transfers) not tested  -PH       Row Name 03/18/24 1141          Sit-Stand Transfer    Sit-Stand [place] (Transfers) standby assist  -PH     Assistive Device (Sit-Stand Transfers) walker, front-wheeled  -PH       Row Name 03/18/24 1141          Gait/Stairs (Locomotion)    [place] Level (Gait) contact guard;standby assist  -PH     Assistive Device (Gait) walker, front-wheeled  -PH     Distance in Feet (Gait) 60  -PH     Pattern (Gait) step-through  -PH     Deviations/Abnormal Patterns (Gait) gait speed decreased;shavon decreased  -PH     Bilateral Gait Deviations forward flexed posture  -PH     Gait Assessment/Intervention steady w/ no overt LOB; fatigue limited distance  -PH     [place] Level (Stairs) not tested  -PH               User Key  (r) = Recorded By, (t) = Taken By, (c) = Cosigned By      Initials Name Provider Type     Deann Miles PTA Physical Therapist Assistant                   Obj/Interventions       Row Name 03/18/24 1143          Motor Skills    Therapeutic Exercise other (see comments)  BAP, LAQ, hip abd/add, SLR, punches, shldr flexion; x 15 reps each  -       Row Name 03/18/24 1143           Balance    Balance Assessment sitting static balance;standing static balance  -PH     Static Sitting Balance standby assist  -PH     Dynamic Standing Balance standby assist  -PH     Position/Device Used, Standing Balance walker, front-wheeled  -PH               User Key  (r) = Recorded By, (t) = Taken By, (c) = Cosigned By      Initials Name Provider Type    PH Deann Miles PTA Physical Therapist Assistant                   Goals/Plan    No documentation.                  Clinical Impression       Row Name 03/18/24 1143          Pain    Pretreatment Pain Rating 0/10 - no pain  -PH     Posttreatment Pain Rating 0/10 - no pain  -PH     Additional Documentation Pain Scale: Numbers Pre/Post-Treatment (Group)  -PH       Row Name 03/18/24 1143          Plan of Care Review    Plan of Care Reviewed With patient  -PH     Progress improving  -PH     Outcome Evaluation Pt was seen by PT this AM for tx. Pt was UIC and stood w/ SBA. Pt then amb approx 60' req SBA and use of fww. Pt reported visual deficits from mac degeneration although was able to maneuver obstacles in room w/o cues. Pt performed ther ex for strengthening and was UIC at end of session. Rec pt up and amb as able w/ pt likely returning to [facility] after dc.  -PH       Row Name 03/18/24 1143          Vital Signs    O2 Delivery Pre Treatment room air  -PH     O2 Delivery Intra Treatment room air  -PH     O2 Delivery Post Treatment room air  -PH       Row Name 03/18/24 1143          Positioning and Restraints    Pre-Treatment Position sitting in chair/recliner  -PH     Post Treatment Position chair  -PH     In Chair reclined;call light within reach;encouraged to call for assist;exit alarm on;notified nsg  -PH               User Key  (r) = Recorded By, (t) = Taken By, (c) = Cosigned By      Initials Name Provider Type    PH Deann Miles PTA Physical Therapist Assistant                   Outcome Measures       Row Name 03/18/24 1146 03/18/24 0848        How much help from another person do you currently need...    Turning from your back to your side while in flat bed without using bedrails? 4  -PH 4  -MA    Moving from lying on back to sitting on the side of a flat bed without bedrails? 4  -PH 4  -MA    Moving to and from a bed to a chair (including a wheelchair)? 3  -PH 3  -MA    Standing up from a chair using your arms (e.g., wheelchair, bedside chair)? 4  -PH 4  -MA    Climbing 3-5 steps with a railing? 3  -PH 3  -MA    To walk in hospital room? 3  -PH 3  -MA    AM-PAC 6 Clicks Score (PT) 21  -PH 21  -MA    Highest Level of Mobility Goal 6 --> Walk 10 steps or more  -PH 6 --> Walk 10 steps or more  -MA      Row Name 03/18/24 1146          Functional Assessment    Outcome Measure Options AM-PAC 6 Clicks Basic Mobility (PT)  -PH               User Key  (r) = Recorded By, (t) = Taken By, (c) = Cosigned By      Initials Name Provider Type    Nina Red RN Registered Nurse     Deann Miles PTA Physical Therapist Assistant                                 Physical Therapy Education       Title: PT OT SLP Therapies (Done)       Topic: Physical Therapy (Done)       Point: Mobility training (Done)       Learning Progress Summary             Patient Acceptance, E,TB,D, VU,DU by  at 3/18/2024 1146    Acceptance, E,D, VU,DU by LAWRENCE at 3/15/2024 1416                         Point: Home exercise program (Done)       Learning Progress Summary             Patient Acceptance, E,TB,D, VU,DU by  at 3/18/2024 1146    Acceptance, E,D, VU,DU by ZB at 3/15/2024 1416                         Point: Body mechanics (Done)       Learning Progress Summary             Patient Acceptance, E,TB,D, VU,DU by  at 3/18/2024 1146    Acceptance, E,D, VU,DU by ZB at 3/15/2024 1416                         Point: Precautions (Done)       Learning Progress Summary             Patient Acceptance, E,TB,D, VU,DU by  at 3/18/2024 1146    Acceptance, E,D, VU,DU by ZB at  3/15/2024 1416                                         User Key       Initials Effective Dates Name Provider Type Discipline    PH 06/16/21 -  Deann Miles PTA Physical Therapist Assistant PT    ZB 08/30/23 -  Froilan Meyers PT Physical Therapist PT                  PT Recommendation and Plan     Plan of Care Reviewed With: patient  Progress: improving  Outcome Evaluation: Pt was seen by PT this AM for tx. Pt was UIC and stood w/ SBA. Pt then amb approx 60' req SBA and use of fww. Pt reported visual deficits from mac degeneration although was able to maneuver obstacles in room w/o cues. Pt performed ther ex for strengthening and was UIC at end of session. Rec pt up and amb as able w/ pt likely returning to [facility] after dc.     Time Calculation:         PT Charges       Row Name 03/18/24 1146             Time Calculation    Start Time 1024  -PH      Stop Time 1039  -PH      Time Calculation (min) 15 min  -PH      PT Received On 03/18/24  -PH      PT - Next Appointment 03/20/24  -PH         Timed Charges    92384 - PT Therapeutic Exercise Minutes 7  -PH      80197 - PT Therapeutic Activity Minutes 8  -PH         Total Minutes    Timed Charges Total Minutes 15  -PH       Total Minutes 15  -PH                User Key  (r) = Recorded By, (t) = Taken By, (c) = Cosigned By      Initials Name Provider Type    PH Deann Miles PTA Physical Therapist Assistant                  Therapy Charges for Today       Code Description Service Date Service Provider Modifiers Qty    49606669048  PT THERAPEUTIC ACT EA 15 MIN 3/18/2024 Deann Miles PTA GP 1            PT G-Codes  Outcome Measure Options: AM-PAC 6 Clicks Basic Mobility (PT)  AM-PAC 6 Clicks Score (PT): 21  PT Discharge Summary  Anticipated Discharge Disposition (PT): assisted living    Deann Miles PTA  3/18/2024

## 2024-03-18 NOTE — PROGRESS NOTES
[facility]     LOS: 4 days     Subjective:    CC:Shortness of Breath    DIET:  Diet Order   Procedures    Diet: Cardiac; Healthy Heart (2-3 Na+); Fluid Consistency: Thin (IDDSI 0)     No complaints    Objective:    Vital Signs:  Temp:  [96.9 °F (36.1 °C)-97.6 °F (36.4 °C)] 96.9 °F (36.1 °C)  Heart Rate:  [66-79] 67  Resp:  [18] 18  BP: (141-162)/(69-87) 150/84    SpO2:  [91 %-94 %] 94 %  on   ;   Device (Oxygen Therapy): room air  Body mass index is 17.98 kg/m².    Physical Exam  Constitutional:       Appearance: Normal appearance.   HENT:      Head: Normocephalic and atraumatic.   Cardiovascular:      Rate and Rhythm: Normal rate and regular rhythm.      Heart sounds: No murmur heard.     No friction rub.   Pulmonary:      Effort: Pulmonary effort is normal.      Breath sounds: Normal breath sounds.   Abdominal:      General: Bowel sounds are normal. There is no distension.      Palpations: Abdomen is soft.      Tenderness: There is no abdominal tenderness.   Skin:     General: Skin is warm and dry.   Neurological:      Mental Status: She is alert.   Psychiatric:         Mood and Affect: Mood normal.         Behavior: Behavior normal.         Results Review:    No results found for: \"GLUCOSE\"    Results from last 7 days   Lab Units 03/15/24  0507   WBC 10*3/mm3 5.08   HEMOGLOBIN g/dL 12.1   HEMATOCRIT % 37.5   PLATELETS 10*3/mm3 237     Results from last 7 days   Lab Units 03/15/24  0507 03/14/24  1601   SODIUM mmol/L 141 136   POTASSIUM mmol/L 4.0 4.1   CHLORIDE mmol/L 104 98   CO2 mmol/L 24.8 26.1   BUN mg/dL 14 16   CREATININE mg/dL 0.66 0.78   CALCIUM mg/dL 8.5 9.7*   BILIRUBIN mg/dL  --  0.5   ALK PHOS U/L  --  70   ALT (SGPT) U/L  --  12   AST (SGOT) U/L  --  18   GLUCOSE mg/dL 138* 92             Results from last 7 days   Lab Units 03/14/24  1601   HSTROP T ng/L 18*     Cultures:  Blood Culture   Date Value Ref Range Status   03/14/2024 No growth at 2 days  Preliminary "   03/14/2024 No growth at 2 days  Preliminary       I have reviewed daily medications and changes in CPOE    Scheduled meds  amLODIPine, 5 mg, Oral, BID  cefTRIAXone, 1,000 mg, Intravenous, Q24H  metoprolol succinate XL, 25 mg, Oral, QAM   And  metoprolol succinate XL, 50 mg, Oral, Q PM  multivitamin with minerals, 1 tablet, Oral, Daily  sodium chloride, 10 mL, Intravenous, Q12H           PRN meds    benzonatate    senna-docusate sodium **AND** polyethylene glycol **AND** bisacodyl **AND** bisacodyl    ipratropium-albuterol    sodium chloride    sodium chloride    sodium chloride        Acute respiratory failure with hypoxia    Hypertension    MVP (mitral valve prolapse)    Aphasia    Bronchiectasis    Pneumonia of right middle lobe due to infectious organism        Assessment/Plan:    89 y.o. female admitted with Acute respiratory failure with hypoxia.     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

## 2024-03-18 NOTE — PLAN OF CARE
Goal Outcome Evaluation:  Plan of Care Reviewed With: patient        Progress: no change  Outcome Evaluation: Patient denies any pain. Non-productive cough, sputum ordered if possible. Awaiting results of TB blood test. Airborne precautions maintained. Patient up in chair all day, fall precautions maintained. SLP consulted for swallow study, saw patient this afternoon. VSS on room air. Anticipate discharge back to facility.

## 2024-03-18 NOTE — PROGRESS NOTES
[facility] INPATIENT PROGRESS NOTE         [address]    3/18/2024      PATIENT IDENTIFICATION:  Name: Carito Gonzalez ADMIT: 3/14/2024   : 3/7/1930  PCP: Eb Turner MD    MRN: [id number] LOS: 4 days   AGE/SEX: 89 y.o. female  ROOM: [facility]                     LOS 4    Reason for visit: Cavitary lung lesion and pulmonary infiltrates      SUBJECTIVE:      Resting comfortably.  Denies productive cough or chest pain currently.  QuantiFERON gold labs pending.      Objective   OBJECTIVE:    Vital Sign Min/Max for last 24 hours  Temp  Min: 96.9 °F (36.1 °C)  Max: 97.6 °F (36.4 °C)   BP  Min: 141/69  Max: 162/85   Pulse  Min: 66  Max: 79   Resp  Min: 18  Max: 18   SpO2  Min: 91 %  Max: 94 %   No data recorded   Weight  Min: 49 kg (108 lb 0.4 oz)  Max: 49 kg (108 lb 0.4 oz)    Vitals:    24 1729 24 0522 24 0900   BP: 141/69 162/85 147/87 150/84   BP Location: Right arm Right arm Right arm Right arm   Patient Position: Sitting Lying Lying Sitting   Pulse: 76 66 79 67   Resp:    Temp: 97.5 °F (36.4 °C) 97.6 °F (36.4 °C) 96.9 °F (36.1 °C) 96.9 °F (36.1 °C)   TempSrc: Oral Oral Oral Oral   SpO2: 91% 91% 91% 94%   Weight:   49 kg (108 lb 0.4 oz)    Height:                24  0535 24  0612 24  05   Weight: 48.3 kg (106 lb 7.7 oz) 48.5 kg (106 lb 14.8 oz) 49 kg (108 lb 0.4 oz)       Body mass index is 17.98 kg/m².                          Body mass index is 17.98 kg/m².    Intake/Output Summary (Last 24 hours) at 3/18/2024 1314  Last data filed at 3/18/2024 0900  Gross per 24 hour   Intake 700 ml   Output 800 ml   Net -100 ml         Exam:  GEN:  No distress, appears stated age  EYES:   anicteric sclerae  ENT:    External ears/nose normal, OP clear  NECK:  No adenopathy, midline trachea  LUNGS: Normal chest on inspection, palpation and coarse breath sounds bases right greater than left on auscultation      Assessment     Scheduled meds:   "amLODIPine, 5 mg, Oral, BID  cefTRIAXone, 1,000 mg, Intravenous, Q24H  metoprolol succinate XL, 25 mg, Oral, QAM   And  metoprolol succinate XL, 50 mg, Oral, Q PM  multivitamin with minerals, 1 tablet, Oral, Daily  sodium chloride, 10 mL, Intravenous, Q12H      IV meds:                           Data Review:  Results from last 7 days   Lab Units 03/15/24  0507 03/14/24  1601   SODIUM mmol/L 141 136   POTASSIUM mmol/L 4.0 4.1   CHLORIDE mmol/L 104 98   CO2 mmol/L 24.8 26.1   BUN mg/dL 14 16   CREATININE mg/dL 0.66 0.78   GLUCOSE mg/dL 138* 92   CALCIUM mg/dL 8.5 9.7*         Estimated Creatinine Clearance: 40.3 mL/min (by C-G formula based on SCr of 0.66 mg/dL).  Results from last 7 days   Lab Units 03/15/24  0507 03/14/24  1601   WBC 10*3/mm3 5.08 8.38   HEMOGLOBIN g/dL 12.1 13.2   PLATELETS 10*3/mm3 237 255         Results from last 7 days   Lab Units 03/14/24  1601   ALT (SGPT) U/L 12   AST (SGOT) U/L 18         Results from last 7 days   Lab Units 03/14/24  2259 03/14/24  1601   PROCALCITONIN ng/mL  --  0.09   LACTATE mmol/L 1.0  --          No results found for: \"HGBA1C\", \"POCGLU\"      Imaging reviewed  Chest x-ray 3/14 reviewed  CT chest 3/15 reviewed    Microbiology reviewed            Active Hospital Problems    Diagnosis  POA    **Acute respiratory failure with hypoxia [J96.01]  Yes    Bronchiectasis [J47.9]  No    Pneumonia of right middle lobe due to infectious organism [J18.9]  Yes    Aphasia [R47.01]  Yes    MVP (mitral valve prolapse) [I34.1]  Yes    Hypertension [I10]  Yes      Resolved Hospital Problems    Diagnosis Date Resolved POA    Hypoxia [R09.02] 03/14/2024 Yes         ASSESSMENT:  Right lower lobe cavitary lesion  Right middle lobe infiltrate  Pectus excavatum  Bronchiectasis        PLAN:  Very frail but in no distress.  Very well could be an aspiration pneumonia causing the cavitary lesion superior segment of the right lower lobe especially if she is aspirating in her sleep.  Awaiting final " culture results.  Noted QuantiFERON gold test ordered and pending.  Dr. Caceres's note mentioned consider bronchoscopy however in this 89-year-old very frail woman I would be reluctant to take her to endoscopy for anesthesia and the procedure at this time.  She is agreeable with this assessment and would not be willing to consent for bronchoscopy anyway. Continue current antibiotics.  Continue bronchodilators to help with clearance.      Levi Ames MD  Pulmonary and Critical Care Medicine  [facility], [facility]  3/18/2024    13:14 EDT

## 2024-03-18 NOTE — PLAN OF CARE
Goal Outcome Evaluation:  Plan of Care Reviewed With: patient        Progress: improving  Outcome Evaluation: on room air and sats wnl. Lungs with bilateral coarse crackles. Cough is nonproductive. In [place] precautions. Still waiting for TB gold test results. Continues on rocephin. Sleeping in chair tonight. Alarm on for safety. Up to bsc with assist. Gait is unsteady. Afebrile and VS stable. No cough noted after po but to be evaluated by SLP today.

## 2024-03-18 NOTE — PLAN OF CARE
Goal Outcome Evaluation:  Plan of Care Reviewed With: patient        Progress: improving  Outcome Evaluation: Pt was seen by PT this AM for tx. Pt was UIC and stood w/ SBA. Pt then amb approx 60' req SBA and use of fww. Pt reported visual deficits from mac degeneration although was able to maneuver obstacles in room w/o cues. Pt performed ther ex for strengthening and was UIC at end of session. Rec pt up and amb as able w/ pt likely returning to long term after dc.      Anticipated Discharge Disposition (PT): assisted living

## 2024-03-18 NOTE — PLAN OF CARE
Goal Outcome Evaluation:              Outcome Evaluation: Clinical swallow evaluation completed. Voice clear with phonation, however, wet congestion noted during laughter prior to PO trials. No overt s/s of aspiration with ice chips, thins by spoon/cup/straw, puree, soft/chopped solid, mixed consistecny, or regular solid trials. If further concerned for aspiration, consider VFSS if TB results are negative to rule out silent aspiration. At this time, continue regular diet with thin liquids. Meds as tolerated. Sitting upright, slow rate, small bites/sips.                 SLP Swallowing Diagnosis: R/O pharyngeal dysphagia (03/18/24 1500)

## 2024-03-19 NOTE — PROGRESS NOTES
"Nutrition Services    Patient Name:  Carito Gonzalez  YOB: 1930  MRN: [id number]  Admit Date:  3/14/2024    Assessment Date:  03/19/24    Summary: Follow up for BMI 17.8    Pt seen sitting in chair eating lunch. Noted decrease oral intake from admit. 0-50% PO intake per chart review. Reports poor appetite right now. Declined boosts/ensure. Agreeable to magic cups to help w/ PO intake. Awaiting quantiferon TB results. Expected DC plan tomorrow.     REC:  Magic cups once daily for breakfast  Continue to monitor PO intake, might meet criteria for malnutrition dx.  Encourage good PO intake and nutrition    RD to follow nutrition needs.     CLINICAL NUTRITION ASSESSMENT      Reason for Assessment BMI     Diagnosis/Problem   acute respiratory failure with hypoxia   Medical/Surgical History Past Medical History:   Diagnosis Date    Abscess of leg     Acute deep vein thrombosis (DVT) of left lower extremity     Acute deep vein thrombosis (DVT) of right lower extremity     Acute renal insufficiency     Angiotensin converting enzyme inhibitor-aggravated angioedema     Cellulitis     CKD (chronic kidney disease) stage 3, GFR 30-59 ml/min     Depression     Herpes zoster     Hypercholesterolemia     Hyperlipidemia     Hypertension     Hypotension     Macular degeneration     Noted February 2014    Mitral regurgitation     Mitral valve prolapse     SOB (shortness of breath)     Zoster        Past Surgical History:   Procedure Laterality Date    HIP FRACTURE SURGERY Right 09/18/2017    SKIN CANCER EXCISION          Anthropometrics        Current Height  Current Weight  BMI kg/m2 Height: 165.1 cm (65\")  Weight: 45.2 kg (99 lb 10.4 oz) (03/19/24 0656)  Body mass index is 16.58 kg/m².   Adjusted BMI (if applicable)    BMI Category Underweight (18.4 or below)   Ideal Body Weight (IBW) 125#   Usual Body Weight (UBW) 110#   Weight Trend Loss   Weight History Wt Readings from Last 30 Encounters:   03/19/24 0656 45.2 kg (99 " lb 10.4 oz)   03/18/24 0522 49 kg (108 lb 0.4 oz)   03/17/24 0612 48.5 kg (106 lb 14.8 oz)   03/16/24 0535 48.3 kg (106 lb 7.7 oz)   03/15/24 0531 48.7 kg (107 lb 5.8 oz)   03/14/24 2113 48.7 kg (107 lb 5.8 oz)   03/14/24 1548 50 kg (110 lb 4.8 oz)   02/15/24 1505 50 kg (110 lb 4.8 oz)   02/07/24 2027 52.4 kg (115 lb 8.3 oz)   02/07/24 1307 49 kg (108 lb)   01/08/24 1327 49.4 kg (108 lb 14.4 oz)   10/31/23 1032 50.3 kg (110 lb 12.8 oz)   06/06/23 1132 50.8 kg (112 lb)   04/18/23 1214 50.3 kg (111 lb)   03/10/22 1533 51.7 kg (114 lb)   02/18/22 1148 51.7 kg (114 lb)   11/16/21 1001 51.3 kg (113 lb)   11/02/21 1011 51.3 kg (113 lb)   03/25/21 1451 51.9 kg (114 lb 6.4 oz)   03/16/21 1228 51.3 kg (113 lb)   02/13/21 0908 54.4 kg (120 lb)   03/05/20 0901 52.6 kg (116 lb)   03/03/20 0953 52.6 kg (116 lb)   01/22/20 1046 52.6 kg (116 lb)   07/22/19 1139 53.1 kg (117 lb)   07/22/19 1039 53.1 kg (117 lb)   01/14/19 1336 54 kg (119 lb)   11/12/18 1113 53.1 kg (117 lb)   07/16/18 1415 53.7 kg (118 lb 6.4 oz)   06/05/18 1341 54.4 kg (120 lb)   05/14/18 1144 55.3 kg (122 lb)   01/02/18 1539 54.4 kg (120 lb)   11/17/17 1215 54.4 kg (120 lb)   10/03/17 1043 55.3 kg (122 lb)   06/26/17 1023 56.7 kg (125 lb)   06/08/17 1238 55.2 kg (121 lb 9.6 oz)   04/21/17 1226 55.3 kg (122 lb)        Estimated Requirements         Weight used  48.7 kg    Calories  1461 - 1705 kcal (30-35 kcal/kg)    Protein  58 - 73 g (1.2 - 1.5 gm/kg)    Fluid   (1 mL/kcal)       Labs       Pertinent Labs    Results from last 7 days   Lab Units 03/19/24  0547 03/15/24  0507 03/14/24  1601   SODIUM mmol/L 140 141 136   POTASSIUM mmol/L 3.6 4.0 4.1   CHLORIDE mmol/L 101 104 98   CO2 mmol/L 28.0 24.8 26.1   BUN mg/dL 22 14 16   CREATININE mg/dL 1.03* 0.66 0.78   CALCIUM mg/dL 9.1 8.5 9.7*   BILIRUBIN mg/dL 0.3  --  0.5   ALK PHOS U/L 59  --  70   ALT (SGPT) U/L 12  --  12   AST (SGOT) U/L 10  --  18   GLUCOSE mg/dL 103* 138* 92     Results from last 7 days   Lab  Units 03/19/24  0547   HEMOGLOBIN g/dL 13.0   HEMATOCRIT % 38.9   WBC 10*3/mm3 11.10*   ALBUMIN g/dL 3.5     Results from last 7 days   Lab Units 03/19/24  0547 03/15/24  0507 03/14/24  1601   PLATELETS 10*3/mm3 313 237 255     COVID19   Date Value Ref Range Status   03/14/2024 Not Detected Not Detected - Ref. Range Final     Lab Results   Component Value Date    HGBA1C 5.30 02/08/2024          Medications           Scheduled Medications amLODIPine, 5 mg, Oral, BID  cefTRIAXone, 1,000 mg, Intravenous, Q24H  metoprolol succinate XL, 25 mg, Oral, QAM   And  metoprolol succinate XL, 50 mg, Oral, Q PM  multivitamin with minerals, 1 tablet, Oral, Daily  pantoprazole, 40 mg, Oral, Q AM  sodium chloride, 10 mL, Intravenous, Q12H  sodium chloride, 4 mL, Nebulization, BID - RT       Infusions       PRN Medications   benzonatate    senna-docusate sodium **AND** polyethylene glycol **AND** bisacodyl **AND** bisacodyl    ipratropium-albuterol    ipratropium-albuterol    sodium chloride    sodium chloride    sodium chloride     Physical Findings          General Findings alert, oriented, underweight   Oral/Mouth Cavity WDL   Edema  no edema   Gastrointestinal WDL   Skin  skin intact   Tubes/Drains/Lines none   NFPE Date Completed: 3/15   --  Current Nutrition Orders & Evaluation of Intake       Oral Nutrition     Food Allergies NKFA   Current PO Diet Diet: Cardiac; Healthy Heart (2-3 Na+); Fluid Consistency: Thin (IDDSI 0)   Supplement n/a   PO Evaluation     % PO Intake 0-50% PO intake    Factors Affecting Intake: altered respiratory status, visual impairment   --  PES STATEMENT / NUTRITION DIAGNOSIS      Nutrition Dx Problem  Problem: Nutrition Appropriate for Condition at this Time and Inadequate Oral Intake  Etiology: Medical Diagnosis - acute resp failure w/ hypoxia    Signs/Symptoms: PO intake and Report/Observation     NUTRITION INTERVENTION / PLAN OF CARE      Intervention Goal(s) Maintain nutrition status, Meet  estimated needs, Maintain intake, No significant weight loss, Appropriate weight gain, and PO intake goal %: %         RD Intervention/Action Supplement offered/declined, Encourage intake, Continue to monitor, Care plan reviewed, and Recommend/order: ONS   --      Prescription/Orders:       PO Diet       Supplements Magic cups once daily for breakfast      Enteral Nutrition       Parenteral Nutrition    New Prescription Ordered? No changes at this time   --      Monitor/Evaluation Per protocol, PO intake, Weight, Swallow function   Discharge Plan/Needs Pending clinical course   --    RD to follow per protocol.      Electronically signed by:  Chary Chopra  03/19/24 16:41 EDT

## 2024-03-19 NOTE — PROGRESS NOTES
Continued Stay Note  [facility]     Patient Name: Carito Gonzalez  MRN: [id number]  Today's Date: 3/19/2024    Admit Date: 3/14/2024    Plan: From/return to [facility] [place]   Discharge Plan       Row Name 03/19/24 1451       Plan    Plan Comments Spoke to Diana/JERRICA at [phone] to update on TB tets (-). Diana stated as long as she is able to walk with walker/rollator, she is okay to return to [facility]      Row Name 03/19/24 1354       Plan    Plan Comments Spoke to t Violet Bernstein at [phone] (Home number but was listed as Cell number). Home nad cell numbers were corrected in EPIC. Violet confirmed DC plan is for patient to return to [facility]. Stated patient uses a rollator at all times. Stated she will provide transportation at [place]. Denies any needs/equipment.                   Discharge Codes    No documentation.                 Expected Discharge Date and Time       Expected Discharge Date Expected Discharge Time    Mar 20, 2024               Shaye Cox RN

## 2024-03-19 NOTE — PROGRESS NOTES
[facility] INPATIENT PROGRESS NOTE         [facility] [address] [place]    3/19/2024      PATIENT IDENTIFICATION:  Name: Carito Gonzalez ADMIT: 3/14/2024   : 3/7/1930  PCP: Eb Turner MD    MRN: [id number] LOS: 5 days   AGE/SEX: 89 y.o. female  ROOM: [facility]                     LOS 5    Reason for visit: Cavitary lung lesion and pulmonary infiltrates      SUBJECTIVE:      Resting comfortably.  No new complaints.  Denies productive cough.  Try to induce sputum yesterday since it is taking so long for QuantiFERON to come back but she is unable to produce sputum even with induction methods.      Objective   OBJECTIVE:    Vital Sign Min/Max for last 24 hours  Temp  Min: 96.7 °F (35.9 °C)  Max: 97.5 °F (36.4 °C)   BP  Min: 107/68  Max: 177/93   Pulse  Min: 75  Max: 90   Resp  Min: 16  Max: 18   SpO2  Min: 90 %  Max: 97 %   No data recorded   Weight  Min: 45.2 kg (99 lb 10.4 oz)  Max: 45.2 kg (99 lb 10.4 oz)    Vitals:    24 2204 24 0517 24 0656 24 0920   BP: 177/93 123/68  107/68   BP Location: Right arm Right arm  Right arm   Patient Position: Sitting Lying  Sitting   Pulse: 81 78  80   Resp: 16 16  16   Temp: 96.7 °F (35.9 °C) 96.8 °F (36 °C)  97.5 °F (36.4 °C)   TempSrc: Oral Oral  Oral   SpO2: 93% 91%  97%   Weight:   45.2 kg (99 lb 10.4 oz)    Height:                24  0612 24  0522 24  0656   Weight: 48.5 kg (106 lb 14.8 oz) 49 kg (108 lb 0.4 oz) 45.2 kg (99 lb 10.4 oz)       Body mass index is 16.58 kg/m².                          Body mass index is 16.58 kg/m².    Intake/Output Summary (Last 24 hours) at 3/19/2024 1044  Last data filed at 3/19/2024 0920  Gross per 24 hour   Intake 320 ml   Output 300 ml   Net 20 ml         Exam:  GEN:  No distress, appears stated age  EYES:   anicteric sclerae  ENT:    External ears/nose normal, OP clear  NECK:  No adenopathy, midline trachea  LUNGS: Normal chest on inspection, palpation and diminished on auscultation.  No  "wheezing or rhonchi.      Assessment     Scheduled meds:  amLODIPine, 5 mg, Oral, BID  cefTRIAXone, 1,000 mg, Intravenous, Q24H  metoprolol succinate XL, 25 mg, Oral, QAM   And  metoprolol succinate XL, 50 mg, Oral, Q PM  multivitamin with minerals, 1 tablet, Oral, Daily  pantoprazole, 40 mg, Oral, Q AM  sodium chloride, 10 mL, Intravenous, Q12H  sodium chloride, 4 mL, Nebulization, BID - RT      IV meds:                           Data Review:  Results from last 7 days   Lab Units 03/19/24  0547 03/15/24  0507 03/14/24  1601   SODIUM mmol/L 140 141 136   POTASSIUM mmol/L 3.6 4.0 4.1   CHLORIDE mmol/L 101 104 98   CO2 mmol/L 28.0 24.8 26.1   BUN mg/dL 22 14 16   CREATININE mg/dL 1.03* 0.66 0.78   GLUCOSE mg/dL 103* 138* 92   CALCIUM mg/dL 9.1 8.5 9.7*         Estimated Creatinine Clearance: 23.8 mL/min (A) (by C-G formula based on SCr of 1.03 mg/dL (H)).  Results from last 7 days   Lab Units 03/19/24  0547 03/15/24  0507 03/14/24  1601   WBC 10*3/mm3 11.10* 5.08 8.38   HEMOGLOBIN g/dL 13.0 12.1 13.2   PLATELETS 10*3/mm3 313 237 255         Results from last 7 days   Lab Units 03/19/24  0547 03/14/24  1601   ALT (SGPT) U/L 12 12   AST (SGOT) U/L 10 18         Results from last 7 days   Lab Units 03/14/24  2259 03/14/24  1601   PROCALCITONIN ng/mL  --  0.09   LACTATE mmol/L 1.0  --          No results found for: \"HGBA1C\", \"POCGLU\"      Imaging reviewed  Chest x-ray 3/14 reviewed  CT chest 3/15 reviewed    Microbiology reviewed            Active Hospital Problems    Diagnosis  POA    **Acute respiratory failure with hypoxia [J96.01]  Yes    Bronchiectasis [J47.9]  No    Pneumonia of right middle lobe due to infectious organism [J18.9]  Yes    Aphasia [R47.01]  Yes    MVP (mitral valve prolapse) [I34.1]  Yes    Hypertension [I10]  Yes      Resolved Hospital Problems    Diagnosis Date Resolved POA    Hypoxia [R09.02] 03/14/2024 Yes         ASSESSMENT:  Right lower lobe cavitary lesion  Right middle lobe infiltrate  Pectus " excavatum  Bronchiectasis        PLAN:  Very frail but in no distress.  Very well could be an aspiration pneumonia causing the cavitary lesion superior segment of the right lower lobe especially if she is aspirating in her sleep.  Have attempted to induce sputum since she was unable to produce it on her own and this was unsuccessful.  Noted QuantiFERON gold test ordered and pending.  Dr. Caceres's note mentioned consider bronchoscopy however in this 89-year-old very frail woman I would be reluctant to take her to endoscopy for anesthesia and the procedure at this time.  She is agreeable with this assessment and would not be willing to consent for bronchoscopy anyway she says. Continue current antibiotics.  Clinically she is improving with this treatment.  Continue bronchodilators to help with clearance.    If her QuantiFERON gold is negative then we can be assured that this is not TB.  If positive it does not mean that she has tuberculosis. My suspicion for TB is not very high and I suspect that the problem is related to silent aspiration.  Discussed with Dr. Yeager.      Levi Ames MD  Pulmonary and Critical Care Medicine  [facility], [facility]  3/19/2024    10:44 EDT

## 2024-03-19 NOTE — PLAN OF CARE
Problem: Adult Inpatient Plan of Care  Goal: Plan of Care Review  Outcome: Ongoing, Progressing  Flowsheets (Taken 3/19/2024 1649)  Progress: improving  Plan of Care Reviewed With: patient  Outcome Evaluation: bed alarm, up in chair for most of the day, DNR, no isolation/TB neg, up with asst/walker, [place], blind both eyes, voids well, RA, VSS. Plan to go back to her residency at [facility].living, swallows meds whole with water, daily iv Ceft QHS, will cont to monitor.   Goal Outcome Evaluation:  Plan of Care Reviewed With: patient        Progress: improving  Outcome Evaluation: bed alarm, up in chair for most of the day, DNR, no isolation/TB neg, up with asst/walker, [place], blind both eyes, voids well, RA, VSS. Plan to go back to her residency at [facility].living, swallows meds whole with water, daily iv Ceft QHS, will cont to monitor.

## 2024-03-19 NOTE — PROGRESS NOTES
Continued Stay Note  [facility]     Patient Name: Carito Gonzalez  MRN: [id number]  Today's Date: 3/19/2024    Admit Date: 3/14/2024    Plan: From/return to [facility] [place]   Discharge Plan       Row Name 03/19/24 1354       Plan    Plan Comments Spoke to dgt Violet Amandeep at [phone] (Home number but was listed as Cell number). Home nad cell numbers were corrected in EPIC. Violet confirmed DC plan is for patient to return to [facility]. Stated patient uses a rollator at all times. Stated she will provide transportation at [place]. Denies any needs/equipment.                   Discharge Codes    No documentation.                 Expected Discharge Date and Time       Expected Discharge Date Expected Discharge Time    Mar 20, 2024               Shaye Cox RN

## 2024-03-19 NOTE — PROGRESS NOTES
[facility]     LOS: 5 days     Subjective:    CC:Shortness of Breath    DIET:  Diet Order   Procedures    Diet: Cardiac; Healthy Heart (2-3 Na+); Fluid Consistency: Thin (IDDSI 0)     No complaints    Objective:    Vital Signs:  Temp:  [96.2 °F (35.7 °C)-97.5 °F (36.4 °C)] 96.2 °F (35.7 °C)  Heart Rate:  [73-81] 73  Resp:  [16-18] 16  BP: (107-177)/(68-93) 136/73    SpO2:  [90 %-97 %] 95 %  on   ;   Device (Oxygen Therapy): room air  Body mass index is 16.58 kg/m².    Physical Exam  Constitutional:       Appearance: Normal appearance.   HENT:      Head: Normocephalic and atraumatic.   Cardiovascular:      Rate and Rhythm: Normal rate and regular rhythm.      Heart sounds: No murmur heard.     No friction rub.   Pulmonary:      Effort: Pulmonary effort is normal.      Breath sounds: Normal breath sounds.   Abdominal:      General: Bowel sounds are normal. There is no distension.      Palpations: Abdomen is soft.      Tenderness: There is no abdominal tenderness.   Skin:     General: Skin is warm and dry.   Neurological:      Mental Status: She is alert.   Psychiatric:         Mood and Affect: Mood normal.         Behavior: Behavior normal.         Results Review:    Glucose   Date Value Ref Range Status   03/19/2024 103 (H) 65 - 99 mg/dL Final       Results from last 7 days   Lab Units 03/19/24  0547   WBC 10*3/mm3 11.10*   HEMOGLOBIN g/dL 13.0   HEMATOCRIT % 38.9   PLATELETS 10*3/mm3 313     Results from last 7 days   Lab Units 03/19/24  0547   SODIUM mmol/L 140   POTASSIUM mmol/L 3.6   CHLORIDE mmol/L 101   CO2 mmol/L 28.0   BUN mg/dL 22   CREATININE mg/dL 1.03*   CALCIUM mg/dL 9.1   BILIRUBIN mg/dL 0.3   ALK PHOS U/L 59   ALT (SGPT) U/L 12   AST (SGOT) U/L 10   GLUCOSE mg/dL 103*             Results from last 7 days   Lab Units 03/14/24  1601   HSTROP T ng/L 18*     Cultures:  Blood Culture   Date Value Ref Range Status   03/14/2024 No growth at 2 days  Preliminary   03/14/2024 No  growth at 2 days  Preliminary       I have reviewed daily medications and changes in CPOE    Scheduled meds  amLODIPine, 5 mg, Oral, BID  cefTRIAXone, 1,000 mg, Intravenous, Q24H  metoprolol succinate XL, 25 mg, Oral, QAM   And  metoprolol succinate XL, 50 mg, Oral, Q PM  multivitamin with minerals, 1 tablet, Oral, Daily  pantoprazole, 40 mg, Oral, Q AM  sodium chloride, 10 mL, Intravenous, Q12H  sodium chloride, 4 mL, Nebulization, BID - RT           PRN meds    benzonatate    senna-docusate sodium **AND** polyethylene glycol **AND** bisacodyl **AND** bisacodyl    ipratropium-albuterol    ipratropium-albuterol    sodium chloride    sodium chloride    sodium chloride        Acute respiratory failure with hypoxia    Hypertension    MVP (mitral valve prolapse)    Aphasia    Bronchiectasis    Pneumonia of right middle lobe due to infectious organism        Assessment/Plan:    89 y.o. female admitted with Acute respiratory failure with hypoxia.     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

## 2024-03-19 NOTE — PLAN OF CARE
Goal Outcome Evaluation:  Plan of Care Reviewed With: patient        Progress: no change  Outcome Evaluation: Breath sounds little less coarse last night but resp was in trying to give txs to induce sputum production for testing but pt still unable to produce any sputum. Sge is on room air but gets a little sob with exertion. Bed or chair alarm for safety. Pt continues on Rocephin. Continues in airbourne precautions to r/o TB. Passed swallow eval. takes pills who;e with water.Slightly confused but pleasant and cooperative.

## 2024-03-20 NOTE — PROGRESS NOTES
[facility] INPATIENT PROGRESS NOTE         [address]    3/20/2024      PATIENT IDENTIFICATION:  Name: Carito Gonzalez ADMIT: 3/14/2024   : 3/7/1930  PCP: Eb Turner MD    MRN: [id number] LOS: 6 days   AGE/SEX: 89 y.o. female  ROOM: [facility]                     LOS 6    Reason for visit: Cavitary lung lesion and pulmonary infiltrates      SUBJECTIVE:      No new issues overnight.      Objective   OBJECTIVE:    Vital Sign Min/Max for last 24 hours  Temp  Min: 96.2 °F (35.7 °C)  Max: 98.7 °F (37.1 °C)   BP  Min: 114/59  Max: 136/73   Pulse  Min: 71  Max: 75   Resp  Min: 16  Max: 16   SpO2  Min: 91 %  Max: 95 %   No data recorded   Weight  Min: 44.8 kg (98 lb 12.3 oz)  Max: 44.8 kg (98 lb 12.3 oz)    Vitals:    24 2156 24 0622 24 0631 24 0915   BP: 114/59 130/65  128/66   BP Location: Left arm Right arm  Right arm   Patient Position: Lying Lying  Lying   Pulse: 75 73  72   Resp: 16 16  16   Temp: 98.7 °F (37.1 °C) 97.2 °F (36.2 °C)  98.2 °F (36.8 °C)   TempSrc: Oral Oral  Oral   SpO2: 91% 94%  92%   Weight:   44.8 kg (98 lb 12.3 oz)    Height:                24  0522 24  0656 24  0631   Weight: 49 kg (108 lb 0.4 oz) 45.2 kg (99 lb 10.4 oz) 44.8 kg (98 lb 12.3 oz)       Body mass index is 16.44 kg/m².                          Body mass index is 16.44 kg/m².    Intake/Output Summary (Last 24 hours) at 3/20/2024 0926  Last data filed at 3/20/2024 0630  Gross per 24 hour   Intake 400 ml   Output 650 ml   Net -250 ml         Exam:  GEN:  No distress, appears stated age  NECK:  midline trachea  LUNGS: Nonlabored      Assessment     Scheduled meds:  amLODIPine, 5 mg, Oral, BID  cefTRIAXone, 1,000 mg, Intravenous, Q24H  metoprolol succinate XL, 25 mg, Oral, QAM   And  metoprolol succinate XL, 50 mg, Oral, Q PM  multivitamin with minerals, 1 tablet, Oral, Daily  pantoprazole, 40 mg, Oral, Q AM  sodium chloride, 10 mL, Intravenous, Q12H  sodium chloride, 4 mL,  "Nebulization, BID - RT      IV meds:                           Data Review:  Results from last 7 days   Lab Units 03/19/24  0547 03/15/24  0507 03/14/24  1601   SODIUM mmol/L 140 141 136   POTASSIUM mmol/L 3.6 4.0 4.1   CHLORIDE mmol/L 101 104 98   CO2 mmol/L 28.0 24.8 26.1   BUN mg/dL 22 14 16   CREATININE mg/dL 1.03* 0.66 0.78   GLUCOSE mg/dL 103* 138* 92   CALCIUM mg/dL 9.1 8.5 9.7*         Estimated Creatinine Clearance: 23.6 mL/min (A) (by C-G formula based on SCr of 1.03 mg/dL (H)).  Results from last 7 days   Lab Units 03/19/24  0547 03/15/24  0507 03/14/24  1601   WBC 10*3/mm3 11.10* 5.08 8.38   HEMOGLOBIN g/dL 13.0 12.1 13.2   PLATELETS 10*3/mm3 313 237 255         Results from last 7 days   Lab Units 03/19/24  0547 03/14/24  1601   ALT (SGPT) U/L 12 12   AST (SGOT) U/L 10 18         Results from last 7 days   Lab Units 03/14/24  2259 03/14/24  1601   PROCALCITONIN ng/mL  --  0.09   LACTATE mmol/L 1.0  --          No results found for: \"HGBA1C\", \"POCGLU\"      Imaging reviewed  Chest x-ray 3/14 reviewed  CT chest 3/15 reviewed    Microbiology reviewed            Active Hospital Problems    Diagnosis  POA    **Acute respiratory failure with hypoxia [J96.01]  Yes    Bronchiectasis [J47.9]  No    Pneumonia of right middle lobe due to infectious organism [J18.9]  Yes    Aphasia [R47.01]  Yes    MVP (mitral valve prolapse) [I34.1]  Yes    Hypertension [I10]  Yes      Resolved Hospital Problems    Diagnosis Date Resolved POA    Hypoxia [R09.02] 03/14/2024 Yes         ASSESSMENT:  Right lower lobe cavitary lesion  Right middle lobe infiltrate  Pectus excavatum  Bronchiectasis        PLAN:  QuantiFERON test came back negative.  Improving with current therapy.  Recommend aspiration precautions and suspect her cavitary infiltrate is due to aspiration.  No objection to discharge back to rehab.      Levi Ames MD  Pulmonary and Critical Care Medicine  [facility], [facility]  3/20/2024    09:26 EDT     "

## 2024-03-20 NOTE — THERAPY TREATMENT NOTE
Patient Name: Carito Gonzalez  : 3/7/1930    MRN: [id number]                              Today's Date: 3/20/2024       Admit Date: 3/14/2024    Visit Dx:     ICD-10-CM ICD-9-CM   1. Acute respiratory failure with hypoxia  J96.01 518.81   2. Bronchiectasis with acute lower respiratory infection  J47.0 494.1   3. Pneumonia of right middle lobe due to infectious organism  J18.9 486     Patient Active Problem List   Diagnosis    Hypercholesterolemia    Hypertension    Swelling of limb, left    MVP (mitral valve prolapse)    Non-rheumatic mitral regurgitation    Bilateral carotid bruits    Depression    Protein calorie malnutrition    Azotemia    SOB (shortness of breath)    Pain of right heel    Cystitis    Medicare annual wellness visit, subsequent    Aphasia    Subdural hematoma    Acute respiratory failure with hypoxia    Bronchiectasis    Pneumonia of right middle lobe due to infectious organism     Past Medical History:   Diagnosis Date    Abscess of leg     Acute deep vein thrombosis (DVT) of left lower extremity     Acute deep vein thrombosis (DVT) of right lower extremity     Acute renal insufficiency     Angiotensin converting enzyme inhibitor-aggravated angioedema     Cellulitis     CKD (chronic kidney disease) stage 3, GFR 30-59 ml/min     Depression     Herpes zoster     Hypercholesterolemia     Hyperlipidemia     Hypertension     Hypotension     Macular degeneration     Noted 2014    Mitral regurgitation     Mitral valve prolapse     SOB (shortness of breath)     Zoster      Past Surgical History:   Procedure Laterality Date    HIP FRACTURE SURGERY Right 2017    SKIN CANCER EXCISION        General Information       Row Name 24 1440          Physical Therapy Time and Intention    Document Type therapy note (daily note)  -CB     Mode of Treatment individual therapy;physical therapy  -CB       Row Name 24 1440          General Information    Existing Precautions/Restrictions fall   -CB     Barriers to Rehab visual deficit  -CB       Row Name 03/20/24 1440          Cognition    Orientation Status (Cognition) oriented x 3  -CB               User Key  (r) = Recorded By, (t) = Taken By, (c) = Cosigned By      Initials Name Provider Type    Monica Salvador, SYDNEY Physical Therapist                   Mobility       Row Name 03/20/24 1442          Bed Mobility    Bed Mobility supine-sit;sit-supine  -CB     Supine-Sit Titus (Bed Mobility) contact guard;minimum assist (75% patient effort);verbal cues  -CB     Sit-Supine Titus (Bed Mobility) minimum assist (75% patient effort);verbal cues  -CB     Assistive Device (Bed Mobility) head of bed elevated;bed rails  -CB       Row Name 03/20/24 1442          Sit-Stand Transfer    Sit-Stand Titus (Transfers) minimum assist (75% patient effort);moderate assist (50% patient effort);verbal cues  -CB     Assistive Device (Sit-Stand Transfers) walker, front-wheeled  -CB     Comment, (Sit-Stand Transfer) x2 STS reps  -CB       Row Name 03/20/24 1442          Gait/Stairs (Locomotion)    Titus Level (Gait) contact guard;minimum assist (75% patient effort)  -CB     Assistive Device (Gait) walker, front-wheeled  -CB     Distance in Feet (Gait) 8  x2  -CB     Deviations/Abnormal Patterns (Gait) gait speed decreased;shavon decreased  -CB     Bilateral Gait Deviations forward flexed posture  -CB     Comment, (Gait/Stairs) VC so pt is able to know where she is going as she states she is legally blind  -CB               User Key  (r) = Recorded By, (t) = Taken By, (c) = Cosigned By      Initials Name Provider Type    Monica Salvador PT Physical Therapist                   Obj/Interventions       Row Name 03/20/24 1443          Balance    Balance Assessment standing static balance;standing dynamic balance;sitting static balance;sitting dynamic balance  -CB     Static Sitting Balance standby assist  -CB     Dynamic Sitting Balance standby assist   -CB     Position, Sitting Balance sitting edge of bed  -CB     Static Standing Balance contact guard  -CB     Dynamic Standing Balance minimal assist;contact guard  -CB     Position/Device Used, Standing Balance supported;walker, front-wheeled  -CB     Balance Interventions sitting;standing;sit to stand;supported;static;minimal challenge;dynamic  -CB               User Key  (r) = Recorded By, (t) = Taken By, (c) = Cosigned By      Initials Name Provider Type    CB Monica Wu, PT Physical Therapist                   Goals/Plan    No documentation.                  Clinical Impression       Row Name 03/20/24 [facility]          Pain    Pretreatment Pain Rating 0/10 - no pain  -CB       Row Name 03/20/24 [facility]          Plan of Care Review    Plan of Care Reviewed With patient  -CB     Progress declining  -CB     Outcome Evaluation Patient is agreeable to PT this afternoon. Pt completed bed mobility requiring CGA-Talib and then x2 STS to rwx requiring min-modA with VC for UE placement. She ambulated 8ftx2 using rwx requiring CGA-Talib. VC for safety with turns and pt resting in bed post session with x-ray present. Will continue to progress as pt tolerates.  -CB       Row Name 03/20/24 1443          Therapy Assessment/Plan (PT)    Therapy Frequency (PT) 5 times/wk  -CB       Row Name 03/20/24 1443          Vital Signs    Pre SpO2 (%) 96  -CB     O2 Delivery Pre Treatment supplemental O2  -CB     O2 Delivery Intra Treatment supplemental O2  -CB     Post SpO2 (%) 93  -CB     O2 Delivery Post Treatment supplemental O2  -CB       Row Name 03/20/24 1443          Positioning and Restraints    Pre-Treatment Position in bed  -CB     Post Treatment Position bed  -CB     In Bed notified nsg;fowlers;call light within reach;encouraged to call for assist;exit alarm on;side rails up x2;with other staff  -CB               User Key  (r) = Recorded By, (t) = Taken By, (c) = Cosigned By      Initials Name Provider Type    Monica Salvador, PT  Physical Therapist                   Outcome Measures       Row Name 03/20/24 1445 03/20/24 0841       How much help from another person do you currently need...    Turning from your back to your side while in flat bed without using bedrails? 3  -CB 4  -MB    Moving from lying on back to sitting on the side of a flat bed without bedrails? 3  -CB 3  -MB    Moving to and from a bed to a chair (including a wheelchair)? 3  -CB 3  -MB    Standing up from a chair using your arms (e.g., wheelchair, bedside chair)? 3  -CB 3  -MB    Climbing 3-5 steps with a railing? 2  -CB 2  -MB    To walk in hospital room? 3  -CB 3  -MB    AM-PAC 6 Clicks Score (PT) 17  -CB 18  -MB    Highest Level of Mobility Goal 5 --> Static standing  -CB 6 --> Walk 10 steps or more  -MB      Row Name 03/20/24 1445          Functional Assessment    Outcome Measure Options AM-PAC 6 Clicks Basic Mobility (PT)  -CB               User Key  (r) = Recorded By, (t) = Taken By, (c) = Cosigned By      Initials Name Provider Type    Monica Salvador, PT Physical Therapist    Martha Corral, RN Registered Nurse                                 Physical Therapy Education       Title: PT OT SLP Therapies (In Progress)       Topic: Physical Therapy (In Progress)       Point: Mobility training (In Progress)       Learning Progress Summary             Patient Acceptance, E,TB, NR by CB at 3/20/2024 1445    Acceptance, E,TB,D, VU,DU by PH at 3/18/2024 1146    Acceptance, E,D, VU,DU by ZB at 3/15/2024 1416                         Point: Home exercise program (Done)       Learning Progress Summary             Patient Acceptance, E,TB,D, VU,DU by PH at 3/18/2024 1146    Acceptance, E,D, VU,DU by ZB at 3/15/2024 1416                         Point: Body mechanics (In Progress)       Learning Progress Summary             Patient Acceptance, E,TB, NR by CB at 3/20/2024 1445    Acceptance, E,TB,D, VU,DU by PH at 3/18/2024 1146    Acceptance, E,D, VU,DU by ZB at 3/15/2024  1416                         Point: Precautions (In Progress)       Learning Progress Summary             Patient Acceptance, E,TB, NR by CB at 3/20/2024 1445    Acceptance, E,TB,D, VU,DU by  at 3/18/2024 1146    Acceptance, E,D, VU,DU by ZB at 3/15/2024 1416                                         User Key       Initials Effective Dates Name Provider Type Discipline    PH 06/16/21 -  Deann Miles, PTA Physical Therapist Assistant PT    CB 10/22/21 -  Monica Wu, PT Physical Therapist PT    ZB 08/30/23 -  Froilan Meyers, PT Physical Therapist PT                  PT Recommendation and Plan     Plan of Care Reviewed With: patient  Progress: declining  Outcome Evaluation: Patient is agreeable to PT this afternoon. Pt completed bed mobility requiring CGA-Talib and then x2 STS to rwx requiring min-modA with VC for UE placement. She ambulated 8ftx2 using rwx requiring CGA-Talib. VC for safety with turns and pt resting in bed post session with x-ray present. Will continue to progress as pt tolerates.     Time Calculation:         PT Charges       Row Name 03/20/24 1445             Time Calculation    Start Time 1409  -CB      Stop Time 1419  -CB      Time Calculation (min) 10 min  -CB      PT Received On 03/20/24  -CB      PT - Next Appointment 03/21/24  -CB         Time Calculation- PT    Total Timed Code Minutes- PT 10 minute(s)  -CB         Timed Charges    42797 - PT Therapeutic Activity Minutes 10  -CB         Total Minutes    Timed Charges Total Minutes 10  -CB       Total Minutes 10  -CB                User Key  (r) = Recorded By, (t) = Taken By, (c) = Cosigned By      Initials Name Provider Type    CB Monica Wu, PT Physical Therapist                  Therapy Charges for Today       Code Description Service Date Service Provider Modifiers Qty    54942224955 HC PT THERAPEUTIC ACT EA 15 MIN 3/20/2024 Monica Wu, PT GP 1            PT G-Codes  Outcome Measure Options: AM-PAC 6 Clicks Basic  Mobility (PT)  AM-PAC 6 Clicks Score (PT): 17  PT Discharge Summary  Anticipated Discharge Disposition (PT): assisted living    Monica Wu, PT  3/20/2024

## 2024-03-20 NOTE — PLAN OF CARE
Goal Outcome Evaluation:  Plan of Care Reviewed With: patient           Outcome Evaluation: SLP arrived to attempt FEES in the AM. Patient with breathing complaints, discussed with RN. RT to see patient for breathing treatment. Completed re-eval at bedside. Recommend NPO, ice chips ok, meds alternate route. SLP does not feel FEES appropraite secondary to fluctuating status into the afternoon. SLP to follow for re-eval and instrumental as warranted. Recommend consider goals of care discussion, risk of NPO recommendations per instrumental.                 SLP Swallowing Diagnosis: suspected pharyngeal dysphagia (03/20/24 1030)

## 2024-03-20 NOTE — PLAN OF CARE
Goal Outcome Evaluation:           Progress: no change  Outcome Evaluation: VSS. Pt had episodes of shortness of breath today. Nebulizers now scheduled. LR @ 75. Falls precautions in place. Up to bathroom with assist X 1 and RW. Pt NPO for now due to shortness of breath episodes and an episode of dysphagia. SLP to do a FEES tomorrow if appropriate. Will likely d/c back to [facility], timing TBD.

## 2024-03-20 NOTE — NURSING NOTE
Pt had episode around 1100 with shortness of breath. SLP was in the room and pt was unable to drink through a straw and then when she tried to sip from the side of the cup she just let the drink fall out of her mouth. RT was called and they came and gave pt an albuterol nebulizer. Pt's shortness of breath improved per pt report. Pt was able to sip coffee out of a cup with no issues. Around 1300 pt was having shortness of breath again. 2LNC O2 placed on pt because she was sating 88 on RA. Oxygen sat came up to 94% on 2LNC.  RA and RN got pt up to bathroom and pt stated her vision has changed this morning from before. She stated her vision was cisco than usual and this happened this AM. MD called with no immediate call back. Rapid response called after RN discussed situation with Charge KERA Mccrary and Chiquita RN Manager. See Rapid response note.

## 2024-03-20 NOTE — THERAPY RE-EVALUATION
Acute Care - Speech Language Pathology   Swallow Re-Evaluation [facility]     Patient Name: Carito Gonzalez  : 3/7/1930  MRN: [id number]  Today's Date: 3/20/2024               Admit Date: 3/14/2024    Visit Dx:     ICD-10-CM ICD-9-CM   1. Acute respiratory failure with hypoxia  J96.01 518.81   2. Bronchiectasis with acute lower respiratory infection  J47.0 494.1   3. Pneumonia of right middle lobe due to infectious organism  J18.9 486     Patient Active Problem List   Diagnosis    Hypercholesterolemia    Hypertension    Swelling of limb, left    MVP (mitral valve prolapse)    Non-rheumatic mitral regurgitation    Bilateral carotid bruits    Depression    Protein calorie malnutrition    Azotemia    SOB (shortness of breath)    Pain of right heel    Cystitis    Medicare annual wellness visit, subsequent    Aphasia    Subdural hematoma    Acute respiratory failure with hypoxia    Bronchiectasis    Pneumonia of right middle lobe due to infectious organism     Past Medical History:   Diagnosis Date    Abscess of leg     Acute deep vein thrombosis (DVT) of left lower extremity     Acute deep vein thrombosis (DVT) of right lower extremity     Acute renal insufficiency     Angiotensin converting enzyme inhibitor-aggravated angioedema     Cellulitis     CKD (chronic kidney disease) stage 3, GFR 30-59 ml/min     Depression     Herpes zoster     Hypercholesterolemia     Hyperlipidemia     Hypertension     Hypotension     Macular degeneration     Noted 2014    Mitral regurgitation     Mitral valve prolapse     SOB (shortness of breath)     Zoster      Past Surgical History:   Procedure Laterality Date    HIP FRACTURE SURGERY Right 2017    SKIN CANCER EXCISION         SLP Recommendation and Plan  SLP Swallowing Diagnosis: suspected pharyngeal dysphagia (24 1030)  SLP Diet Recommendation: NPO (24 1030)  Recommended Precautions and Strategies: upright posture during/after eating, small bites of food  and sips of liquid, general aspiration precautions (03/20/24 1030)  SLP Rec. for Method of Medication Administration: meds via alternate route (03/20/24 1030)     Monitor for Signs of Aspiration: yes, notify SLP if any concerns (03/20/24 1030)  Recommended Diagnostics: reassess via clinical swallow evaluation (03/20/24 1030)  Swallow Criteria for Skilled Therapeutic Interventions Met: demonstrates skilled criteria (03/20/24 1030)     Rehab Potential/Prognosis, Swallowing: adequate, monitor progress closely (03/20/24 1030)  Therapy Frequency (Swallow): PRN (03/20/24 1030)  Predicted Duration Therapy Intervention (Days): until discharge (03/20/24 1030)                                           Plan of Care Reviewed With: patient  Outcome Evaluation: SLP arrived to attempt FEES in the AM. Patient with breathing complaints, discussed with RN. RT to see patient for breathing treatment. Completed re-eval at bedside. Recommend NPO, ice chips ok, meds alternate route. SLP does not feel FEES appropraite secondary to fluctuating status into the afternoon. SLP to follow for re-eval and instrumental as warranted. Recommend consider goals of care discussion, risk of NPO recommendations per instrumental.      SWALLOW EVALUATION (Last 72 Hours)       SLP Adult Swallow Evaluation       Row Name 03/20/24 1030 03/18/24 1500                Rehab Evaluation    Document Type re-evaluation  -OC evaluation  -CR       Subjective Information no complaints  -OC no complaints  -CR       Patient Observations alert;cooperative;agree to therapy  -OC alert;cooperative  -CR       Patient Effort good  -OC good  -CR       Symptoms Noted During/After Treatment none  -OC none  -CR          General Information    Patient Profile Reviewed yes  -OC yes  -CR       Pertinent History Of Current Problem out of TB precautions. Discussed with MD and RN. SLP arrived to attempt FEES this date. Patient with comlaints of shortness of breath and anxiety.  -OC 89  y/o female admitted with respiratory failure with hypoxia. Right mid lung infiltrate. Right low lobe cavitary lesion. Recent discharge on 2/8/24 for SDH. Awaiting TB results.  -CR       Current Method of Nutrition regular textures;thin liquids  -OC regular textures;thin liquids  -CR       Precautions/Limitations, Vision difficult to assess  -OC difficult to assess  -CR       Precautions/Limitations, Hearing hearing impairment, bilaterally  -OC hearing impairment, bilaterally  -CR       Prior Level of Function-Swallowing -- no diet consistency restrictions  -CR       Plans/Goals Discussed with patient;agreed upon  -OC patient;agreed upon  -CR       Barriers to Rehab none identified  -OC none identified  -CR          Pain Scale: Numbers Pre/Post-Treatment    Pretreatment Pain Rating 0/10 - no pain  -OC 0/10 - no pain  -CR       Posttreatment Pain Rating 0/10 - no pain  -OC 0/10 - no pain  -CR          Oral Motor Structure and Function    Dentition Assessment natural, present and adequate  -OC natural, present and adequate  -CR       Secretion Management WNL/WFL  -OC WNL/WFL  -CR       Mucosal Quality moist, healthy  -OC moist, healthy  -CR          Oral Musculature and Cranial Nerve Assessment    Oral Motor General Assessment generalized oral motor weakness  -OC generalized oral motor weakness  -CR          Clinical Swallow Eval    Clinical Swallow Evaluation Summary Re-eval completed as RN and SLP awaited respiratory to arrive for breathing treatment. Patient demonstrated inabiity to pull thin liquid via straw (cup of tea at bedside). Patient attempted a trial via cup, decreased coordination of acceptance of liquid, breathing, and swallowing. Patient expectored part of bolus. Audible swallow and coughing s/p trial. SLP recommend NPO at this time.  -OC Clinical swallow evaluation completed. Voice clear with phonation, however, wet congestion noted during laughter prior to PO trials. No overt s/s of aspiration with ice  chips, thins by spoon/cup/straw, puree, soft/chopped solid, mixed consistecny, or regular solid trials. If further concerned for aspiration, consider VFSS if TB results are negative to rule out silent aspiration. At this time, continue regular diet with thin liquids. Meds as tolerated. Sitting upright, slow rate, small bites/sips.  -CR          SLP Evaluation Clinical Impression    SLP Swallowing Diagnosis suspected pharyngeal dysphagia  -OC R/O pharyngeal dysphagia  -CR       Functional Impact risk of aspiration/pneumonia  -OC --       Rehab Potential/Prognosis, Swallowing adequate, monitor progress closely  -OC --       Swallow Criteria for Skilled Therapeutic Interventions Met demonstrates skilled criteria  -OC --          Recommendations    Therapy Frequency (Swallow) PRN  -OC PRN  -CR       Predicted Duration Therapy Intervention (Days) until discharge  -OC until discharge  -CR       SLP Diet Recommendation NPO  -OC regular textures;thin liquids  -CR       Recommended Diagnostics reassess via clinical swallow evaluation  -OC VFSS (MBS)  if further concerned for aspiration (when/if TB ruled out)  -CR       Recommended Precautions and Strategies upright posture during/after eating;small bites of food and sips of liquid;general aspiration precautions  -OC upright posture during/after eating;small bites of food and sips of liquid;general aspiration precautions  -CR       Oral Care Recommendations -- Oral Care BID/PRN  -CR       SLP Rec. for Method of Medication Administration meds via alternate route  -OC meds whole;meds crushed;as tolerated  -CR       Monitor for Signs of Aspiration yes;notify SLP if any concerns  -OC yes;notify SLP if any concerns  -CR                 User Key  (r) = Recorded By, (t) = Taken By, (c) = Cosigned By      Initials Name Effective Dates    OC Flores Adrian SLP 08/28/23 -     CR Kimberley Sutton SLP 08/28/23 -                     EDUCATION  The patient has been educated in the  following areas:   Dysphagia (Swallowing Impairment).              Time Calculation:    Time Calculation- SLP       Row Name 03/20/24 1518             Time Calculation- SLP    SLP Start Time 1130  -OC      SLP Received On 03/20/24  -OC         Untimed Charges    SLP Treatment ST Treatment Swallow Minutes  - 50438  -OC      65337-UP Treatment Swallow Minutes 90  -OC         Total Minutes    Untimed Charges Total Minutes 90  -OC       Total Minutes 90  -OC                User Key  (r) = Recorded By, (t) = Taken By, (c) = Cosigned By      Initials Name Provider Type    Flores Katz SLP Speech and Language Pathologist                    Therapy Charges for Today       Code Description Service Date Service Provider Modifiers Qty    93528699960 HC ST TREATMENT SWALLOW 6 3/20/2024 Flores Adrian SLP GN 1                 STACY Garsia  3/20/2024

## 2024-03-20 NOTE — PLAN OF CARE
Goal Outcome Evaluation:  Plan of Care Reviewed With: patient        Progress: no change  Outcome Evaluation: iv abx, vdg, up with assist, falls protocol, dnr, [place], legally blind, poss d/c to [facility]                                Hx of irregular menses (menarche age 6) and now no menses x 7-8 months  No obvious s/s thyroid disorder, Hirsuitism or other excess androgenism  Reviewed common causes of amenorrhea and w/u ordered to assess for thyroid dx, Hyperprolactinemia, pregnancy (though states virginal), PCOS, other HPO axis dx     US also ordered to assess anatomy   f/u appt in 3-4 wks  Provera given to induce menses

## 2024-03-20 NOTE — PROGRESS NOTES
[facility]     LOS: 6 days     Subjective:    CC:Shortness of Breath    DIET:  Diet Order   Procedures    NPO Diet NPO Type: Ice Chips   When I saw the patient this morning she says stated she was more anxious today.  But her speech remained normal.  And respiratory pattern was normal    But just after had seen her she became more dyspneic and she was seen by the speech therapist who had her drink through a straw and she was unable to drink through a straw.  She immediately spit fluid out.  Patient was given a breathing treatment and was feeling better however after lunch again she had another episode of dyspnea.    Objective:    Vital Signs:  Temp:  [97.2 °F (36.2 °C)-98.7 °F (37.1 °C)] 98.2 °F (36.8 °C)  Heart Rate:  [71-75] 73  Resp:  [16-20] 20  BP: (106-134)/(59-76) 106/62    SpO2:  [91 %-100 %] 100 %  on  Flow (L/min):  [1-2] 2;   Device (Oxygen Therapy): nasal cannula  Body mass index is 16.44 kg/m².    Physical Exam  Constitutional:       Appearance: Normal appearance.   HENT:      Head: Normocephalic and atraumatic.   Cardiovascular:      Rate and Rhythm: Normal rate and regular rhythm.      Heart sounds: No murmur heard.     No friction rub.   Pulmonary:      Effort: Pulmonary effort is normal.      Breath sounds: Normal breath sounds.   Abdominal:      General: Bowel sounds are normal. There is no distension.      Palpations: Abdomen is soft.      Tenderness: There is no abdominal tenderness.   Skin:     General: Skin is warm and dry.   Neurological:      Mental Status: She is alert.   Psychiatric:         Mood and Affect: Mood normal.         Behavior: Behavior normal.         Results Review:    Glucose   Date Value Ref Range Status   03/20/2024 76 65 - 99 mg/dL Final   03/19/2024 103 (H) 65 - 99 mg/dL Final       Results from last 7 days   Lab Units 03/19/24  0547   WBC 10*3/mm3 11.10*   HEMOGLOBIN g/dL 13.0   HEMATOCRIT % 38.9   PLATELETS 10*3/mm3 313     Results from last  7 days   Lab Units 03/20/24  1208 03/19/24  0547   SODIUM mmol/L 138 140   POTASSIUM mmol/L 3.4* 3.6   CHLORIDE mmol/L 99 101   CO2 mmol/L 23.0 28.0   BUN mg/dL 30* 22   CREATININE mg/dL 0.83 1.03*   CALCIUM mg/dL 8.8 9.1   BILIRUBIN mg/dL  --  0.3   ALK PHOS U/L  --  59   ALT (SGPT) U/L  --  12   AST (SGOT) U/L  --  10   GLUCOSE mg/dL 76 103*             Results from last 7 days   Lab Units 03/14/24  1601   HSTROP T ng/L 18*     Cultures:  Blood Culture   Date Value Ref Range Status   03/14/2024 No growth at 2 days  Preliminary   03/14/2024 No growth at 2 days  Preliminary       I have reviewed daily medications and changes in CPOE    Scheduled meds  albuterol, 2.5 mg, Nebulization, TID  amLODIPine, 5 mg, Oral, BID  cefTRIAXone, 1,000 mg, Intravenous, Q24H  metoprolol succinate XL, 25 mg, Oral, QAM   And  metoprolol succinate XL, 50 mg, Oral, Q PM  multivitamin with minerals, 1 tablet, Oral, Daily  pantoprazole, 40 mg, Oral, Q AM  sodium chloride, 10 mL, Intravenous, Q12H        lactated ringers, 75 mL/hr, Last Rate: 75 mL/hr (03/20/24 1433)      PRN meds    benzonatate    senna-docusate sodium **AND** polyethylene glycol **AND** bisacodyl **AND** bisacodyl    ipratropium-albuterol    ipratropium-albuterol    sodium chloride    sodium chloride    sodium chloride        Acute respiratory failure with hypoxia    Hypertension    MVP (mitral valve prolapse)    Aphasia    Bronchiectasis    Pneumonia of right middle lobe due to infectious organism        Assessment/Plan:    89 y.o. female admitted with Acute respiratory failure with hypoxia.     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   -I discussed with daughter  -For now patient be n.p.o.  -Continue with antibiotics    HTN  - continue amlodipine, metoprolol  - BP is controlled    History of DVT  Mitral valve prolapse history  History of SDH  - off of all antiplatelets/AC due to SDH     Flow (L/min):  [2-4] 2    Protonix 40 mg qday        Luis Enrique Yeager MD  03/20/24  16:19 EDT

## 2024-03-20 NOTE — CODE DOCUMENTATION
"Patient Name:  Carito Gonzalez  YOB: 1930  MRN:  [id number]  Admit Date:  3/14/2024    Visit Diagnoses:     ICD-10-CM ICD-9-CM   1. Acute respiratory failure with hypoxia  J96.01 518.81   2. Bronchiectasis with acute lower respiratory infection  J47.0 494.1   3. Pneumonia of right middle lobe due to infectious organism  J18.9 486       Reason For Rapid:   pt complaint of world seeming \"greyer\" (pt legally blind and doesn't see colors at baseline), also short of breath and tired    RN Communicated With:   primary RN to reach out to [facility] team      Rapid Outcome:  pt to stay on unit    Communication From Rapid Team:   Pt complaining of vision seeming greyer, along with fatigue and shortness of air. Vitals at rest look stable on room air. Orthostatic BPs positive upon our assessment, see note. Primary RN to reach out to [facility] team and develop plan. No focal deficit or signs of stroke at this time.       Most Recent Vital Signs  Temp:  [96.2 °F (35.7 °C)-98.7 °F (37.1 °C)] 98.2 °F (36.8 °C)  Heart Rate:  [71-75] 73  Resp:  [16-20] 20  BP: (106-136)/(59-76) 106/62  SpO2:  [91 %-96 %] 94 %  on  Flow (L/min):  [1-2] 2;   Device (Oxygen Therapy): nasal cannula    Labs:  Results from last 7 days   Lab Units 03/14/24  1602   COVID19  Not Detected     No results found for: \"POCGLU\"  No results found for: \"SITE\", \"ALLENTEST\", \"PHART\", \"PEF1NIO\", \"PO2ART\", \"WUN5LTY\", \"BASEEXCESS\", \"C6URWTFH\", \"HGBBG\", \"HCTABG\", \"OXYHEMOGLOBI\", \"METHHGBN\", \"CARBOXYHGB\", \"CO2CT\", \"BAROMETRIC\", \"MODALITY\", \"FIO2\"  Results from last 7 days   Lab Units 03/19/24  0547 03/15/24  0507 03/14/24  1601   WBC 10*3/mm3 11.10* 5.08 8.38   HEMOGLOBIN g/dL 13.0 12.1 13.2   PLATELETS 10*3/mm3 313 237 255     Results from last 7 days   Lab Units 03/20/24  1208 03/19/24  0547 03/15/24  0507 03/14/24  1601   SODIUM mmol/L 138 140 141 136   POTASSIUM mmol/L 3.4* 3.6 4.0 4.1   CHLORIDE mmol/L 99 101 104 98   CO2 mmol/L 23.0 28.0 24.8 26.1   BUN mg/dL 30* 22 " 14 16   CREATININE mg/dL 0.83 1.03* 0.66 0.78   GLUCOSE mg/dL 76 103* 138* 92   ALBUMIN g/dL  --  3.5  --  4.1   BILIRUBIN mg/dL  --  0.3  --  0.5   ALK PHOS U/L  --  59  --  70   AST (SGOT) U/L  --  10  --  18   ALT (SGPT) U/L  --  12  --  12   Estimated Creatinine Clearance: 29.3 mL/min (by C-G formula based on SCr of 0.83 mg/dL).  Results from last 7 days   Lab Units 03/14/24  1654 03/14/24  1601   HSTROP T ng/L  --  18*   PROBNP pg/mL  --  897.2   D DIMER QUANT MCGFEU/mL 0.68  --      Results from last 7 days   Lab Units 03/14/24  2259 03/14/24  1601   PROCALCITONIN ng/mL  --  0.09   LACTATE mmol/L 1.0  --        Lab Results   Component Value Date    STREPPNEUAG Negative 03/15/2024    LEGANTIGENUR Negative 03/15/2024     Results from last 7 days   Lab Units 03/14/24  1656   BLOODCX  No growth at 5 days  No growth at 5 days     Results from last 7 days   Lab Units 03/14/24  1602   INFLUENZA B PCR  Not Detected   INFLUENZA A PCR  Not Detected       NIH Stroke Scale:   n/a                                                         Please refer to full rapid documentation on summary page under Index / Code Timeline

## 2024-03-20 NOTE — PLAN OF CARE
Goal Outcome Evaluation:  Plan of Care Reviewed With: patient        Progress: declining  Outcome Evaluation: Patient is agreeable to PT this afternoon. Pt completed bed mobility requiring CGA-Talib and then x2 STS to rwx requiring min-modA with VC for UE placement. She ambulated 8ftx2 using rwx requiring CGA-Talib. VC for safety with turns and pt resting in bed post session with x-ray present. Will continue to progress as pt tolerates.

## 2024-03-21 NOTE — PLAN OF CARE
Goal Outcome Evaluation:  Plan of Care Reviewed With: patient, daughter        Progress: no change  Outcome Evaluation: Pt seen by PT this PM for tx. Pt was UIc w/ dtr in room. Pt performed ther ex for B LE. Pt then stood w/ CGA and use of fww. Pt amb to BR toilet then to door of room and back to chair req CGA and use of fww. Pt was UIC at end of session. PT will prog as pt leon.      Anticipated Discharge Disposition (PT): assisted living

## 2024-03-21 NOTE — PLAN OF CARE
Goal Outcome Evaluation:      SLP attempted FEES this date. Patient refused procedure reporting she could not tolerate the scope. SLP offered VFSS, patient does not wish to participate in VFSS with ingestion of barium at this time. SLP unable to rule out aspiration or determine safest PO diet. Recommend goals of care discussion. SLP to sign off, please re-consult as warranted or if patient agreeable to instrumental eval.

## 2024-03-21 NOTE — PLAN OF CARE
Goal Outcome Evaluation:  Plan of Care Reviewed With: patient        Progress: no change  Outcome Evaluation: Lungs with coarse crackles. O2 at 2liters. Pt is NPO. She is suppose to have another swallow study again today. I did let her swallow her norvasc pill with sip of water--no cough or choking noted. She has slept a lot tonight. She gets sob easily with exertion

## 2024-03-21 NOTE — PROGRESS NOTES
Nutrition Services    Patient Name:  Carito Gonzalez  YOB: 1930  MRN: [id number]  Admit Date:  3/14/2024    Assessment Date:  03/21/24    Summary: Follow up for BMI 17.8    Pt seen laying in bed during follow up. Previously NPO for concern for dysphagia but just changed back to regular diet today. SLP notes reviewed. Pt refused VFSS 2/2 reports unable to tolerate scope. Recommended GOC discussion. Hospice and palliative consulted. Also completed MSA re-assessment. Pt now meets malnutrition criteria.  Pt continues to decline during hospital stay per MD's note. Also attempted to talk to pt about tube feeding but pt also declined it w/ this RD. Will increase her ONS to help support PO intake.     Patient meets ASPEN/AND criteria for nutrition diagnosis of severe malnutrition of acute illness based on: inadequate oral intake, swallow evaluation, NFPE results below, declining functional status, BMI 16.6    REC:  Increase magic cups to BID  Continue to monitor PO/ONS intake.   Encourage and assist with PO and ONS intake    RD to follow nutrition needs.     CLINICAL NUTRITION ASSESSMENT      Reason for Assessment BMI     Diagnosis/Problem   acute respiratory failure with hypoxia   Medical/Surgical History Past Medical History:   Diagnosis Date    Abscess of leg     Acute deep vein thrombosis (DVT) of left lower extremity     Acute deep vein thrombosis (DVT) of right lower extremity     Acute renal insufficiency     Angiotensin converting enzyme inhibitor-aggravated angioedema     Cellulitis     CKD (chronic kidney disease) stage 3, GFR 30-59 ml/min     Depression     Herpes zoster     Hypercholesterolemia     Hyperlipidemia     Hypertension     Hypotension     Macular degeneration     Noted February 2014    Mitral regurgitation     Mitral valve prolapse     SOB (shortness of breath)     Zoster        Past Surgical History:   Procedure Laterality Date    HIP FRACTURE SURGERY Right 09/18/2017    SKIN CANCER  "EXCISION          Anthropometrics        Current Height  Current Weight  BMI kg/m2 Height: 165.1 cm (65\")  Weight: 45.4 kg (100 lb 1.4 oz) (03/21/24 0455)  Body mass index is 16.66 kg/m².   Adjusted BMI (if applicable)    BMI Category Underweight (18.4 or below)   Ideal Body Weight (IBW) 125#   Usual Body Weight (UBW) 110#   Weight Trend Loss   Weight History Wt Readings from Last 30 Encounters:   03/21/24 0455 45.4 kg (100 lb 1.4 oz)   03/20/24 0631 44.8 kg (98 lb 12.3 oz)   03/19/24 0656 45.2 kg (99 lb 10.4 oz)   03/18/24 0522 49 kg (108 lb 0.4 oz)   03/17/24 0612 48.5 kg (106 lb 14.8 oz)   03/16/24 0535 48.3 kg (106 lb 7.7 oz)   03/15/24 0531 48.7 kg (107 lb 5.8 oz)   03/14/24 2113 48.7 kg (107 lb 5.8 oz)   03/14/24 1548 50 kg (110 lb 4.8 oz)   02/15/24 1505 50 kg (110 lb 4.8 oz)   02/07/24 2027 52.4 kg (115 lb 8.3 oz)   02/07/24 1307 49 kg (108 lb)   01/08/24 1327 49.4 kg (108 lb 14.4 oz)   10/31/23 1032 50.3 kg (110 lb 12.8 oz)   06/06/23 1132 50.8 kg (112 lb)   04/18/23 1214 50.3 kg (111 lb)   03/10/22 1533 51.7 kg (114 lb)   02/18/22 1148 51.7 kg (114 lb)   11/16/21 1001 51.3 kg (113 lb)   11/02/21 1011 51.3 kg (113 lb)   03/25/21 1451 51.9 kg (114 lb 6.4 oz)   03/16/21 1228 51.3 kg (113 lb)   02/13/21 0908 54.4 kg (120 lb)   03/05/20 0901 52.6 kg (116 lb)   03/03/20 0953 52.6 kg (116 lb)   01/22/20 1046 52.6 kg (116 lb)   07/22/19 1139 53.1 kg (117 lb)   07/22/19 1039 53.1 kg (117 lb)   01/14/19 1336 54 kg (119 lb)   11/12/18 1113 53.1 kg (117 lb)   07/16/18 1415 53.7 kg (118 lb 6.4 oz)   06/05/18 1341 54.4 kg (120 lb)   05/14/18 1144 55.3 kg (122 lb)   01/02/18 1539 54.4 kg (120 lb)   11/17/17 1215 54.4 kg (120 lb)   10/03/17 1043 55.3 kg (122 lb)   06/26/17 1023 56.7 kg (125 lb)   06/08/17 1238 55.2 kg (121 lb 9.6 oz)   04/21/17 1226 55.3 kg (122 lb)        Estimated Requirements         Weight used  48.7 kg    Calories  1461 - 1705 kcal (30-35 kcal/kg)    Protein  58 - 73 g (1.2 - 1.5 gm/kg)    Fluid   (1 " mL/kcal)       Labs       Pertinent Labs    Results from last 7 days   Lab Units 03/21/24  0549 03/20/24  1208 03/19/24  0547   SODIUM mmol/L 140 138 140   POTASSIUM mmol/L 3.7 3.4* 3.6   CHLORIDE mmol/L 103 99 101   CO2 mmol/L 22.2 23.0 28.0   BUN mg/dL 24* 30* 22   CREATININE mg/dL 0.60 0.83 1.03*   CALCIUM mg/dL 8.6 8.8 9.1   BILIRUBIN mg/dL  --   --  0.3   ALK PHOS U/L  --   --  59   ALT (SGPT) U/L  --   --  12   AST (SGOT) U/L  --   --  10   GLUCOSE mg/dL 74 76 103*     Results from last 7 days   Lab Units 03/19/24  0547   HEMOGLOBIN g/dL 13.0   HEMATOCRIT % 38.9   WBC 10*3/mm3 11.10*   ALBUMIN g/dL 3.5     Results from last 7 days   Lab Units 03/19/24  0547 03/15/24  0507   PLATELETS 10*3/mm3 313 237     COVID19   Date Value Ref Range Status   03/14/2024 Not Detected Not Detected - Ref. Range Final     Lab Results   Component Value Date    HGBA1C 5.30 02/08/2024          Medications           Scheduled Medications albuterol, 2.5 mg, Nebulization, TID  amLODIPine, 5 mg, Oral, BID  cefTRIAXone, 1,000 mg, Intravenous, Q24H  methylPREDNISolone sodium succinate, 20 mg, Intravenous, BID  metoprolol succinate XL, 25 mg, Oral, QAM   And  metoprolol succinate XL, 50 mg, Oral, Q PM  multivitamin with minerals, 1 tablet, Oral, Daily  pantoprazole, 40 mg, Oral, Q AM  sodium chloride, 10 mL, Intravenous, Q12H       Infusions lactated ringers, 75 mL/hr, Last Rate: 75 mL/hr (03/21/24 0347)         PRN Medications   benzonatate    senna-docusate sodium **AND** polyethylene glycol **AND** bisacodyl **AND** bisacodyl    ipratropium-albuterol    ipratropium-albuterol    Potassium Replacement - Follow Nurse / BPA Driven Protocol    sodium chloride    sodium chloride    sodium chloride     Physical Findings          General Findings alert, oriented, underweight   Oral/Mouth Cavity WDL   Edema  no edema   Gastrointestinal WDL   Skin  skin intact   Tubes/Drains/Lines none   NFPE See Malnutrition Severity Assessment, Date Completed:  3/21      Malnutrition Severity Assessment      Patient meets criteria for : (P) Severe Malnutrition  Malnutrition Type (Last 8 Hours)       Malnutrition Severity Assessment       Row Name 03/21/24 1637       Malnutrition Severity Assessment    Malnutrition Type Acute Disease or Injury - Related Malnutrition (P)       Row Name 03/21/24 1637       Insufficient Energy Intake     Insufficient Energy Intake Findings Severe (P)     Insufficient Energy Intake  < or equal to 50% of est. energy requirement for > or equal to 5d) (P)       Row Name 03/21/24 1637       Muscle Loss    Loss of Muscle Mass Findings Severe (P)     Mormon Region Severe - deep hollowing/scooping, lack of muscle to touch, facial bones well defined (P)     Clavicle Bone Region Severe - protruding prominent bone (P)     Acromion Bone Region Severe - squared shoulders, bones, and acromion process protrusion prominent (P)     Scapular Bone Region Severe - prominent bones, depressions easily visible between ribs, scapula, spine, shoulders (P)     Dorsal Hand Region Severe - prominent depression (P)     Patellar Region Severe - prominent bone, square looking, very little muscle definition (P)     Anterior Thigh Region Moderate - mild depression on inner thigh (P)     Posterior Calf Region Moderate - some roundness, slight firmness (P)       Row Name 03/21/24 1637       Fat Loss    Subcutaneous Fat Loss Findings Severe (P)     Orbital Region  Severe - pronounced hollowness/depression, dark circles, loose saggy skin (P)     Upper Arm Region Severe - mostly skin, very little space between folds, fingers touch (P)     Thoracic & Lumbar Region Severe - ribs visible with prominent depressions, iliac crest very prominent (P)       Row Name 03/21/24 1637       Declining Functional Status    Declining Functional Status Findings Measurably Reduced (P)       Row Name 03/21/24 1637       Criteria Met (Must meet criteria for severity in at least 2 of these  categories: M Wasting, Fat Loss, Fluid, Secondary Signs, Wt. Status, Intake)    Patient meets criteria for  Severe Malnutrition (P)                        Current Nutrition Orders & Evaluation of Intake       Oral Nutrition     Food Allergies NKFA   Current PO Diet Diet: Regular/House; Fluid Consistency: Thin (IDDSI 0)   Supplement n/a   PO Evaluation     % PO Intake 0-25% PO intake    Factors Affecting Intake: altered respiratory status, visual impairment   --  PES STATEMENT / NUTRITION DIAGNOSIS      Nutrition Dx Problem  Problem: Malnutrition (severe)  Etiology: Medical Diagnosis - acute resp failure w/ hypoxia    Signs/Symptoms: NPO, PO intake, NFPE Results, BMI, and SLP/Swallow Evaluation     NUTRITION INTERVENTION / PLAN OF CARE      Intervention Goal(s) Maintain nutrition status, Meet estimated needs, Maintain intake, No significant weight loss, Appropriate weight gain, and PO intake goal %: %         RD Intervention/Action Supplement offered/declined, Encourage intake, Continue to monitor, Care plan reviewed, and Recommend/order: ONS   --      Prescription/Orders:       PO Diet       Supplements Magic cups BID      Enteral Nutrition       Parenteral Nutrition    New Prescription Ordered? No changes at this time   --      Monitor/Evaluation Per protocol, PO intake, Weight, Swallow function   Discharge Plan/Needs Pending clinical course   --    RD to follow per protocol.      Electronically signed by:  Chary Chopra  03/21/24 16:21 EDT

## 2024-03-21 NOTE — PLAN OF CARE
Problem: Adult Inpatient Plan of Care  Goal: Plan of Care Review  Outcome: Ongoing, Progressing  Flowsheets (Taken 3/21/2024 8439)  Progress: no change  Outcome Evaluation: ivf's infusing, pivoting to BSC with asst, 2LO2NC, up to chair, Speech tried to scope today but pt refused, changed diet to reg, consults to palliative/[facility], starting iv solumedrol tonight, voids well, bed/chair alarm, DNR, daughter here for care conf with DrEdling, pt is blind, [place], will cont to monitor.   Goal Outcome Evaluation:  Plan of Care Reviewed With: patient        Progress: no change  Outcome Evaluation: ivf's infusing, pivoting to BSC with asst, 2LO2NC, up to chair, Speech tried to scope today but pt refused, changed diet to reg, consults to palliative/[facility], starting iv solumedrol tonight, voids well, bed/chair alarm, DNR, daughter here for care conf with DrEdling, pt is blind, [place], will cont to monitor.

## 2024-03-21 NOTE — THERAPY TREATMENT NOTE
Patient Name: Carito Gonzalez  : 3/7/1930    MRN: [id number]                              Today's Date: 3/21/2024       Admit Date: 3/14/2024    Visit Dx:     ICD-10-CM ICD-9-CM   1. Acute respiratory failure with hypoxia  J96.01 518.81   2. Bronchiectasis with acute lower respiratory infection  J47.0 494.1   3. Pneumonia of right middle lobe due to infectious organism  J18.9 486     Patient Active Problem List   Diagnosis    Hypercholesterolemia    Hypertension    Swelling of limb, left    MVP (mitral valve prolapse)    Non-rheumatic mitral regurgitation    Bilateral carotid bruits    Depression    Protein calorie malnutrition    Azotemia    SOB (shortness of breath)    Pain of right heel    Cystitis    Medicare annual wellness visit, subsequent    Aphasia    Subdural hematoma    Acute respiratory failure with hypoxia    Bronchiectasis    Pneumonia of right middle lobe due to infectious organism     Past Medical History:   Diagnosis Date    Abscess of leg     Acute deep vein thrombosis (DVT) of left lower extremity     Acute deep vein thrombosis (DVT) of right lower extremity     Acute renal insufficiency     Angiotensin converting enzyme inhibitor-aggravated angioedema     Cellulitis     CKD (chronic kidney disease) stage 3, GFR 30-59 ml/min     Depression     Herpes zoster     Hypercholesterolemia     Hyperlipidemia     Hypertension     Hypotension     Macular degeneration     Noted 2014    Mitral regurgitation     Mitral valve prolapse     SOB (shortness of breath)     Zoster      Past Surgical History:   Procedure Laterality Date    HIP FRACTURE SURGERY Right 2017    SKIN CANCER EXCISION        General Information       Row Name 24 1546          Physical Therapy Time and Intention    Document Type therapy note (daily note)  -PH     Mode of Treatment physical therapy  -PH       Row Name 24 1546          General Information    Existing Precautions/Restrictions fall  -PH     Barriers to  Rehab visual deficit  -PH       Row Name 03/21/24 1546          Cognition    Orientation Status (Cognition) oriented x 3  -PH       Row Name 03/21/24 1546          Safety Issues, Functional Mobility    Impairments Affecting Function (Mobility) endurance/activity tolerance;strength  -PH     Comment, Safety Issues/Impairments (Mobility) gt belt and non skid socks donned;  -PH               User Key  (r) = Recorded By, (t) = Taken By, (c) = Cosigned By      Initials Name Provider Type     Deann Miles PTA Physical Therapist Assistant                   Mobility       Row Name 03/21/24 1547          Bed Mobility    Supine-Sit Everetts (Bed Mobility) not tested  -PH     Sit-Supine Everetts (Bed Mobility) not tested  -PH     Comment, (Bed Mobility) NT UIC  -PH       Row Name 03/21/24 1547          Sit-Stand Transfer    Sit-Stand Everetts (Transfers) contact guard;minimum assist (75% patient effort);verbal cues;nonverbal cues (demo/gesture)  -PH     Assistive Device (Sit-Stand Transfers) walker, front-wheeled  -PH     Comment, (Sit-Stand Transfer) 2x STS from chair and from  toilet  -PH       Row Name 03/21/24 1547          Gait/Stairs (Locomotion)    Everetts Level (Gait) contact guard  -PH     Assistive Device (Gait) walker, front-wheeled  -PH     Distance in Feet (Gait) 40  10' to BR then 30  -PH     Pattern (Gait) step-through  -PH     Deviations/Abnormal Patterns (Gait) gait speed decreased;shavon decreased  -PH     Gait Assessment/Intervention directional  cues 2/2 visual deficits; fatigue limited distance; slow w/ no overt LOB  -PH     Everetts Level (Stairs) unable to assess  -PH               User Key  (r) = Recorded By, (t) = Taken By, (c) = Cosigned By      Initials Name Provider Type     Deann Miles PTA Physical Therapist Assistant                   Obj/Interventions       Row Name 03/21/24 1549          Motor Skills    Therapeutic Exercise other (see comments)   BAP, HS, hip abd/add, SLR; x 10 reps all  -PH       Row Name 03/21/24 1549          Balance    Balance Assessment sitting static balance;standing static balance  -PH     Static Sitting Balance standby assist  -PH     Static Standing Balance contact guard  -PH     Position/Device Used, Standing Balance walker, front-wheeled  -PH               User Key  (r) = Recorded By, (t) = Taken By, (c) = Cosigned By      Initials Name Provider Type    PH Deann Miles PTA Physical Therapist Assistant                   Goals/Plan    No documentation.                  Clinical Impression       Row Name 03/21/24 1549          Pain    Pretreatment Pain Rating 0/10 - no pain  -PH     Posttreatment Pain Rating 0/10 - no pain  -PH     Additional Documentation Pain Scale: Numbers Pre/Post-Treatment (Group)  -PH       Row Name 03/21/24 1549          Plan of Care Review    Plan of Care Reviewed With patient;daughter  -PH     Progress no change  -PH     Outcome Evaluation Pt seen by PT this PM for tx. Pt was UIc w/ dtr in room. Pt performed ther ex for B LE. Pt then stood w/ CGA and use of fww. Pt amb to BR toilet then to door of room and back to chair req CGA and use of fww. Pt was UIC at end of session. PT will prog as pt leon.  -PH       Row Name 03/21/24 1549          Vital Signs    O2 Delivery Pre Treatment supplemental O2  -PH     O2 Delivery Intra Treatment supplemental O2  -PH     O2 Delivery Post Treatment supplemental O2  -PH       Row Name 03/21/24 1549          Positioning and Restraints    Pre-Treatment Position sitting in chair/recliner  -PH     Post Treatment Position chair  -PH     In Chair reclined;call light within reach;encouraged to call for assist;exit alarm on;with family/caregiver;notified nsg  -PH               User Key  (r) = Recorded By, (t) = Taken By, (c) = Cosigned By      Initials Name Provider Type    PH Deann Miles PTA Physical Therapist Assistant                   Outcome Measures        Row Name 03/21/24 1551 03/21/24 0820       How much help from another person do you currently need...    Turning from your back to your side while in flat bed without using bedrails? 3  -PH 3  -TW    Moving from lying on back to sitting on the side of a flat bed without bedrails? 3  -PH 3  -TW    Moving to and from a bed to a chair (including a wheelchair)? 3  -PH 3  -TW    Standing up from a chair using your arms (e.g., wheelchair, bedside chair)? 3  -PH 3  -TW    Climbing 3-5 steps with a railing? 2  -PH 2  -TW    To walk in hospital room? 3  -PH 3  -TW    AM-PAC 6 Clicks Score (PT) 17  -PH 17  -TW    Highest Level of Mobility Goal 5 --> Static standing  -PH 5 --> Static standing  -TW      Row Name 03/21/24 1551          Functional Assessment    Outcome Measure Options AM-PAC 6 Clicks Basic Mobility (PT)  -PH               User Key  (r) = Recorded By, (t) = Taken By, (c) = Cosigned By      Initials Name Provider Type    TW Gini Harrison, RN Registered Nurse     Deann Miles PTA Physical Therapist Assistant                                 Physical Therapy Education       Title: PT OT SLP Therapies (Done)       Topic: Physical Therapy (Done)       Point: Mobility training (Done)       Learning Progress Summary             Patient Acceptance, E,TB, VU,NR by  at 3/21/2024 1551    Acceptance, E,TB, NR by CB at 3/20/2024 1445    Acceptance, E,TB,D, VU,DU by  at 3/18/2024 1146    Acceptance, E,D, VU,DU by ZB at 3/15/2024 1416                         Point: Home exercise program (Done)       Learning Progress Summary             Patient Acceptance, E,TB, VU,NR by  at 3/21/2024 1551    Acceptance, E,TB,D, VU,DU by  at 3/18/2024 1146    Acceptance, E,D, VU,DU by ZB at 3/15/2024 1416                         Point: Body mechanics (Done)       Learning Progress Summary             Patient Acceptance, E,TB, VU,NR by  at 3/21/2024 1551    Acceptance, E,TB, NR by CB at 3/20/2024 1445    Acceptance, E,TB,D,  VU,DU by  at 3/18/2024 1146    Acceptance, E,D, VU,DU by ZB at 3/15/2024 1416                         Point: Precautions (Done)       Learning Progress Summary             Patient Acceptance, E,TB, VU,NR by  at 3/21/2024 1551    Acceptance, E,TB, NR by CB at 3/20/2024 1445    Acceptance, E,TB,D, VU,DU by  at 3/18/2024 1146    Acceptance, E,D, VU,DU by ZB at 3/15/2024 1416                                         User Key       Initials Effective Dates Name Provider Type Discipline     06/16/21 -  Deann Miles PTA Physical Therapist Assistant PT    CB 10/22/21 -  Monica Wu, [place] Physical Therapist PT    ZB 08/30/23 -  Froilan Meyers PT Physical Therapist PT                  PT Recommendation and Plan     Plan of Care Reviewed With: patient, daughter  Progress: no change  Outcome Evaluation: Pt seen by PT this PM for tx. Pt was UIc w/ dtr in room. Pt performed ther ex for B LE. Pt then stood w/ CGA and use of fww. Pt amb to BR toilet then to door of room and back to chair req CGA and use of fww. Pt was UIC at end of session. PT will prog as pt leon.     Time Calculation:         PT Charges       Row Name 03/21/24 1552             Time Calculation    Start Time 1512  -PH      Stop Time 1545  -PH      Time Calculation (min) 33 min  -PH      PT Received On 03/21/24  -PH      PT - Next Appointment 03/22/24  -PH         Timed Charges    30153 - PT Therapeutic Exercise Minutes 8  -PH      85183 - PT Therapeutic Activity Minutes 25  -PH         Total Minutes    Timed Charges Total Minutes 33  -PH       Total Minutes 33  -PH                User Key  (r) = Recorded By, (t) = Taken By, (c) = Cosigned By      Initials Name Provider Type     Deann Miles PTA Physical Therapist Assistant                  Therapy Charges for Today       Code Description Service Date Service Provider Modifiers Qty    50724562172 HC PT THERAPEUTIC ACT EA 15 MIN 3/21/2024 Deann Miles PTA GP 2            PT  G-Codes  Outcome Measure Options: AM-PAC 6 Clicks Basic Mobility (PT)  AM-PAC 6 Clicks Score (PT): 17  PT Discharge Summary  Anticipated Discharge Disposition (PT): assisted living    Deann Jd, PTA  3/21/2024

## 2024-03-21 NOTE — PROGRESS NOTES
[facility]     LOS: 7 days     Subjective:    CC:Shortness of Breath    DIET:  Diet Order   Procedures    Diet: Regular/House; Fluid Consistency: Thin (IDDSI 0)     Patient is less alert today than she has been previously she is still alert and answers questions appropriately.  She has a slight rattle in her throat    Objective:    Vital Signs:  Temp:  [98.4 °F (36.9 °C)-99 °F (37.2 °C)] 99 °F (37.2 °C)  Heart Rate:  [73-83] 79  Resp:  [16-20] 16  BP: (132-167)/(66-78) 151/76    SpO2:  [91 %-100 %] 98 %  on  Flow (L/min):  [2-2.5] 2.5;   Device (Oxygen Therapy): nasal cannula  Body mass index is 16.66 kg/m².    Physical Exam  Constitutional:       Appearance: Normal appearance.   HENT:      Head: Normocephalic and atraumatic.   Cardiovascular:      Rate and Rhythm: Normal rate and regular rhythm.      Heart sounds: No murmur heard.     No friction rub.   Pulmonary:      Effort: Pulmonary effort is normal.      Breath sounds: Normal breath sounds.   Abdominal:      General: Bowel sounds are normal. There is no distension.      Palpations: Abdomen is soft.      Tenderness: There is no abdominal tenderness.   Skin:     General: Skin is warm and dry.   Neurological:      Mental Status: She is alert.   Psychiatric:         Mood and Affect: Mood normal.         Behavior: Behavior normal.         Results Review:    Glucose   Date Value Ref Range Status   03/21/2024 74 65 - 99 mg/dL Final   03/20/2024 76 65 - 99 mg/dL Final   03/19/2024 103 (H) 65 - 99 mg/dL Final       Results from last 7 days   Lab Units 03/19/24  0547   WBC 10*3/mm3 11.10*   HEMOGLOBIN g/dL 13.0   HEMATOCRIT % 38.9   PLATELETS 10*3/mm3 313     Results from last 7 days   Lab Units 03/21/24  0549 03/20/24  1208 03/19/24  0547   SODIUM mmol/L 140   < > 140   POTASSIUM mmol/L 3.7   < > 3.6   CHLORIDE mmol/L 103   < > 101   CO2 mmol/L 22.2   < > 28.0   BUN mg/dL 24*   < > 22   CREATININE mg/dL 0.60   < > 1.03*   CALCIUM mg/dL 8.6    < > 9.1   BILIRUBIN mg/dL  --   --  0.3   ALK PHOS U/L  --   --  59   ALT (SGPT) U/L  --   --  12   AST (SGOT) U/L  --   --  10   GLUCOSE mg/dL 74   < > 103*    < > = values in this interval not displayed.             Results from last 7 days   Lab Units 03/14/24  1601   HSTROP T ng/L 18*     Cultures:  Blood Culture   Date Value Ref Range Status   03/14/2024 No growth at 2 days  Preliminary   03/14/2024 No growth at 2 days  Preliminary       I have reviewed daily medications and changes in CPOE    Scheduled meds  albuterol, 2.5 mg, Nebulization, TID  amLODIPine, 5 mg, Oral, BID  cefTRIAXone, 1,000 mg, Intravenous, Q24H  metoprolol succinate XL, 25 mg, Oral, QAM   And  metoprolol succinate XL, 50 mg, Oral, Q PM  multivitamin with minerals, 1 tablet, Oral, Daily  pantoprazole, 40 mg, Oral, Q AM  sodium chloride, 10 mL, Intravenous, Q12H        lactated ringers, 75 mL/hr, Last Rate: 75 mL/hr (03/21/24 0347)      PRN meds    benzonatate    senna-docusate sodium **AND** polyethylene glycol **AND** bisacodyl **AND** bisacodyl    ipratropium-albuterol    ipratropium-albuterol    Potassium Replacement - Follow Nurse / BPA Driven Protocol    sodium chloride    sodium chloride    sodium chloride        Acute respiratory failure with hypoxia    Hypertension    MVP (mitral valve prolapse)    Aphasia    Bronchiectasis    Pneumonia of right middle lobe due to infectious organism        Assessment/Plan:    89 y.o. female admitted with Acute respiratory failure with hypoxia.     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  - CT results reviewed  - pt on rocephin 7/10 and steroids were stopped on 3/15   -QuantiFERON gold testing is negative  -she had a big boost with the steroids at the beginning of the hospitalization so family is requesting a trial of steroids, patient is agreeable to try  this    Concern for moderate to severe dysphagia  -Patient's cavitary lesion may be related to chronic aspiration pneumonia  -Continue with antibiotics x 10 days    HTN  - continue amlodipine, metoprolol  - BP is controlled    History of DVT  Mitral valve prolapse history  History of SDH  -off of all antiplatelets/AC due to SDH     Flow (L/min):  [2-4] 2    Protonix 40 mg qday    Overall patient is declining during the hospital stay.  She is likely having severe aspiration.  Unable to even get swallow evaluation because she is having difficulty.  Recommended to the patient placing core track for several days to see if this would help with her recovery and then reattempt swallow evaluation but the patient declines this.  Also offer possible PEG tube which she also declines.    We are restarting a diet and concerned that she could worsen rapidly given her aspiration.    We will ask hospice and palliative care to see            Luis Enrique Yeager MD  03/21/24  14:27 EDT

## 2024-03-22 NOTE — PLAN OF CARE
Goal Outcome Evaluation:  Plan of Care Reviewed With: patient        Progress: no change  Outcome Evaluation: VSS.  HAs been sleeping most of the time.  took her po meds last night.  Famiily to meet with hospice and palliative nurse today.   oxygen at 2 L.

## 2024-03-22 NOTE — PROGRESS NOTES
[facility]     LOS: 8 days     Subjective:    CC:Shortness of Breath    DIET:  Diet Order   Procedures    Diet: Regular/House; Fluid Consistency: Thin (IDDSI 0)     Patient is less alert today than she has been previously she is still alert and answers questions appropriately.  She has a slight rattle in her throat    Objective:    Vital Signs:  Temp:  [96.6 °F (35.9 °C)-98.4 °F (36.9 °C)] 96.6 °F (35.9 °C)  Heart Rate:  [58-84] 62  Resp:  [16-18] 18  BP: (123-136)/(69-75) 123/72    SpO2:  [92 %-99 %] 99 %  on  Flow (L/min):  [2-2.5] 2;   Device (Oxygen Therapy): nasal cannula  Body mass index is 17.02 kg/m².    Physical Exam  Constitutional:       Appearance: Normal appearance.   HENT:      Head: Normocephalic and atraumatic.   Cardiovascular:      Rate and Rhythm: Normal rate and regular rhythm.      Heart sounds: No murmur heard.     No friction rub.   Pulmonary:      Effort: Pulmonary effort is normal.      Breath sounds: Normal breath sounds.   Abdominal:      General: Bowel sounds are normal. There is no distension.      Palpations: Abdomen is soft.      Tenderness: There is no abdominal tenderness.   Skin:     General: Skin is warm and dry.   Neurological:      Mental Status: She is alert.   Psychiatric:         Mood and Affect: Mood normal.         Behavior: Behavior normal.         Results Review:    Glucose   Date Value Ref Range Status   03/22/2024 120 (H) 65 - 99 mg/dL Final   03/21/2024 74 65 - 99 mg/dL Final   03/20/2024 76 65 - 99 mg/dL Final       Results from last 7 days   Lab Units 03/19/24  0547   WBC 10*3/mm3 11.10*   HEMOGLOBIN g/dL 13.0   HEMATOCRIT % 38.9   PLATELETS 10*3/mm3 313     Results from last 7 days   Lab Units 03/22/24  0542 03/20/24  1208 03/19/24  0547   SODIUM mmol/L 143   < > 140   POTASSIUM mmol/L 3.9   < > 3.6   CHLORIDE mmol/L 105   < > 101   CO2 mmol/L 23.0   < > 28.0   BUN mg/dL 24*   < > 22   CREATININE mg/dL 0.64   < > 1.03*   CALCIUM mg/dL  9.1   < > 9.1   BILIRUBIN mg/dL  --   --  0.3   ALK PHOS U/L  --   --  59   ALT (SGPT) U/L  --   --  12   AST (SGOT) U/L  --   --  10   GLUCOSE mg/dL 120*   < > 103*    < > = values in this interval not displayed.                   Cultures:  Blood Culture   Date Value Ref Range Status   03/14/2024 No growth at 2 days  Preliminary   03/14/2024 No growth at 2 days  Preliminary       I have reviewed daily medications and changes in CPOE    Scheduled meds  albuterol, 2.5 mg, Nebulization, TID  amLODIPine, 5 mg, Oral, BID  methylPREDNISolone sodium succinate, 20 mg, Intravenous, BID  metoprolol succinate XL, 25 mg, Oral, QAM   And  metoprolol succinate XL, 50 mg, Oral, Q PM  multivitamin with minerals, 1 tablet, Oral, Daily  pantoprazole, 40 mg, Oral, Q AM  sodium chloride, 10 mL, Intravenous, Q12H        lactated ringers, 75 mL/hr, Last Rate: 75 mL/hr (03/22/24 1054)      PRN meds    benzonatate    senna-docusate sodium **AND** polyethylene glycol **AND** bisacodyl **AND** bisacodyl    ipratropium-albuterol    ipratropium-albuterol    LORazepam    LORazepam    Morphine    Potassium Replacement - Follow Nurse / BPA Driven Protocol    sodium chloride    sodium chloride    sodium chloride        Acute respiratory failure with hypoxia    Hypertension    MVP (mitral valve prolapse)    Aphasia    Bronchiectasis    Pneumonia of right middle lobe due to infectious organism        Assessment/Plan:    89 y.o. female admitted with Acute respiratory failure with hypoxia.     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  - CT results reviewed  - pt 8 days of rocephin    -QuantiFERON gold testing is negative  -she had a big boost with the steroids at the beginning of the hospitalization so family is requesting a trial of steroids, patient is agreeable to try this, family would like to continue  steroids    Concern for moderate to severe dysphagia  -Patient's cavitary lesion may be related to chronic aspiration pneumonia  -Continue with antibiotics x 10 days    HTN  - continue amlodipine, metoprolol if able to tolerate  - BP is controlled    History of DVT  Mitral valve prolapse history  History of SDH  -off of all antiplatelets/AC due to SDH     Flow (L/min):  [2-4] 2    Protonix 40 mg qday    Patient seen by hospice  Palliative care to see patient    Ativan as needed for symptom control    Patient and family both aware patient may continue to decline.  And if she does decline then would recommend stopping blood draws, steroids, fluids.    Patient is not taking much oral intake          Luis Enrique Yeager MD  03/22/24  15:57 EDT

## 2024-03-22 NOTE — PROGRESS NOTES
[facility] INPATIENT PROGRESS NOTE         [address]    3/22/2024      PATIENT IDENTIFICATION:  Name: Carito Gonzalez ADMIT: 3/14/2024   : 3/7/1930  PCP: Eb Turner MD    MRN: [id number] LOS: 8 days   AGE/SEX: 89 y.o. female  ROOM: [facility]                     LOS 8    Reason for visit: Cavitary lung lesion and pulmonary infiltrates      SUBJECTIVE:      No new issues overnight.      Objective   OBJECTIVE:    Vital Sign Min/Max for last 24 hours  Temp  Min: 96.7 °F (35.9 °C)  Max: 99 °F (37.2 °C)   BP  Min: 126/71  Max: 151/76   Pulse  Min: 58  Max: 84   Resp  Min: 16  Max: 20   SpO2  Min: 92 %  Max: 98 %   No data recorded   Weight  Min: 46.4 kg (102 lb 4.7 oz)  Max: 46.4 kg (102 lb 4.7 oz)    Vitals:    24 2127 24 0542 24 0748 24 0806   BP: 136/75 126/71     BP Location: Right arm Right arm     Patient Position: Lying Lying     Pulse: 82 65 64 58   Resp: 16 16 18    Temp: 97.3 °F (36.3 °C) 96.7 °F (35.9 °C)     TempSrc: Oral Oral     SpO2: 92% 96% 95%    Weight:  46.4 kg (102 lb 4.7 oz)     Height:                24  0631 24  0455 24  0542   Weight: 44.8 kg (98 lb 12.3 oz) 45.4 kg (100 lb 1.4 oz) 46.4 kg (102 lb 4.7 oz)       Body mass index is 17.02 kg/m².                          Body mass index is 17.02 kg/m².    Intake/Output Summary (Last 24 hours) at 3/22/2024 0826  Last data filed at 3/22/2024 0542  Gross per 24 hour   Intake 60 ml   Output 1000 ml   Net -940 ml         Exam:  LUNGS: Nonlabored      Assessment     Scheduled meds:  albuterol, 2.5 mg, Nebulization, TID  amLODIPine, 5 mg, Oral, BID  cefTRIAXone, 1,000 mg, Intravenous, Q24H  methylPREDNISolone sodium succinate, 20 mg, Intravenous, BID  metoprolol succinate XL, 25 mg, Oral, QAM   And  metoprolol succinate XL, 50 mg, Oral, Q PM  multivitamin with minerals, 1 tablet, Oral, Daily  pantoprazole, 40 mg, Oral, Q AM  sodium chloride, 10 mL, Intravenous, Q12H      IV meds:                "       lactated ringers, 75 mL/hr, Last Rate: 75 mL/hr (03/21/24 1830)        Data Review:  Results from last 7 days   Lab Units 03/22/24  0542 03/21/24  0549 03/20/24  1208 03/19/24  0547   SODIUM mmol/L 143 140 138 140   POTASSIUM mmol/L 3.9 3.7 3.4* 3.6   CHLORIDE mmol/L 105 103 99 101   CO2 mmol/L 23.0 22.2 23.0 28.0   BUN mg/dL 24* 24* 30* 22   CREATININE mg/dL 0.64 0.60 0.83 1.03*   GLUCOSE mg/dL 120* 74 76 103*   CALCIUM mg/dL 9.1 8.6 8.8 9.1         Estimated Creatinine Clearance: 39.4 mL/min (by C-G formula based on SCr of 0.64 mg/dL).  Results from last 7 days   Lab Units 03/19/24  0547   WBC 10*3/mm3 11.10*   HEMOGLOBIN g/dL 13.0   PLATELETS 10*3/mm3 313         Results from last 7 days   Lab Units 03/19/24  0547   ALT (SGPT) U/L 12   AST (SGOT) U/L 10         Results from last 7 days   Lab Units 03/20/24  1455   LACTATE mmol/L 1.0         No results found for: \"HGBA1C\", \"POCGLU\"      Imaging reviewed  Chest x-ray 3/14 reviewed  CT chest 3/15 reviewed    Microbiology reviewed            Active Hospital Problems    Diagnosis  POA    **Acute respiratory failure with hypoxia [J96.01]  Yes    Bronchiectasis [J47.9]  No    Pneumonia of right middle lobe due to infectious organism [J18.9]  Yes    Aphasia [R47.01]  Yes    MVP (mitral valve prolapse) [I34.1]  Yes    Hypertension [I10]  Yes      Resolved Hospital Problems    Diagnosis Date Resolved POA    Hypoxia [R09.02] 03/14/2024 Yes         ASSESSMENT:  Right lower lobe cavitary lesion  Right middle lobe infiltrate  Pectus excavatum  Bronchiectasis        PLAN:  QuantiFERON test came back negative.  Improving with current therapy.  Recommend aspiration precautions and suspect her cavitary infiltrate is due to aspiration.  No objection to discharge back to rehab. Noted family to meet with hospice today.  We will sign off.      Levi Ames MD  Pulmonary and Critical Care Medicine  [facility], [facility]  3/22/2024    08:26 EDT     "

## 2024-03-22 NOTE — PROGRESS NOTES
Upon arrival to [facility] records reviewed and faxed. Spoke with primary RN Marquita for updated pt status.  I met with the daughter in the lobby first and then with daughter and pt in the room. EOS provided in detail and all questions answered. Discussed the patient's GOC including DNR/avoiding hospitalizations/comfort focused care. After discussion with patient and family, call made to [facility] VC obtained from Dr. Fierro for [facility] admission for home hospice. Daughter and pt will read the introduction book and have our number to call with questions or to initiate [facility].     Thank you for the consult and the opportunity to care for this patient/family. Please call with any questions or concerns.    Diana Wheatley RN, BSN  [facility]  [phone]

## 2024-03-22 NOTE — PROGRESS NOTES
Continued Stay Note  [facility]     Patient Name: Carito Gonzalez  MRN: [id number]  Today's Date: 3/22/2024    Admit Date: 3/14/2024    Plan: From/Return to [facility] [place]   Discharge Plan       Row Name 03/22/24 1357       Plan    Plan From/Return to [facility] [place]    Plan Comments Received call from Diana/JERRICA with [facility]. Reviewed [facility] note and PT note with Diana. Aware of SBA/CTG assist. Diana stated patient can return. Discussed poss [facility]. Diana requested RN to call her at [phone] at time of DC and to fax DC summary to [phone]. Spoke to dgmonty Lazo at bedside who stated she was unsure of DC plan. Stated she did meet with \[facility]\"" and they provided her with information. CCP will coantinue to follow for poss needs/equipment.                   Discharge Codes    No documentation.                 Expected Discharge Date and Time       Expected Discharge Date Expected Discharge Time    Mar 25, 2024               Shaye Cox, RN

## 2024-03-22 NOTE — PLAN OF CARE
Goal Outcome Evaluation:  Plan of Care Reviewed With: patient, daughter        Progress: no change  Outcome Evaluation: oxygen sats stable on nasal cannula.  denies SOA.  rare, non productive cough.  appears fatigued, sleeping a lot but denies c/o and states feels fine.   no appetite.  up to chair for a short time for lunch.  daughter at bedside and met with hospice nurse today.  bed/chair alarm in use.

## 2024-03-23 PROBLEM — E43 SEVERE MALNUTRITION: Status: ACTIVE | Noted: 2024-01-01

## 2024-03-23 NOTE — PLAN OF CARE
Goal Outcome Evaluation:  Plan of Care Reviewed With: patient        Progress: no change  Outcome Evaluation: Patient up with assist x 1 to BSC. Fall precautions maintained. VSS on 2L O2. IVF infusing. Regular diet ordered but patient taking very little PO intake, fluids encouraged.

## 2024-03-23 NOTE — PLAN OF CARE
Goal Outcome Evaluation:  Plan of Care Reviewed With: patient        Progress: no change  Outcome Evaluation: VSS.  HAd a BM learly this am.  up with assist x2 using the BSC.  gait unsteady.  Oxygen at 2 L.  Falls precaution maintained.  IV solumedrol given

## 2024-03-23 NOTE — PROGRESS NOTES
Name: Carito Gonzalez ADMIT: 3/14/2024   : 3/7/1930  PCP: Eb Turner MD    MRN: [id number] LOS: 9 days   AGE/SEX: 89 y.o. female  ROOM: [facility]     Subjective   Subjective     The patient is awake today and appears to be doing well. Her daughter and grandson are at bedside and they report that this is a significant improvement from yesterday. Nevertheless, the patient wishes to pursue palliative care and wants to stop all therapies and testing not for comfort. She complains of dry mouth       Objective   Objective   Vital Signs  Temp:  [96.9 °F (36.1 °C)-97.3 °F (36.3 °C)] 97 °F (36.1 °C)  Heart Rate:  [58-76] 66  Resp:  [16-20] 18  BP: (122-129)/(70-78) 122/78  SpO2:  [89 %-100 %] 92 %  on  Flow (L/min):  [2] 2;   Device (Oxygen Therapy): nasal cannula  Body mass index is 17.1 kg/m².  Physical Exam  Constitutional:       General: She is not in acute distress.     Appearance: She is ill-appearing (frail, elderly). She is not toxic-appearing.   Cardiovascular:      Rate and Rhythm: Normal rate and regular rhythm.      Heart sounds: Normal heart sounds.   Pulmonary:      Effort: Pulmonary effort is normal. No respiratory distress.      Breath sounds: Rales present. No wheezing or rhonchi.   Abdominal:      General: Bowel sounds are normal. There is no distension.      Palpations: Abdomen is soft.      Tenderness: There is no abdominal tenderness. There is no guarding or rebound.   Musculoskeletal:         General: No tenderness.      Right lower leg: No edema.      Left lower leg: No edema.   Neurological:      Mental Status: She is alert.   Psychiatric:         Mood and Affect: Mood normal.         Behavior: Behavior normal.         Results Review     I reviewed the patient's new clinical results.  Results from last 7 days   Lab Units 24  0547   WBC 10*3/mm3 11.10*   HEMOGLOBIN g/dL 13.0   PLATELETS 10*3/mm3 313     Results from last 7 days   Lab Units 24  0547 24  0542 24  0549  "03/20/24  1208   SODIUM mmol/L 143 143 140 138   POTASSIUM mmol/L 4.2 3.9 3.7 3.4*   CHLORIDE mmol/L 107 105 103 99   CO2 mmol/L 25.7 23.0 22.2 23.0   BUN mg/dL 28* 24* 24* 30*   CREATININE mg/dL 0.56* 0.64 0.60 0.83   GLUCOSE mg/dL 135* 120* 74 76   Estimated Creatinine Clearance: 45.2 mL/min (A) (by C-G formula based on SCr of 0.56 mg/dL (L)).  Results from last 7 days   Lab Units 03/19/24  0547   ALBUMIN g/dL 3.5   BILIRUBIN mg/dL 0.3   ALK PHOS U/L 59   AST (SGOT) U/L 10   ALT (SGPT) U/L 12     Results from last 7 days   Lab Units 03/23/24  0547 03/22/24  0542 03/21/24  0549 03/20/24  1208 03/19/24  0547   CALCIUM mg/dL 8.8 9.1 8.6 8.8 9.1   ALBUMIN g/dL  --   --   --   --  3.5     Results from last 7 days   Lab Units 03/20/24  1455   LACTATE mmol/L 1.0     COVID19   Date Value Ref Range Status   03/14/2024 Not Detected Not Detected - Ref. Range Final     No results found for: \"HGBA1C\", \"POCGLU\"    XR Chest 1 View  Narrative: XR CHEST 1 VW-     HISTORY: Female who is 89 years-old, short of breath     TECHNIQUE: Frontal view of the chest     COMPARISON: 3/14/2024     FINDINGS: The heart size is borderline. Aorta is tortuous, calcified.  Pulmonary vasculature is unremarkable. Old granulomatous disease is  present. No focal pulmonary consolidation, pleural effusion, or  pneumothorax. No acute osseous process.     Impression: No focal pulmonary consolidation. Borderline heart size.  Tortuous aorta. Follow-up as clinically indicated.     This report was finalized on 3/20/2024 2:41 PM by Dr. Sam Griffin M.D on Workstation: [id number]       Scheduled Medications  chlorhexidine, 15 mL, Mouth/Throat, Q12H    Infusions   Diet  Diet: Regular/House; Fluid Consistency: Thin (IDDSI 0)       Assessment/Plan     Active Hospital Problems    Diagnosis  POA    **Acute respiratory failure with hypoxia [J96.01]  Yes    Severe malnutrition [E43]  Yes    Bronchiectasis [J47.9]  No    Pneumonia of right middle lobe due to " infectious organism [J18.9]  Yes    Aphasia [R47.01]  Yes    MVP (mitral valve prolapse) [I34.1]  Yes    Hypertension [I10]  Yes      Resolved Hospital Problems    Diagnosis Date Resolved POA    Hypoxia [R09.02] 03/14/2024 Yes       89 y.o. female admitted with Acute respiratory failure with hypoxia.    The patient and her family wish to pursue comfort measures. I will stop all testing and therapies not for comfort and institute the palliative care order set and transfer the patient to [facility].     She is enrolled in the [employer] body program and her daughter is requesting assistance with ensuring that should the patient die here in the hospital that 's protocols are followed. I will ask CCP to look into this and see what's required.     Acute respiratory failure with hypoxia due to an acute exacerbation of bronchiectasis and pneumonia with a right lower lobe cavitary lesion-completed an 8 day course of ceftriaxone. Quantiferon testing was negative. She is on room air. Stop steroids per her wishes  Essential hypertension-stop antihypertensives as above  Dry mouth-will make available artificial saliva and chlorhexidine  History of DVT  Mitral valve prolapse history  History of SDH  GERD  No indication for DVT ppx  Code status: comfort care only  Discussed with patient, family, and nursing staff.  Anticipate discharge  HSB vs. Home/SNF with hospice  timing yet to be determined.      Adelfo Link MD  [facility]  03/23/24  17:09 EDT    I wore protective equipment throughout this patient encounter including a face mask, gloves and protective eyewear.  Hand hygiene was performed before donning protective equipment and after removal when leaving the room.

## 2024-03-24 NOTE — PROGRESS NOTES
Name: Carito Gonzalez ADMIT: 3/14/2024   : 3/7/1930  PCP: Eb Turner MD    MRN: [id number] LOS: 10 days   AGE/SEX: 89 y.o. female  ROOM: [facility]     Subjective   Subjective     No events overnight. Her nurse states that she thinks she's a little more confused. She appears about the same as yesterday to me. Her family is at bedside. She didn't want to use the artificial saliva. It sounds like she isn't eating or drinking much. Hasn't yet required any pain or anxiety medications.       Objective   Objective   Vital Signs  Temp:  [96.9 °F (36.1 °C)-97 °F (36.1 °C)] 96.9 °F (36.1 °C)  Heart Rate:  [70-73] 73  Resp:  [16] 16  BP: (147-157)/(76-80) 147/76  SpO2:  [93 %-96 %] 96 %  on  Flow (L/min):  [2] 2;   Device (Oxygen Therapy): room air  Body mass index is 17.1 kg/m².  Physical Exam  Constitutional:       General: She is not in acute distress.     Appearance: She is ill-appearing (frail, elderly). She is not toxic-appearing.   Cardiovascular:      Rate and Rhythm: Normal rate and regular rhythm.      Heart sounds: Normal heart sounds.   Pulmonary:      Effort: Pulmonary effort is normal. No respiratory distress.      Breath sounds: Rales present. No wheezing or rhonchi.   Abdominal:      General: Bowel sounds are normal. There is no distension.      Palpations: Abdomen is soft.      Tenderness: There is no abdominal tenderness. There is no guarding or rebound.   Musculoskeletal:         General: No tenderness.      Right lower leg: No edema.      Left lower leg: No edema.   Neurological:      Mental Status: She is alert.   Psychiatric:         Mood and Affect: Mood normal.         Behavior: Behavior normal.         Results Review     I reviewed the patient's new clinical results.  Results from last 7 days   Lab Units 24  0547   WBC 10*3/mm3 11.10*   HEMOGLOBIN g/dL 13.0   PLATELETS 10*3/mm3 313     Results from last 7 days   Lab Units 24  0547 24  0542 24  0549 24  1208  "  SODIUM mmol/L 143 143 140 138   POTASSIUM mmol/L 4.2 3.9 3.7 3.4*   CHLORIDE mmol/L 107 105 103 99   CO2 mmol/L 25.7 23.0 22.2 23.0   BUN mg/dL 28* 24* 24* 30*   CREATININE mg/dL 0.56* 0.64 0.60 0.83   GLUCOSE mg/dL 135* 120* 74 76   Estimated Creatinine Clearance: 45.2 mL/min (A) (by C-G formula based on SCr of 0.56 mg/dL (L)).  Results from last 7 days   Lab Units 03/19/24  0547   ALBUMIN g/dL 3.5   BILIRUBIN mg/dL 0.3   ALK PHOS U/L 59   AST (SGOT) U/L 10   ALT (SGPT) U/L 12     Results from last 7 days   Lab Units 03/23/24  0547 03/22/24  0542 03/21/24  0549 03/20/24  1208 03/19/24  0547   CALCIUM mg/dL 8.8 9.1 8.6 8.8 9.1   ALBUMIN g/dL  --   --   --   --  3.5     Results from last 7 days   Lab Units 03/20/24  1455   LACTATE mmol/L 1.0     COVID19   Date Value Ref Range Status   03/14/2024 Not Detected Not Detected - Ref. Range Final     No results found for: \"HGBA1C\", \"POCGLU\"    XR Chest 1 View  Narrative: XR CHEST 1 VW-     HISTORY: Female who is 89 years-old, short of breath     TECHNIQUE: Frontal view of the chest     COMPARISON: 3/14/2024     FINDINGS: The heart size is borderline. Aorta is tortuous, calcified.  Pulmonary vasculature is unremarkable. Old granulomatous disease is  present. No focal pulmonary consolidation, pleural effusion, or  pneumothorax. No acute osseous process.     Impression: No focal pulmonary consolidation. Borderline heart size.  Tortuous aorta. Follow-up as clinically indicated.     This report was finalized on 3/20/2024 2:41 PM by Dr. Sam Griffin M.D on Workstation: [id number]       Scheduled Medications  chlorhexidine, 15 mL, Mouth/Throat, Q12H    Infusions   Diet  Diet: Regular/House; Fluid Consistency: Thin (IDDSI 0)       Assessment/Plan     Active Hospital Problems    Diagnosis  POA    **Acute respiratory failure with hypoxia [J96.01]  Yes    Severe malnutrition [E43]  Yes    Bronchiectasis [J47.9]  No    Pneumonia of right middle lobe due to infectious organism " [J18.9]  Yes    Aphasia [R47.01]  Yes    MVP (mitral valve prolapse) [I34.1]  Yes    Hypertension [I10]  Yes      Resolved Hospital Problems    Diagnosis Date Resolved POA    Hypoxia [R09.02] 03/14/2024 Yes       89 y.o. female admitted with Acute respiratory failure with hypoxia.    The patient has transitioned to comfort care only. She is comfortable currently and not requiring any prns yet, but she isn't eating or drinking much and if this continues, then I suspect she'll have a rapid decline. We'll continue to monitor for HSB need for the next couple of days.    She is enrolled in the [employer] body program     Acute respiratory failure with hypoxia due to an acute exacerbation of bronchiectasis and pneumonia with a right lower lobe cavitary lesion-completed an 8 day course of ceftriaxone. Quantiferon testing was negative. She is on room air. Stop steroids per her wishes  Essential hypertension-stop antihypertensives as above  Dry mouth-will make available artificial saliva and chlorhexidine  History of DVT  Mitral valve prolapse history  History of SDH  GERD  No indication for DVT ppx  Code status: comfort care only  Discussed with patient, family, and nursing staff.  Anticipate discharge  HSB vs. Home/SNF with hospice  timing yet to be determined.      Adelfo Link MD  [facility]  03/24/24  16:28 EDT    I wore protective equipment throughout this patient encounter including a face mask, gloves and protective eyewear.  Hand hygiene was performed before donning protective equipment and after removal when leaving the room.

## 2024-03-24 NOTE — PLAN OF CARE
Goal Outcome Evaluation:           Progress: no change  Outcome Evaluation: Pt Hypertensive but otherwise stable. Pt slept well this whole shift. Up to bedside commode multiple times this shift, assist x1. Pt has no needs at this time, denies pain and nausea. Plan of kindness ongoing.

## 2024-03-24 NOTE — PLAN OF CARE
Goal Outcome Evaluation:           Progress: no change  Outcome Evaluation: Pt rested throughout day. Appeared anxious this AM but setteld down after talking to family. Denies pain. Up to BSC with assist x2. Refuses food--mouth moisturizer at bedside with swabs. Family at bedside and updated on care

## 2024-03-24 NOTE — CONSULTS
Visited patient at bedside. Patient was pleasant and smiling, but also said she did not have any specific spiritual care needs. She thanked me for the visit and I let her know that chaplains are available for support and to reach out to an RN if she would like to speak with us.     Pastoral care remains available.

## 2024-03-25 PROBLEM — J18.9 PNEUMONIA OF RIGHT MIDDLE LOBE DUE TO INFECTIOUS ORGANISM: Status: RESOLVED | Noted: 2024-01-01 | Resolved: 2024-01-01

## 2024-03-25 PROBLEM — J96.01 ACUTE RESPIRATORY FAILURE WITH HYPOXIA: Status: RESOLVED | Noted: 2024-01-01 | Resolved: 2024-01-01

## 2024-03-25 NOTE — PLAN OF CARE
Goal Outcome Evaluation:           Progress: declining  Outcome Evaluation: Pt responsive to voice. Medicated x2 with 2mg morphine with good effect--pt would become grimace, become restless when medications are needed. F/C intact. Flipped to HSB, family at bedside and updated on care

## 2024-03-25 NOTE — DISCHARGE SUMMARY
Patient Name: Carito Gonzalez  : 3/7/1930  MRN: [id number]    Date of Admission: 3/14/2024  Date of Discharge:  3/25/2024  Primary Care Physician: Eb Turner MD      Chief Complaint:   Shortness of Breath      Discharge Diagnoses     Active Hospital Problems    Diagnosis  POA    Severe malnutrition [E43]  Yes    Bronchiectasis [J47.9]  No    Aphasia [R47.01]  Yes    MVP (mitral valve prolapse) [I34.1]  Yes    Hypertension [I10]  Yes      Resolved Hospital Problems    Diagnosis Date Resolved POA    **Acute respiratory failure with hypoxia [J96.01] 2024 Yes    Hypoxia [R09.02] 2024 Yes    Pneumonia of right middle lobe due to infectious organism [J18.9] 2024 Yes        Hospital Course     Ms. Gonzalez is a 89 y.o. female with a history of essential hypertension, hyperlipidemia history of subdural hematoma, GERD, history of mitral valve prolapse, history of DVT who presented to [facility] initially complaining of cough and chest congestion.  Please see the admitting history and physical for further details.  She was found to have acute respiratory failure with hypoxia due to an acute exacerbation of bronchiectasis as well as pneumonia with a right lower lobe cavitary lesion and metabolic encephalopathy and was admitted to the hospital for further evaluation and treatment.      She was started on IV antibiotics and steroids and nebulizers, and she was seen in consultation by pulmonology.  She was ruled out for tuberculosis, and she gradually improved with the aforementioned treatments and her hypoxia and encephalopathy resolved.  When she was able to make her wishes known, she stated that she wanted to pursue palliative care.  She had finished her treatment and had clinically improved, but she was needing very much, so we transferred her to [facility] and stopped all of her medications, testing, and therapies that were not for comfort and initiated the palliative care order set.  She hasn't really required many PRNs so far, but today she appears more sedated and as mentioned above, she hasn't been eating or drinking. Her family has met with hospice and she is being transitioned to a hospice scatter bed today.     Day of Discharge     Subjective:  No events overnight. She's less responsive today. Her family is at bedside and the report that she was awake earlier, but she had been somewhat anxious and maybe short of breath and so she received some morphine and ativan. She is sleeping at the moment and appears completely comfortable.     Physical Exam:  Temp:  [98 °F (36.7 °C)-98.4 °F (36.9 °C)] 98.4 °F (36.9 °C)  Heart Rate:  [72-79] 79  Resp:  [16-30] 16  Body mass index is 17.1 kg/m².  Physical Exam  Constitutional:       General: She is not in acute distress.     Appearance: She is ill-appearing (frail, elderly). She is not toxic-appearing.   Cardiovascular:      Rate and Rhythm: Normal rate and regular rhythm.      Heart sounds: Normal heart sounds.   Pulmonary:      Effort: Pulmonary effort is normal.      Breath sounds: Normal breath sounds.   Abdominal:      General: Bowel sounds are normal.      Palpations: Abdomen is soft.   Musculoskeletal:         General: No tenderness.      Right lower leg: No edema.      Left lower leg: No edema.   Neurological:      Mental Status: She is alert.   Psychiatric:         Mood and Affect: Mood normal.         Behavior: Behavior normal.         Consultants     Consult Orders (all) (From admission, onward)       Start     Ordered    03/23/24 1709  Inpatient Case Management  Consult  Once,   Status:  Canceled        Provider:  (Not yet assigned)    03/23/24 1709    03/23/24 1708  Consult to Pastoral/Spiritual Care  Once        Provider:  (Not yet assigned)    03/23/24 1708    03/23/24 1708  Inpatient consult to Case Management   Once,   Status:  Canceled        Provider:  (Not yet assigned)    03/23/24 1708    03/21/24 1410   Inpatient Palliative Care Team Consult  Once        Provider:  (Not yet assigned)    03/21/24 1418    03/21/24 1418  Inpatient Hospice / [facility] Consult  Once,   Status:  Canceled        Specialty:  Hospice and Palliative Medicine  Provider:  (Not yet assigned)    03/21/24 1417    03/15/24 1226  Inpatient Pulmonology Consult  Once        Specialty:  Pulmonary Disease  Provider:  Joaquín Zuleta MD    03/15/24 1225    Signed and Held  Inpatient Hospice / [facility] Consult  Once,   Status:  Canceled        Specialty:  Hospice and Palliative Medicine  Provider:  (Not yet assigned)    Signed and Held    Signed and Held  Inpatient consult to Case Management   Once,   Status:  Canceled        Provider:  (Not yet assigned)    Signed and Held    Signed and Held  Inpatient Case Management  Consult  Once,   Status:  Canceled        Provider:  (Not yet assigned)    Signed and Held                  Procedures     Imaging Results (All)       Procedure Component Value Units Date/Time    XR Chest 1 View [[id number]] Collected: 03/20/24 1440     Updated: 03/20/24 1445    Narrative:      XR CHEST 1 VW-     HISTORY: Female who is 89 years-old, short of breath     TECHNIQUE: Frontal view of the chest     COMPARISON: 3/14/2024     FINDINGS: The heart size is borderline. Aorta is tortuous, calcified.  Pulmonary vasculature is unremarkable. Old granulomatous disease is  present. No focal pulmonary consolidation, pleural effusion, or  pneumothorax. No acute osseous process.       Impression:      No focal pulmonary consolidation. Borderline heart size.  Tortuous aorta. Follow-up as clinically indicated.     This report was finalized on 3/20/2024 2:41 PM by Dr. Sam Griffin M.D on Workstation: [id number]       CT Chest Without Contrast Diagnostic [[id number]] Collected: 03/15/24 1133     Updated: 03/15/24 1157    Narrative:      CT CHEST WO CONTRAST DIAGNOSTIC-     INDICATION: Pneumonia. Possible  complication. Shortness of air.     COMPARISON: None     TECHNIQUE:  Routine CT chest without IV contrast. Coronal and sagittal reformats.  Radiation dose reduction techniques were utilized, including automated  exposure control and exposure modulation based on body size.     FINDINGS:      Chest wall: No lymphadenopathy.     Thyroid: Normal.     Mediastinum: Three-vessel coronary artery atherosclerotic  calcifications. Mild cardiomegaly. Aortic valve calcification. No  pericardial effusion. Bulky calcific atherosclerotic disease in the  central left subclavian artery with suspected stenosis. No mediastinal  or hilar lymphadenopathy.     Lungs/pleura: Trace right pleural fluid. Trace left pleural fluid.  Airways wall thickening. Subsegmental airway occlusions in the basilar  lower lobes. Bronchiolectasis seen in the anterior right upper lobe,  right middle lobe and lingula with subsegmental airway impactions.  Biapical pleural-parenchymal scarring. Scattered centrilobular and  tree-in-bud nodules in the bilateral lungs. For example, tree-in-bud  nodules in the lateral right upper lobe, series 3, axial mage 34. Mildly  thick walled cavity seen in the superior segment right lower lobe,  containing tiny air-fluid level, series 3, axial mage 45, measures 3.3  cm. Bronchovascular pulmonary nodules seen adjacent to this cavity,  series 3, axial image 45, with the largest nodule measuring 10 mm. Solid  pulmonary nodule in the superior segment right lower lobe, series 3,  axial image 36, measures 8 mm. Cavitating nodule seen in the superior  segment right lower lobe, series 3, axial mage 39, measures 6 mm. Small  heterogeneous bronchovascular opacities seen in the superior lingula.  Possible additional cavitating nodule in the lingula, series 3, axial  mage 41, measures 11 mm.     Upper abdomen: Fluid attenuating cyst in the superior pole left kidney.  Colonic diverticulosis. Severe abdominal aortic  atherosclerotic  calcification.     Osseous structures: Right glenohumeral osteoarthritis.       Impression:         1. Airways disease with thickened airway walls, bronchiolectasis and  subsegmental airway impactions, greatest in the anterior segment right  upper lobe, right middle lobe and lingula. Finding may indicate  underlying MAC type infection.  2. Scattered centrilobular and tree-in-bud nodules consistent with  infectious bronchiolitis.   3. Mildly thick walled cavity in the superior segment right lower lobe  with small air-fluid level and scattered bronchocentric pulmonary  nodules with a few demonstrating cavitation. Findings probably related  to an atypical infection. Pulmonology evaluation. Short follow-up  following treatment course for presumed atypical infectious etiology. If  persistent or progressive, sampling could be considered.     This report was finalized on 3/15/2024 11:54 AM by Dr. Yunior Delgado M.D on Workstation: [id number]       XR Chest 1 View [[id number]] Collected: 03/14/24 1701     Updated: 03/14/24 1711    Narrative:      XR CHEST 1 VW-     INDICATION: Shortness of breath     COMPARISON: Chest radiographs dating back to 12/20/2016. CT abdomen  pelvis 1/2/2017       Impression:      Right middle lobe tubular opacities with intermixed hazy  opacities suggesting right middle lobe bronchiectasis with possible  infectious/inflammatory process. Recommend imaging follow-up to ensure  clearance. No pleural effusion or pneumothorax. Normal size  cardiomediastinal silhouette. No focal osseous abnormality.     This report was finalized on 3/14/2024 5:08 PM by Dr. Perez Flynn M.D on Workstation: [id number]               Pertinent Labs     Results from last 7 days   Lab Units 03/19/24  0547   WBC 10*3/mm3 11.10*   HEMOGLOBIN g/dL 13.0   PLATELETS 10*3/mm3 313     Results from last 7 days   Lab Units 03/23/24  0547 03/22/24  0542 03/21/24  0549 03/20/24  1208   SODIUM mmol/L 143 143 140  "138   POTASSIUM mmol/L 4.2 3.9 3.7 3.4*   CHLORIDE mmol/L 107 105 103 99   CO2 mmol/L 25.7 23.0 22.2 23.0   BUN mg/dL 28* 24* 24* 30*   CREATININE mg/dL 0.56* 0.64 0.60 0.83   GLUCOSE mg/dL 135* 120* 74 76   Estimated Creatinine Clearance: 45.2 mL/min (A) (by C-G formula based on SCr of 0.56 mg/dL (L)).  Results from last 7 days   Lab Units 03/19/24  0547   ALBUMIN g/dL 3.5   BILIRUBIN mg/dL 0.3   ALK PHOS U/L 59   AST (SGOT) U/L 10   ALT (SGPT) U/L 12     Results from last 7 days   Lab Units 03/23/24  0547 03/22/24  0542 03/21/24  0549 03/20/24  1208 03/19/24  0547   CALCIUM mg/dL 8.8 9.1 8.6 8.8 9.1   ALBUMIN g/dL  --   --   --   --  3.5               Invalid input(s): \"LDLCALC\"        Test Results Pending at Discharge       Discharge Details        Discharge Medications        ASK your doctor about these medications        Instructions Start Date   acetaminophen 325 MG tablet  Commonly known as: TYLENOL   650 mg, Oral, Every 6 Hours PRN      amLODIPine 5 MG tablet  Commonly known as: NORVASC   5 mg, Oral, 2 Times Daily      benzonatate 100 MG capsule  Commonly known as: Tessalon Perles   100 mg, Oral, 3 Times Daily PRN      metoprolol succinate XL 25 MG 24 hr tablet  Commonly known as: TOPROL-XL   Take 1 tablet by mouth Every Morning AND 2 tablets Every Evening.      neomycin-polymyxin-dexamethamethasone 3.5-48473-3.1 ointment ophthalmic ointment  Commonly known as: POLYDEX       Ocutabs tablet tablet  Generic drug: multivitamin with minerals   1 tablet, Oral, Daily               Allergies   Allergen Reactions    Lisinopril Angioedema    Zoloft [Sertraline Hcl] Shortness Of Breath    Erythromycin Swelling         Discharge Disposition:  Hospice/Medical Facility ([facility])    Discharge Diet:  No active diet order      Discharge Activity:       CODE STATUS:    Code Status and Medical Interventions:   Ordered at: 03/23/24 1705     Level Of Support Discussed With:    Patient     Code Status (Patient has " no pulse and is not breathing):    No CPR (Do Not Attempt to Resuscitate)     Medical Interventions (Patient has pulse or is breathing):    Comfort Measures       Future Appointments   Date Time Provider Department Center   4/22/2024 10:00 AM [facility]   4/22/2024 10:40 AM Quin Magana MD [facility]   7/15/2024 10:00 AM [facility]   7/22/2024 10:15 AM Eb Turner MD [facility]      Contact information for follow-up providers       Eb Turner MD .    Specialty: Internal Medicine  Contact information:  [id number] [facility] [id number] [phone]                       Contact information for after-discharge care       Destination       [facility] .    Service: Inpatient Hospice  Contact information:  [address]  [facility] [id number] [phone]                                   Time Spent on Discharge:  Greater than 30 minutes      Adelfo Link MD  [facility]  03/25/24  16:12 EDT

## 2024-03-25 NOTE — CONSULTS
Visit with patient at bedside. Patient was sleeping soundly and appeared comfortable and restful. There was no family at bedside during visit. Prayer offered for comfort and peace of patient and loved ones.

## 2024-03-25 NOTE — PLAN OF CARE
Goal Outcome Evaluation:   Pt drowsy and lethargic overnight. PRN 0.5mg ativan given this morning for anxiety. Wheeler inserted as pt was retaining. Daughter at bedside during the evening and supportive of comfort care. Will continue to monitor per palliative goals of care.

## 2024-03-25 NOTE — PROGRESS NOTES
Discharge Planning Assessment  [facility]     Patient Name: Carito Gonzalez  MRN: [id number]  Today's Date: 3/25/2024    Admit Date: 3/14/2024    Plan: admitted to a [facility] scattered bed on 3/25/24. CHRISTOPH Diaz RN, CCP.   Discharge Needs Assessment    No documentation.                  Discharge Plan       Row Name 03/25/24 1324       Plan    Plan admitted to a [facility] scattered bed on 3/25/24. CHRISTOPH Diaz RN, CCP.    Final Discharge Disposition Code 51 - hospice medical facility    Final Note admitted to a [facility] scattered bed on 3/25/24. CHRISTOPH Diaz RN, CCP.                  Continued Care and Services - Admitted Since 3/14/2024       Destination Coordination complete.      Service Provider Request Status Selected Services Address Phone Fax Patient Preferred    [facility]  Selected Inpatient Hospice [address] [place] [id number] [phone] --    [facility] Accepted N/A [address] [facility] [address] [phone] --                  Selected Continued Care - Prior Encounters Includes continued care and service providers with selected services from prior encounters from 12/15/2023 to 3/25/2024      Discharged on 2/8/2024 Admission date: 2/7/2024 - Discharge disposition: Home or Self Care      Destination       Service Provider Selected Services Address Phone Fax Patient Preferred    [facility] [address] [address] [phone] --                          Expected Discharge Date and Time       Expected Discharge Date Expected Discharge Time    Mar 25, 2024            Demographic Summary    No documentation.                  Functional Status    No documentation.                  Psychosocial    No documentation.                  Abuse/Neglect    No documentation.                  Legal    No documentation.                  Substance Abuse    No documentation.                   Patient Forms    No documentation.                     Marian Diaz RN

## 2024-03-25 NOTE — H&P
Patient Name:  Carito Gonzalez  YOB: 1930  MRN:  [id number]  Admit Date:  3/25/2024  Patient Care Team:  Eb Turner MD as PCP - General (Internal Medicine)      Subjective   History Present Illness     No chief complaint on file.    Ms. Gonzalez is a 89 y.o. female with a history of essential hypertension, hyperlipidemia history of subdural hematoma, GERD, history of mitral valve prolapse, history of DVT who presented to [facility] initially complaining of cough and chest congestion.  Please see the admitting history and physical for further details.  She was found to have acute respiratory failure with hypoxia due to an acute exacerbation of bronchiectasis as well as pneumonia with a right lower lobe cavitary lesion and metabolic encephalopathy and was admitted to the hospital for further evaluation and treatment.       She was started on IV antibiotics and steroids and nebulizers, and she was seen in consultation by pulmonology.  She was ruled out for tuberculosis, and she gradually improved with the aforementioned treatments and her hypoxia and encephalopathy resolved.  When she was able to make her wishes known, she stated that she wanted to pursue palliative care.  She had finished her treatment and had clinically improved, but wasn't eating very much, so we transferred her to [facility] and stopped all of her medications, testing, and therapies that were not for comfort and initiated the palliative care order set. She hasn't really required many PRNs so far, but today she appears more sedated and as mentioned above, she hasn't been eating or drinking. Her family has met with hospice and she is being transitioned to a hospice scatter bed today.     History of Present Illness  Review of Systems   Reason unable to perform ROS: unresponsive.        Personal History     Past Medical History:   Diagnosis Date    Abscess of leg     Acute deep vein thrombosis (DVT) of left lower  extremity     Acute deep vein thrombosis (DVT) of right lower extremity     Acute renal insufficiency     Angiotensin converting enzyme inhibitor-aggravated angioedema     Cellulitis     CKD (chronic kidney disease) stage 3, GFR 30-59 ml/min     Depression     Herpes zoster     Hypercholesterolemia     Hyperlipidemia     Hypertension     Hypotension     Macular degeneration     Noted February 2014    Mitral regurgitation     Mitral valve prolapse     SOB (shortness of breath)     Zoster      Past Surgical History:   Procedure Laterality Date    HIP FRACTURE SURGERY Right 09/18/2017    SKIN CANCER EXCISION       Family History   Problem Relation Age of Onset    Heart attack Mother     Hypertension Mother     Heart attack Father     Hypertension Father     Heart attack Brother 58    Hypertension Brother      Social History     Tobacco Use    Smoking status: Never     Passive exposure: Never    Smokeless tobacco: Never   Vaping Use    Vaping status: Never Used   Substance Use Topics    Alcohol use: No     Comment: Daily caffeine use    Drug use: No     Current Facility-Administered Medications on File Prior to Encounter   Medication Dose Route Frequency Provider Last Rate Last Admin    [DISCONTINUED] acetaminophen (TYLENOL) 160 MG/5ML oral solution 650 mg  650 mg Oral Q4H PRN Adelfo Link MD        [DISCONTINUED] acetaminophen (TYLENOL) suppository 650 mg  650 mg Rectal Q4H PRN Adelfo Link MD        [DISCONTINUED] acetaminophen (TYLENOL) tablet 650 mg  650 mg Oral Q4H PRN Adelfo Link MD        [DISCONTINUED] atropine 1 % ophthalmic solution 2 drop  2 drop Sublingual BID PRN Adelfo Link MD        [DISCONTINUED] caphosol (SALIVA SUBSTITUTE) oral rinse solution 30 mL  30 mL Oral PRN Adelfo Link MD        [DISCONTINUED] chlorhexidine (PERIDEX) 0.12 % solution 15 mL  15 mL Mouth/Throat Q12H Adelfo Link MD        [DISCONTINUED] diphenoxylate-atropine (LOMOTIL) 2.5-0.025 MG per tablet 1 tablet  1 tablet Oral  Q2H PRN Adelfo Link MD        [DISCONTINUED] glycopyrrolate (ROBINUL) injection 0.2 mg  0.2 mg Intravenous Q2H PRN Adelfo Link MD        [DISCONTINUED] glycopyrrolate (ROBINUL) injection 0.2 mg  0.2 mg Subcutaneous Q2H PRN Adelfo Link MD        [DISCONTINUED] glycopyrrolate (ROBINUL) injection 0.4 mg  0.4 mg Intravenous Q2H PRN Adelfo Link MD        [DISCONTINUED] glycopyrrolate (ROBINUL) injection 0.4 mg  0.4 mg Subcutaneous Q2H PRN Adelfo Link MD        [DISCONTINUED] HYDROmorphone (DILAUDID) injection 0.5 mg  0.5 mg Intravenous Q1H PRN Adelfo Link MD        [DISCONTINUED] HYDROmorphone (DILAUDID) injection 1 mg  1 mg Intravenous Q1H PRN Adelfo Link MD        [DISCONTINUED] HYDROmorphone (DILAUDID) injection 1.5 mg  1.5 mg Intravenous Q1H PRN Adelfo Link MD        [DISCONTINUED] ketorolac (TORADOL) injection 15 mg  15 mg Intravenous Q6H PRN Adelfo Link MD        [DISCONTINUED] LORazepam (ATIVAN) 2 MG/ML concentrated solution 0.5 mg  0.5 mg Sublingual Q1H PRAdelfo Hernandes MD        [DISCONTINUED] LORazepam (ATIVAN) 2 MG/ML concentrated solution 1 mg  1 mg Sublingual Q1H PRAdelfo Hernandes MD        [DISCONTINUED] LORazepam (ATIVAN) 2 MG/ML concentrated solution 2 mg  2 mg Sublingual Q1H PRAdelfo Hernandes MD        [DISCONTINUED] LORazepam (ATIVAN) injection 0.5 mg  0.5 mg Intravenous Q1H PRAdelfo Hernandes MD   0.5 mg at 03/25/24 0508    [DISCONTINUED] LORazepam (ATIVAN) injection 0.5 mg  0.5 mg Subcutaneous Q1H PRAdelfo Hernandes MD        [DISCONTINUED] LORazepam (ATIVAN) injection 1 mg  1 mg Intravenous Q1H PRAdelfo Hernandes MD        [DISCONTINUED] LORazepam (ATIVAN) injection 1 mg  1 mg Subcutaneous Q1H PRAdelfo Hernandes MD        [DISCONTINUED] LORazepam (ATIVAN) injection 2 mg  2 mg Intravenous Q1H PRN Adelfo Link MD        [DISCONTINUED] LORazepam (ATIVAN) injection 2 mg  2 mg Subcutaneous Q1H PRN Adelfo Link MD        [DISCONTINUED] morphine concentrated solution 10 mg   10 mg Sublingual Q1H PRN Adelfo Link MD        [DISCONTINUED] morphine concentrated solution 20 mg  20 mg Sublingual Q1H PRN Adelfo Link MD        [DISCONTINUED] morphine concentrated solution 5 mg  5 mg Sublingual Q1H PRN Adelfo Link MD        [DISCONTINUED] morphine injection 2 mg  2 mg Intravenous Q1H PRN Adelfo Link MD   2 mg at 03/25/24 0830    [DISCONTINUED] morphine injection 4 mg  4 mg Intravenous Q1H PRN Adelfo Link MD        [DISCONTINUED] Morphine sulfate (PF) injection 6 mg  6 mg Intravenous Q1H PRN Adelfo Link MD        [DISCONTINUED] polyethylene glycol 1% (ARTIFICIAL TEARS) ophthalmic solution  1 drop Both Eyes Q1H PRN Adelfo Link MD        [DISCONTINUED] promethazine (PHENERGAN) suppository 12.5 mg  12.5 mg Rectal Q4H PRN Adelfo Link MD        [DISCONTINUED] promethazine (PHENERGAN) tablet 12.5 mg  12.5 mg Oral Q4H PRN Adelfo Link MD        [DISCONTINUED] scopolamine patch 1 mg/72 hr  1 patch Transdermal Q72H PRN Adelfo Link MD         Current Outpatient Medications on File Prior to Encounter   Medication Sig Dispense Refill    acetaminophen (TYLENOL) 325 MG tablet Take 2 tablets by mouth Every 6 (Six) Hours As Needed for mild pain (1-3).  0    amLODIPine (NORVASC) 5 MG tablet Take 1 tablet by mouth 2 (Two) Times a Day. 180 tablet 3    benzonatate (Tessalon Perles) 100 MG capsule Take 1 capsule by mouth 3 (Three) Times a Day As Needed for Cough. 90 capsule 0    metoprolol succinate XL (TOPROL-XL) 25 MG 24 hr tablet Take 1 tablet by mouth Every Morning AND 2 tablets Every Evening. 270 tablet 1    Multiple Vitamins-Minerals (OCUTABS) tablet Take 1 tablet by mouth Daily.      neomycin-polymyxin-dexamethamethasone (POLYDEX) 3.5-76035-4.1 ointment ophthalmic ointment       [DISCONTINUED] amLODIPine (NORVASC) 5 MG tablet Take two tablets every am and one tablet every pm 270 tablet 3     Allergies   Allergen Reactions    Lisinopril Angioedema    Zoloft [Sertraline Hcl]  Shortness Of Breath    Erythromycin Swelling       Objective    Objective     Vital Signs  Temp:  [98 °F (36.7 °C)-98.4 °F (36.9 °C)] 98.4 °F (36.9 °C)  Heart Rate:  [72-79] 79  Resp:  [16-30] 16  SpO2:  [88 %-91 %] 88 %  on   ;   Device (Oxygen Therapy): room air  There is no height or weight on file to calculate BMI.    Physical Exam  Constitutional:       General: She is not in acute distress.     Appearance: She is ill-appearing (chronically ill appearing, frail, elderly). She is not toxic-appearing.   Cardiovascular:      Rate and Rhythm: Normal rate and regular rhythm.      Heart sounds: Normal heart sounds.   Pulmonary:      Effort: Pulmonary effort is normal.      Breath sounds: Normal breath sounds.   Abdominal:      General: Bowel sounds are normal.      Palpations: Abdomen is soft.   Neurological:      Mental Status: She is unresponsive.         Results Review:  I reviewed the patient's new clinical results.  I reviewed the patient's new imaging results and agree with the interpretation.  I reviewed the patient's other test results and agree with the interpretation  I personally viewed and interpreted the patient's EKG/Telemetry data  Discussed with ED provider.    Lab Results (last 24 hours)       ** No results found for the last 24 hours. **            Imaging Results (Last 24 Hours)       ** No results found for the last 24 hours. **            Results for orders placed during the hospital encounter of 11/16/21    Adult Transthoracic Echo Complete W/ Cont if Necessary Per Protocol    Interpretation Summary  · Calculated left ventricular EF = 61.6% Estimated left ventricular EF = 62% Estimated left ventricular EF was in agreement with the calculated left ventricular EF. Left ventricular systolic function is normal. Normal left ventricular cavity size and wall thickness noted. All left ventricular wall segments contract normally. Left ventricular diastolic function is consistent with (grade I) impaired  relaxation.  · There is severe calcification of the aortic valve. No aortic valve regurgitation is present. Mild aortic valve stenosis is present. Peak velocity of the flow distal to the aortic valve is 224.7 cm/s. Aortic valve mean pressure gradient is 10.3 mmHg. Aortic valve area is 1.3 cm2.  · There is moderate mitral valve prolapse of the posterior mitral leaflet. There is moderate, bileaflet mitral valve thickening present. Moderate to severe mitral valve regurgitation is present. Pulmonary vein flow reversal is present.  · Trace tricuspid valve regurgitation is present. Estimated right ventricular systolic pressure from tricuspid regurgitation is normal (<35 mmHg). Calculated right ventricular systolic pressure from tricuspid regurgitation is 29.0 mmHg.      No orders to display        Assessment/Plan     Active Hospital Problems    Diagnosis  POA    **Severe malnutrition [E43]  Yes    Bronchiectasis [J47.9]  Yes    MVP (mitral valve prolapse) [I34.1]  Yes    Hypertension [I10]  Yes    Hypercholesterolemia [E78.00]  Yes      Resolved Hospital Problems   No resolved problems to display.       Ms. Gonzalez is a 89 y.o. female with a history of essential hypertension, hyperlipidemia history of subdural hematoma, GERD, history of mitral valve prolapse, history of DVT who presented to [facility] initially complaining of cough and chest congestion and was diagnosed with acute respiratory failure with hypoxia due to an acute exacerbation of bronchiectasis as well as pneumonia with a right lower lobe cavitary lesion and metabolic encephalopathy and was admitted to the hospital for further evaluation and treatment. She was treated with iv antibiotics, steroids, and nebulizers and clinically improved. Once her encephalopathy had resolved, she expressed her desire to transition to comfort care and now she is transitioning to a hospice scatter bed.     The patient has transitioned to a hospice scatter bed.  She is comfortable currently and while not yet requiring many PRNs, I think she is declining. We will continue the palliative care order set and treat her symptoms as they arise.     She is enrolled in the [employer] willed body program, and nursing staff has been made aware of this.     Severe malnutrition    Acute respiratory failure with hypoxia due to an acute exacerbation of bronchiectasis and pneumonia with a right lower lobe cavitary lesion  Essential hypertension  Dry mouth  History of DVT  Mitral valve prolapse history  History of SDH  GERD  I discussed the patient's findings and my recommendations with family and nursing staff.    VTE Prophylaxis - inconsistent with goals of care  Code Status - comfort care only       Adelfo Link MD  [facility]  03/25/24  16:24 EDT

## 2024-03-25 NOTE — CONSULTS
Patient transferred to the palliative care unit following goals of care and treatment preferences discussion with Dr. Link.  Patient and/or family state goal of care to be comfort and treatment preferences to be geared toward symptom management.  The palliative care team will follow for further support and discussion as needed.

## 2024-03-25 NOTE — CONSULTS
HSB Admission: 3/25/24  [facility] ID: [id number]  Diagnosis: severe protein malnutrition (E43) acute respiratory failure w/hypoxia (J96.01) bronchiectasis (J47.9) pneumonia (J18.9) dysphagia (R13.10)  Symptom Management: anxiety/dyspnea/pain    Met with daughter Deepa Bernstein at bedside. Explanation of services provided with a focus on [facility] ([facility]). Answered all questions, discussed medications, symptom management needs, and goals of care. [facility]. [facility] consent forms completed and signed by daughter/POA. [facility] information provided and encouraged to reach out with any needs.    Benefit change completed and copy left with Marian Diaz CCP. [facility] daily visits will begin tomorrow 3/26/24.    Please call with any questions, concerns, or change in patient status. Thank you for allowing us the opportunity to participate in the care of this patient/family.    Elsie Spear, RN, BSN  [facility] Admissions  [phone]

## 2024-03-25 NOTE — PROGRESS NOTES
Case Management Discharge Note      Final Note: admitted to a [facility] scattered bed on 3/25/24. CHRISTOPH Diaz RN, CCP.         Selected Continued Care - Admitted Since 3/14/2024       Destination Coordination complete.      Service Provider Selected Services Address Phone Fax Patient Preferred    [facility] [address], [facility] [id number] [phone] --              Durable Medical Equipment    No services have been selected for the patient.                Dialysis/Infusion    No services have been selected for the patient.                Home Medical Care    No services have been selected for the patient.                Therapy    No services have been selected for the patient.                Community Resources    No services have been selected for the patient.                Community & DME    No services have been selected for the patient.                    Selected Continued Care - Prior Encounters Includes continued care and service providers with selected services from prior encounters from 12/15/2023 to 3/25/2024      Discharged on 2/8/2024 Admission date: 2/7/2024 - Discharge disposition: Home or Self Care      Destination       Service Provider Selected Services Address Phone Fax Patient Preferred    [facility] [address] [facility] [address] [phone] --                               Final Discharge Disposition Code: 51 - hospice medical facility

## 2024-03-26 PROBLEM — E43 SEVERE PROTEIN-CALORIE MALNUTRITION: Status: ACTIVE | Noted: 2024-01-01

## 2024-03-26 NOTE — PROGRESS NOTES
Name: Carito Gonzalez ADMIT: 3/25/2024   : 3/7/1930  PCP: Eb Turner MD    MRN: [id number] LOS: 1 days   AGE/SEX: 89 y.o. female  ROOM: [facility]     Subjective   Subjective     No events overnight. No family at bedside. She awoke to voice. She doesn't remember me and her speech is difficult to understand but she denies any discomfort or needs.       Objective   Objective   Vital Signs  Temp:  [97.5 °F (36.4 °C)-99 °F (37.2 °C)] 97.5 °F (36.4 °C)  Heart Rate:  [84-85] 84  Resp:  [16] 16  BP: (120-140)/(71-87) 140/87  SpO2:  [87 %-93 %] 93 %  on   ;   Device (Oxygen Therapy): room air  There is no height or weight on file to calculate BMI.  Physical Exam  Constitutional:       General: She is not in acute distress.     Appearance: She is ill-appearing (frail, elderly). She is not toxic-appearing.   Cardiovascular:      Rate and Rhythm: Normal rate and regular rhythm.      Heart sounds: Normal heart sounds.   Pulmonary:      Effort: Pulmonary effort is normal. No respiratory distress.      Breath sounds: No wheezing, rhonchi or rales.   Abdominal:      General: Bowel sounds are normal. There is no distension.      Palpations: Abdomen is soft.      Tenderness: There is no abdominal tenderness. There is no guarding or rebound.   Musculoskeletal:         General: No tenderness.      Right lower leg: No edema.      Left lower leg: No edema.   Neurological:      General: No focal deficit present.      Mental Status: She is alert. She is disoriented.   Psychiatric:         Mood and Affect: Mood normal.         Behavior: Behavior normal.         Results Review     I reviewed the patient's new clinical results.        Results from last 7 days   Lab Units 24  0547 24  0542 24  0549 24  1208   SODIUM mmol/L 143 143 140 138   POTASSIUM mmol/L 4.2 3.9 3.7 3.4*   CHLORIDE mmol/L 107 105 103 99   CO2 mmol/L 25.7 23.0 22.2 23.0   BUN mg/dL 28* 24* 24* 30*   CREATININE mg/dL 0.56* 0.64 0.60 0.83  "  GLUCOSE mg/dL 135* 120* 74 76   Estimated Creatinine Clearance: 45.2 mL/min (A) (by C-G formula based on SCr of 0.56 mg/dL (L)).        Results from last 7 days   Lab Units 03/23/24  0547 03/22/24  0542 03/21/24  0549 03/20/24  1208   CALCIUM mg/dL 8.8 9.1 8.6 8.8     Results from last 7 days   Lab Units 03/20/24  1455   LACTATE mmol/L 1.0     COVID19   Date Value Ref Range Status   03/14/2024 Not Detected Not Detected - Ref. Range Final     No results found for: \"HGBA1C\", \"POCGLU\"    XR Chest 1 View  Narrative: XR CHEST 1 VW-     HISTORY: Female who is 89 years-old, short of breath     TECHNIQUE: Frontal view of the chest     COMPARISON: 3/14/2024     FINDINGS: The heart size is borderline. Aorta is tortuous, calcified.  Pulmonary vasculature is unremarkable. Old granulomatous disease is  present. No focal pulmonary consolidation, pleural effusion, or  pneumothorax. No acute osseous process.     Impression: No focal pulmonary consolidation. Borderline heart size.  Tortuous aorta. Follow-up as clinically indicated.     This report was finalized on 3/20/2024 2:41 PM by Dr. Sam Griffin M.D on Workstation: [id number]       Scheduled Medications  chlorhexidine, 15 mL, Mouth/Throat, Q12H    Infusions   Diet  Diet: Regular/House; Fluid Consistency: Thin (IDDSI 0)       Assessment/Plan     Active Hospital Problems    Diagnosis  POA    **Severe malnutrition [E43]  Yes    Severe protein-calorie malnutrition [E43]  Yes    Bronchiectasis [J47.9]  Yes    MVP (mitral valve prolapse) [I34.1]  Yes    Hypertension [I10]  Yes    Hypercholesterolemia [E78.00]  Yes      Resolved Hospital Problems   No resolved problems to display.       89 y.o. female admitted with Severe malnutrition.    In HSB. Not requiring frequent prns at this time. She appears comfortable. Not eating or drinking, so I expect rapid decline. No therapies/testing not for comfort. Continue the palliative care order set.     She is enrolled in the [facility] [employer] " [facility] body program     Acute respiratory failure with hypoxia due to an acute exacerbation of bronchiectasis and pneumonia with a right lower lobe cavitary lesion-completed an 8 day course of ceftriaxone. Quantiferon testing was negative. Resolved.  Dry mouth-artificial saliva and chlorhexidine are available  Essential hypertension  History of DVT  Mitral valve prolapse history  History of SDH  GERD  No indication for DVT ppx  Code status: comfort care only  Discussed with patient.  Dispo: ETIENNE Link MD  [facility]  03/26/24  15:09 EDT    I wore protective equipment throughout this patient encounter including a face mask, gloves and protective eyewear.  Hand hygiene was performed before donning protective equipment and after removal when leaving the room.

## 2024-03-26 NOTE — PROGRESS NOTES
SB team SW met with patient to explain role of SB team SW and assess for needs. Patient was lying in her hospital bed, unresponsive with no signs of pain or discomfort. NO family members were at the bedside. SW sat with patient to provide supportive presence.   Patient is currently sleeping and did not arouse for SW, therefore SW unable to complete PHQ9. Patient has two adult children that live in the area and are  involved in patient's care. Patient was [facility] at [facility] prior to this hospital stay. Family  would like for patient to remain in a SB for EOL care due to her imminent prognosis. SW educated on bereavement services provided by [facility] and family voiced understanding. Discussed  planning and family [facility]. SW will visit on a weekly and PRN basis to offer EOL support and assist with needs.

## 2024-03-26 NOTE — PLAN OF CARE
Goal Outcome Evaluation:  Plan of Care Reviewed With: patient        Progress: declining  Outcome Evaluation: Pt awakes to voice and gentle touch. O2 sat is low but otherwise VSS. Pt received one dose of morphine 2mg and 0.5mg ativan once this shift. Kept daughter updated this shift. Plan of kindess ongoing.

## 2024-03-26 NOTE — PROGRESS NOTES
"Enter Query Response Below      Query Response: Unable to clinically determine; this diagnosis was based on reporting from the family that the patient was confused and lethargic for several days, but I also didn't see documentation or witness it personally              If applicable, please update the problem list.     Patient: Carito Gonzalez        : 3/7/1930  Account: [id number]           Admit Date: 3/14/2024        How to Respond to this query:       a. Click New Note     b. Answer query within the yellow box.                c. Update the Problem List, if applicable.      If you have any questions about this query contact me at: [email or web address]     Dr. Link,    Patient with history of CKD III presented 3/14 for hypoxia. Notes include acute hypoxic respiratory failure, exacerbation of bronchiectasis, and pneumonia with right lower lobe cavitary lesion. H&P notes patient alert and oriented x3, and pulmonary consult includes alert and oriented x4. Progress notes 3/21 and 3/22 not \"Patient is less alert today than she has been previously she is still alert and answers questions appropriately.\" Discharge summary describes the patient as alert, and metabolic encephalopathy. Patient made comfort measures only on 3/23, and treated with monitoring, supplemental oxygen, and IV ceftriaxone 3/14 - 3/21.     After study, was metabolic encephalopathy clinically supported during this admission?     - Metabolic encephalopathy was supported with additional clinical indicators ___________________  - Metabolic encephalopathy was not supported  - Other ___________________  - Unable to clinically determine    By submitting this query, we are merely seeking further clarification of documentation to accurately reflect all conditions that you are monitoring, evaluating, treating or that extend the hospitalization or utilize additional resources of care. Please utilize your independent clinical judgment when addressing the " question(s) above.     This query and your response, once completed, will be entered into the legal medical record.    Sincerely,  Trenton Merino RN, CDS  Clinical Documentation Integrity Program

## 2024-03-26 NOTE — PROGRESS NOTES
Pt was minimally responsive and appeared comfortable.  Pt's daughter Violet was present.  Pt is [employer] (once [employer]).  Violet expressed sadness yet peace and acceptance with her mom's prognosis.  Violet has good support from her  and brother and family.  Violet lovingly and tearful share her love for her mom as  facilitated life review.  Rosalind  provided a prayer of closure and scripture for and her daughter.

## 2024-03-26 NOTE — PROGRESS NOTES
Visit Form    Carito Gonzalez  [id number]  3/26/2024      Patient is a 89yo female with a primary [facility] diagnosis of severe protein malnutrition . Admitted to [facility]  for GIP for EOL care and symptom management of pain and agitation    PPS: 20%       Medications in 24 hours:  -IV Morphine 2mg PRN x2  -IV Ativan 0.5mg PRN x2    Recommendations:  Continue to monitor for signs of decline and provide comfort measures. Contact [facility] at [phone] with any questions or concerns and at TOD.     Assessment:  Patient is in bed with her eyes closed. She startled when nurse said her name and lightly touched her arm. She was able to nod yes and no but did not open her eyes for me. She denied pain/anxiety. She was warm to touch. Patent f/c. Lungs clear on room air with unlabored respirations.     Collaboration:  Spoke with pts dtr Violet via phone with condition update and support provided. Training provided regarding assessment findings, disease progression, symptom management, recommendations and ELOS. Family v/u of pts condition and all questions were answered. Collaborated with facility RN and  about pts condition and recommendations.     Disposition:  Patient meets GIP criteria d/t frequent administration and continued titration of IV medications to achieve and maintain symptom management. Patient appears to be unsafe for transport at this time, requiring increasing symptom management and frequent RN assessment. If patient were to stabilize she would likely return home with family and [facility] support. Will continue [facility] RN visits to monitor for changes, assess needs and provide support. Please reach out with any questions/concerns. Thank you for allowing us the opportunity to participate in patient's care.      Yoanna Camarena RN  [facility] Scattered Bed Team  [phone]

## 2024-03-26 NOTE — PROGRESS NOTES
"Enter Query Response Below      Query Response: Bacterial pneumonia unspecified              If applicable, please update the problem list.     Patient: Carito Gonzalez        : 3/7/1930  Account: [id number]           Admit Date: 3/14/2024        How to Respond to this query:       a. Click New Note     b. Answer query within the yellow box.                c. Update the Problem List, if applicable.      If you have any questions about this query contact me at: [email or web address]     Dr. Likn,    Patient with history of CKD III presented 3/14 for hypoxia. Notes include acute hypoxic respiratory failure, exacerbation of bronchiectasis, and pneumonia with right lower lobe cavitary lesion. Pulmonary notes include \"Very well could be an aspiration pneumonia,\" and \"My suspicion for TB is not very high and I suspect that the problem is related to silent aspiration.\" Hospitalist notes dated 3/21 and 3/22 include \"Patient's cavitary lesion may be related to chronic aspiration pneumonia.\" Quantiferon gold, legionella, and strep pneumo negative. Patient treated with monitoring, supplemental oxygen, and IV ceftriaxone 3/14 - 3/21.     Please clarify the type of pneumonia the patient was treated/monitored for:     - Aspiration pneumonia  - Gram negative pneumonia (excluding Haemophilus influenzae)  - Bacterial pneumonia unspecified  - Other ___________________  - Unable to clinically determine    By submitting this query, we are merely seeking further clarification of documentation to accurately reflect all conditions that you are monitoring, evaluating, treating or that extend the hospitalization or utilize additional resources of care. Please utilize your independent clinical judgment when addressing the question(s) above.     This query and your response, once completed, will be entered into the legal medical record.    Sincerely,  Trenton Merino RN, CDS  Clinical Documentation Integrity Program     "

## 2024-03-27 NOTE — PROGRESS NOTES
Name: Carito Gonzalez ADMIT: 3/25/2024   : 3/7/1930  PCP: Eb Turner MD    MRN: [id number] LOS: 2 days   AGE/SEX: 89 y.o. female  ROOM: [facility]     Subjective   Subjective     No events overnight. Grand daughters at bedside. She is requiring a lot more sedating medications starting yesterday. She still rouses to voice, but doesn't awake       Objective   Objective   Vital Signs  Temp:  [97 °F (36.1 °C)-97.6 °F (36.4 °C)] 97 °F (36.1 °C)  Heart Rate:  [83-95] 83  Resp:  [16] 16  BP: (117-153)/(73-92) 117/73  SpO2:  [87 %-92 %] 87 %  on   ;   Device (Oxygen Therapy): room air  There is no height or weight on file to calculate BMI.  Physical Exam  Constitutional:       General: She is not in acute distress.     Appearance: She is ill-appearing (frail, elderly). She is not toxic-appearing.   Cardiovascular:      Rate and Rhythm: Regular rhythm. Tachycardia present.      Heart sounds: Normal heart sounds.   Pulmonary:      Effort: Pulmonary effort is normal. No respiratory distress.      Breath sounds: No wheezing, rhonchi or rales.   Abdominal:      General: Bowel sounds are normal. There is no distension.      Palpations: Abdomen is soft.      Tenderness: There is no abdominal tenderness. There is no guarding or rebound.   Musculoskeletal:         General: No tenderness.      Right lower leg: No edema.      Left lower leg: No edema.   Neurological:      General: No focal deficit present.      Mental Status: She is alert. She is disoriented.   Psychiatric:         Mood and Affect: Mood normal.         Behavior: Behavior normal.         Results Review     I reviewed the patient's new clinical results.        Results from last 7 days   Lab Units 24  0547 24  0542 24  0549   SODIUM mmol/L 143 143 140   POTASSIUM mmol/L 4.2 3.9 3.7   CHLORIDE mmol/L 107 105 103   CO2 mmol/L 25.7 23.0 22.2   BUN mg/dL 28* 24* 24*   CREATININE mg/dL 0.56* 0.64 0.60   GLUCOSE mg/dL 135* 120* 74   Estimated  "Creatinine Clearance: 45.2 mL/min (A) (by C-G formula based on SCr of 0.56 mg/dL (L)).        Results from last 7 days   Lab Units 03/23/24  0547 03/22/24  0542 03/21/24  0549   CALCIUM mg/dL 8.8 9.1 8.6           COVID19   Date Value Ref Range Status   03/14/2024 Not Detected Not Detected - Ref. Range Final     No results found for: \"HGBA1C\", \"POCGLU\"    XR Chest 1 View  Narrative: XR CHEST 1 VW-     HISTORY: Female who is 89 years-old, short of breath     TECHNIQUE: Frontal view of the chest     COMPARISON: 3/14/2024     FINDINGS: The heart size is borderline. Aorta is tortuous, calcified.  Pulmonary vasculature is unremarkable. Old granulomatous disease is  present. No focal pulmonary consolidation, pleural effusion, or  pneumothorax. No acute osseous process.     Impression: No focal pulmonary consolidation. Borderline heart size.  Tortuous aorta. Follow-up as clinically indicated.     This report was finalized on 3/20/2024 2:41 PM by Dr. Sam Griffin M.D on Workstation: [id number]       Scheduled Medications  chlorhexidine, 15 mL, Mouth/Throat, Q12H    Infusions   Diet  Diet: Regular/House; Fluid Consistency: Thin (IDDSI 0)       Assessment/Plan     Active Hospital Problems    Diagnosis  POA    **Severe malnutrition [E43]  Yes    Severe protein-calorie malnutrition [E43]  Yes    Bronchiectasis [J47.9]  Yes    MVP (mitral valve prolapse) [I34.1]  Yes    Hypertension [I10]  Yes    Hypercholesterolemia [E78.00]  Yes      Resolved Hospital Problems   No resolved problems to display.       89 y.o. female admitted with Severe malnutrition.    In HSB. Now requiring frequent PRNs. She appears comfortable. No therapies/testing not for comfort. Continue the palliative care order set.     She is enrolled in the [facility] willed body program     Acute respiratory failure with hypoxia due to an acute exacerbation of bronchiectasis and pneumonia with a right lower lobe cavitary lesion-completed an 8 day course of " ceftriaxone. Quantiferon testing was negative. Resolved.  Dry mouth-artificial saliva and chlorhexidine are available  Essential hypertension  History of DVT  Mitral valve prolapse history  History of SDH  GERD  No indication for DVT ppx  Code status: comfort care only  Discussed with patient and family.  Dispo: HSB      Adelfo Link MD  [facility]  03/27/24  14:56 EDT    I wore protective equipment throughout this patient encounter including a face mask, gloves and protective eyewear.  Hand hygiene was performed before donning protective equipment and after removal when leaving the room.

## 2024-03-27 NOTE — PROGRESS NOTES
Visit Form     Carito Gonzalez  [id number]  3/27/2024        Patient is a 89yo female with a primary [facility] diagnosis of severe protein malnutrition . Admitted to [facility]  for GIP for EOL care and symptom management of pain and agitation     PPS: 10%                Medications in 24 hours:  -IV Morphine 2mg PRN x5  -IV Ativan 0.5mg PRN x5     Recommendations:  Continue to monitor for signs of decline and provide comfort measures. Contact [facility] at [phone] with any questions or concerns and at TOD.      Assessment:  Patient is in bed with her eyes closed on her back with granddtr at bedside. Pt remains warm to touch with palpable peripheral pulses. Lungs clear on ra. Hypoactive b/s and patent f/c in place. Pt appeared very comfortable with no nonverbal s/s of pain/distress. Pt was no longer responding to my assessment but per family she will moan with movement at times    Collaboration:  Spoke with pts dtr Violet via phone and granddtr at bedside with condition update and support provided. Training provided regarding assessment findings, disease progression, symptom management, recommendations and ELOS. Family v/u of pts condition and all questions were answered. Collaborated with facility RN and  about pts condition and recommendations.      Disposition:  Patient meets GIP criteria d/t frequent administration and continued titration of IV medications to achieve and maintain symptom management. Patient appears to be unsafe for transport at this time, requiring increasing symptom management and frequent RN assessment. If patient were to stabilize she would likely return home with family and [facility] support. Will continue [facility] RN visits to monitor for changes, assess needs and provide support. Please reach out with any questions/concerns. Thank you for allowing us the opportunity to participate in patient's care.        Yoanna Camarena RN  [facility] Bed Team  [phone]

## 2024-03-28 NOTE — CONSULTS
Visit with patient's daughter at bedside. Patient appears to be comfortable. Daughter is attentive to mother's facial expressions and movements. Daughter's  is living with end stage cancer. Daughter has lost many family members and is tearful and grieving. Patient is a member at [employer]. Prayer offered for comfort and peace of patient and those that she loves. Family is aware that chaplains are available for continued care and support.

## 2024-03-28 NOTE — PLAN OF CARE
Goal Outcome Evaluation:  Plan of Care Reviewed With: daughter, family        Progress: declining  Outcome Evaluation: Pt minimally responsive tonight. Premedicate prior to turns with ativan and morphine and reposition about 30 minutes following med administration. Pt has tolerated well with this schedule and has appeared comfortable during turns and between care. Daughter at bedside with pt. Will continue to monitor for comfort.

## 2024-03-28 NOTE — PROGRESS NOTES
Visit Form     Carito Gonzalez  [id number]  3/28/2024        Patient is a 89yo female with a primary [facility] diagnosis of severe protein malnutrition . Admitted to [facility]  for GIP for EOL care and symptom management of pain and agitation     PPS: 10%                Medications in 24 hours:  -IV Morphine 2mg PRN x2  -IV Morphine 4mg PRN x4  -IV Ativan 0.5mg PRN x1  -IV Ativan 1mg PRN x5  -IV Robinul 0.2mg     Recommendations:  Continue to monitor for signs of decline and provide comfort measures. Contact [facility] at [phone] with any questions or concerns and at TOD.      Assessment:  Pt was in bed on her left side. She has had a bif decline overnight. She is now has mottling to his BUL and BLE. Palpable pulses but very weak. Respirations are very shallow. Current regimen effective.    Collaboration:  Spoke with pts dtr Violet via phone and dtr at bedside with condition update and support provided. Training provided regarding assessment findings, disease progression, symptom management, recommendations and ELOS. Family v/u of pts condition and all questions were answered. Collaborated with facility RN and  about pts condition and recommendations.      Disposition:  Patient meets GIP criteria d/t frequent administration and continued titration of IV medications to achieve and maintain symptom management. Patient appears to be unsafe for transport at this time, requiring increasing symptom management and frequent RN assessment. If patient were to stabilize she would likely return home with family and [facility] support. Will continue [facility] RN visits to monitor for changes, assess needs and provide support. Please reach out with any questions/concerns. Thank you for allowing us the opportunity to participate in patient's care.        Yoanna Camarena RN  [facility] Bed Team  [phone]

## 2024-03-28 NOTE — DISCHARGE SUMMARY
Name: Carito Gonzalez  :  3/7/1930  MRN: [id number]         Primary Care Physician: Eb Turner MD      Date of Admission:  3/25/2024  Date and Time of Death:  3/28/2024 at 1:05 PM    Principle Cause of Death:   Severe malnutrition    Secondary Diagnoses:     Severe malnutrition    Hypercholesterolemia    Hypertension    MVP (mitral valve prolapse)    Bronchiectasis    Severe protein-calorie malnutrition        Hospital Course  Ms. Gonzalez is a 89 y.o. female with a history of essential hypertension, hyperlipidemia history of subdural hematoma, GERD, history of mitral valve prolapse, history of DVT who presented to [facility] initially complaining of cough and chest congestion.  Please see the admitting history and physical for further details.  She was found to have acute respiratory failure with hypoxia due to an acute exacerbation of bronchiectasis as well as pneumonia with a right lower lobe cavitary lesion and metabolic encephalopathy and was admitted to the hospital for further evaluation and treatment.       She was started on IV antibiotics and steroids and nebulizers, and she was seen in consultation by pulmonology.  She was ruled out for tuberculosis, and she gradually improved with the aforementioned treatments and her hypoxia and encephalopathy resolved.  When she was able to make her wishes known, she stated that she wanted to pursue palliative care.  She had finished her treatment and had clinically improved, but she wasn't eating or drinking very much, so we transferred her to [facility] and stopped all of her medications, testing, and therapies that were not for comfort and initiated the palliative care order set. Initially she didn't require any prns, but after a couple of days she started to decline. She was kept comfortable until she passed away this afternoon.     Adeflo Link MD  24  13:54 EDT

## 2024-03-29 NOTE — PROGRESS NOTES
Case Management Discharge Note      Final Note: The patient  on 3/28/24 @ 13:05. B. Joe RN, CCP.         Selected Continued Care - Discharged on 3/28/2024 Admission date: 3/25/2024 - Discharge disposition:       Destination Coordination complete.      Service Provider Selected Services Address Phone Fax Patient Preferred    [facility] [address], [place] [facility] [address] [phone] --              Durable Medical Equipment    No services have been selected for the patient.                Dialysis/Infusion    No services have been selected for the patient.                Home Medical Care    No services have been selected for the patient.                Therapy    No services have been selected for the patient.                Community Resources    No services have been selected for the patient.                Community & DME    No services have been selected for the patient.                    Selected Continued Care - Prior Encounters Includes continued care and service providers with selected services from prior encounters from 2023 to 3/28/2024      Discharged on 3/25/2024 Admission date: 3/14/2024 - Discharge disposition: Hospice/Medical Facility ([facility])      Destination       Service Provider Selected Services Address Phone Fax Patient Preferred    [facility] [address], [place] [address] [phone] --                      Discharged on 2024 Admission date: 2024 - Discharge disposition: Home or Self Care      Destination       Service Provider Selected Services Address Phone Fax Patient Preferred    [facility] [address], [place] [address] [phone] --                               Final Discharge Disposition Code: 41 -  in medical facility